# Patient Record
Sex: MALE | Race: WHITE | Employment: OTHER | URBAN - METROPOLITAN AREA
[De-identification: names, ages, dates, MRNs, and addresses within clinical notes are randomized per-mention and may not be internally consistent; named-entity substitution may affect disease eponyms.]

---

## 2017-01-10 ENCOUNTER — TELEPHONE (OUTPATIENT)
Dept: ONCOLOGY | Facility: CLINIC | Age: 77
End: 2017-01-10

## 2017-01-10 NOTE — TELEPHONE ENCOUNTER
Per Patient's request,  completed and faxed [employer] request for lodging dates 1/10/2017-1/11/2017. [employer] will contact Patient for confirmation of reservation.  will continue to provide support as needed.    Soo Yeon Han, LGSW  Pager:  [phone]

## 2017-01-10 NOTE — TELEPHONE ENCOUNTER
Per Patient's request,  completed and faxed [employer] request for lodging dates 1/10/2017-1/11/2017. [employer] will contact Patient for confirmation of reservation.  will continue to provide support as needed.    Soo Yeon Han, LGSW  Pager:  [phone]

## 2017-01-11 ENCOUNTER — APPOINTMENT (OUTPATIENT)
Dept: LAB | Facility: CLINIC | Age: 77
End: 2017-01-11
Attending: INTERNAL MEDICINE
Payer: MEDICARE

## 2017-01-11 ENCOUNTER — ONCOLOGY VISIT (OUTPATIENT)
Dept: ONCOLOGY | Facility: CLINIC | Age: 77
End: 2017-01-11
Attending: INTERNAL MEDICINE
Payer: MEDICARE

## 2017-01-11 VITALS
TEMPERATURE: 97.8 F | RESPIRATION RATE: 18 BRPM | OXYGEN SATURATION: 100 % | HEART RATE: 68 BPM | WEIGHT: 166.6 LBS | BODY MASS INDEX: 24.68 KG/M2 | SYSTOLIC BLOOD PRESSURE: 143 MMHG | HEIGHT: 69 IN | DIASTOLIC BLOOD PRESSURE: 74 MMHG

## 2017-01-11 DIAGNOSIS — Z79.899 ENCOUNTER FOR LONG-TERM (CURRENT) USE OF MEDICATIONS: ICD-10-CM

## 2017-01-11 DIAGNOSIS — C34.11 MALIGNANT NEOPLASM OF UPPER LOBE OF RIGHT LUNG (H): ICD-10-CM

## 2017-01-11 LAB
ALBUMIN SERPL-MCNC: 3.6 G/DL (ref 3.4–5)
ALP SERPL-CCNC: 103 U/L (ref 40–150)
ALT SERPL W P-5'-P-CCNC: 25 U/L (ref 0–70)
ANION GAP SERPL CALCULATED.3IONS-SCNC: 6 MMOL/L (ref 3–14)
AST SERPL W P-5'-P-CCNC: 24 U/L (ref 0–45)
BASOPHILS # BLD AUTO: 0.1 10E9/L (ref 0–0.2)
BASOPHILS NFR BLD AUTO: 0.8 %
BILIRUB SERPL-MCNC: 1.1 MG/DL (ref 0.2–1.3)
BUN SERPL-MCNC: 20 MG/DL (ref 7–30)
CALCIUM SERPL-MCNC: 8.8 MG/DL (ref 8.5–10.1)
CHLORIDE SERPL-SCNC: 111 MMOL/L (ref 94–109)
CO2 SERPL-SCNC: 27 MMOL/L (ref 20–32)
CREAT SERPL-MCNC: 1.59 MG/DL (ref 0.66–1.25)
DIFFERENTIAL METHOD BLD: NORMAL
EOSINOPHIL # BLD AUTO: 0.1 10E9/L (ref 0–0.7)
EOSINOPHIL NFR BLD AUTO: 2.1 %
ERYTHROCYTE [DISTWIDTH] IN BLOOD BY AUTOMATED COUNT: 12.8 % (ref 10–15)
GFR SERPL CREATININE-BSD FRML MDRD: 42 ML/MIN/1.7M2
GLUCOSE SERPL-MCNC: 70 MG/DL (ref 70–99)
HCT VFR BLD AUTO: 49.4 % (ref 40–53)
HGB BLD-MCNC: 15.6 G/DL (ref 13.3–17.7)
IMM GRANULOCYTES # BLD: 0 10E9/L (ref 0–0.4)
IMM GRANULOCYTES NFR BLD: 0.3 %
LYMPHOCYTES # BLD AUTO: 1.5 10E9/L (ref 0.8–5.3)
LYMPHOCYTES NFR BLD AUTO: 24.7 %
MAGNESIUM SERPL-MCNC: 2.3 MG/DL (ref 1.6–2.3)
MCH RBC QN AUTO: 31 PG (ref 26.5–33)
MCHC RBC AUTO-ENTMCNC: 31.6 G/DL (ref 31.5–36.5)
MCV RBC AUTO: 98 FL (ref 78–100)
MONOCYTES # BLD AUTO: 0.7 10E9/L (ref 0–1.3)
MONOCYTES NFR BLD AUTO: 11.1 %
NEUTROPHILS # BLD AUTO: 3.8 10E9/L (ref 1.6–8.3)
NEUTROPHILS NFR BLD AUTO: 61 %
NRBC # BLD AUTO: 0 10*3/UL
NRBC BLD AUTO-RTO: 0 /100
PHOSPHATE SERPL-MCNC: 2 MG/DL (ref 2.5–4.5)
PLATELET # BLD AUTO: 185 10E9/L (ref 150–450)
POTASSIUM SERPL-SCNC: 5.1 MMOL/L (ref 3.4–5.3)
PROT SERPL-MCNC: 6.5 G/DL (ref 6.8–8.8)
RBC # BLD AUTO: 5.04 10E12/L (ref 4.4–5.9)
SODIUM SERPL-SCNC: 144 MMOL/L (ref 133–144)
WBC # BLD AUTO: 6.2 10E9/L (ref 4–11)

## 2017-01-11 PROCEDURE — 99212 OFFICE O/P EST SF 10 MIN: CPT | Mod: ZF

## 2017-01-11 PROCEDURE — 85025 COMPLETE CBC W/AUTO DIFF WBC: CPT | Performed by: INTERNAL MEDICINE

## 2017-01-11 PROCEDURE — 99215 OFFICE O/P EST HI 40 MIN: CPT | Mod: ZP | Performed by: INTERNAL MEDICINE

## 2017-01-11 PROCEDURE — 83735 ASSAY OF MAGNESIUM: CPT | Performed by: INTERNAL MEDICINE

## 2017-01-11 PROCEDURE — 80053 COMPREHEN METABOLIC PANEL: CPT | Performed by: INTERNAL MEDICINE

## 2017-01-11 PROCEDURE — 84100 ASSAY OF PHOSPHORUS: CPT | Performed by: INTERNAL MEDICINE

## 2017-01-11 PROCEDURE — 36592 COLLECT BLOOD FROM PICC: CPT

## 2017-01-11 RX ORDER — NITROGLYCERIN 0.4 MG/1
TABLET SUBLINGUAL
COMMUNITY
Start: 2009-08-07 | End: 2017-07-12

## 2017-01-11 RX ORDER — MOXIFLOXACIN 5 MG/ML
SOLUTION/ DROPS OPHTHALMIC
COMMUNITY
Start: 2015-03-26 | End: 2017-07-12

## 2017-01-11 RX ORDER — DIPHENOXYLATE HYDROCHLORIDE AND ATROPINE SULFATE 2.5; .025 MG/1; MG/1
TABLET ORAL
COMMUNITY
End: 2017-03-08

## 2017-01-11 RX ORDER — KETOROLAC TROMETHAMINE 5 MG/ML
SOLUTION OPHTHALMIC
COMMUNITY
Start: 2015-03-26 | End: 2017-07-12

## 2017-01-11 RX ORDER — PREDNISOLONE ACETATE 10 MG/ML
SUSPENSION/ DROPS OPHTHALMIC
COMMUNITY
Start: 2015-03-26 | End: 2017-07-12

## 2017-01-11 RX ORDER — SIMVASTATIN 80 MG
TABLET ORAL
COMMUNITY
Start: 2015-11-03

## 2017-01-11 ASSESSMENT — PAIN SCALES - GENERAL: PAINLEVEL: NO PAIN (0)

## 2017-01-11 NOTE — MR AVS SNAPSHOT
After Visit Summary   1/11/2017    Gilson Curtis    MRN: [id number]           Patient Information     Date Of Birth          1940        Visit Information        Provider Department      1/11/2017 10:15 AM Tim Haywood MD [facility]        Today's Diagnoses     Malignant neoplasm of upper lobe of right lung (H)         Encounter for long-term (current) use of medications            Follow-ups after your visit        Your next 10 appointments already scheduled     Mar 06, 2017 10:20 AM   LAB with LAB FIRST FLOOR [facility] ([facility])    [address] [place] [id number] [phone]           Patient must bring picture ID.  Patient should be prepared to give a urine specimen  Please do not eat 10-12 hours before your appointment if you are coming in fasting for labs on lipids, cholesterol, or glucose (sugar).  Pregnant women should follow their Care Team instructions. Water with medications is okay. Do not drink coffee or other fluids.   If you have concerns about taking  your medications, please ask at office or if scheduling via [employer], send a message by clicking on Secure Messaging, Message Your Care Team.            Mar 06, 2017 11:00 AM   CT CHEST W CONTRAST with MGCT1   [facility]    [address] [place] [id number] [phone]           Please bring any scans or X-rays taken at other hospitals, if similar tests were done. Also bring a list of your medicines, including vitamins, minerals and over-the-counter drugs. It is safest to leave personal items at home.  Be sure to tell your doctor:   If you have any allergies.   If there s any chance you are pregnant.   If you are breastfeeding.   If you have any special needs.  You will have contrast for this exam. To prepare:   Do not eat or drink for 2 hours before your exam. If you need  to take medicine, you may take it with small sips of water. (We may ask you to take liquid medicine as well.)   The day before your exam, drink extra fluids at least six 8-ounce glasses (unless your doctor tells you to restrict your fluids).  Patients over 70 or patients with diabetes or kidney problems:   If you haven t had a blood test (creatinine test) within the last 30 days, go to your clinic or Diagnostic Imaging Department for this test.  If you have diabetes:   If your kidney function is normal, continue taking your metformin (Avandamet, Glucophage, Glucovance, Metaglip) on the day of your exam.   If your kidney function is abnormal, wait 48 hours before restarting this medicine.  Please wear loose clothing, such as a sweat suit or jogging clothes. Avoid snaps, zippers and other metal. We may ask you to undress and put on a hospital gown.  If you have any questions, please call the Imaging Department where you will have your exam.            Mar 08, 2017  8:45 AM   (Arrive by 8:30 AM)   Return Visit with Tim Haywood MD   [facility] ([facility])    [address] [address] [phone]              Future tests that were ordered for you today     Open Future Orders        Priority Expected Expires Ordered    CT Chest w contrast Routine  1/11/2018 1/11/2017            Who to contact     If you have questions or need follow up information about today's clinic visit or your schedule please contact [facility] directly at [phone].  Normal or non-critical lab and imaging results will be communicated to you by MyChart, letter or phone within 4 business days after the clinic has received the results. If you do not hear from us within 7 days, please contact the clinic through MyChart or phone. If you have a critical or abnormal lab result, we will notify you by phone as soon as possible.  Submit refill  "requests through [employer] or call your pharmacy and they will forward the refill request to us. Please allow 3 business days for your refill to be completed.          Additional Information About Your Visit        [employer] Information     [employer] gives you secure access to your electronic health record. If you see a primary care provider, you can also send messages to your care team and make appointments. If you have questions, please call your primary care clinic.  If you do not have a primary care provider, please call [phone] and they will assist you.        Care EveryWhere ID     This is your Care EveryWhere ID. This could be used by other organizations to access your [facility] medical records  [id number]        Your Vitals Were     Pulse Temperature Respirations Height BMI (Body Mass Index) Pulse Oximetry    68 97.8  F (36.6  C) (Oral) 18 1.753 m (5' 9.02\") 24.59 kg/m2 100%       Blood Pressure from Last 3 Encounters:   01/11/17 143/74   10/12/16 158/75   08/15/16 127/74    Weight from Last 3 Encounters:   01/11/17 75.569 kg (166 lb 9.6 oz)   10/12/16 77.338 kg (170 lb 8 oz)   08/15/16 75.978 kg (167 lb 8 oz)              We Performed the Following     CBC with platelets differential     Comprehensive metabolic panel     Magnesium     Phosphorus          Today's Medication Changes          These changes are accurate as of: 1/11/17 11:26 AM.  If you have any questions, ask your nurse or doctor.               These medicines have changed or have updated prescriptions.        Dose/Directions    simvastatin 80 MG tablet   Commonly known as:  ZOCOR   This may have changed:  Another medication with the same name was removed. Continue taking this medication, and follow the directions you see here.   Changed by:  Tim Haywood MD        Refills:  0                Primary Care Provider Office Phone # Fax #    Hemant Jiménez [phone]       [address] " [address]        Thank you!     Thank you for choosing [facility]  for your care. Our goal is always to provide you with excellent care. Hearing back from our patients is one way we can continue to improve our services. Please take a few minutes to complete the written survey that you may receive in the mail after your visit with us. Thank you!             Your Updated Medication List - Protect others around you: Learn how to safely use, store and throw away your medicines at www.disposemymeds.org.          This list is accurate as of: 1/11/17 11:26 AM.  Always use your most recent med list.                   Brand Name Dispense Instructions for use    aspirin 81 MG tablet      Take 1 tablet by mouth At Bedtime       calcium carbonate 500 MG chewable tablet    TUMS     Take 1 chew tab by mouth as needed for heartburn       clindamycin 1 % topical gel    CLINDAMAX    60 g    Apply topically 2 times daily       erlotinib 150 MG tablet CHEMO    TARCEVA    30 tablet    Take 1 tablet (150 mg) by mouth daily Take on an empty stomach one hour before or two hours after a meal. Avoid grapefruit and grapefruit juice.       GABAPENTIN PO      Take 150 mg by mouth At Bedtime       hydrocortisone 1 % ointment      Apply topically 2 times daily       ketorolac 0.5 % ophthalmic solution    ACULAR         lisinopril 2.5 MG tablet    PRINIVIL/Zestril     Take 2.5 mg by mouth daily.       * MATURE ADULT CENTURY PO      Take 1 tablet by mouth daily OTC       * EYE VITAMINS PO      Take 1 capsule by mouth daily OTC       MULTI-VITAMINS Tabs          nitroglycerin 0.4 MG sublingual tablet    NITROSTAT         prednisoLONE acetate 1 % ophthalmic susp    PRED FORTE         ranitidine 150 MG capsule    ZANTAC     Take 150 mg by mouth 2 times daily       simvastatin 80 MG tablet    ZOCOR         VIGAMOX 0.5 % ophthalmic solution   Generic drug:  moxifloxacin          * Notice:  This list has 2 medication(s) that are the same  as other medications prescribed for you. Read the directions carefully, and ask your doctor or other care provider to review them with you.

## 2017-01-11 NOTE — Clinical Note
1/11/2017       RE: Gilson Curtis  [address] [address]     Dear Colleague,    Thank you for referring your patient, Gilson Curtis, to the [facility]. Please see a copy of my visit note below.    CHIEF COMPLAINT:  Stage IV non-small cell lung cancer.      HISTORY OF PRESENT ILLNESS:  The patient returns to clinic today for further therapy and monitoring.  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  He has been on Tarceva for the better part of    9 months.  CT scan today shows no change.  The previously resected rib shows no evidence of cancer in that area.  The nodule in the right upper lobe is unchanged at 6.8 mm.  I have reviewed the CT myself and concur with the radiologist's findings.      The patient continues to have a facial rash.  He wants to put the cortisone cream just on his nose at this time since he feels it makes his rash worse.  I said that is fine.  We did talk about some of the newer EGFR inhibitors which have less rash and will likely be available for first-line therapy later this year.  The patient has no diarrhea at this time.       REVIEW OF SYSTEMS:  A 10-point review of systems is positive for drug-induced rash as described above.  The patient uses hydrocortisone cream as needed for this.       IMAGING:  I have reviewed the CT myself and concur with the radiologist's findings.  There is no significant change.  There is no evidence of tumor in the right 8th rib.       PHYSICAL EXAMINATION:  Vital signs are stable.  The patient is afebrile.  Oxygenation is 100% on room air.  No further exam was carried out.  Erythematous rash on cheeks and nose.  No exudate.     LABORATORY:  Laboratory tests are pending at this time.  Creatinine is 1.7 which is stable.      ASSESSMENT AND PLAN:   1.  Recurrent non-small cell lung cancer:  The patient is doing well on erlotinib.  We will continue this treatment.  There is some hazy infiltrate at the bases of  both lungs.  It is very minimal and I have reviewed it myself.  It possibly is early pneumonitis and so I would like to see the patient back with an earlier CT scan in 6-8 weeks.  I will evaluate him myself at that time.  Otherwise, the patient is doing extremely well on the Tarceva and will continue this treatment.  If the Tarceva fails, second-line treatment with Tagrisso or possibly afatinib which has better effects on exon 21-deleted tumors would be a possibility.    2.  Drug-induced rash:  The patient's rash is not significantly improved.  He would like to stop the hydrocortisone cream and will just put it on his nose.  We did discuss that some of the newer agents have less rash and they might be available later this year for use in the first line.       cc:  ARMIDA Johnson, P           Again, thank you for allowing me to participate in the care of your patient.      Sincerely,    Tim Haywood MD

## 2017-01-11 NOTE — PROGRESS NOTES
CHIEF COMPLAINT:  Stage IV non-small cell lung cancer.      HISTORY OF PRESENT ILLNESS:  The patient returns to clinic today for further therapy and monitoring.  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  He has been on Tarceva for the better part of    9 months.  CT scan today shows no change.  The previously resected rib shows no evidence of cancer in that area.  The nodule in the right upper lobe is unchanged at 6.8 mm.  I have reviewed the CT myself and concur with the radiologist's findings.      The patient continues to have a facial rash.  He wants to put the cortisone cream just on his nose at this time since he feels it makes his rash worse.  I said that is fine.  We did talk about some of the newer EGFR inhibitors which have less rash and will likely be available for first-line therapy later this year.  The patient has no diarrhea at this time.       REVIEW OF SYSTEMS:  A 10-point review of systems is positive for drug-induced rash as described above.  The patient uses hydrocortisone cream as needed for this.       IMAGING:  I have reviewed the CT myself and concur with the radiologist's findings.  There is no significant change.  There is no evidence of tumor in the right 8th rib.       PHYSICAL EXAMINATION:  Vital signs are stable.  The patient is afebrile.  Oxygenation is 100% on room air.  No further exam was carried out.  Erythematous rash on cheeks and nose.  No exudate.     LABORATORY:  Laboratory tests are pending at this time.  Creatinine is 1.7 which is stable.      ASSESSMENT AND PLAN:   1.  Recurrent non-small cell lung cancer:  The patient is doing well on erlotinib.  We will continue this treatment.  There is some hazy infiltrate at the bases of both lungs.  It is very minimal and I have reviewed it myself.  It possibly is early pneumonitis and so I would like to see the patient back with an earlier CT scan in 6-8 weeks.  I will evaluate him myself at that time.  Otherwise, the patient  is doing extremely well on the Tarceva and will continue this treatment.  If the Tarceva fails, second-line treatment with Tagrisso or possibly afatinib which has better effects on exon 21-deleted tumors would be a possibility.    2.  Drug-induced rash:  The patient's rash is not significantly improved.  He would like to stop the hydrocortisone cream and will just put it on his nose.  We did discuss that some of the newer agents have less rash and they might be available later this year for use in the first line.       cc:  ARMIDA Johnson, P

## 2017-01-11 NOTE — NURSING NOTE
"Gilson Curtis is a 76 year old male who presents for:  Chief Complaint   Patient presents with     Blood Draw     blood collected by RN in placed unused IV from CT, shanta extra red gel just in case, vitals taken and pt checked in for next appt.     Oncology Clinic Visit     return patient visit related to NSCL CA         Initial Vitals:  /74 mmHg  Pulse 68  Temp(Src) 97.8  F (36.6  C) (Oral)  Resp 18  Ht 1.753 m (5' 9.02\")  Wt 75.569 kg (166 lb 9.6 oz)  BMI 24.59 kg/m2  SpO2 100% Estimated body mass index is 24.59 kg/(m^2) as calculated from the following:    Height as of this encounter: 1.753 m (5' 9.02\").    Weight as of this encounter: 75.569 kg (166 lb 9.6 oz).. Body surface area is 1.92 meters squared. BP completed using cuff size: NA (Not Taken)  No Pain (0) No LMP for male patient. Allergies and medications reviewed.     Medications: Medication refills not needed today.  Pharmacy name entered into [employer]: [facility] [address] [place] [address]    Comments: patient denied pain/discomfort.    5 minutes for nursing intake (face to face time)   Jair Newsome CMA          "

## 2017-01-16 ENCOUNTER — TELEPHONE (OUTPATIENT)
Dept: ONCOLOGY | Facility: CLINIC | Age: 77
End: 2017-01-16

## 2017-01-16 DIAGNOSIS — C34.91 NON-SMALL CELL LUNG CANCER, RIGHT (H): Primary | ICD-10-CM

## 2017-01-16 RX ORDER — ERLOTINIB HYDROCHLORIDE 150 MG/1
150 TABLET, FILM COATED ORAL DAILY
Qty: 30 TABLET | Refills: 0 | Status: SHIPPED
Start: 2017-01-16 | End: 2017-02-15

## 2017-01-16 NOTE — TELEPHONE ENCOUNTER
Oral Chemotherapy Monitoring Program    Primary Oncologist: Dr. Haywood  Primary Oncology Clinic: [facility]  Cancer Diagnosis: Lung Cancer    Therapy History:Tarceva 150mg (1 x 150mg) QDaily, Continuously   Start Date: 5/11/16      Lab Monitoring Plan  Monitoring plan:     C1D1+     CMP   C2D1+   Call, CMP, Mg, Phos   C3D1+   Call, CMP, Mg, Phos   C4D1+   Call, CMP, Mg, Phos   C5D1+   Call, CMP, Mg, Phos   C6D1+   Call, CMP, Mg, Phos     C1D8+      C2D8+      C3D8+      C4D8+      C5D8+      C6D8+        C1D15+   Call   C2D15+      C3D15+      C4D15+      C5D15+      C6D15+        C1D22+      C2D22+      C3D22+      C4D22+      C5D22+      C6D22+            Drug Interaction Assessment: No new drug interactions.                   Subjective/Objective:  Gilson Curtis is a 76 year old male contacted by phone for a follow-up visit for oral chemotherapy. Gilson reports he is feeling well. Reports the small rash on face and around nose has not improved and will use Aveno cream and stop hydrocortisone cream. He denies any diarrhea, constipation, nausea, vomiting, fever, and fatigue. We discussed timing of medication for administration on an empty stomach. He reports no missed doses and his MPR is 1.06. He has about a 3 week supply today.     ORAL CHEMOTHERAPY 6/3/2016 6/9/2016 7/8/2016 7/26/2016 9/6/2016 11/9/2016 1/16/2017   Drug Name Tarceva (Erlotinib) Tarceva (Erlotinib) Tarceva (Erlotinib) Tarceva (Erlotinib) Tarceva (Erlotinib) Tarceva (Erlotinib) Tarceva (Erlotinib)   Current Dosage 150mg 150mg 150mg 150mg 1500mg 150mg 150mg   Current Schedule Daily Daily Daily Daily Daily Daily Daily   Cycle Details Continuous Continuous Continuous Continuous Continuous Continuous Continuous   Start Date of Last Cycle - 5/11/2016 6/11/2016 7/11/2016 8/12/2016 10/10/2016 -   Planned next cycle start date - - - - - 11/10/2016 -   Doses missed in last 2 weeks 0 0 0 0 0 0 0   Adherence Assessment Adherent Adherent  "Adherent Adherent Adherent Adherent Adherent   Adherence interventions recommended none none none none none none none   Adverse Effects Rash Rash Rash Rash Rash EGFR rash EGFR rash   EGFR rash - - - - - Grade 1 Grade 1   Pharmacist Intervention(EGFR) - - - - - No No   Home BPs not needed not needed not needed not needed all BPs<140/90 not needed Not applicable   Any new drug interactions? - - - - - No No   Pharmacist Intervention - - - - - No No   Is the patient currently in pain? - - - - - No No       Last PHQ-2 Score on record:   PHQ-2 ( 1999 [employer]) 1/11/2017 10/12/2016   Q1: Little interest or pleasure in doing things 0 0   Q2: Feeling down, depressed or hopeless 0 0   PHQ-2 Score 0 0       Patient does not report depression symptoms.      Vitals:  BP:   BP Readings from Last 1 Encounters:   01/11/17 143/74     Wt Readings from Last 1 Encounters:   01/11/17 75.569 kg (166 lb 9.6 oz)     Estimated body surface area is 1.92 meters squared as calculated from the following:    Height as of 1/11/17: 1.753 m (5' 9.02\").    Weight as of 1/11/17: 75.569 kg (166 lb 9.6 oz).    Labs:  NA      144   1/11/2017   POTASSIUM      5.1   1/11/2017  RICK      8.8   1/11/2017  ALBUMIN      3.6   1/11/2017  MAG      2.3   1/11/2017  PHOS      2.0   1/11/2017  BUN       20   1/11/2017  CR     1.59   1/11/2017    AST       24   1/11/2017  ALT       25   1/11/2017  BILITOTAL      1.1   1/11/2017    WBC      6.2   1/11/2017  HGB     15.6   1/11/2017  PLT      185   1/11/2017  PLT      228   12/15/2016  ANEU      3.8   1/11/2017        Assessment:  Rash is stable and Gilson agrees with current treatment plan.    Plan:  Continue Tarceva 150mg po once daily.     Follow-Up:  Next appointment with Dr. Haywood 3/8/2017  Next labs and radiology 3/6/2017  Oral chemotherapy management in 4 weeks    Refill Due:  Next Tarceva rx release 1/16/2017  Fills at site 19      Jj Hassan, PharmD  Therapy Management Pharmacist  [facility]    Phone: [phone]

## 2017-02-08 ENCOUNTER — TELEPHONE (OUTPATIENT)
Dept: ONCOLOGY | Facility: CLINIC | Age: 77
End: 2017-02-08

## 2017-02-08 NOTE — TELEPHONE ENCOUNTER
Oral Chemotherapy Monitoring Program    Placed call to patient to set up Monthly labs.     Gilson notified me that he is leaving town early on a bus tour tomorrow morning (2/9/17) and will not be coming back till the 24th of February. He will schedule local labs on 2/27/16. Gilson has a history of high potassium levels therefore I went over foods high in potassium that he may want to limit in his diet.    Manuel Kay  Pharmacy Intern  [facility]

## 2017-02-13 DIAGNOSIS — C34.91 NON-SMALL CELL LUNG CANCER, RIGHT (H): Primary | ICD-10-CM

## 2017-02-13 RX ORDER — ERLOTINIB HYDROCHLORIDE 150 MG/1
150 TABLET, FILM COATED ORAL DAILY
Qty: 30 TABLET | Refills: 0 | Status: SHIPPED
Start: 2017-02-13 | End: 2017-03-15

## 2017-03-01 ENCOUNTER — TELEPHONE (OUTPATIENT)
Dept: ONCOLOGY | Facility: CLINIC | Age: 77
End: 2017-03-01

## 2017-03-06 ENCOUNTER — RADIANT APPOINTMENT (OUTPATIENT)
Dept: CT IMAGING | Facility: CLINIC | Age: 77
End: 2017-03-06
Attending: INTERNAL MEDICINE
Payer: COMMERCIAL

## 2017-03-06 DIAGNOSIS — C34.11 MALIGNANT NEOPLASM OF UPPER LOBE OF RIGHT LUNG (H): ICD-10-CM

## 2017-03-06 DIAGNOSIS — Z79.899 ENCOUNTER FOR LONG-TERM (CURRENT) USE OF MEDICATIONS: ICD-10-CM

## 2017-03-06 LAB
ALBUMIN SERPL-MCNC: 3.7 G/DL (ref 3.4–5)
ALP SERPL-CCNC: 109 U/L (ref 40–150)
ALT SERPL W P-5'-P-CCNC: 27 U/L (ref 0–70)
ANION GAP SERPL CALCULATED.3IONS-SCNC: 5 MMOL/L (ref 3–14)
AST SERPL W P-5'-P-CCNC: 20 U/L (ref 0–45)
BASOPHILS # BLD AUTO: 0 10E9/L (ref 0–0.2)
BASOPHILS NFR BLD AUTO: 0.6 %
BILIRUB SERPL-MCNC: 0.9 MG/DL (ref 0.2–1.3)
BUN SERPL-MCNC: 22 MG/DL (ref 7–30)
CALCIUM SERPL-MCNC: 8.9 MG/DL (ref 8.5–10.1)
CHLORIDE SERPL-SCNC: 110 MMOL/L (ref 94–109)
CO2 SERPL-SCNC: 28 MMOL/L (ref 20–32)
CREAT SERPL-MCNC: 1.78 MG/DL (ref 0.66–1.25)
DIFFERENTIAL METHOD BLD: NORMAL
EOSINOPHIL # BLD AUTO: 0.1 10E9/L (ref 0–0.7)
EOSINOPHIL NFR BLD AUTO: 2.1 %
ERYTHROCYTE [DISTWIDTH] IN BLOOD BY AUTOMATED COUNT: 13.7 % (ref 10–15)
GFR SERPL CREATININE-BSD FRML MDRD: 37 ML/MIN/1.7M2
GLUCOSE SERPL-MCNC: 106 MG/DL (ref 70–99)
HCT VFR BLD AUTO: 50 % (ref 40–53)
HGB BLD-MCNC: 16.5 G/DL (ref 13.3–17.7)
LYMPHOCYTES # BLD AUTO: 1.8 10E9/L (ref 0.8–5.3)
LYMPHOCYTES NFR BLD AUTO: 26.9 %
MAGNESIUM SERPL-MCNC: 2.1 MG/DL (ref 1.6–2.3)
MCH RBC QN AUTO: 32 PG (ref 26.5–33)
MCHC RBC AUTO-ENTMCNC: 33 G/DL (ref 31.5–36.5)
MCV RBC AUTO: 97 FL (ref 78–100)
MONOCYTES # BLD AUTO: 0.7 10E9/L (ref 0–1.3)
MONOCYTES NFR BLD AUTO: 11.1 %
NEUTROPHILS # BLD AUTO: 3.9 10E9/L (ref 1.6–8.3)
NEUTROPHILS NFR BLD AUTO: 59.3 %
PHOSPHATE SERPL-MCNC: 2.7 MG/DL (ref 2.5–4.5)
PLATELET # BLD AUTO: 171 10E9/L (ref 150–450)
POTASSIUM SERPL-SCNC: 4.8 MMOL/L (ref 3.4–5.3)
PROT SERPL-MCNC: 6.4 G/DL (ref 6.8–8.8)
RBC # BLD AUTO: 5.16 10E12/L (ref 4.4–5.9)
SODIUM SERPL-SCNC: 143 MMOL/L (ref 133–144)
WBC # BLD AUTO: 6.6 10E9/L (ref 4–11)

## 2017-03-06 PROCEDURE — 36415 COLL VENOUS BLD VENIPUNCTURE: CPT | Performed by: INTERNAL MEDICINE

## 2017-03-06 PROCEDURE — 71250 CT THORAX DX C-: CPT | Performed by: RADIOLOGY

## 2017-03-06 PROCEDURE — 84100 ASSAY OF PHOSPHORUS: CPT | Performed by: INTERNAL MEDICINE

## 2017-03-06 PROCEDURE — 83735 ASSAY OF MAGNESIUM: CPT | Performed by: INTERNAL MEDICINE

## 2017-03-06 PROCEDURE — 85025 COMPLETE CBC W/AUTO DIFF WBC: CPT | Performed by: INTERNAL MEDICINE

## 2017-03-06 PROCEDURE — 80053 COMPREHEN METABOLIC PANEL: CPT | Performed by: INTERNAL MEDICINE

## 2017-03-08 ENCOUNTER — ONCOLOGY VISIT (OUTPATIENT)
Dept: ONCOLOGY | Facility: CLINIC | Age: 77
End: 2017-03-08
Attending: INTERNAL MEDICINE
Payer: COMMERCIAL

## 2017-03-08 VITALS
HEART RATE: 73 BPM | TEMPERATURE: 97.5 F | DIASTOLIC BLOOD PRESSURE: 79 MMHG | OXYGEN SATURATION: 96 % | HEIGHT: 69 IN | WEIGHT: 167.6 LBS | BODY MASS INDEX: 24.82 KG/M2 | SYSTOLIC BLOOD PRESSURE: 146 MMHG

## 2017-03-08 DIAGNOSIS — C34.91 MALIGNANT NEOPLASM OF RIGHT LUNG, UNSPECIFIED PART OF LUNG (H): Primary | ICD-10-CM

## 2017-03-08 PROCEDURE — 99213 OFFICE O/P EST LOW 20 MIN: CPT | Mod: ZP | Performed by: INTERNAL MEDICINE

## 2017-03-08 PROCEDURE — 40000114 ZZH STATISTIC NO CHARGE CLINIC VISIT

## 2017-03-08 NOTE — PROGRESS NOTES
"Medical Oncology Follow Up Note    CHIEF COMPLAINT:  Stage IV non-small cell lung cancer.      HISTORY OF PRESENT ILLNESS:  The patient returns to clinic today for further therapy and monitoring.  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  He has been on Tarceva for nearly a year and is tolerating it quite well. He returns earlier to follow up interstitial changes on his prior CT scan. These change are all but resolved and his primary lesion remains stable at approximately 7mm.      The patient continues to have a facial rash but it is better recently than normal. He and his wife recently returned from a trip to [place], he is asking about pistachio interactions with his medication. The patient has no diarrhea at this time.  Overall he otherwise feels very well.     REVIEW OF SYSTEMS:  A 10-point review of systems is positive for drug-induced rash as described above.       PHYSICAL EXAMINATION:  /79  Pulse 73  Temp 97.5  F (36.4  C) (Oral)  Ht 1.753 m (5' 9.02\")  Wt 76 kg (167 lb 9.6 oz)  SpO2 96%  BMI 24.74 kg/m2  Gen: well appearing male in no acute distress  HEENT: Erythematous rash on his nose and cheeks, forehead is normal  Lymph: no palpable cervical, axillary, or inguinal lymphadenopathy  Lungs: CTA B/L, no wheezing  Heart: RRR, no murmurs  Abd: soft, nontender, no hepatosplenomegaly  Ext: no edema     LABORATORY:    CBC RESULTS:   Recent Labs   Lab Test  03/06/17   1000   WBC  6.6   RBC  5.16   HGB  16.5   HCT  50.0   MCV  97   MCH  32.0   MCHC  33.0   RDW  13.7   PLT  171     Recent Labs   Lab Test  03/06/17   1000  01/11/17   1000   NA  143  144   POTASSIUM  4.8  5.1   CHLORIDE  110*  111*   CO2  28  27   ANIONGAP  5  6   GLC  106*  70   BUN  22  20   CR  1.78*  1.59*   RICK  8.9  8.8     IMAGING:  CT scan 03/06/2017 has been reviewed.    ASSESSMENT:   1.  Recurrent non-small cell lung cancer: He is doing very well on erlotinib. HE has a slight rash on his face with occasional worsening but " overall is doing well. His CT scan shows resolution of hazy infiltrates and stable disease. We will plan to see him back in 3-4 months with repeat imaging at that time.  If the Tarceva fails, second-line treatment with Tagrisso or possibly afatinib which has better effects on exon 21-deleted tumors would be a possibility.    2.  Drug-induced rash:  The patient's rash is slightly better, but overall remains there. Not terribly bothersome for him.    PLAN:  1) Continue erlotinib  2) Repeat CT scan in 3-4 months  3) RTC after CT scan to see Dr. Haywood.     Patient seen and discussed with Dr. Haywood.    Ayan Sanchez MD  Heme/Onc Fellow    I have independently seen and examined this patient and my assessment is in agreement with the above note.  I have reviewed all tests and past medical history and my evaluation agrees with the findings in the note.    I spoke to the patient for 20 minutes regarding plans for therapy and monitoring.  I have reviewed the CT myself and concur with the radiologist's findings.  Previously seen GGO has resolved.    Patient will stay on Tarceva and have follow up CT in 3 months.

## 2017-03-08 NOTE — MR AVS SNAPSHOT
After Visit Summary   3/8/2017    Gilson Curtis    MRN: [id number]           Patient Information     Date Of Birth          1940        Visit Information        Provider Department      3/8/2017 8:45 AM Tim Haywood MD [facility]        Today's Diagnoses     Malignant neoplasm of right lung, unspecified part of lung (H)    -  1       Follow-ups after your visit        Your next 10 appointments already scheduled     Jul 11, 2017 10:20 AM CDT   LAB with LAB FIRST FLOOR [facility])    [address] [address] [place] [id number] [phone]           Patient must bring picture ID.  Patient should be prepared to give a urine specimen  Please do not eat 10-12 hours before your appointment if you are coming in fasting for labs on lipids, cholesterol, or glucose (sugar).  Pregnant women should follow their Care Team instructions. Water with medications is okay. Do not drink coffee or other fluids.   If you have concerns about taking  your medications, please ask at office or if scheduling via [employer], send a message by clicking on Secure Messaging, Message Your Care Team.            Jul 11, 2017 11:00 AM CDT   CT CHEST W CONTRAST with MGCT1   [facility])    [address] [address] [place] [id number] [phone]           Please bring any scans or X-rays taken at other hospitals, if similar tests were done. Also bring a list of your medicines, including vitamins, minerals and over-the-counter drugs. It is safest to leave personal items at home.  Be sure to tell your doctor:   If you have any allergies.   If there s any chance you are pregnant.   If you are breastfeeding.   If you have any special needs.  You will have contrast for this exam. To prepare:   Do not eat or drink for 2 hours before your exam. If you need to take medicine, you may take it with  small sips of water. (We may ask you to take liquid medicine as well.)   The day before your exam, drink extra fluids at least six 8-ounce glasses (unless your doctor tells you to restrict your fluids).  Patients over 70 or patients with diabetes or kidney problems:   If you haven t had a blood test (creatinine test) within the last 30 days, go to your clinic or Diagnostic Imaging Department for this test.  If you have diabetes:   If your kidney function is normal, continue taking your metformin (Avandamet, Glucophage, Glucovance, Metaglip) on the day of your exam.   If your kidney function is abnormal, wait 48 hours before restarting this medicine.  Please wear loose clothing, such as a sweat suit or jogging clothes. Avoid snaps, zippers and other metal. We may ask you to undress and put on a hospital gown.  If you have any questions, please call the Imaging Department where you will have your exam.            Jul 12, 2017  8:45 AM CDT   (Arrive by 8:30 AM)   Return Visit with Tim Haywood MD   [facility] ([facility])    [address] [place] [id number] [phone]              Future tests that were ordered for you today     Open Future Orders        Priority Expected Expires Ordered    CT Chest w contrast Routine  3/8/2018 3/8/2017            Who to contact     If you have questions or need follow up information about today's clinic visit or your schedule please contact [facility] directly at [phone].  Normal or non-critical lab and imaging results will be communicated to you by MyChart, letter or phone within 4 business days after the clinic has received the results. If you do not hear from us within 7 days, please contact the clinic through MyChart or phone. If you have a critical or abnormal lab result, we will notify you by phone as soon as possible.  Submit refill requests through [email or web address] or call your  "pharmacy and they will forward the refill request to us. Please allow 3 business days for your refill to be completed.          Additional Information About Your Visit        [employer] Information     [employer] gives you secure access to your electronic health record. If you see a primary care provider, you can also send messages to your care team and make appointments. If you have questions, please call your primary care clinic.  If you do not have a primary care provider, please call [phone] and they will assist you.        Care EveryWhere ID     This is your Care EveryWhere ID. This could be used by other organizations to access your [facility] medical records  [id number]        Your Vitals Were     Pulse Temperature Height Pulse Oximetry BMI (Body Mass Index)       73 97.5  F (36.4  C) (Oral) 1.753 m (5' 9.02\") 96% 24.74 kg/m2        Blood Pressure from Last 3 Encounters:   03/08/17 146/79   01/11/17 143/74   10/12/16 158/75    Weight from Last 3 Encounters:   03/08/17 76 kg (167 lb 9.6 oz)   01/11/17 75.6 kg (166 lb 9.6 oz)   10/12/16 77.3 kg (170 lb 8 oz)               Primary Care Provider Office Phone # Fax #    Hemant Jiménez [phone]       [address] [address]        Thank you!     Thank you for choosing [facility]  for your care. Our goal is always to provide you with excellent care. Hearing back from our patients is one way we can continue to improve our services. Please take a few minutes to complete the written survey that you may receive in the mail after your visit with us. Thank you!             Your Updated Medication List - Protect others around you: Learn how to safely use, store and throw away your medicines at www.disposemymeds.org.          This list is accurate as of: 3/8/17  9:17 AM.  Always use your most recent med list.                   Brand Name Dispense Instructions for use    aspirin 81 MG tablet      Take 1 tablet " by mouth At Bedtime       calcium carbonate 500 MG chewable tablet    TUMS     Take 1 chew tab by mouth as needed for heartburn       clindamycin 1 % topical gel    CLINDAMAX    60 g    Apply topically 2 times daily       erlotinib 150 MG tablet CHEMO    TARCEVA    30 tablet    Take 1 tablet (150 mg) by mouth daily Take on an empty stomach one hour before or two hours after a meal. Avoid grapefruit and grapefruit juice.       GABAPENTIN PO      Take 150 mg by mouth At Bedtime       hydrocortisone 1 % ointment      Apply topically 2 times daily       ketorolac 0.5 % ophthalmic solution    ACULAR     Reported on 3/8/2017       lisinopril 2.5 MG tablet    PRINIVIL/Zestril     Take 2.5 mg by mouth daily.       * MATURE ADULT CENTURY PO      Take 1 tablet by mouth daily OTC       * EYE VITAMINS PO      Take 1 capsule by mouth daily OTC       nitroglycerin 0.4 MG sublingual tablet    NITROSTAT     Reported on 3/8/2017       prednisoLONE acetate 1 % ophthalmic susp    PRED FORTE     Reported on 3/8/2017       ranitidine 150 MG capsule    ZANTAC     Take 150 mg by mouth 2 times daily       simvastatin 80 MG tablet    ZOCOR         VIGAMOX 0.5 % ophthalmic solution   Generic drug:  moxifloxacin      Reported on 3/8/2017       * Notice:  This list has 2 medication(s) that are the same as other medications prescribed for you. Read the directions carefully, and ask your doctor or other care provider to review them with you.

## 2017-03-08 NOTE — LETTER
"3/8/2017       RE: Gilson Curtis  [address] [facility] [address]     Dear Colleague,    Thank you for referring your patient, Gilson Curtis, to the [facility]. Please see a copy of my visit note below.    Medical Oncology Follow Up Note    CHIEF COMPLAINT:  Stage IV non-small cell lung cancer.      HISTORY OF PRESENT ILLNESS:  The patient returns to clinic today for further therapy and monitoring.  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  He has been on Tarceva for nearly a year and is tolerating it quite well. He returns earlier to follow up interstitial changes on his prior CT scan. These change are all but resolved and his primary lesion remains stable at approximately 7mm.      The patient continues to have a facial rash but it is better recently than normal. He and his wife recently returned from a trip to [place], he is asking about pistachio interactions with his medication. The patient has no diarrhea at this time.  Overall he otherwise feels very well.     REVIEW OF SYSTEMS:  A 10-point review of systems is positive for drug-induced rash as described above.       PHYSICAL EXAMINATION:  /79  Pulse 73  Temp 97.5  F (36.4  C) (Oral)  Ht 1.753 m (5' 9.02\")  Wt 76 kg (167 lb 9.6 oz)  SpO2 96%  BMI 24.74 kg/m2  Gen: well appearing male in no acute distress  HEENT: Erythematous rash on his nose and cheeks, forehead is normal  Lymph: no palpable cervical, axillary, or inguinal lymphadenopathy  Lungs: CTA B/L, no wheezing  Heart: RRR, no murmurs  Abd: soft, nontender, no hepatosplenomegaly  Ext: no edema     LABORATORY:    CBC RESULTS:   Recent Labs   Lab Test  03/06/17   1000   WBC  6.6   RBC  5.16   HGB  16.5   HCT  50.0   MCV  97   MCH  32.0   MCHC  33.0   RDW  13.7   PLT  171     Recent Labs   Lab Test  03/06/17   1000  01/11/17   1000   NA  143  144   POTASSIUM  4.8  5.1   CHLORIDE  110*  111*   CO2  28  27   ANIONGAP  5  6   GLC  106*  70   BUN  22  20   CR  " 1.78*  1.59*   RICK  8.9  8.8     IMAGING:  CT scan 03/06/2017 has been reviewed.    ASSESSMENT:   1.  Recurrent non-small cell lung cancer: He is doing very well on erlotinib. HE has a slight rash on his face with occasional worsening but overall is doing well. His CT scan shows resolution of hazy infiltrates and stable disease. We will plan to see him back in 3-4 months with repeat imaging at that time.  If the Tarceva fails, second-line treatment with Tagrisso or possibly afatinib which has better effects on exon 21-deleted tumors would be a possibility.    2.  Drug-induced rash:  The patient's rash is slightly better, but overall remains there. Not terribly bothersome for him.    PLAN:  1) Continue erlotinib  2) Repeat CT scan in 3-4 months  3) RTC after CT scan to see Dr. Haywood.     Patient seen and discussed with Dr. Haywood.    Ayan Sanchez MD  Heme/Onc Fellow    I have independently seen and examined this patient and my assessment is in agreement with the above note.  I have reviewed all tests and past medical history and my evaluation agrees with the findings in the note.    I spoke to the patient for 20 minutes regarding plans for therapy and monitoring.  I have reviewed the CT myself and concur with the radiologist's findings.  Previously seen GGO has resolved.    Patient will stay on Tarceva and have follow up CT in 3 months.    Again, thank you for allowing me to participate in the care of your patient.      Sincerely,    Tim Haywood MD

## 2017-04-03 DIAGNOSIS — C34.91 NON-SMALL CELL LUNG CANCER, RIGHT (H): Primary | ICD-10-CM

## 2017-04-03 RX ORDER — ERLOTINIB HYDROCHLORIDE 150 MG/1
150 TABLET, FILM COATED ORAL DAILY
Qty: 30 TABLET | Refills: 0 | Status: SHIPPED | OUTPATIENT
Start: 2017-04-03 | End: 2017-04-03

## 2017-04-03 RX ORDER — ERLOTINIB HYDROCHLORIDE 150 MG/1
150 TABLET, FILM COATED ORAL DAILY
Qty: 30 TABLET | Refills: 0 | Status: SHIPPED | OUTPATIENT
Start: 2017-04-03 | End: 2017-05-01

## 2017-04-04 ENCOUNTER — TELEPHONE (OUTPATIENT)
Dept: ONCOLOGY | Facility: CLINIC | Age: 77
End: 2017-04-04

## 2017-04-04 NOTE — TELEPHONE ENCOUNTER
Oral Chemotherapy Monitoring Program    Primary Oncologist: Dr. Haywood  Primary Oncology Clinic: [facility]  Cancer Diagnosis: Lung Cancer      Therapy History: Confirmed patient taking Tarceva 150mg (1 x 150mg) once daily, Continuously   Start Date: 5/11/16    Drug Interaction Assessment: No new drug interactions    Lab Monitoring Plan  Monitoring plan:     C1D1+     CMP   C2D1+   Call, CMP, Mg, Phos   C3D1+   Call, CMP, Mg, Phos   C4D1+   Call, CMP, Mg, Phos   C5D1+   Call, CMP, Mg, Phos   C6D1+   Call, CMP, Mg, Phos     C1D8+      C2D8+      C3D8+      C4D8+      C5D8+      C6D8+        C1D15+   Call   C2D15+      C3D15+      C4D15+      C5D15+      C6D15+        C1D22+      C2D22+      C3D22+      C4D22+      C5D22+      C6D22+            Subjective/Objective:  Gilson Curtis is a 77 year old male contacted by phone for a follow-up visit for oral chemotherapy.  Gilson reports he has had some mild cold symptoms over the past 4 to 5 days, reports no fever. Asked about drug interactions between Tarceva and Mucinex. No drug interaction, but recommended he increase fluids to 8 to 12 cups a day. He drinks about 40 oz of water a day. Also talked about adding honey and lemon to warm water or drinking tea. He reports the rash has improved since starting Aveno cream, but remains. No other questions or concerns.     ORAL CHEMOTHERAPY 7/8/2016 7/26/2016 9/6/2016 11/9/2016 1/16/2017 3/1/2017 4/4/2017   Drug Name Tarceva (Erlotinib) Tarceva (Erlotinib) Tarceva (Erlotinib) Tarceva (Erlotinib) Tarceva (Erlotinib) Tarceva (Erlotinib) Tarceva (Erlotinib)   Current Dosage 150mg 150mg 1500mg 150mg 150mg 150mg 150mg   Current Schedule Daily Daily Daily Daily Daily Daily Daily   Cycle Details Continuous Continuous Continuous Continuous Continuous Continuous Continuous   Start Date of Last Cycle 6/11/2016 7/11/2016 8/12/2016 10/10/2016 - - -   Planned next cycle start date - - - 11/10/2016 - - -   Doses missed in last 2  "weeks 0 0 0 0 0 0 0   Adherence Assessment - - - - - Adherent Adherent   Adverse Effects Rash Rash Rash EGFR rash EGFR rash EGFR rash EGFR rash   EGFR rash - - - Grade 1 Grade 1 Grade 1 Grade 1   Pharmacist Intervention(EGFR) - - - No No Yes No   Intervention(s) - - - - - Patient education -   Home BPs not needed not needed all BPs<140/90 not needed Not applicable not needed not needed   Any new drug interactions? - - - No No No No   Is the dose as ordered appropriate for the patient? - - - - - Yes Yes   Is the patient currently in pain? - - - No No No No   Has the patient been assessed within the past 6 months for depression? - - - - - - Yes   Has the patient missed any days of school, work, or other routine activity? - - - - - No No       Last PHQ-2 Score on record:   PHQ-2 ( 1999 [employer]) 1/11/2017 10/12/2016   Q1: Little interest or pleasure in doing things 0 0   Q2: Feeling down, depressed or hopeless 0 0   PHQ-2 Score 0 0       Patient does not report depression symptoms.      Vitals:  BP:   BP Readings from Last 1 Encounters:   03/08/17 146/79     Wt Readings from Last 1 Encounters:   03/08/17 76 kg (167 lb 9.6 oz)     Estimated body surface area is 1.92 meters squared as calculated from the following:    Height as of 3/8/17: 1.753 m (5' 9.02\").    Weight as of 3/8/17: 76 kg (167 lb 9.6 oz).    Labs:  Lab Results   Component Value Date     03/06/2017      Lab Results   Component Value Date    POTASSIUM 4.8 03/06/2017     Lab Results   Component Value Date    RICK 8.9 03/06/2017     Lab Results   Component Value Date    ALBUMIN 3.7 03/06/2017     Lab Results   Component Value Date    MAG 2.1 03/06/2017     Lab Results   Component Value Date    PHOS 2.7 03/06/2017     Lab Results   Component Value Date    BUN 22 03/06/2017     Lab Results   Component Value Date    CR 1.78 03/06/2017       Lab Results   Component Value Date    AST 20 03/06/2017     Lab Results   Component Value Date    ALT 27 03/06/2017 "     Lab Results   Component Value Date    BILITOTAL 0.9 03/06/2017       Lab Results   Component Value Date    WBC 6.6 03/06/2017     Lab Results   Component Value Date    HGB 16.5 03/06/2017     Lab Results   Component Value Date     03/06/2017     Lab Results   Component Value Date    ANEU 3.9 03/06/2017           Assessment:  Rash is stable and Gilson agrees with current treatment plan.      Plan:  Continue Tarceva 150mg po once daily.       Follow-Up:  Next appointment with Dr. Haywood 7/12/2017  Next labs and radiology 7/11/2017  Oral chemotherapy management in 4 weeks      Refill Due:  Last filled 4/4/17  Next Tarceva rx release 5/1/2017  Fills at [facility]      Thank you for the opportunity to participate in this patient's care,    Jj Hassan, PharmD  Therapy Management Oncology Pharmacist  [facility]   Phone: [phone]

## 2017-05-01 DIAGNOSIS — C34.91 NON-SMALL CELL LUNG CANCER, RIGHT (H): Primary | ICD-10-CM

## 2017-05-01 RX ORDER — ERLOTINIB HYDROCHLORIDE 150 MG/1
150 TABLET, FILM COATED ORAL DAILY
Qty: 30 TABLET | Refills: 0 | Status: SHIPPED | OUTPATIENT
Start: 2017-05-01 | End: 2017-05-31

## 2017-05-05 ENCOUNTER — TELEPHONE (OUTPATIENT)
Dept: ONCOLOGY | Facility: CLINIC | Age: 77
End: 2017-05-05

## 2017-05-05 NOTE — TELEPHONE ENCOUNTER
Oral Chemotherapy Monitoring Program   Placed call to patient in follow up of Tarceva (erlotinib) therapy.   Left message requesting call back.   No drug names were mentioned.    Left message 5/2. Rx filled on 5/1. MPR=1, patient is adherent.     Jj Hassan, PharmD  Therapy Management Oncology Pharmacist  [facility]   Phone: [phone]

## 2017-05-31 DIAGNOSIS — C34.91 NON-SMALL CELL LUNG CANCER, RIGHT (H): Primary | ICD-10-CM

## 2017-05-31 RX ORDER — ERLOTINIB HYDROCHLORIDE 150 MG/1
150 TABLET, FILM COATED ORAL DAILY
Qty: 30 TABLET | Refills: 0 | Status: SHIPPED | OUTPATIENT
Start: 2017-05-31 | End: 2017-06-30

## 2017-06-27 DIAGNOSIS — C34.91 NON-SMALL CELL LUNG CANCER, RIGHT (H): Primary | ICD-10-CM

## 2017-06-27 RX ORDER — ERLOTINIB HYDROCHLORIDE 150 MG/1
150 TABLET, FILM COATED ORAL DAILY
Qty: 30 TABLET | Refills: 0 | Status: SHIPPED | OUTPATIENT
Start: 2017-06-27 | End: 2017-07-27

## 2017-06-28 ENCOUNTER — CARE COORDINATION (OUTPATIENT)
Dept: ONCOLOGY | Facility: CLINIC | Age: 77
End: 2017-06-28

## 2017-06-28 NOTE — PROGRESS NOTES
To:  Dr. Haywood and Miriam Trevizo RNCC    Critical access hospital     Gilson was recently seen at his local [place] clinic and a CT scan was performed. 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 They would like you to have the images to view and report so they are sending them to the clinic.     Thanks   Digna Armendariz

## 2017-07-11 ENCOUNTER — TELEPHONE (OUTPATIENT)
Dept: ONCOLOGY | Facility: CLINIC | Age: 77
End: 2017-07-11

## 2017-07-11 ENCOUNTER — RADIANT APPOINTMENT (OUTPATIENT)
Dept: CT IMAGING | Facility: CLINIC | Age: 77
End: 2017-07-11
Attending: INTERNAL MEDICINE
Payer: COMMERCIAL

## 2017-07-11 DIAGNOSIS — C34.91 MALIGNANT NEOPLASM OF RIGHT LUNG, UNSPECIFIED PART OF LUNG (H): ICD-10-CM

## 2017-07-11 DIAGNOSIS — Z79.899 ENCOUNTER FOR LONG-TERM (CURRENT) USE OF MEDICATIONS: ICD-10-CM

## 2017-07-11 LAB
MAGNESIUM SERPL-MCNC: 1.9 MG/DL (ref 1.6–2.3)
PHOSPHATE SERPL-MCNC: 2.9 MG/DL (ref 2.5–4.5)
RADIOLOGIST FLAGS: NORMAL

## 2017-07-11 PROCEDURE — 71250 CT THORAX DX C-: CPT | Performed by: RADIOLOGY

## 2017-07-11 PROCEDURE — 36415 COLL VENOUS BLD VENIPUNCTURE: CPT | Performed by: FAMILY MEDICINE

## 2017-07-11 PROCEDURE — 84100 ASSAY OF PHOSPHORUS: CPT | Performed by: FAMILY MEDICINE

## 2017-07-11 PROCEDURE — 83735 ASSAY OF MAGNESIUM: CPT | Performed by: FAMILY MEDICINE

## 2017-07-11 NOTE — TELEPHONE ENCOUNTER
Oral Chemotherapy Monitoring Program   Placed call to patient in follow up of Tarceva (erlotinib) therapy.   Left message requesting call back.   No drug names were mentioned.    Left message 6/30 for assessment.     On 7/11, Idania Ty PGY-2 spoke with patient and requested call back early next week for assessment.     Last filled at site [facility] on 6/27.      Jj Hassan, PharmD  Therapy Management Oncology Pharmacist  [facility]   Phone: [phone]

## 2017-07-12 ENCOUNTER — ONCOLOGY VISIT (OUTPATIENT)
Dept: ONCOLOGY | Facility: CLINIC | Age: 77
End: 2017-07-12
Attending: INTERNAL MEDICINE
Payer: COMMERCIAL

## 2017-07-12 VITALS
HEART RATE: 56 BPM | HEIGHT: 69 IN | OXYGEN SATURATION: 98 % | WEIGHT: 169.8 LBS | DIASTOLIC BLOOD PRESSURE: 73 MMHG | RESPIRATION RATE: 16 BRPM | SYSTOLIC BLOOD PRESSURE: 131 MMHG | BODY MASS INDEX: 25.15 KG/M2 | TEMPERATURE: 97.9 F

## 2017-07-12 DIAGNOSIS — C34.90 NON-SMALL CELL LUNG CANCER (H): Primary | ICD-10-CM

## 2017-07-12 DIAGNOSIS — C34.91 MALIGNANT NEOPLASM OF RIGHT LUNG, UNSPECIFIED PART OF LUNG (H): Primary | ICD-10-CM

## 2017-07-12 PROCEDURE — 99215 OFFICE O/P EST HI 40 MIN: CPT | Mod: ZP | Performed by: INTERNAL MEDICINE

## 2017-07-12 PROCEDURE — 99212 OFFICE O/P EST SF 10 MIN: CPT | Mod: ZF

## 2017-07-12 PROCEDURE — 99213 OFFICE O/P EST LOW 20 MIN: CPT

## 2017-07-12 ASSESSMENT — PAIN SCALES - GENERAL: PAINLEVEL: NO PAIN (0)

## 2017-07-12 NOTE — MR AVS SNAPSHOT
After Visit Summary   7/12/2017    Gilson Curtis    MRN: [id number]           Patient Information     Date Of Birth          1940        Visit Information        Provider Department      7/12/2017 11:15 AM Tim Haywood MD [facility]        Today's Diagnoses     Malignant neoplasm of right lung, unspecified part of lung (H)    -  1       Follow-ups after your visit        Your next 10 appointments already scheduled     Oct 10, 2017 10:40 AM CDT   LAB with LAB FIRST FLOOR [facility])    [address] [place] [id number] [phone]           Patient must bring picture ID.  Patient should be prepared to give a urine specimen  Please do not eat 10-12 hours before your appointment if you are coming in fasting for labs on lipids, cholesterol, or glucose (sugar).  Pregnant women should follow their Care Team instructions. Water with medications is okay. Do not drink coffee or other fluids.   If you have concerns about taking  your medications, please ask at office or if scheduling via [employer], send a message by clicking on Secure Messaging, Message Your Care Team.            Oct 10, 2017 11:15 AM CDT   CT CHEST W/O CONTRAST with MGCT1   [facility])    [address] [place] [address] [phone]           Please bring any scans or X-rays taken at other hospitals, if similar tests were done. Also bring a list of your medicines, including vitamins, minerals and over-the-counter drugs. It is safest to leave personal items at home.  Be sure to tell your doctor:   If you have any allergies.   If there s any chance you are pregnant.   If you are breastfeeding.   If you have any special needs.  You do not need to do anything special to prepare.  Please wear loose clothing, such as a sweat suit or jogging clothes. Avoid snaps, zippers and other  metal. We may ask you to undress and put on a hospital gown.            Oct 11, 2017 10:15 AM CDT   (Arrive by 10:00 AM)   Return Visit with Tim Haywood MD   [facility] ([facility])    [address] [facility]  [address] [facility] [id number] [phone]              Future tests that were ordered for you today     Open Future Orders        Priority Expected Expires Ordered    CT Chest w/o contrast Routine  7/12/2018 7/12/2017    Comprehensive metabolic panel Routine  3/25/2018 3/26/2017    Magnesium Routine  3/25/2018 3/26/2017    Phosphorus Routine  3/25/2018 3/26/2017            Who to contact     If you have questions or need follow up information about today's clinic visit or your schedule please contact [facility] directly at [phone].  Normal or non-critical lab and imaging results will be communicated to you by MyChart, letter or phone within 4 business days after the clinic has received the results. If you do not hear from us within 7 days, please contact the clinic through [employer] or phone. If you have a critical or abnormal lab result, we will notify you by phone as soon as possible.  Submit refill requests through [employer] or call your pharmacy and they will forward the refill request to us. Please allow 3 business days for your refill to be completed.          Additional Information About Your Visit        MyChart Information     [employer] gives you secure access to your electronic health record. If you see a primary care provider, you can also send messages to your care team and make appointments. If you have questions, please call your primary care clinic.  If you do not have a primary care provider, please call [phone] and they will assist you.        Care EveryWhere ID     This is your Care EveryWhere ID. This could be used by other organizations to access your [facility] medical records  [id number]        Your  "Vitals Were     Pulse Temperature Respirations Height Pulse Oximetry BMI (Body Mass Index)    56 97.9  F (36.6  C) 16 1.755 m (5' 9.09\") 98% 25.01 kg/m2       Blood Pressure from Last 3 Encounters:   07/12/17 131/73   03/08/17 146/79   01/11/17 143/74    Weight from Last 3 Encounters:   07/12/17 77 kg (169 lb 12.8 oz)   03/08/17 76 kg (167 lb 9.6 oz)   01/11/17 75.6 kg (166 lb 9.6 oz)               Primary Care Provider Office Phone # Fax #    Hemant Jiménez [phone]       [address] [address]        Equal Access to Services     CAMRYN POSEY : Hadii aad ku hadasho Soomaali, waaxda luqadaha, qaybta kaalmada adeegyada, tono haskins . So [facility] [phone].    ATENCIÓN: Si habla español, tiene a tran disposición servicios gratuitos de asistencia lingüística. Llame al [phone].    We comply with applicable federal civil rights laws and [place] laws. We do not discriminate on the basis of race, color, national origin, age, disability sex, sexual orientation or gender identity.            Thank you!     Thank you for choosing [facility]  for your care. Our goal is always to provide you with excellent care. Hearing back from our patients is one way we can continue to improve our services. Please take a few minutes to complete the written survey that you may receive in the mail after your visit with us. Thank you!             Your Updated Medication List - Protect others around you: Learn how to safely use, store and throw away your medicines at www.disposemymeds.org.          This list is accurate as of: 7/12/17 11:56 AM.  Always use your most recent med list.                   Brand Name Dispense Instructions for use Diagnosis    aspirin 81 MG tablet      Take 1 tablet by mouth At Bedtime        calcium carbonate 500 MG chewable tablet    TUMS     Take 1 chew tab by mouth as needed for heartburn        clindamycin 1 % " topical gel    CLINDAMAX    60 g    Apply topically 2 times daily    Drug rash, Malignant neoplasm of upper lobe of right lung (H)       erlotinib 150 MG tablet CHEMO    TARCEVA    30 tablet    Take 1 tablet (150 mg) by mouth daily Take on an empty stomach 1hr before or 2hr after a meal. Avoid grapefruit and grapefruit juice.    Non-small cell lung cancer, right (H)       GABAPENTIN PO      Take 150 mg by mouth At Bedtime        hydrocortisone 1 % ointment      Apply topically 2 times daily        lisinopril 2.5 MG tablet    PRINIVIL/Zestril     Take 2.5 mg by mouth daily.        * MATURE ADULT CENTURY PO      Take 1 tablet by mouth daily OTC        * EYE VITAMINS PO      Take 1 capsule by mouth daily OTC        ranitidine 150 MG capsule    ZANTAC     Take 150 mg by mouth 2 times daily        simvastatin 80 MG tablet    ZOCOR          * Notice:  This list has 2 medication(s) that are the same as other medications prescribed for you. Read the directions carefully, and ask your doctor or other care provider to review them with you.

## 2017-07-12 NOTE — NURSING NOTE
"Oncology Rooming Note    July 12, 2017 11:00 AM   Gilson Curtis is a 77 year old male who presents for:    Chief Complaint   Patient presents with     Oncology Clinic Visit     follow up visit related to NSCL CA     Initial Vitals: /73  Pulse 56  Temp 97.9  F (36.6  C)  Resp 16  Ht 1.755 m (5' 9.09\")  Wt 77 kg (169 lb 12.8 oz)  SpO2 98%  BMI 25.01 kg/m2 Estimated body mass index is 25.01 kg/(m^2) as calculated from the following:    Height as of this encounter: 1.755 m (5' 9.09\").    Weight as of this encounter: 77 kg (169 lb 12.8 oz). Body surface area is 1.94 meters squared.  No Pain (0) Comment: Data Unavailable   No LMP for male patient.  Allergies reviewed: Yes  Medications reviewed: Yes    Medications: Medication refills not needed today.  Pharmacy name entered into [employer]: [facility] [address] [place] [address]    Clinical concerns: No new concerns.    10 minutes for nursing intake (face to face time)     Sunita Jorgensen LPN              "

## 2017-07-12 NOTE — LETTER
"7/12/2017       RE: Gilson Curtis  [address] [facility] [address]     Dear Colleague,    Thank you for referring your patient, Gilson Curtis, to the [facility]. Please see a copy of my visit note below.    CHIEF COMPLAINT:  Stage IV non-small cell lung cancer.       HISTORY OF PRESENT ILLNESS:  The patient returns to clinic today for further therapy and monitoring.  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  He has been on Tarceva for over a year and is tolerating it quite well. He returns earlier to follow up interstitial changes on his prior CT scan. These change are all but resolved and his primary lesion remains stable at approximately 7mm. I have reviewed the scan myself and concur with the radiologist's findings      The patient continues to have a facial rash . The patient has no diarrhea at this time.  Overall he otherwise feels very well.  We discussed decreasing the dose to 100 mg PO qd and we will try this to lessen the symptoms.      REVIEW OF SYSTEMS:  A 10-point review of systems is positive for drug-induced rash as described above.  Patient also has some right sided chest pain at site of previous bone resection       PHYSICAL EXAMINATION:  /79  Pulse 73  Temp 97.5  F (36.4  C) (Oral)  Ht 1.753 m (5' 9.02\")  Wt 76 kg (167 lb 9.6 oz)  SpO2 96%  BMI 24.74 kg/m2  Gen: well appearing male in no acute distress  HEENT: Erythematous rash on his nose and cheeks, forehead is normal  Lymph: no palpable cervical, axillary, or inguinal lymphadenopathy  Lungs: CTA B/L, no wheezing  Heart: RRR, no murmurs  Abd: soft, nontender, no hepatosplenomegaly  Ext: no edema    Imaging:  CT reveals stable dz.  Minimal nodule in RUL.  New 3mm non-specific nodule noted in left lung.  Will follow    Labs:  WNL    A/P:      NSCLC:  Will change to lower dose of Tarceva to try and lessen rash.  I will see patient in 3 months.    Again, thank you for allowing me to participate in the " care of your patient.      Sincerely,    Tim Haywood MD

## 2017-07-13 DIAGNOSIS — C33 MALIGNANT NEOPLASM OF TRACHEA, BRONCHUS, AND LUNG (H): ICD-10-CM

## 2017-07-13 DIAGNOSIS — C34.91 NON-SMALL CELL LUNG CANCER, RIGHT (H): Primary | ICD-10-CM

## 2017-07-13 DIAGNOSIS — C34.80 MALIGNANT NEOPLASM OF TRACHEA, BRONCHUS, AND LUNG (H): ICD-10-CM

## 2017-07-13 NOTE — PROGRESS NOTES
Oral Chemotherapy Monitoring Program    I spoke with gibran at [facility] and gave a verbal order on labs. Their fax machines are not working. Refer to labs in standing orders. Patient will have labs drawn next week and will be faxed to [facility] [phone]    Heidi Loja, PharmD, BCPS, BCOP  Hematology/Oncology Clinical Pharmacist  [facility]  [email or web address]

## 2017-07-18 ENCOUNTER — TRANSFERRED RECORDS (OUTPATIENT)
Dept: HEALTH INFORMATION MANAGEMENT | Facility: CLINIC | Age: 77
End: 2017-07-18

## 2017-07-18 ENCOUNTER — TELEPHONE (OUTPATIENT)
Dept: ONCOLOGY | Facility: CLINIC | Age: 77
End: 2017-07-18

## 2017-07-18 LAB
% GRANULOCYTES: 65.2 %
ABSOLUTE GRANULOCYTES: 4.1
ALBUMIN SERPL-MCNC: 3.8 G/DL
ALP SERPL-CCNC: 88 U/L
ALT SERPL-CCNC: 27 U/L
ANION GAP SERPL CALCULATED.3IONS-SCNC: NORMAL MMOL/L
AST SERPL-CCNC: 32 U/L
BILIRUB SERPL-MCNC: 1.4 MG/DL
BUN SERPL-MCNC: 26 MG/DL
CALCIUM SERPL-MCNC: 9.3 MG/DL
CHLORIDE SERPLBLD-SCNC: 104 MMOL/L
CO2 SERPL-SCNC: 26 MMOL/L
CREAT SERPL-MCNC: 1.6 MG/DL
ERYTHROCYTE [DISTWIDTH] IN BLOOD BY AUTOMATED COUNT: 12.7 %
GFR SERPL CREATININE-BSD FRML MDRD: 43 ML/MIN/1.73M2
GLUCOSE SERPL-MCNC: 87 MG/DL (ref 70–99)
HCT VFR BLD AUTO: 47.2 %
HEMOGLOBIN: 14.7 G/DL (ref 13.3–17.7)
LYMPHOCYTES # BLD AUTO: 1.6 10*3/UL
LYMPHOCYTES NFR BLD AUTO: 25.5 %
MCH RBC QN AUTO: 30 PG
MCHC RBC AUTO-ENTMCNC: 31.2 G/DL
MCV RBC AUTO: 96.2 FL
MONOCYTES # BLD AUTO: 0.6 10*3/UL
MONOCYTES NFR BLD AUTO: 9.3 %
PLATELET # BLD AUTO: 196 10^9/L
PMV BLD: 7.6 FL
POTASSIUM SERPL-SCNC: 4.9 MMOL/L
PROT SERPL-MCNC: 6.1 G/DL
RBC # BLD AUTO: 4.91 10^12/L
SODIUM SERPL-SCNC: 142 MMOL/L
WBC # BLD AUTO: 6.3 10^9/L

## 2017-07-18 NOTE — TELEPHONE ENCOUNTER
Oral Chemotherapy Monitoring Program    Placed call to patient regarding lab results from 7/18/17. Lab results show no concerning abnormalities. Patient currently on Tarceva therapy. Per Dr. Haywood, patient can switch over to getting labs every 3 months.    Alicia Mallory, Pharmacy Intern  Oral Chemotherapy Monitoring Program  [facility]  [phone]

## 2017-07-21 DIAGNOSIS — C34.80 MALIGNANT NEOPLASM OF TRACHEA, BRONCHUS, AND LUNG (H): ICD-10-CM

## 2017-07-21 DIAGNOSIS — C34.91 NON-SMALL CELL LUNG CANCER, RIGHT (H): ICD-10-CM

## 2017-07-21 DIAGNOSIS — C33 MALIGNANT NEOPLASM OF TRACHEA, BRONCHUS, AND LUNG (H): ICD-10-CM

## 2017-07-24 ENCOUNTER — TELEPHONE (OUTPATIENT)
Dept: ONCOLOGY | Facility: CLINIC | Age: 77
End: 2017-07-24

## 2017-07-24 NOTE — TELEPHONE ENCOUNTER
Oral Chemotherapy Monitoring Program  Primary Oncologist: Dr. Haywood  Primary Oncology Clinic: [facility]  Cancer Diagnosis: Lung Cancer      Therapy History: Confirmed patient taking Tarceva 150mg (1 x 150mg) once daily, continuously. Due to rash, dose will be reduced to 100mg once daily.   Start Date: 5/11/16     Drug Interaction Assessment: No new drug interactions     Lab Monitoring Plan                Monitoring plan:     C1D1+     CMP   C2D1+   Call, CMP, Mg, Phos   C3D1+   Call, CMP, Mg, Phos   C4D1+   Call, CMP, Mg, Phos   C5D1+   Call, CMP, Mg, Phos   C6D1+   Call, CMP, Mg, Phos     C1D8+      C2D8+      C3D8+      C4D8+      C5D8+      C6D8+        C1D15+   Call   C2D15+      C3D15+      C4D15+      C5D15+      C6D15+        C1D22+      C2D22+      C3D22+      C4D22+      C5D22+      C6D22+            Subjective/Objective:  Gilson Curtis is a 77 year old male contacted by phone for a follow-up visit for oral chemotherapy.  Gilson reports he continued to use hydrocortisone cream for rash. He will finish current supply of 150mg tablets, then reduce dose to 100mg. No other medication changes or side effects. He verbalized understanding of dose and plan. No other side effects, medication changes, or missed doses. Questions answered to his satisfaction.     ORAL CHEMOTHERAPY 7/26/2016 9/6/2016 11/9/2016 1/16/2017 3/1/2017 4/4/2017 7/24/2017   Drug Name Tarceva (Erlotinib) Tarceva (Erlotinib) Tarceva (Erlotinib) Tarceva (Erlotinib) Tarceva (Erlotinib) Tarceva (Erlotinib) Tarceva (Erlotinib)   Current Dosage 150mg 1500mg 150mg 150mg 150mg 150mg 150mg   Current Schedule Daily Daily Daily Daily Daily Daily Daily   Cycle Details Continuous Continuous Continuous Continuous Continuous Continuous Continuous   Start Date of Last Cycle 7/11/2016 8/12/2016 10/10/2016 - - - -   Planned next cycle start date - - 11/10/2016 - - - -   Doses missed in last 2 weeks 0 0 0 0 0 0 0   Adherence Assessment - - - -  "Adherent Adherent Adherent   Adverse Effects Rash Rash EGFR rash EGFR rash EGFR rash EGFR rash EGFR rash   EGFR rash - - Grade 1 Grade 1 Grade 1 Grade 1 Grade 1   Pharmacist Intervention(EGFR) - - No No Yes No No   Intervention(s) - - - - Patient education - -   Home BPs not needed all BPs<140/90 not needed Not applicable not needed not needed not needed   Any new drug interactions? - - No No No No No   Is the dose as ordered appropriate for the patient? - - - - Yes Yes Yes   Is the patient currently in pain? - - No No No No No   Has the patient been assessed within the past 6 months for depression? - - - - - Yes Yes   Has the patient missed any days of school, work, or other routine activity? - - - - No No No       Last PHQ-2 Score on record:   PHQ-2 ( 1999 [employer]) 1/11/2017 10/12/2016   Q1: Little interest or pleasure in doing things 0 0   Q2: Feeling down, depressed or hopeless 0 0   PHQ-2 Score 0 0       Patient does not report depression symptoms.      Vitals:  BP:   BP Readings from Last 1 Encounters:   07/12/17 131/73     Wt Readings from Last 1 Encounters:   07/12/17 77 kg (169 lb 12.8 oz)     Estimated body surface area is 1.94 meters squared as calculated from the following:    Height as of 7/12/17: 1.755 m (5' 9.09\").    Weight as of 7/12/17: 77 kg (169 lb 12.8 oz).    Labs:  Lab Results   Component Value Date     07/18/2017      Lab Results   Component Value Date    POTASSIUM 4.9 07/18/2017     Lab Results   Component Value Date    RICK 9.3 07/18/2017     Lab Results   Component Value Date    ALBUMIN 3.8 07/18/2017     Lab Results   Component Value Date    MAG 1.9 07/11/2017     Lab Results   Component Value Date    PHOS 2.9 07/11/2017     Lab Results   Component Value Date    BUN 26 07/18/2017     Lab Results   Component Value Date    CR 1.6 07/18/2017       Lab Results   Component Value Date    AST 32 07/18/2017     Lab Results   Component Value Date    ALT 27 07/18/2017     Lab Results "   Component Value Date    BILITOTAL 1.4 07/18/2017       Lab Results   Component Value Date    WBC 6.3 07/18/2017     Lab Results   Component Value Date    HGB 14.7 07/18/2017     Lab Results   Component Value Date     07/18/2017     Lab Results   Component Value Date    ANEU 3.9 03/06/2017           Assessment:  Gilson continued to experience continued rash from Tarceva and will decrease dose from 150mg once daily to 100mg once daily. He has about 14 tablets today.     Plan:  Finish current supply of Tarceva 150mg, then reduce dose to 100mg.    Follow-Up:  Dr. Haywood appointment 10/11/2017.      Refill Due:  Rx released 7/27/2017, deliver by 8/3/2017.      Thank you for the opportunity to participate in this patient's care,    Jj Hassan, PharmD  Therapy Management Oncology Pharmacist  [facility]   Phone: [phone]

## 2017-07-27 DIAGNOSIS — C34.91 NON-SMALL CELL LUNG CANCER, RIGHT (H): Primary | ICD-10-CM

## 2017-07-27 RX ORDER — ERLOTINIB HYDROCHLORIDE 100 MG/1
100 TABLET, FILM COATED ORAL DAILY
Qty: 30 TABLET | Refills: 11 | Status: SHIPPED | OUTPATIENT
Start: 2017-07-27 | End: 2017-08-26

## 2017-08-25 ENCOUNTER — TELEPHONE (OUTPATIENT)
Dept: ONCOLOGY | Facility: CLINIC | Age: 77
End: 2017-08-25

## 2017-08-25 NOTE — TELEPHONE ENCOUNTER
"Oral Chemotherapy Monitoring Program    Primary Oncologist: Dr. Haywood  Primary Oncology Clinic: [facility]  Cancer Diagnosis: Lung Cancer      Therapy History: Confirmed patient taking Tarceva 100mg (1 x 100mg) once daily, continuously.   Start Date:  5/11/16 to 8/6/2017- Tarceva 150mg once daily, due to rash, dose reduced to 100mg          8/7/2017- present  -  Tarceva 100mg once daily      Drug Interaction Assessment: No new drug interactions      Lab Monitoring Plan                            Monitoring plan:     C1D1+     CMP   C2D1+   Call, CMP, Mg, Phos   C3D1+   Call, CMP, Mg, Phos   C4D1+   Call, CMP, Mg, Phos   C5D1+   Call, CMP, Mg, Phos   C6D1+   Call, CMP, Mg, Phos     C1D8+      C2D8+      C3D8+      C4D8+      C5D8+      C6D8+        C1D15+   Call   C2D15+      C3D15+      C4D15+      C5D15+      C6D15+        C1D22+      C2D22+      C3D22+      C4D22+      C5D22+      C6D22+            Subjective/Objective:  Gilson Curtis is a 77 year old male contacted by phone for a follow-up visit for oral chemotherapy. After he finished his 150mg tablets, he started 100mg about 2 week ago. He reports \"finger nails are not flaking anymore\" and \"people say I am not quite so red\". He reports rash has not cleared, but has improved. No other side effects, medication changes, and no missed doses. Questions answered to his satisfaction and he appreciated the follow up call. He has 11 tablets remaining. Medication Possession Ratio (MPR) = 1, 13 fills in 12 months, patient is adherent.       ORAL CHEMOTHERAPY 9/6/2016 11/9/2016 1/16/2017 3/1/2017 4/4/2017 7/24/2017 8/25/2017   Drug Name Tarceva (Erlotinib) Tarceva (Erlotinib) Tarceva (Erlotinib) Tarceva (Erlotinib) Tarceva (Erlotinib) Tarceva (Erlotinib) Tarceva (Erlotinib)   Current Dosage 1500mg 150mg 150mg 150mg 150mg 150mg 100mg   Current Schedule Daily Daily Daily Daily Daily Daily Daily   Cycle Details Continuous Continuous Continuous Continuous " "Continuous Continuous Continuous   Start Date of Last Cycle 8/12/2016 10/10/2016 - - - - -   Planned next cycle start date - 11/10/2016 - - - - -   Doses missed in last 2 weeks 0 0 0 0 0 0 0   Adherence Assessment - - - Adherent Adherent Adherent Adherent   Adverse Effects Rash EGFR rash EGFR rash EGFR rash EGFR rash EGFR rash EGFR rash   EGFR rash - Grade 1 Grade 1 Grade 1 Grade 1 Grade 1 Grade 1   Pharmacist Intervention(EGFR) - No No Yes No No No   Intervention(s) - - - Patient education - - -   Home BPs all BPs<140/90 not needed Not applicable not needed not needed not needed not needed   Any new drug interactions? - No No No No No No   Is the dose as ordered appropriate for the patient? - - - Yes Yes Yes Yes   Is the patient currently in pain? - No No No No No No   Has the patient been assessed within the past 6 months for depression? - - - - Yes Yes -   Has the patient missed any days of school, work, or other routine activity? - - - No No No No       Last PHQ-2 Score on record:   PHQ-2 ( 1999 Pfizer) 1/11/2017 10/12/2016   Q1: Little interest or pleasure in doing things 0 0   Q2: Feeling down, depressed or hopeless 0 0   PHQ-2 Score 0 0       Patient does not report depression symptoms.      Vitals:  BP:   BP Readings from Last 1 Encounters:   07/12/17 131/73     Wt Readings from Last 1 Encounters:   07/12/17 77 kg (169 lb 12.8 oz)     Estimated body surface area is 1.94 meters squared as calculated from the following:    Height as of 7/12/17: 1.755 m (5' 9.09\").    Weight as of 7/12/17: 77 kg (169 lb 12.8 oz).    Labs:  Lab Results   Component Value Date     07/18/2017      Lab Results   Component Value Date    POTASSIUM 4.9 07/18/2017     Lab Results   Component Value Date    RICK 9.3 07/18/2017     Lab Results   Component Value Date    ALBUMIN 3.8 07/18/2017     Lab Results   Component Value Date    MAG 1.9 07/11/2017     Lab Results   Component Value Date    PHOS 2.9 07/11/2017     Lab Results "   Component Value Date    BUN 26 07/18/2017     Lab Results   Component Value Date    CR 1.6 07/18/2017       Lab Results   Component Value Date    AST 32 07/18/2017     Lab Results   Component Value Date    ALT 27 07/18/2017     Lab Results   Component Value Date    BILITOTAL 1.4 07/18/2017       Lab Results   Component Value Date    WBC 6.3 07/18/2017     Lab Results   Component Value Date    HGB 14.7 07/18/2017     Lab Results   Component Value Date     07/18/2017     Lab Results   Component Value Date    ANEU 3.9 03/06/2017           Assessment:  Gilson continues to tolerate Tarceva well, less rash since reducing dose to 100mg once daily about two weeks ago. He has 11 tablets remaining today.    Plan:  Continue Tarceva 100mg once daily.     Follow-Up:  Labs and radiology appointment 10/10/2017.  Dr. Haywood appointment 10/11/2017.    Refill Due:  Rx last filled at [facility] ([facility] 7/28/2017.   Deliver refill from [facility] ([facility]) by 9/1/2017.  Next rx release 7/27/2018.     Thank you for the opportunity to participate in this patient's care,    Jj Hassan, PharmD  Therapy Management Oncology Pharmacist  [facility]   Phone: [phone]

## 2017-10-10 ENCOUNTER — RADIANT APPOINTMENT (OUTPATIENT)
Dept: CT IMAGING | Facility: CLINIC | Age: 77
End: 2017-10-10
Attending: INTERNAL MEDICINE
Payer: COMMERCIAL

## 2017-10-10 DIAGNOSIS — C33 MALIGNANT NEOPLASM OF TRACHEA, BRONCHUS, AND LUNG (H): ICD-10-CM

## 2017-10-10 DIAGNOSIS — C34.90 NON-SMALL CELL LUNG CANCER (H): ICD-10-CM

## 2017-10-10 DIAGNOSIS — C34.91 MALIGNANT NEOPLASM OF RIGHT LUNG, UNSPECIFIED PART OF LUNG (H): ICD-10-CM

## 2017-10-10 DIAGNOSIS — C34.80 MALIGNANT NEOPLASM OF TRACHEA, BRONCHUS, AND LUNG (H): ICD-10-CM

## 2017-10-10 DIAGNOSIS — C34.91 NON-SMALL CELL LUNG CANCER, RIGHT (H): ICD-10-CM

## 2017-10-10 LAB
ALBUMIN SERPL-MCNC: 3.7 G/DL (ref 3.4–5)
ALP SERPL-CCNC: 106 U/L (ref 40–150)
ALT SERPL W P-5'-P-CCNC: 29 U/L (ref 0–70)
ANION GAP SERPL CALCULATED.3IONS-SCNC: 5 MMOL/L (ref 3–14)
AST SERPL W P-5'-P-CCNC: 23 U/L (ref 0–45)
BASOPHILS # BLD AUTO: 0 10E9/L (ref 0–0.2)
BASOPHILS NFR BLD AUTO: 0.2 %
BILIRUB SERPL-MCNC: 0.9 MG/DL (ref 0.2–1.3)
BUN SERPL-MCNC: 20 MG/DL (ref 7–30)
CALCIUM SERPL-MCNC: 8.8 MG/DL (ref 8.5–10.1)
CHLORIDE SERPL-SCNC: 109 MMOL/L (ref 94–109)
CO2 SERPL-SCNC: 29 MMOL/L (ref 20–32)
CREAT SERPL-MCNC: 1.55 MG/DL (ref 0.66–1.25)
DIFFERENTIAL METHOD BLD: NORMAL
EOSINOPHIL # BLD AUTO: 0.2 10E9/L (ref 0–0.7)
EOSINOPHIL NFR BLD AUTO: 2 %
ERYTHROCYTE [DISTWIDTH] IN BLOOD BY AUTOMATED COUNT: 12.8 % (ref 10–15)
GFR SERPL CREATININE-BSD FRML MDRD: 43 ML/MIN/1.7M2
GLUCOSE SERPL-MCNC: 73 MG/DL (ref 70–99)
HCT VFR BLD AUTO: 49.8 % (ref 40–53)
HGB BLD-MCNC: 16.4 G/DL (ref 13.3–17.7)
LYMPHOCYTES # BLD AUTO: 2 10E9/L (ref 0.8–5.3)
LYMPHOCYTES NFR BLD AUTO: 23.9 %
MAGNESIUM SERPL-MCNC: 2 MG/DL (ref 1.6–2.3)
MCH RBC QN AUTO: 32.3 PG (ref 26.5–33)
MCHC RBC AUTO-ENTMCNC: 32.9 G/DL (ref 31.5–36.5)
MCV RBC AUTO: 98 FL (ref 78–100)
MONOCYTES # BLD AUTO: 1.1 10E9/L (ref 0–1.3)
MONOCYTES NFR BLD AUTO: 13.1 %
NEUTROPHILS # BLD AUTO: 5.1 10E9/L (ref 1.6–8.3)
NEUTROPHILS NFR BLD AUTO: 60.8 %
PHOSPHATE SERPL-MCNC: 2.3 MG/DL (ref 2.5–4.5)
PLATELET # BLD AUTO: 180 10E9/L (ref 150–450)
POTASSIUM SERPL-SCNC: 4.4 MMOL/L (ref 3.4–5.3)
PROT SERPL-MCNC: 6.9 G/DL (ref 6.8–8.8)
RBC # BLD AUTO: 5.07 10E12/L (ref 4.4–5.9)
SODIUM SERPL-SCNC: 143 MMOL/L (ref 133–144)
WBC # BLD AUTO: 8.5 10E9/L (ref 4–11)

## 2017-10-10 PROCEDURE — 85025 COMPLETE CBC W/AUTO DIFF WBC: CPT | Performed by: INTERNAL MEDICINE

## 2017-10-10 PROCEDURE — 71250 CT THORAX DX C-: CPT | Performed by: RADIOLOGY

## 2017-10-10 PROCEDURE — 83735 ASSAY OF MAGNESIUM: CPT | Performed by: INTERNAL MEDICINE

## 2017-10-10 PROCEDURE — 80053 COMPREHEN METABOLIC PANEL: CPT | Performed by: INTERNAL MEDICINE

## 2017-10-10 PROCEDURE — 36415 COLL VENOUS BLD VENIPUNCTURE: CPT | Performed by: INTERNAL MEDICINE

## 2017-10-10 PROCEDURE — 84100 ASSAY OF PHOSPHORUS: CPT | Performed by: INTERNAL MEDICINE

## 2017-10-11 ENCOUNTER — ONCOLOGY VISIT (OUTPATIENT)
Dept: ONCOLOGY | Facility: CLINIC | Age: 77
End: 2017-10-11
Attending: INTERNAL MEDICINE
Payer: COMMERCIAL

## 2017-10-11 VITALS
DIASTOLIC BLOOD PRESSURE: 74 MMHG | HEART RATE: 66 BPM | SYSTOLIC BLOOD PRESSURE: 122 MMHG | WEIGHT: 170.3 LBS | TEMPERATURE: 97.9 F | RESPIRATION RATE: 17 BRPM | OXYGEN SATURATION: 99 % | BODY MASS INDEX: 25.22 KG/M2 | HEIGHT: 69 IN

## 2017-10-11 DIAGNOSIS — C34.90 MALIGNANT NEOPLASM OF LUNG, UNSPECIFIED LATERALITY, UNSPECIFIED PART OF LUNG (H): Primary | ICD-10-CM

## 2017-10-11 PROCEDURE — 99214 OFFICE O/P EST MOD 30 MIN: CPT | Mod: GC | Performed by: INTERNAL MEDICINE

## 2017-10-11 PROCEDURE — 99212 OFFICE O/P EST SF 10 MIN: CPT | Mod: ZF

## 2017-10-11 ASSESSMENT — PAIN SCALES - GENERAL: PAINLEVEL: MILD PAIN (2)

## 2017-10-11 NOTE — LETTER
"10/11/2017       RE: Gilson Curtis  [address] [facility] [address]     Dear Colleague,    Thank you for referring your patient, Gilson Curtis, to the [facility]. Please see a copy of my visit note below.    CHIEF COMPLAINT:  Stage IV non-small cell lung cancer.       HISTORY OF PRESENT ILLNESS:   Gilson Curtis returns to clinic today for lung cancer. His tumor has an EGFR exon 21 deletion.  He has been on Tarceva since June 2016 and is tolerating it quite well. Dose was decreased from 150 mg daily to 100 mg daily in July 2017 due to rash.       Interim history: Dewayne is here with his wife for 3 month f/u. At last appt we decreased the Tarceva dose b/c of the facial rash. The rash is about the same - erythematous but not pustular. He has one lesion on the left temple that he scratches frequently and has not healed. He uses hydrocortisone cream 1% about once per day. Does not use the clindamycin gel.     His taste has improved on the lower Tarceva dose. No n/v or d/c. Does not miss any doses.   He has pain along the right chest wall since his surgery last year. It has improved but he was expecting it to be gone by now. Does not use any pain meds.   No cough or sob. Energy level is good.       REVIEW OF SYSTEMS:  A 10-point review of systems is negative unless reported above      PHYSICAL EXAMINATION:  /74  Pulse 66  Temp 97.9  F (36.6  C) (Oral)  Resp 17  Ht 1.755 m (5' 9.09\")  Wt 77.2 kg (170 lb 4.8 oz)  SpO2 99%  BMI 25.08 kg/m2  Gen: well appearing male in no acute distress  HEENT: Erythematous rash on his nose and cheeks. Several small pustular lesions on nose.   Lymph: no palpable cervical, axillary, or inguinal lymphadenopathy  Lungs: CTA B/L, no wheezing  Heart: RRR, no murmurs  Abd: soft, nontender, no hepatosplenomegaly  Ext: no edema    Imaging:    CT chest 10/10/17: reviewed, stable right lung soft tissue nodule along surgical site. no other evidence of " disease.    Labs:  Na 143, K 4.4, Cr 1.55, LFTs wnl  WBC 8.5, Hb 16.4, Plts 180    Assessment/Plan:    EGFR positive stage IV lung adenocarcinoma: He has now been on Tarceva for almost 1.5 years and has only one nodule in the right lung which is stable. Tolerating well aside from facial rash which is manageable. Will continue at 100 mg daily.     RTC in 3 months with CT chest. If he does have progression will test for T790M mutation which is sensitive to the 3rd generation EGFR TKI osimertinib (Tagresso) and present in about 50% of cases after progression on 1st generation EGFR TKI.     Tinea cruris: advised to keep clean and dry. Will get clotrimazole OTC.     Patient seen and d/w staff Dr. Yobany Jaramillo MD  Heme/Onc Fellow  Pager: [phone]    I have independently seen and examined this patient and my assessment is in agreement with the above note.  I have reviewed all tests and past medical history and my evaluation agrees with the findings in the note.    Patient doing well.  Rash somewhat better on lower dose.  Will continue 100mg PO qd.  Patient desires to be seen routinely at [facility] and I will arrange follow up with Dr. Ahumada.    Again, thank you for allowing me to participate in the care of your patient.      Sincerely,    Tim Haywood MD

## 2017-10-11 NOTE — Clinical Note
Patient desires follow up in 3 months at [facility] and subsequent f/u at [facility].  I recommended Dr. Ahumada.  CT of chest in 3 months, labs.  Patient in room

## 2017-10-11 NOTE — PROGRESS NOTES
"CHIEF COMPLAINT:  Stage IV non-small cell lung cancer.       HISTORY OF PRESENT ILLNESS:  Gilson Curtis returns to clinic today for lung cancer. His tumor has an EGFR exon 21 deletion.  He has been on Tarceva since June 2016 and is tolerating it quite well. Dose was decreased from 150 mg daily to 100 mg daily in July 2017 due to rash.       Interim history: Dewayne is here with his wife for 3 month f/u. At last appt we decreased the Tarceva dose b/c of the facial rash. The rash is about the same - erythematous but not pustular. He has one lesion on the left temple that he scratches frequently and has not healed. He uses hydrocortisone cream 1% about once per day. Does not use the clindamycin gel.     His taste has improved on the lower Tarceva dose. No n/v or d/c. Does not miss any doses.   He has pain along the right chest wall since his surgery last year. It has improved but he was expecting it to be gone by now. Does not use any pain meds.   No cough or sob. Energy level is good.       REVIEW OF SYSTEMS:  A 10-point review of systems is negative unless reported above      PHYSICAL EXAMINATION:  /74  Pulse 66  Temp 97.9  F (36.6  C) (Oral)  Resp 17  Ht 1.755 m (5' 9.09\")  Wt 77.2 kg (170 lb 4.8 oz)  SpO2 99%  BMI 25.08 kg/m2  Gen: well appearing male in no acute distress  HEENT: Erythematous rash on his nose and cheeks. Several small pustular lesions on nose.   Lymph: no palpable cervical, axillary, or inguinal lymphadenopathy  Lungs: CTA B/L, no wheezing  Heart: RRR, no murmurs  Abd: soft, nontender, no hepatosplenomegaly  Ext: no edema    Imaging:    CT chest 10/10/17: reviewed, stable right lung soft tissue nodule along surgical site. no other evidence of disease.    Labs:  Na 143, K 4.4, Cr 1.55, LFTs wnl  WBC 8.5, Hb 16.4, Plts 180    Assessment/Plan:    EGFR positive stage IV lung adenocarcinoma: He has now been on Tarceva for almost 1.5 years and has only one nodule in the right lung which is " stable. Tolerating well aside from facial rash which is manageable. Will continue at 100 mg daily.     RTC in 3 months with CT chest. If he does have progression will test for T790M mutation which is sensitive to the 3rd generation EGFR TKI osimertinib (Tagresso) and present in about 50% of cases after progression on 1st generation EGFR TKI.     Tinea cruris: advised to keep clean and dry. Will get clotrimazole OTC.     Patient seen and d/w staff Dr. Yobany Jaramillo MD  Heme/Onc Fellow  Pager: [phone]    I have independently seen and examined this patient and my assessment is in agreement with the above note.  I have reviewed all tests and past medical history and my evaluation agrees with the findings in the note.    Patient doing well.  Rash somewhat better on lower dose.  Will continue 100mg PO qd.  Patient desires to be seen routinely at [facility] and I will arrange follow up with Dr. Ahumada.

## 2017-10-11 NOTE — MR AVS SNAPSHOT
After Visit Summary   10/11/2017    Gilson Curtis    MRN: [id number]           Patient Information     Date Of Birth          1940        Visit Information        Provider Department      10/11/2017 10:15 AM Tim Haywood MD [facility]        Today's Diagnoses     Malignant neoplasm of lung, unspecified laterality, unspecified part of lung (H)    -  1       Follow-ups after your visit        Your next 10 appointments already scheduled     Nestor 15, 2018 11:00 AM CST   LAB with LAB FIRST FLOOR [facility] ([facility])    [address] [place] [id number] [phone]           Patient must bring picture ID. Patient should be prepared to give a urine specimen  Please do not eat 10-12 hours before your appointment if you are coming in fasting for labs on lipids, cholesterol, or glucose (sugar). Pregnant women should follow their Care Team instructions. Water with medications is okay. Do not drink coffee or other fluids. If you have concerns about taking  your medications, please ask at office or if scheduling via [employer], send a message by clicking on Secure Messaging, Message Your Care Team.            Nestor 15, 2018 11:15 AM CST   CT CHEST W CONTRAST with MGCT1   [facility])    [address] [place] [id number] [phone]           Please bring any scans or X-rays taken at other hospitals, if similar tests were done. Also bring a list of your medicines, including vitamins, minerals and over-the-counter drugs. It is safest to leave personal items at home.  Be sure to tell your doctor:   If you have any allergies.   If there s any chance you are pregnant.   If you are breastfeeding.   If you have any special needs.  You will have contrast for this exam. To prepare:   Do not eat or drink for 2 hours before your exam. If you need to take medicine, you  may take it with small sips of water. (We may ask you to take liquid medicine as well.)   The day before your exam, drink extra fluids at least six 8-ounce glasses (unless your doctor tells you to restrict your fluids).  Patients over 70 or patients with diabetes or kidney problems:   If you haven t had a blood test (creatinine test) within the last 30 days, go to your clinic or Diagnostic Imaging Department for this test.  If you have diabetes:   If your kidney function is normal, continue taking your metformin (Avandamet, Glucophage, Glucovance, Metaglip) on the day of your exam.   If your kidney function is abnormal, wait 48 hours before restarting this medicine.  Please wear loose clothing, such as a sweat suit or jogging clothes. Avoid snaps, zippers and other metal. We may ask you to undress and put on a hospital gown.  If you have any questions, please call the Imaging Department where you will have your exam.            Nestor 15, 2018  2:00 PM CST   [facility] Visit with G[facility]ada [facility]   [facility]    [id number] [place] [id number] [phone]              Future tests that were ordered for you today     Open Standing Orders        Priority Remaining Interval Expires Ordered    CBC with platelets differential Routine 12/12  10/11/2018 10/11/2017    Comprehensive metabolic panel Routine 12/12  10/11/2018 10/11/2017          Open Future Orders        Priority Expected Expires Ordered    CT Chest w contrast Routine  10/11/2018 10/11/2017            Who to contact     If you have questions or need follow up information about today's clinic visit or your schedule please contact [facility] directly at [phone].  Normal or non-critical lab and imaging results will be communicated to you by MyChart, letter or phone within 4 business days after the clinic has received the results. If you do not hear from us within 7 days,  "please contact the clinic through [employer] or phone. If you have a critical or abnormal lab result, we will notify you by phone as soon as possible.  Submit refill requests through [employer] or call your pharmacy and they will forward the refill request to us. Please allow 3 business days for your refill to be completed.          Additional Information About Your Visit        [employer] Information     [employer] gives you secure access to your electronic health record. If you see a primary care provider, you can also send messages to your care team and make appointments. If you have questions, please call your primary care clinic.  If you do not have a primary care provider, please call [phone] and they will assist you.        Care EveryWhere ID     This is your Care EveryWhere ID. This could be used by other organizations to access your [place] medical records  [id number]        Your Vitals Were     Pulse Temperature Respirations Height Pulse Oximetry BMI (Body Mass Index)    66 97.9  F (36.6  C) (Oral) 17 1.755 m (5' 9.09\") 99% 25.08 kg/m2       Blood Pressure from Last 3 Encounters:   10/11/17 122/74   07/12/17 131/73   03/08/17 146/79    Weight from Last 3 Encounters:   10/11/17 77.2 kg (170 lb 4.8 oz)   07/12/17 77 kg (169 lb 12.8 oz)   03/08/17 76 kg (167 lb 9.6 oz)               Primary Care Provider Office Phone # Fax #    Hemant Jiménez [phone]       [address] [address]        Equal Access to Services     [facility]: Hadii aad ku hadasho Soomaali, waaxda luqadaha, qaybta kaalmada ademarco a, tono haskins . So [facility] [phone].    ATENCIÓN: Si habla español, tiene a tran disposición servicios gratuitos de asistencia lingüística. Llame al [phone].    We comply with applicable federal civil rights laws and [place] laws. We do not discriminate on the basis of race, color, national origin, age, disability, sex, sexual " orientation, or gender identity.            Thank you!     Thank you for choosing [facility]  for your care. Our goal is always to provide you with excellent care. Hearing back from our patients is one way we can continue to improve our services. Please take a few minutes to complete the written survey that you may receive in the mail after your visit with us. Thank you!             Your Updated Medication List - Protect others around you: Learn how to safely use, store and throw away your medicines at www.disposemymeds.org.          This list is accurate as of: 10/11/17 11:57 AM.  Always use your most recent med list.                   Brand Name Dispense Instructions for use Diagnosis    aspirin 81 MG tablet      Take 1 tablet by mouth At Bedtime        calcium carbonate 500 MG chewable tablet    TUMS     Take 1 chew tab by mouth as needed for heartburn        clindamycin 1 % topical gel    CLINDAMAX    60 g    Apply topically 2 times daily    Drug rash, Malignant neoplasm of upper lobe of right lung (H)       GABAPENTIN PO      Take 150 mg by mouth At Bedtime        hydrocortisone 1 % ointment      Apply topically 2 times daily        lisinopril 2.5 MG tablet    PRINIVIL/Zestril     Take 2.5 mg by mouth daily.        * MATURE ADULT CENTURY PO      Take 1 tablet by mouth daily OTC        * EYE VITAMINS PO      Take 1 capsule by mouth daily OTC        ranitidine 150 MG capsule    ZANTAC     Take 150 mg by mouth 2 times daily        simvastatin 80 MG tablet    ZOCOR          * Notice:  This list has 2 medication(s) that are the same as other medications prescribed for you. Read the directions carefully, and ask your doctor or other care provider to review them with you.

## 2017-10-11 NOTE — NURSING NOTE
"Oncology Rooming Note    October 11, 2017 9:48 AM   Gilson Curtis is a 77 year old male who presents for:    Chief Complaint   Patient presents with     Oncology Clinic Visit     return patient visit for 3 month f/u related to NSCL CA     Initial Vitals: /74  Pulse 66  Temp 97.9  F (36.6  C) (Oral)  Resp 17  Ht 1.755 m (5' 9.09\")  Wt 77.2 kg (170 lb 4.8 oz)  SpO2 99%  BMI 25.08 kg/m2 Estimated body mass index is 25.08 kg/(m^2) as calculated from the following:    Height as of this encounter: 1.755 m (5' 9.09\").    Weight as of this encounter: 77.2 kg (170 lb 4.8 oz). Body surface area is 1.94 meters squared.  Mild Pain (2) Comment: Data Unavailable   No LMP for male patient.  Allergies reviewed: Yes  Medications reviewed: Yes    Medications: Medication refills not needed today.  Pharmacy name entered into [facility]: [facility] [address] [place] [address]    Clinical concerns: RIB PAIN (RIGHT SIDE) - 2/10 DR. SNIDER was notified.    6 minutes for nursing intake (face to face time)     Jair Newsome CMA              "

## 2017-12-22 ENCOUNTER — TELEPHONE (OUTPATIENT)
Dept: ONCOLOGY | Facility: CLINIC | Age: 77
End: 2017-12-22

## 2017-12-22 NOTE — TELEPHONE ENCOUNTER
Oral Chemotherapy Monitoring Program   Placed call to patient in follow up of Tarceva (erlotinib) therapy.   Left message requesting call back.   No drug names were mentioned.    Last filled 12/21/2017 [facility]. Medication Possession Ratio (MPR) = 0.97, 12 fills in 12 months.     Jj Hassan, PharmD  Therapy Management Oncology Pharmacist  [facility]   Phone: [phone]

## 2018-01-05 ENCOUNTER — TELEPHONE (OUTPATIENT)
Dept: ONCOLOGY | Facility: CLINIC | Age: 78
End: 2018-01-05

## 2018-01-05 NOTE — TELEPHONE ENCOUNTER
Patient was enrolled into a [employer] mauri for NSCLC in the amount of $6000.00 for dates 12/5/2017-12/4/2018.   Pharmacy Processing Information: Rx Processor is PDM ID: [id number] GRP: [id number] BIN: [id number] PCN: [id number]

## 2018-01-15 ENCOUNTER — RADIANT APPOINTMENT (OUTPATIENT)
Dept: CT IMAGING | Facility: CLINIC | Age: 78
End: 2018-01-15
Attending: INTERNAL MEDICINE
Payer: COMMERCIAL

## 2018-01-15 ENCOUNTER — ONCOLOGY VISIT (OUTPATIENT)
Dept: ONCOLOGY | Facility: CLINIC | Age: 78
End: 2018-01-15
Payer: COMMERCIAL

## 2018-01-15 VITALS
BODY MASS INDEX: 25.38 KG/M2 | DIASTOLIC BLOOD PRESSURE: 78 MMHG | SYSTOLIC BLOOD PRESSURE: 157 MMHG | OXYGEN SATURATION: 99 % | WEIGHT: 172.3 LBS | HEART RATE: 64 BPM

## 2018-01-15 DIAGNOSIS — C34.80 MALIGNANT NEOPLASM OF TRACHEA, BRONCHUS, AND LUNG (H): Primary | ICD-10-CM

## 2018-01-15 DIAGNOSIS — C34.91 NON-SMALL CELL LUNG CANCER, RIGHT (H): Primary | ICD-10-CM

## 2018-01-15 DIAGNOSIS — C33 MALIGNANT NEOPLASM OF TRACHEA, BRONCHUS, AND LUNG (H): Primary | ICD-10-CM

## 2018-01-15 DIAGNOSIS — C34.90 MALIGNANT NEOPLASM OF LUNG, UNSPECIFIED LATERALITY, UNSPECIFIED PART OF LUNG (H): ICD-10-CM

## 2018-01-15 DIAGNOSIS — C34.91 NON-SMALL CELL LUNG CANCER, RIGHT (H): ICD-10-CM

## 2018-01-15 LAB
ALBUMIN SERPL-MCNC: 3.8 G/DL (ref 3.4–5)
ALP SERPL-CCNC: 97 U/L (ref 40–150)
ALT SERPL W P-5'-P-CCNC: 27 U/L (ref 0–70)
ANION GAP SERPL CALCULATED.3IONS-SCNC: 5 MMOL/L (ref 3–14)
AST SERPL W P-5'-P-CCNC: 26 U/L (ref 0–45)
BASOPHILS # BLD AUTO: 0.1 10E9/L (ref 0–0.2)
BASOPHILS NFR BLD AUTO: 0.7 %
BILIRUB SERPL-MCNC: 0.9 MG/DL (ref 0.2–1.3)
BUN SERPL-MCNC: 27 MG/DL (ref 7–30)
CALCIUM SERPL-MCNC: 8.9 MG/DL (ref 8.5–10.1)
CHLORIDE SERPL-SCNC: 108 MMOL/L (ref 94–109)
CO2 SERPL-SCNC: 27 MMOL/L (ref 20–32)
CREAT SERPL-MCNC: 1.75 MG/DL (ref 0.66–1.25)
DIFFERENTIAL METHOD BLD: NORMAL
EOSINOPHIL # BLD AUTO: 0.1 10E9/L (ref 0–0.7)
EOSINOPHIL NFR BLD AUTO: 1.8 %
ERYTHROCYTE [DISTWIDTH] IN BLOOD BY AUTOMATED COUNT: 12.5 % (ref 10–15)
GFR SERPL CREATININE-BSD FRML MDRD: 38 ML/MIN/1.7M2
GLUCOSE SERPL-MCNC: 105 MG/DL (ref 70–99)
HCT VFR BLD AUTO: 51.3 % (ref 40–53)
HGB BLD-MCNC: 16.5 G/DL (ref 13.3–17.7)
IMM GRANULOCYTES # BLD: 0 10E9/L (ref 0–0.4)
IMM GRANULOCYTES NFR BLD: 0.3 %
LYMPHOCYTES # BLD AUTO: 1.6 10E9/L (ref 0.8–5.3)
LYMPHOCYTES NFR BLD AUTO: 23.3 %
MCH RBC QN AUTO: 31.8 PG (ref 26.5–33)
MCHC RBC AUTO-ENTMCNC: 32.2 G/DL (ref 31.5–36.5)
MCV RBC AUTO: 99 FL (ref 78–100)
MONOCYTES # BLD AUTO: 0.6 10E9/L (ref 0–1.3)
MONOCYTES NFR BLD AUTO: 9.2 %
NEUTROPHILS # BLD AUTO: 4.4 10E9/L (ref 1.6–8.3)
NEUTROPHILS NFR BLD AUTO: 64.7 %
PLATELET # BLD AUTO: 177 10E9/L (ref 150–450)
POTASSIUM SERPL-SCNC: 4.8 MMOL/L (ref 3.4–5.3)
PROT SERPL-MCNC: 6.6 G/DL (ref 6.8–8.8)
RADIOLOGIST FLAGS: NORMAL
RBC # BLD AUTO: 5.19 10E12/L (ref 4.4–5.9)
SODIUM SERPL-SCNC: 140 MMOL/L (ref 133–144)
WBC # BLD AUTO: 6.8 10E9/L (ref 4–11)

## 2018-01-15 PROCEDURE — 36415 COLL VENOUS BLD VENIPUNCTURE: CPT | Performed by: INTERNAL MEDICINE

## 2018-01-15 PROCEDURE — 80053 COMPREHEN METABOLIC PANEL: CPT | Performed by: INTERNAL MEDICINE

## 2018-01-15 PROCEDURE — 71260 CT THORAX DX C+: CPT | Performed by: RADIOLOGY

## 2018-01-15 PROCEDURE — 85025 COMPLETE CBC W/AUTO DIFF WBC: CPT | Performed by: INTERNAL MEDICINE

## 2018-01-15 PROCEDURE — 99214 OFFICE O/P EST MOD 30 MIN: CPT | Performed by: INTERNAL MEDICINE

## 2018-01-15 RX ORDER — IOPAMIDOL 755 MG/ML
83 INJECTION, SOLUTION INTRAVASCULAR ONCE
Status: COMPLETED | OUTPATIENT
Start: 2018-01-15 | End: 2018-01-15

## 2018-01-15 RX ADMIN — IOPAMIDOL 83 ML: 755 INJECTION, SOLUTION INTRAVASCULAR at 11:33

## 2018-01-15 ASSESSMENT — PAIN SCALES - GENERAL: PAINLEVEL: MILD PAIN (2)

## 2018-01-15 NOTE — MR AVS SNAPSHOT
After Visit Summary   1/15/2018    Gilson Curtis    MRN: [id number]           Patient Information     Date Of Birth          1940        Visit Information        Provider Department      1/15/2018 2:00 PM Gerry Ahumada MD [facility]        Today's Diagnoses     Non-small cell lung cancer, right (H)    -  1       Follow-ups after your visit        Who to contact     If you have questions or need follow up information about today's clinic visit or your schedule please contact [facility] directly at [phone].  Normal or non-critical lab and imaging results will be communicated to you by [employer], letter or phone within 4 business days after the clinic has received the results. If you do not hear from us within 7 days, please contact the clinic through [employer] or phone. If you have a critical or abnormal lab result, we will notify you by phone as soon as possible.  Submit refill requests through [employer] or call your pharmacy and they will forward the refill request to us. Please allow 3 business days for your refill to be completed.          Additional Information About Your Visit        MyChart Information     [employer] gives you secure access to your electronic health record. If you see a primary care provider, you can also send messages to your care team and make appointments. If you have questions, please call your primary care clinic.  If you do not have a primary care provider, please call [phone] and they will assist you.      [employer] is an electronic gateway that provides easy, online access to your medical records. With [employer], you can request a clinic appointment, read your test results, renew a prescription or communicate with your care team.     To access your existing account, please contact your [facility] or call [phone] for assistance.        Care EveryWhere ID     This is your Care EveryWhere ID.  This could be used by other organizations to access your [place] medical records  [id number]        Your Vitals Were     Pulse Pulse Oximetry BMI (Body Mass Index)             64 99% 25.38 kg/m2          Blood Pressure from Last 3 Encounters:   01/15/18 157/78   10/11/17 122/74   07/12/17 131/73    Weight from Last 3 Encounters:   01/15/18 78.2 kg (172 lb 4.8 oz)   10/11/17 77.2 kg (170 lb 4.8 oz)   07/12/17 77 kg (169 lb 12.8 oz)              Today, you had the following     No orders found for display       Primary Care Provider Office Phone # Fax #    Hemant Jiménez [phone]       [address] [address]        Equal Access to Services     [facility] : Hadii aad ku hadasho Soomaali, waaxda luqadaha, qaybta kaalmada adechikisyada, tono haskins . So [facility] [phone].    ATENCIÓN: Si habla español, tiene a tran disposición servicios gratuitos de asistencia lingüística. Blayne al [phone].    We comply with applicable federal civil rights laws and [place] laws. We do not discriminate on the basis of race, color, national origin, age, disability, sex, sexual orientation, or gender identity.            Thank you!     Thank you for choosing [facility]  for your care. Our goal is always to provide you with excellent care. Hearing back from our patients is one way we can continue to improve our services. Please take a few minutes to complete the written survey that you may receive in the mail after your visit with us. Thank you!             Your Updated Medication List - Protect others around you: Learn how to safely use, store and throw away your medicines at www.disposemymeds.org.          This list is accurate as of: 1/15/18 11:59 PM.  Always use your most recent med list.                   Brand Name Dispense Instructions for use Diagnosis    aspirin 81 MG tablet      Take 1 tablet by mouth At Bedtime        calcium  carbonate 500 MG chewable tablet    TUMS     Take 1 chew tab by mouth as needed for heartburn        clindamycin 1 % topical gel    CLINDAMAX    60 g    Apply topically 2 times daily    Drug rash, Malignant neoplasm of upper lobe of right lung (H)       GABAPENTIN PO      Take 150 mg by mouth At Bedtime        hydrocortisone 1 % ointment      Apply topically 2 times daily        lisinopril 2.5 MG tablet    PRINIVIL/Zestril     Take 2.5 mg by mouth daily.        * MATURE ADULT CENTURY PO      Take 1 tablet by mouth daily OTC        * EYE VITAMINS PO      Take 1 capsule by mouth daily OTC        ranitidine 150 MG capsule    ZANTAC     Take 150 mg by mouth 2 times daily        simvastatin 80 MG tablet    ZOCOR          * Notice:  This list has 2 medication(s) that are the same as other medications prescribed for you. Read the directions carefully, and ask your doctor or other care provider to review them with you.

## 2018-01-15 NOTE — NURSING NOTE
"Gilson Curtis's goals for this visit include:   Chief Complaint   Patient presents with     RECHECK     follow up - Transfer for care     He requests these members of at his care team be copied on today's visit    Initial Vitals: /78 (BP Location: Right arm, Patient Position: Chair, Cuff Size: Adult Regular)  Pulse 64  Wt 78.2 kg (172 lb 4.8 oz)  SpO2 99%  BMI 25.38 kg/m2 Estimated body mass index is 25.38 kg/(m^2) as calculated from the following:    Height as of 10/11/17: 1.755 m (5' 9.09\").    Weight as of this encounter: 78.2 kg (172 lb 4.8 oz).. BP completed using cuff size :regular  PCP: Hemant Jiménez    Referring Provider  No referring provider defined for this encounter.    Do you need refills at today's visit? BRENDA Mustafa CMA    "

## 2018-01-15 NOTE — PROGRESS NOTES
"[facility]  HEMATOLOGY AND ONCOLOGY    FOLLOW-UP VISIT NOTE    PATIENT NAME: Gilson Curtis MRN # [id number]  DATE OF VISIT: Nestor 15, 2018 YOB: 1940    REFERRING PROVIDER: No referring provider defined for this encounter.    CANCER TYPE: Non small cell lung cancer - adenocarcinoma  STAGE: IV    TREATMENT SUMMARY:  Gilson presented with with hemoptysis starting in November 2011. He underwent CT of chest on Jan 18, 2012 at his primary care clinic which showed a RUL mass 2 x 1.9 cm with no suspicious lymphadenopathy or adrenal masses. 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 He was staged with a PET-CT and 2 lesions were noted. He was staged as T3N0 disease. He had surgical resection of the nodules in RUL and RML. EGFR exon 21 deletion.  He has been on Tarceva since June 2016 and is tolerating it quite well. Dose was decreased from 150 mg daily to 100 mg daily in July 2017 due to rash.     CURRENT INTERVENTIONS:  Erlotinib 100 mg daily     SUBJECTIVE   Gilson Curtis is being followed for metastatic lung adenocarcinoma    He has been on this medication since June of 2016. He denies any new complains on this.       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     CAD (coronary artery disease)      CKD (chronic kidney disease) stage 3, GFR 30-59 ml/min      Fatigue     \"spells\"     Hypertension      Non-small cell lung cancer (H)      Prostate cancer (H)      Skin cancer, basal cell      Stented coronary artery 2009    x2     TIA (transient ischemic attack) 2002         CURRENT OUTPATIENT MEDICATIONS     Current Outpatient Prescriptions   Medication Sig     simvastatin (ZOCOR) 80 MG tablet      hydrocortisone 1 % ointment Apply topically 2 times daily     clindamycin (CLINDAMAX) 1 % gel Apply topically 2 times daily     " calcium carbonate (TUMS) 500 MG chewable tablet Take 1 chew tab by mouth as needed for heartburn     GABAPENTIN PO Take 150 mg by mouth At Bedtime      Multiple Vitamins-Minerals (MATURE ADULT CENTURY PO) Take 1 tablet by mouth daily OTC     Multiple Vitamins-Minerals (EYE VITAMINS PO) Take 1 capsule by mouth daily OTC     aspirin 81 MG tablet Take 1 tablet by mouth At Bedtime      ranitidine (ZANTAC) 150 MG capsule Take 150 mg by mouth 2 times daily      lisinopril (PRINIVIL,ZESTRIL) 2.5 MG tablet Take 2.5 mg by mouth daily.     No current facility-administered medications for this visit.      Facility-Administered Medications Ordered in Other Visits   Medication     ropivacaine 0.2% 550 mL in ON-Q C-Bloc select flow ( holds 400-550 mL) single cath disposable pump        ALLERGIES      Allergies   Allergen Reactions     No Known Drug Allergy         REVIEW OF SYSTEMS   As above in the HPI, o/w complete 12-point ROS was negative.     PHYSICAL EXAM   /78 (BP Location: Right arm, Patient Position: Chair, Cuff Size: Adult Regular)  Pulse 64  Wt 78.2 kg (172 lb 4.8 oz)  SpO2 99%  BMI 25.38 kg/m2  GEN: NAD  HEENT: PERRL, EOMI, no icterus, injection or pallor. Oropharynx is clear.  LYMPHATICs: no cervical or supraclavicular lymphadenopathy; no other abn lymphadenopathy  PULMONARY: clear with good air entry bilaterally  CARDIOVASCULAR: regular, no murmurs, rubs, or gallops  GASTROINTESTINAL: soft, non-tender, non-distended, normal bowel sounds, no hepatosplenomegaly by percussion or palpation  MUSCULOSKELTAL: warm, well perfused, no edema  NEURO: awake, alert and oriented to time place and person, cranial nerves intact - II - XII, no focal neurologic deficits  SKIN: no rashes     LABORATORY AND IMAGING STUDIES     Recent Labs   Lab Test  01/15/18   1052  10/10/17   1033  07/18/17   0826  03/06/17   1000  01/11/17   1000   10/12/16   0749   NA  140  143  142  143  144   < >  142   POTASSIUM  4.8  4.4  4.9   4.8  5.1   < >  4.4   CHLORIDE  108  109  104  110*  111*   < >  108   CO2  27  29  26  28  27   < >  26   ANIONGAP  5  5   --   5  6   --   8   BUN  27  20  26  22  20   < >  22   CR  1.75*  1.55*  1.6  1.78*  1.59*   < >  1.57*   GLC  105*  73  87  106*  70   < >  109*   RICK  8.9  8.8  9.3  8.9  8.8   < >  9.3    < > = values in this interval not displayed.     Recent Labs   Lab Test  10/10/17   1033  07/11/17   1037  03/06/17   1000  01/11/17   1000  12/15/16   1055   MAG  2.0  1.9  2.1  2.3  2.0   PHOS  2.3*  2.9  2.7  2.0*  2.7     Recent Labs   Lab Test  01/15/18   1052  10/10/17   1033  07/18/17   0826  03/06/17   1000  01/11/17   1000  12/15/16   1055   WBC  6.8  8.5  6.3  6.6  6.2  6.9   HGB  16.5  16.4  14.7  16.5  15.6  15.9   PLT  177  180  196  171  185  228   MCV  99  98  96.2  97  98  94.5   NEUTROPHIL  64.7  60.8   --   59.3  61.0  66.7     Recent Labs   Lab Test  01/15/18   1052  10/10/17   1033  07/18/17   0826   BILITOTAL  0.9  0.9  1.4   ALKPHOS  97  106  88   ALT  27  29  27   AST  26  23  32   ALBUMIN  3.8  3.7  3.8     No results found for: TSH  No results for input(s): CEA in the last [id number] hours.  Results for orders placed or performed in visit on 01/15/18   CT Chest w contrast     Value    Radiologist flags Lung nodule    Narrative    EXAMINATION: CT CHEST W CONTRAST, 1/15/2018 11:33 AM    TECHNIQUE:  Helical CT images from the lung apices through the upper  abdomen were obtained with IV contrast.  Contrast: 83ml isovue 370    COMPARISON: Chest CT 10/10/2017, 7/11/2017, 3/6/2017, 1/11/2017    HISTORY: nsclc; Malignant neoplasm of lung, unspecified laterality,  unspecified part of lung (H)    FINDINGS:    Chest: Unchanged 1.8 cm heterogeneously hypoattenuating nodule in the  left lobe of the thyroid gland. Heart size is normal. No pericardial  effusion. Normal caliber thoracic aorta and main pulmonary artery. No  central pulmonary embolism. No lymphadenopathy in the chest. No  pleural  effusion or pneumothorax. Central tracheobronchial tree is  clear.    Lungs: Postsurgical changes of right upper lobectomy and right middle  lobe wedge resection. 8 mm nodule along the right major fissure  (series 7 image 141) is unchanged over the course of numerous prior  exams and most likely related to scarring adjacent to the wedge  resection. No new or suspicious nodularity along the resection  margins. Additional pleural-based scarring in the anterior right lower  lobe. Minimal bibasilar atelectasis. Mild reticular opacity in the  lingula. Centrilobular emphysema.     New 10 mm groundglass opacity with a central focus of 2 mm solid  opacity in the left upper lobe (series 7 image 70). New 2 mm solid  nodule in the right upper lobe (series 7 image 42).    Upper abdomen: Cholecystectomy. Calcified granulomata in the spleen.  Numerous renal sinus cysts in the left kidney. Atrophic and scarred  right kidney. Small sliding-type hiatal hernia. Normal adrenals.    Bones and soft tissues: No acute or suspicious osseous abnormality.      Impression    IMPRESSION:   1. Postsurgical changes of right upper lobe lobectomy and right middle  lobe wedge resection without evidence of local recurrence.   2. New part solid part groundglass left upper lobe nodule. The  groundglass component measures 10 mm in the solid component measures 2  mm. Recommend short-term follow-up with CT chest in one month to  evaluate for resolution.  3. New 2 mm solid nodule in the right upper lobe, possibly infectious  or inflammatory.  4. Emphysema.  5. Unchanged 1.8 cm nodule in the left lobe of the thyroid gland.    [Consider Follow Up: Lung nodule]    This report will be copied to the [facility] to ensure a  provider acknowledges the finding.     I have personally reviewed the examination and initial interpretation  and I agree with the findings.    JOSELYN MCRAE MD         ASSESSMENT AND PLAN   Non small cell lung cancer -  adenocarcinoma with EGFR mutation (exxon 21 deletion)   On erlotinib since June 2016  ECOG PS 0  HTN, CKD, CAD    Zane is tolerating erlotinib well at the 100 mg daily dose. I have reviewed actual images from his restaging scans. He has a new ground glass opacity in left upper lobe. It is not possible to rule out metastatic disease in the lesion. Clinically this does still favor stable disease.     We will plan to continue with erlotinib as current. I did have him talk to our pharmacy team.     I will see him again in 3 months with labs and restaging scans.     Over 25 min of direct face to face time spent with patient with more than 50% time spent in counseling and coordinating care.

## 2018-01-15 NOTE — LETTER
"    1/15/2018         RE: Gilson Curtis  [address] [facility] [address]        Dear Colleague,    Thank you for referring your patient, Gilson Curtis, to the [facility]. Please see a copy of my visit note below.    [facility]  HEMATOLOGY AND ONCOLOGY    FOLLOW-UP VISIT NOTE    PATIENT NAME: Gilson Curtis MRN # [id number]  DATE OF VISIT: Nestor 15, 2018 YOB: 1940    REFERRING PROVIDER: No referring provider defined for this encounter.    CANCER TYPE: Non small cell lung cancer - adenocarcinoma  STAGE: IV    TREATMENT SUMMARY:  Gilson presented with with hemoptysis starting in November 2011. He underwent CT of chest on Jan 18, 2012 at his primary care clinic which showed a RUL mass 2 x 1.9 cm with no suspicious lymphadenopathy or adrenal masses. 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 He was staged with a PET-CT and 2 lesions were noted. He was staged as T3N0 disease. He had surgical resection of the nodules in RUL and RML. EGFR exon 21 deletion.  He has been on Tarceva since June 2016 and is tolerating it quite well. Dose was decreased from 150 mg daily to 100 mg daily in July 2017 due to rash.     CURRENT INTERVENTIONS:  Erlotinib 100 mg daily     SUBJECTIVE   Gilson Curtis is being followed for metastatic lung adenocarcinoma    He has been on this medication since June of 2016. He denies any new complains on this.       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     CAD (coronary artery disease)      CKD (chronic kidney disease) stage 3, GFR 30-59 ml/min      Fatigue     \"spells\"     Hypertension      Non-small cell lung cancer (H)      Prostate cancer (H)      Skin cancer, basal cell      Stented coronary artery 2009    x2     TIA (transient ischemic attack) 2002         CURRENT " OUTPATIENT MEDICATIONS     Current Outpatient Prescriptions   Medication Sig     simvastatin (ZOCOR) 80 MG tablet      hydrocortisone 1 % ointment Apply topically 2 times daily     clindamycin (CLINDAMAX) 1 % gel Apply topically 2 times daily     calcium carbonate (TUMS) 500 MG chewable tablet Take 1 chew tab by mouth as needed for heartburn     GABAPENTIN PO Take 150 mg by mouth At Bedtime      Multiple Vitamins-Minerals (MATURE ADULT CENTURY PO) Take 1 tablet by mouth daily OTC     Multiple Vitamins-Minerals (EYE VITAMINS PO) Take 1 capsule by mouth daily OTC     aspirin 81 MG tablet Take 1 tablet by mouth At Bedtime      ranitidine (ZANTAC) 150 MG capsule Take 150 mg by mouth 2 times daily      lisinopril (PRINIVIL,ZESTRIL) 2.5 MG tablet Take 2.5 mg by mouth daily.     No current facility-administered medications for this visit.      Facility-Administered Medications Ordered in Other Visits   Medication     ropivacaine 0.2% 550 mL in ON-Q C-Bloc select flow ( holds 400-550 mL) single cath disposable pump        ALLERGIES      Allergies   Allergen Reactions     No Known Drug Allergy         REVIEW OF SYSTEMS   As above in the HPI, o/w complete 12-point ROS was negative.     PHYSICAL EXAM   /78 (BP Location: Right arm, Patient Position: Chair, Cuff Size: Adult Regular)  Pulse 64  Wt 78.2 kg (172 lb 4.8 oz)  SpO2 99%  BMI 25.38 kg/m2  GEN: NAD  HEENT: PERRL, EOMI, no icterus, injection or pallor. Oropharynx is clear.  LYMPHATICs: no cervical or supraclavicular lymphadenopathy; no other abn lymphadenopathy  PULMONARY: clear with good air entry bilaterally  CARDIOVASCULAR: regular, no murmurs, rubs, or gallops  GASTROINTESTINAL: soft, non-tender, non-distended, normal bowel sounds, no hepatosplenomegaly by percussion or palpation  MUSCULOSKELTAL: warm, well perfused, no edema  NEURO: awake, alert and oriented to time place and person, cranial nerves intact - II - XII, no focal neurologic deficits  SKIN:  no rashes     LABORATORY AND IMAGING STUDIES     Recent Labs   Lab Test  01/15/18   1052  10/10/17   1033  07/18/17   0826  03/06/17   1000  01/11/17   1000   10/12/16   0749   NA  140  143  142  143  144   < >  142   POTASSIUM  4.8  4.4  4.9  4.8  5.1   < >  4.4   CHLORIDE  108  109  104  110*  111*   < >  108   CO2  27  29  26  28  27   < >  26   ANIONGAP  5  5   --   5  6   --   8   BUN  27  20  26  22  20   < >  22   CR  1.75*  1.55*  1.6  1.78*  1.59*   < >  1.57*   GLC  105*  73  87  106*  70   < >  109*   RICK  8.9  8.8  9.3  8.9  8.8   < >  9.3    < > = values in this interval not displayed.     Recent Labs   Lab Test  10/10/17   1033  07/11/17   1037  03/06/17   1000  01/11/17   1000  12/15/16   1055   MAG  2.0  1.9  2.1  2.3  2.0   PHOS  2.3*  2.9  2.7  2.0*  2.7     Recent Labs   Lab Test  01/15/18   1052  10/10/17   1033  07/18/17   0826  03/06/17   1000  01/11/17   1000  12/15/16   1055   WBC  6.8  8.5  6.3  6.6  6.2  6.9   HGB  16.5  16.4  14.7  16.5  15.6  15.9   PLT  177  180  196  171  185  228   MCV  99  98  96.2  97  98  94.5   NEUTROPHIL  64.7  60.8   --   59.3  61.0  66.7     Recent Labs   Lab Test  01/15/18   1052  10/10/17   1033  07/18/17   0826   BILITOTAL  0.9  0.9  1.4   ALKPHOS  97  106  88   ALT  27  29  27   AST  26  23  32   ALBUMIN  3.8  3.7  3.8     No results found for: TSH  No results for input(s): CEA in the last 51925 hours.  Results for orders placed or performed in visit on 01/15/18   CT Chest w contrast     Value    Radiologist flags Lung nodule    Narrative    EXAMINATION: CT CHEST W CONTRAST, 1/15/2018 11:33 AM    TECHNIQUE:  Helical CT images from the lung apices through the upper  abdomen were obtained with IV contrast.  Contrast: 83ml isovue 370    COMPARISON: Chest CT 10/10/2017, 7/11/2017, 3/6/2017, 1/11/2017    HISTORY: nsclc; Malignant neoplasm of lung, unspecified laterality,  unspecified part of lung (H)    FINDINGS:    Chest: Unchanged 1.8 cm heterogeneously  hypoattenuating nodule in the  left lobe of the thyroid gland. Heart size is normal. No pericardial  effusion. Normal caliber thoracic aorta and main pulmonary artery. No  central pulmonary embolism. No lymphadenopathy in the chest. No  pleural effusion or pneumothorax. Central tracheobronchial tree is  clear.    Lungs: Postsurgical changes of right upper lobectomy and right middle  lobe wedge resection. 8 mm nodule along the right major fissure  (series 7 image 141) is unchanged over the course of numerous prior  exams and most likely related to scarring adjacent to the wedge  resection. No new or suspicious nodularity along the resection  margins. Additional pleural-based scarring in the anterior right lower  lobe. Minimal bibasilar atelectasis. Mild reticular opacity in the  lingula. Centrilobular emphysema.     New 10 mm groundglass opacity with a central focus of 2 mm solid  opacity in the left upper lobe (series 7 image 70). New 2 mm solid  nodule in the right upper lobe (series 7 image 42).    Upper abdomen: Cholecystectomy. Calcified granulomata in the spleen.  Numerous renal sinus cysts in the left kidney. Atrophic and scarred  right kidney. Small sliding-type hiatal hernia. Normal adrenals.    Bones and soft tissues: No acute or suspicious osseous abnormality.      Impression    IMPRESSION:   1. Postsurgical changes of right upper lobe lobectomy and right middle  lobe wedge resection without evidence of local recurrence.   2. New part solid part groundglass left upper lobe nodule. The  groundglass component measures 10 mm in the solid component measures 2  mm. Recommend short-term follow-up with CT chest in one month to  evaluate for resolution.  3. New 2 mm solid nodule in the right upper lobe, possibly infectious  or inflammatory.  4. Emphysema.  5. Unchanged 1.8 cm nodule in the left lobe of the thyroid gland.    [Consider Follow Up: Lung nodule]    This report will be copied to the [facility] to ensure a  provider acknowledges the finding.     I have personally reviewed the examination and initial interpretation  and I agree with the findings.    JOSELYN MCRAE MD         ASSESSMENT AND PLAN   Non small cell lung cancer - adenocarcinoma with EGFR mutation (exxon 21 deletion)   On erlotinib since June 2016  ECOG PS 0  HTN, CKD, CAD    Zane is tolerating erlotinib well at the 100 mg daily dose. I have reviewed actual images from his restaging scans. He has a new ground glass opacity in left upper lobe. It is not possible to rule out metastatic disease in the lesion. Clinically this does still favor stable disease.     We will plan to continue with erlotinib as current. I did have him talk to our pharmacy team.     I will see him again in 3 months with labs and restaging scans.     Over 25 min of direct face to face time spent with patient with more than 50% time spent in counseling and coordinating care.      Again, thank you for allowing me to participate in the care of your patient.        Sincerely,        Gerry Ahumada MD

## 2018-01-22 ENCOUNTER — TELEPHONE (OUTPATIENT)
Dept: ONCOLOGY | Facility: CLINIC | Age: 78
End: 2018-01-22

## 2018-01-22 NOTE — TELEPHONE ENCOUNTER
Oral Chemotherapy Monitoring Program   Placed call to patient in follow up of Tarceva (erlotinib) therapy.   Left message requesting call back.   No drug names were mentioned.      Patient difficult to reach for therapy management assessment. His MPR is monitored every 3 months to ensure adherence to Tarceva therapy. MPR = 0.98 with 12 fills in 12 months. Last filled 30DS on 1/15/18. Will reach out next quarter for TM assessment.

## 2018-01-25 DIAGNOSIS — C34.91 NON-SMALL CELL CANCER OF RIGHT LUNG (H): Primary | ICD-10-CM

## 2018-02-21 ENCOUNTER — CARE COORDINATION (OUTPATIENT)
Dept: ONCOLOGY | Facility: CLINIC | Age: 78
End: 2018-02-21

## 2018-02-21 NOTE — PROGRESS NOTES
Left message with patient regarding his request for call back regarding CT scan results.  Call back number left on voice mail.

## 2018-04-23 ENCOUNTER — TELEPHONE (OUTPATIENT)
Dept: ONCOLOGY | Facility: CLINIC | Age: 78
End: 2018-04-23

## 2018-04-23 NOTE — TELEPHONE ENCOUNTER
Oral Chemotherapy Monitoring Program      Primary Oncologist: Dr. Haywood  Primary Oncology Clinic: [facility] Diagnosis: Lung Cancer      Therapy History: Confirmed patient taking Tarceva 150mg (1 x 150mg) once daily, continuously. Due to rash, dose will be reduced to 100mg once daily.   Start Date: 5/11/16      Drug Interaction Assessment: No new drug interactions      Lab Monitoring Plan                            Monitoring plan:     C1D1+     CMP   C2D1+   Call, CMP, Mg, Phos   C3D1+   Call, CMP, Mg, Phos   C4D1+   Call, CMP, Mg, Phos   C5D1+   Call, CMP, Mg, Phos   C6D1+   Call, CMP, Mg, Phos     C1D8+      C2D8+      C3D8+      C4D8+      C5D8+      C6D8+        C1D15+   Call   C2D15+      C3D15+      C4D15+      C5D15+      C6D15+        C1D22+      C2D22+      C3D22+      C4D22+      C5D22+      C6D22+             Subjective/Objective:  Gilson Curtis is a 78 year old male contacted by phone for a follow-up visit for oral chemotherapy. Continues on 100mg Tarceva daily.  No side effects, medication changes, and no missed doses. . He has 14 tablets remaining. Medication Possession Ratio (MPR) = 1.01, 13 fills in 12 months, patient is adherent.     ORAL CHEMOTHERAPY 11/9/2016 1/16/2017 3/1/2017 4/4/2017 7/24/2017 8/25/2017 4/23/2018   Drug Name Tarceva (Erlotinib) Tarceva (Erlotinib) Tarceva (Erlotinib) Tarceva (Erlotinib) Tarceva (Erlotinib) Tarceva (Erlotinib) Tarceva (Erlotinib)   Current Dosage 150mg 150mg 150mg 150mg 150mg 100mg 100mg   Current Schedule Daily Daily Daily Daily Daily Daily Daily   Cycle Details Continuous Continuous Continuous Continuous Continuous Continuous Continuous   Start Date of Last Cycle 10/10/2016 - - - - - -   Planned next cycle start date 11/10/2016 - - - - - -   Doses missed in last 2 weeks 0 0 0 0 0 0 0   Adherence Assessment - - Adherent Adherent Adherent Adherent Adherent   Adverse Effects EGFR rash EGFR rash EGFR rash EGFR rash EGFR rash EGFR rash No AE  "identified during assessment   EGFR rash Grade 1 Grade 1 Grade 1 Grade 1 Grade 1 Grade 1 -   Pharmacist Intervention(EGFR) No No Yes No No No -   Intervention(s) - - Patient education - - - -   Home BPs not needed Not applicable not needed not needed not needed not needed not needed   Any new drug interactions? No No No No No No No   Is the dose as ordered appropriate for the patient? - - Yes Yes Yes Yes -   Is the patient currently in pain? No No No No No No -   Has the patient been assessed within the past 6 months for depression? - - - Yes Yes - -   Has the patient missed any days of school, work, or other routine activity? - - No No No No -       Last PHQ-2 Score on record:   PHQ-2 ( 1999 [employer]) 1/11/2017 10/12/2016   Q1: Little interest or pleasure in doing things 0 0   Q2: Feeling down, depressed or hopeless 0 0   PHQ-2 Score 0 0       Patient does not report depression symptoms.      Vitals:  BP:   BP Readings from Last 1 Encounters:   01/15/18 157/78     Wt Readings from Last 1 Encounters:   01/15/18 78.2 kg (172 lb 4.8 oz)     Estimated body surface area is 1.95 meters squared as calculated from the following:    Height as of 10/11/17: 1.755 m (5' 9.09\").    Weight as of 1/15/18: 78.2 kg (172 lb 4.8 oz).    Labs:  No results found for NA within last 30 days.     No results found for K within last 30 days.     No results found for CA within last 30 days.     No results found for Mag within last 30 days.     No results found for Phos within last 30 days.     No results found for ALBUMIN within last 30 days.     No results found for BUN within last 30 days.     No results found for CR within last 30 days.       No results found for AST within last 30 days.     No results found for ALT within last 30 days.     No results found for BILITOTAL within last 30 days.       No results found for WBC within last 30 days.     No results found for HGB within last 30 days.     No results found for PLT within last 30 days. "     No results found for ANC within last 30 days.           Tabs on hand - 14    Assessment:  Continues to tolerate tarceva.     Plan:  Continue tarceva    Follow-Up:  RX deliver 5/2- email sent to site 19 to coordinate  Labs/scans 4/3  Dr. Ahumada- 5/7    Refill Due:  5/2 delivery     Kary Mandel RN  [facility]  [phone]

## 2018-05-01 ENCOUNTER — DOCUMENTATION ONLY (OUTPATIENT)
Dept: LAB | Facility: CLINIC | Age: 78
End: 2018-05-01

## 2018-05-01 DIAGNOSIS — C34.90 NON-SMALL CELL LUNG CANCER (H): Primary | ICD-10-CM

## 2018-05-01 NOTE — PROGRESS NOTES
Notes say labs per Dr. Ahumada but there is a cbc and cmp from Dr. Haywood in open orders. Please order labs for 5/3 appointment if others are needed.    Thank you,  Anastacia SARMIENTO

## 2018-05-03 ENCOUNTER — RADIANT APPOINTMENT (OUTPATIENT)
Dept: CT IMAGING | Facility: CLINIC | Age: 78
End: 2018-05-03
Attending: INTERNAL MEDICINE
Payer: COMMERCIAL

## 2018-05-03 DIAGNOSIS — C34.91 NON-SMALL CELL CANCER OF RIGHT LUNG (H): ICD-10-CM

## 2018-05-03 DIAGNOSIS — C34.90 NON-SMALL CELL LUNG CANCER (H): ICD-10-CM

## 2018-05-03 LAB
ALBUMIN SERPL-MCNC: 3.6 G/DL (ref 3.4–5)
ALP SERPL-CCNC: 99 U/L (ref 40–150)
ALT SERPL W P-5'-P-CCNC: 23 U/L (ref 0–70)
ANION GAP SERPL CALCULATED.3IONS-SCNC: 4 MMOL/L (ref 3–14)
AST SERPL W P-5'-P-CCNC: 26 U/L (ref 0–45)
BASOPHILS # BLD AUTO: 0 10E9/L (ref 0–0.2)
BASOPHILS NFR BLD AUTO: 0.6 %
BILIRUB SERPL-MCNC: 1.3 MG/DL (ref 0.2–1.3)
BUN SERPL-MCNC: 26 MG/DL (ref 7–30)
CALCIUM SERPL-MCNC: 8.8 MG/DL (ref 8.5–10.1)
CHLORIDE SERPL-SCNC: 109 MMOL/L (ref 94–109)
CO2 SERPL-SCNC: 29 MMOL/L (ref 20–32)
CREAT SERPL-MCNC: 1.61 MG/DL (ref 0.66–1.25)
DIFFERENTIAL METHOD BLD: NORMAL
EOSINOPHIL # BLD AUTO: 0.2 10E9/L (ref 0–0.7)
EOSINOPHIL NFR BLD AUTO: 2.4 %
ERYTHROCYTE [DISTWIDTH] IN BLOOD BY AUTOMATED COUNT: 12.5 % (ref 10–15)
GFR SERPL CREATININE-BSD FRML MDRD: 41 ML/MIN/1.7M2
GLUCOSE SERPL-MCNC: 101 MG/DL (ref 70–99)
HCT VFR BLD AUTO: 50.1 % (ref 40–53)
HGB BLD-MCNC: 15.9 G/DL (ref 13.3–17.7)
IMM GRANULOCYTES # BLD: 0 10E9/L (ref 0–0.4)
IMM GRANULOCYTES NFR BLD: 0.4 %
LYMPHOCYTES # BLD AUTO: 1.8 10E9/L (ref 0.8–5.3)
LYMPHOCYTES NFR BLD AUTO: 24.8 %
MCH RBC QN AUTO: 31.4 PG (ref 26.5–33)
MCHC RBC AUTO-ENTMCNC: 31.7 G/DL (ref 31.5–36.5)
MCV RBC AUTO: 99 FL (ref 78–100)
MONOCYTES # BLD AUTO: 0.9 10E9/L (ref 0–1.3)
MONOCYTES NFR BLD AUTO: 12.3 %
NEUTROPHILS # BLD AUTO: 4.2 10E9/L (ref 1.6–8.3)
NEUTROPHILS NFR BLD AUTO: 59.5 %
PLATELET # BLD AUTO: 187 10E9/L (ref 150–450)
POTASSIUM SERPL-SCNC: 4.4 MMOL/L (ref 3.4–5.3)
PROT SERPL-MCNC: 6.8 G/DL (ref 6.8–8.8)
RBC # BLD AUTO: 5.07 10E12/L (ref 4.4–5.9)
SODIUM SERPL-SCNC: 142 MMOL/L (ref 133–144)
WBC # BLD AUTO: 7.1 10E9/L (ref 4–11)

## 2018-05-03 PROCEDURE — 36415 COLL VENOUS BLD VENIPUNCTURE: CPT | Performed by: INTERNAL MEDICINE

## 2018-05-03 PROCEDURE — 85025 COMPLETE CBC W/AUTO DIFF WBC: CPT | Performed by: INTERNAL MEDICINE

## 2018-05-03 PROCEDURE — 71260 CT THORAX DX C+: CPT | Performed by: RADIOLOGY

## 2018-05-03 PROCEDURE — 74177 CT ABD & PELVIS W/CONTRAST: CPT | Performed by: RADIOLOGY

## 2018-05-03 PROCEDURE — 80053 COMPREHEN METABOLIC PANEL: CPT | Performed by: INTERNAL MEDICINE

## 2018-05-03 RX ORDER — IOPAMIDOL 755 MG/ML
105 INJECTION, SOLUTION INTRAVASCULAR ONCE
Status: COMPLETED | OUTPATIENT
Start: 2018-05-03 | End: 2018-05-03

## 2018-05-03 RX ADMIN — IOPAMIDOL 105 ML: 755 INJECTION, SOLUTION INTRAVASCULAR at 10:55

## 2018-05-07 ENCOUNTER — ONCOLOGY VISIT (OUTPATIENT)
Dept: ONCOLOGY | Facility: CLINIC | Age: 78
End: 2018-05-07
Payer: COMMERCIAL

## 2018-05-07 VITALS
WEIGHT: 170 LBS | SYSTOLIC BLOOD PRESSURE: 137 MMHG | BODY MASS INDEX: 25.18 KG/M2 | DIASTOLIC BLOOD PRESSURE: 80 MMHG | OXYGEN SATURATION: 96 % | TEMPERATURE: 98 F | HEIGHT: 69 IN | HEART RATE: 76 BPM

## 2018-05-07 DIAGNOSIS — C34.80 MALIGNANT NEOPLASM OF TRACHEA, BRONCHUS, AND LUNG (H): Primary | ICD-10-CM

## 2018-05-07 DIAGNOSIS — C33 MALIGNANT NEOPLASM OF TRACHEA, BRONCHUS, AND LUNG (H): Primary | ICD-10-CM

## 2018-05-07 PROCEDURE — 99215 OFFICE O/P EST HI 40 MIN: CPT | Performed by: INTERNAL MEDICINE

## 2018-05-07 ASSESSMENT — PAIN SCALES - GENERAL: PAINLEVEL: MILD PAIN (3)

## 2018-05-07 NOTE — NURSING NOTE
"Oncology Rooming Note    May 7, 2018 3:07 PM   Gilson Curtis is a 78 year old male who presents for:    Chief Complaint   Patient presents with     Oncology Clinic Visit     3 month follow up     Initial Vitals: /80  Pulse 76  Temp 98  F (36.7  C)  Ht 1.755 m (5' 9.09\")  Wt 77.1 kg (170 lb)  SpO2 96%  BMI 25.04 kg/m2 Estimated body mass index is 25.04 kg/(m^2) as calculated from the following:    Height as of this encounter: 1.755 m (5' 9.09\").    Weight as of this encounter: 77.1 kg (170 lb). Body surface area is 1.94 meters squared.  Mild Pain (3) Comment: Data Unavailable   No LMP for male patient.  Allergies reviewed: Yes  Medications reviewed: Yes    Medications: Medication refills not needed today.  Pharmacy name entered into [facility]: [facility] [address] [place] [address]        5 minutes for nursing intake (face to face time)     Gretel Khan LPN              "

## 2018-05-07 NOTE — LETTER
"    5/7/2018         RE: Gilson Curtis  [address] [facility] [address]        Dear Colleague,    Thank you for referring your patient, Gilson Curtis, to the [facility]. Please see a copy of my visit note below.    [facility]  HEMATOLOGY AND ONCOLOGY    FOLLOW-UP VISIT NOTE    PATIENT NAME: Gilson Curtis MRN # [id number]  DATE OF VISIT: May 7, 2018 YOB: 1940    REFERRING PROVIDER: No referring provider defined for this encounter.    CANCER TYPE: Non small cell lung cancer - adenocarcinoma  STAGE: IV    TREATMENT SUMMARY:  Gilson presented with with hemoptysis starting in November 2011. He underwent CT of chest on Jan 18, 2012 at his primary care clinic which showed a RUL mass 2 x 1.9 cm with no suspicious lymphadenopathy or adrenal masses. 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 He was staged with a PET-CT and 2 lesions were noted. He was staged as T3N0 disease. He had surgical resection of the nodules in RUL and RML. EGFR exon 21 deletion.  He has been on Tarceva since June 2016 and is tolerating it quite well. Dose was decreased from 150 mg daily to 100 mg daily in July 2017 due to rash.     CURRENT INTERVENTIONS:  Erlotinib 100 mg daily     SUBJECTIVE   Gilson Curtis is being followed for metastatic lung adenocarcinoma    He has been on this medication since June of 2016. He has pain in his chest on right side when he lays on the bed on that side.       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     CAD (coronary artery disease)      CKD (chronic kidney disease) stage 3, GFR 30-59 ml/min      Fatigue     \"spells\"     Hypertension      Non-small cell lung cancer (H)      Prostate cancer (H)      Skin cancer, basal cell      Stented coronary artery 2009    x2     TIA (transient " "ischemic attack) 2002         CURRENT OUTPATIENT MEDICATIONS     Current Outpatient Prescriptions   Medication Sig     aspirin 81 MG tablet Take 1 tablet by mouth At Bedtime      calcium carbonate (TUMS) 500 MG chewable tablet Take 1 chew tab by mouth as needed for heartburn     GABAPENTIN PO Take 150 mg by mouth At Bedtime      hydrocortisone 1 % ointment Apply topically 2 times daily     lisinopril (PRINIVIL,ZESTRIL) 2.5 MG tablet Take 2.5 mg by mouth daily.     Multiple Vitamins-Minerals (EYE VITAMINS PO) Take 1 capsule by mouth daily OTC     Multiple Vitamins-Minerals (MATURE ADULT CENTURY PO) Take 1 tablet by mouth daily OTC     ranitidine (ZANTAC) 150 MG capsule Take 150 mg by mouth 2 times daily      simvastatin (ZOCOR) 80 MG tablet      clindamycin (CLINDAMAX) 1 % gel Apply topically 2 times daily (Patient not taking: Reported on 5/7/2018)     No current facility-administered medications for this visit.      Facility-Administered Medications Ordered in Other Visits   Medication     ropivacaine 0.2% 550 mL in ON-Q C-Bloc select flow ( holds 400-550 mL) single cath disposable pump        ALLERGIES      Allergies   Allergen Reactions     No Known Drug Allergy         REVIEW OF SYSTEMS   As above in the HPI, o/w complete 12-point ROS was negative.     PHYSICAL EXAM   /80  Pulse 76  Temp 98  F (36.7  C)  Ht 1.755 m (5' 9.09\")  Wt 77.1 kg (170 lb)  SpO2 96%  BMI 25.04 kg/m2  GEN: NAD  HEENT: PERRL, EOMI, no icterus, injection or pallor. Oropharynx is clear.  LYMPHATICs: no cervical or supraclavicular lymphadenopathy; no other abn lymphadenopathy  PULMONARY: clear with good air entry bilaterally  CARDIOVASCULAR: regular, no murmurs, rubs, or gallops  GASTROINTESTINAL: soft, non-tender, non-distended, normal bowel sounds, no hepatosplenomegaly by percussion or palpation  MUSCULOSKELTAL: warm, well perfused, no edema  NEURO: awake, alert and oriented to time place and person, cranial nerves intact - " II - XII, no focal neurologic deficits  SKIN: no rashes     LABORATORY AND IMAGING STUDIES     Recent Labs   Lab Test  05/03/18   1019  01/15/18   1052  10/10/17   1033  07/18/17   0826  03/06/17   1000  01/11/17   1000   NA  142  140  143  142  143  144   POTASSIUM  4.4  4.8  4.4  4.9  4.8  5.1   CHLORIDE  109  108  109  104  110*  111*   CO2  29  27  29  26  28  27   ANIONGAP  4  5  5   --   5  6   BUN  26  27  20  26  22  20   CR  1.61*  1.75*  1.55*  1.6  1.78*  1.59*   GLC  101*  105*  73  87  106*  70   RICK  8.8  8.9  8.8  9.3  8.9  8.8     Recent Labs   Lab Test  10/10/17   1033  07/11/17   1037  03/06/17   1000  01/11/17   1000  12/15/16   1055   MAG  2.0  1.9  2.1  2.3  2.0   PHOS  2.3*  2.9  2.7  2.0*  2.7     Recent Labs   Lab Test  05/03/18   1019  01/15/18   1052  10/10/17   1033  07/18/17   0826  03/06/17   1000  01/11/17   1000   WBC  7.1  6.8  8.5  6.3  6.6  6.2   HGB  15.9  16.5  16.4  14.7  16.5  15.6   PLT  187  177  180  196  171  185   MCV  99  99  98  96.2  97  98   NEUTROPHIL  59.5  64.7  60.8   --   59.3  61.0     Recent Labs   Lab Test  05/03/18   1019  01/15/18   1052  10/10/17   1033   BILITOTAL  1.3  0.9  0.9   ALKPHOS  99  97  106   ALT  23  27  29   AST  26  26  23   ALBUMIN  3.6  3.8  3.7     No results found for: TSH  No results for input(s): CEA in the last 77888 hours.  Results for orders placed or performed in visit on 05/03/18   CT Chest/Abdomen/Pelvis w Contrast    Narrative    EXAMINATION: CT CHEST/ABDOMEN/PELVIS W CONTRAST, 5/3/2018 11:15 AM    TECHNIQUE:  Helical CT images from the thoracic inlet through the  symphysis pubis were obtained  with contrast. Contrast dose: 105 ml  isovue 370    COMPARISON: Chest CT on 1/15/2018, 10/10/2017 and 7/11/2017    HISTORY: Restaging non small cell lung cancer; Non-small cell cancer  of right lung (H)    FINDINGS:    Chest: There is 2.8 x 1.8 cm left and 1.5 x 1.2 cm right thyroid  nodules (series 3 image 14 and 10, respectively), not  significantly  changed as compared to 7/11/2017 exam. Heart size is within normal  limits. No pericardial effusion. The calibers of the main pulmonary  artery and ascending aorta are within normal limits. No significant  mediastinal, hilar or axillary lymphadenopathy.    Postsurgical changes of right upper and middle lobes wedge resection.  There is 8 x 9 mm nodule along the right major fissure (series 8 image  73), not significantly changed as compared to multiple prior exams,  likely represent scarring. Few new pulmonary nodules, for example 2 mm  right lower lobe pulmonary nodule (series 8 image 65) and 3 mm right  upper lobe nodule (series 8 image 27). Slight interval increase in the  5 mm posterior right lower lobe nodular subpleural pulmonary opacity  (series 8 image 139), as compared to 10/12/2016 exam. Interval  resolution of the previously noted 10 mm groundglass nodular pulmonary  opacity in the left upper lobe and 2 mm nodular opacity in the right  upper lobe, both were new on 1/15/2018 exam.    Abdomen and pelvis: Post surgical changes of cholecystectomy. Small  size hiatal hernia. The liver and adrenal glands are unremarkable. Few  calcified splenic granulomas, otherwise the spleen is unremarkable.  Fatty infiltration of the pancreas. Multiple parapelvic cystic areas  in the left kidney, likely parapelvic cysts. Multiple hypodense  cortical cystic foci in both kidneys, likely simple renal cyst.  Bilateral cortical scarring, right greater than left. Asymmetric sizes  of the kidney, with the right smaller than left.    Atherosclerotic calcification of the abdominal aorta and major  vessels. No significant retroperitoneal or mesenteric lymphadenopathy.  No abnormally dilated bowel loops. The appendix is normal.     Bones and soft tissues: Bilateral L5 pars defect. Mild multilevel  degenerative changes of thoracolumbar spine. Diffuse osteopenia. Small  fat-containing umbilical hernia.      Impression     IMPRESSION: In this patient with history of non-small cell lung cancer  status post resection, there is;  1. Few new pulmonary nodules, for example a 3 mm right upper lobe and  2 mm right lower lobe pulmonary nodules. Could be metastatic or  infectious, recommend short-term follow-up in 3 months to ensure  stability/resolution.  2. Interval resolution of the previously noted 10 mm left upper lobe  groundglass nodular pulmonary opacity and 2 mm right upper lobe  pulmonary nodule.  3. Stable bilateral thyroid nodules, can be further evaluated with  thyroid ultrasound if clinically indicated.  4. Small size hiatal hernia.       I have personally reviewed the examination and initial interpretation  and I agree with the findings.    MIRNA PANDA MD      ASSESSMENT AND PLAN   Non small cell lung cancer - adenocarcinoma with EGFR mutation (exxon 21 deletion)   On erlotinib since June 2016  ECOG PS 1  HTN, CKD, CAD    Zane is tolerating erlotinib well at the 100 mg daily dose. I have reviewed actual images from his restaging scans.     Pain with cough in right lower rib along the anterior axillary line. I reviewed actual images from his CT scan. I do see a lesion in chest which was present on previous imaging but has grown significantly this time around. The official read did miss it. While we could biopsy to confirm presence of metastatic disease, the radiographic findings are quite consistent with metastasis.     I would recommend palliative radiation to him. I reviewed the technique, logistics, palliative intent, side effects.     We will plan to continue with erlotinib as current. I did have him talk to our pharmacy team.     I will see him again in 3 months with labs and restaging scans.     Addendum: I have reviewed his scans with Dr. Haywood and he agrees with planned radiation. He has suggested continued erlotinib.     Over 45 min of direct face to face time spent with patient with more than 50% time spent in  counseling and coordinating care.      Again, thank you for allowing me to participate in the care of your patient.        Sincerely,        Gerry Ahumada MD

## 2018-05-07 NOTE — MR AVS SNAPSHOT
After Visit Summary   5/7/2018    Gilson Curtis    MRN: [id number]           Patient Information     Date Of Birth          1940        Visit Information        Provider Department      5/7/2018 3:00 PM Gerry Ahumada MD [facility] [place]        Today's Diagnoses     Malignant neoplasm of trachea, bronchus, and lung (H)    -  1       Follow-ups after your visit        Your next 10 appointments already scheduled     Aug 02, 2018  9:50 AM CDT   LAB with LAB FIRST FLOOR [facility] ([facility] [place])    [address] [place] [id number] [phone]           Please do not eat 10-12 hours before your appointment if you are coming in fasting for labs on lipids, cholesterol, or glucose (sugar). This does not apply to pregnant women. Water, hot tea and black coffee (with nothing added) are okay. Do not drink other fluids, diet soda or chew gum.            Aug 02, 2018 10:15 AM CDT   CT CHEST/ABDOMEN/PELVIS W CONTRAST with MGCT1   [facility] [place] ([facility] [place])    [address] [place] [id number] [phone]           Please bring any scans or X-rays taken at other hospitals, if similar tests were done. Also bring a list of your medicines, including vitamins, minerals and over-the-counter drugs. It is safest to leave personal items at home.  Be sure to tell your doctor:   If you have any allergies.   If there s any chance you are pregnant.   If you are breastfeeding.  How to prepare:   Do not eat or drink for 2 hours before your exam. If you need to take medicine, you may take it with small sips of water. (We may ask you to take liquid medicine as well.)   Please wear loose clothing, such as a sweat suit or jogging clothes. Avoid snaps, zippers and other metal. We may ask you to undress and put on a hospital gown.  Please arrive 30 minutes early for your CT. Once in the department you  might be asked to drink water 15-20 minutes prior to your exam.  If indicated you may be asked to drink an oral contrast in advance of your CT.  If this is the case, the imaging team will let you know or be in contact with you prior to your appointment  Patients over 70 or patients with diabetes or kidney problems:   If you haven t had a blood test (creatinine test) within the last 30 days, the Cardiologist/Radiologist may require you to get this test prior to your exam.  If you have diabetes:   Continue to take your metformin medication on the day of your exam  If you have any questions, please call the Imaging Department where you will have your exam.            Aug 06, 2018 10:00 AM CDT   Return Visit with Gerry Ahumada MD   [facility] ([facility])    [id number] [place] [id number] [phone]              Future tests that were ordered for you today     Open Future Orders        Priority Expected Expires Ordered    CBC with platelets differential Routine  5/7/2019 5/7/2018    Comprehensive metabolic panel Routine  5/7/2019 5/7/2018    CT Chest/Abdomen/Pelvis w Contrast Routine  5/7/2019 5/7/2018    Lactate Dehydrogenase Routine  5/7/2019 5/7/2018            Who to contact     If you have questions or need follow up information about today's clinic visit or your schedule please contact [facility] directly at [phone].  Normal or non-critical lab and imaging results will be communicated to you by MyChart, letter or phone within 4 business days after the clinic has received the results. If you do not hear from us within 7 days, please contact the clinic through MyChart or phone. If you have a critical or abnormal lab result, we will notify you by phone as soon as possible.  Submit refill requests through [employer] or call your pharmacy and they will forward the refill request to us. Please allow 3 business days for your refill to be  "completed.          Additional Information About Your Visit        [employer] Information     [employer] gives you secure access to your electronic health record. If you see a primary care provider, you can also send messages to your care team and make appointments. If you have questions, please call your primary care clinic.  If you do not have a primary care provider, please call [phone] and they will assist you.      [employer] is an electronic gateway that provides easy, online access to your medical records. With [employer], you can request a clinic appointment, read your test results, renew a prescription or communicate with your care team.     To access your existing account, please contact your [facility] or call [phone] for assistance.        Care EveryWhere ID     This is your Care EveryWhere ID. This could be used by other organizations to access your [facility] medical records  [id number]        Your Vitals Were     Pulse Temperature Height Pulse Oximetry BMI (Body Mass Index)       76 98  F (36.7  C) 1.755 m (5' 9.09\") 96% 25.04 kg/m2        Blood Pressure from Last 3 Encounters:   05/07/18 137/80   01/15/18 157/78   10/11/17 122/74    Weight from Last 3 Encounters:   05/07/18 77.1 kg (170 lb)   01/15/18 78.2 kg (172 lb 4.8 oz)   10/11/17 77.2 kg (170 lb 4.8 oz)               Primary Care Provider Office Phone # Fax #    Hemant Jiménez [phone]       [address] [address]        Equal Access to Services     CAMRYN POSEY : Hadii aad ku hadasho Soomaali, waaxda luqadaha, qaybta kaalmada adeegyada, tono giordano. So [facility] [phone].    ATENCIÓN: Si habla español, tiene a tran disposición servicios gratuitos de asistencia lingüística. Llame al [phone].    We comply with applicable federal civil rights laws and [place] laws. We do not discriminate on the basis of race, color, national origin, age, " disability, sex, sexual orientation, or gender identity.            Thank you!     Thank you for choosing [facility]  for your care. Our goal is always to provide you with excellent care. Hearing back from our patients is one way we can continue to improve our services. Please take a few minutes to complete the written survey that you may receive in the mail after your visit with us. Thank you!             Your Updated Medication List - Protect others around you: Learn how to safely use, store and throw away your medicines at www.disposemymeds.org.          This list is accurate as of 5/7/18  4:30 PM.  Always use your most recent med list.                   Brand Name Dispense Instructions for use Diagnosis    aspirin 81 MG tablet      Take 1 tablet by mouth At Bedtime        calcium carbonate 500 MG chewable tablet    TUMS     Take 1 chew tab by mouth as needed for heartburn        clindamycin 1 % topical gel    CLINDAMAX    60 g    Apply topically 2 times daily    Drug rash, Malignant neoplasm of upper lobe of right lung (H)       GABAPENTIN PO      Take 150 mg by mouth At Bedtime        hydrocortisone 1 % ointment      Apply topically 2 times daily        lisinopril 2.5 MG tablet    PRINIVIL/Zestril     Take 2.5 mg by mouth daily.        * MATURE ADULT CENTURY PO      Take 1 tablet by mouth daily OTC        * EYE VITAMINS PO      Take 1 capsule by mouth daily OTC        ranitidine 150 MG capsule    ZANTAC     Take 150 mg by mouth 2 times daily        simvastatin 80 MG tablet    ZOCOR          * Notice:  This list has 2 medication(s) that are the same as other medications prescribed for you. Read the directions carefully, and ask your doctor or other care provider to review them with you.

## 2018-05-07 NOTE — PROGRESS NOTES
"[facility]  HEMATOLOGY AND ONCOLOGY    FOLLOW-UP VISIT NOTE    PATIENT NAME: Gilson Curtis MRN # [id number]  DATE OF VISIT: May 7, 2018 YOB: 1940    REFERRING PROVIDER: No referring provider defined for this encounter.    CANCER TYPE: Non small cell lung cancer - adenocarcinoma  STAGE: IV    TREATMENT SUMMARY:  Gilson presented with with hemoptysis starting in November 2011. He underwent CT of chest on Jan 18, 2012 at his primary care clinic which showed a RUL mass 2 x 1.9 cm with no suspicious lymphadenopathy or adrenal masses. 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 He was staged with a PET-CT and 2 lesions were noted. He was staged as T3N0 disease. He had surgical resection of the nodules in RUL and RML. EGFR exon 21 deletion.  He has been on Tarceva since June 2016 and is tolerating it quite well. Dose was decreased from 150 mg daily to 100 mg daily in July 2017 due to rash.     CURRENT INTERVENTIONS:  Erlotinib 100 mg daily     SUBJECTIVE   Gilson Curtis is being followed for metastatic lung adenocarcinoma    He has been on this medication since June of 2016. He has pain in his chest on right side when he lays on the bed on that side.       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     CAD (coronary artery disease)      CKD (chronic kidney disease) stage 3, GFR 30-59 ml/min      Fatigue     \"spells\"     Hypertension      Non-small cell lung cancer (H)      Prostate cancer (H)      Skin cancer, basal cell      Stented coronary artery 2009    x2     TIA (transient ischemic attack) 2002         CURRENT OUTPATIENT MEDICATIONS     Current Outpatient Prescriptions   Medication Sig     aspirin 81 MG tablet Take 1 tablet by mouth At Bedtime      calcium carbonate (TUMS) 500 MG chewable tablet Take 1 chew tab by " "mouth as needed for heartburn     GABAPENTIN PO Take 150 mg by mouth At Bedtime      hydrocortisone 1 % ointment Apply topically 2 times daily     lisinopril (PRINIVIL,ZESTRIL) 2.5 MG tablet Take 2.5 mg by mouth daily.     Multiple Vitamins-Minerals (EYE VITAMINS PO) Take 1 capsule by mouth daily OTC     Multiple Vitamins-Minerals (MATURE ADULT CENTURY PO) Take 1 tablet by mouth daily OTC     ranitidine (ZANTAC) 150 MG capsule Take 150 mg by mouth 2 times daily      simvastatin (ZOCOR) 80 MG tablet      clindamycin (CLINDAMAX) 1 % gel Apply topically 2 times daily (Patient not taking: Reported on 5/7/2018)     No current facility-administered medications for this visit.      Facility-Administered Medications Ordered in Other Visits   Medication     ropivacaine 0.2% 550 mL in ON-Q C-Bloc select flow ( holds 400-550 mL) single cath disposable pump        ALLERGIES      Allergies   Allergen Reactions     No Known Drug Allergy         REVIEW OF SYSTEMS   As above in the HPI, o/w complete 12-point ROS was negative.     PHYSICAL EXAM   /80  Pulse 76  Temp 98  F (36.7  C)  Ht 1.755 m (5' 9.09\")  Wt 77.1 kg (170 lb)  SpO2 96%  BMI 25.04 kg/m2  GEN: NAD  HEENT: PERRL, EOMI, no icterus, injection or pallor. Oropharynx is clear.  LYMPHATICs: no cervical or supraclavicular lymphadenopathy; no other abn lymphadenopathy  PULMONARY: clear with good air entry bilaterally  CARDIOVASCULAR: regular, no murmurs, rubs, or gallops  GASTROINTESTINAL: soft, non-tender, non-distended, normal bowel sounds, no hepatosplenomegaly by percussion or palpation  MUSCULOSKELTAL: warm, well perfused, no edema  NEURO: awake, alert and oriented to time place and person, cranial nerves intact - II - XII, no focal neurologic deficits  SKIN: no rashes     LABORATORY AND IMAGING STUDIES     Recent Labs   Lab Test  05/03/18   1019  01/15/18   1052  10/10/17   1033  07/18/17   0826  03/06/17   1000  01/11/17   1000   NA  142  140  143  142  " 143  144   POTASSIUM  4.4  4.8  4.4  4.9  4.8  5.1   CHLORIDE  109  108  109  104  110*  111*   CO2  29  27  29  26  28  27   ANIONGAP  4  5  5   --   5  6   BUN  26  27  20  26  22  20   CR  1.61*  1.75*  1.55*  1.6  1.78*  1.59*   GLC  101*  105*  73  87  106*  70   RICK  8.8  8.9  8.8  9.3  8.9  8.8     Recent Labs   Lab Test  10/10/17   1033  07/11/17   1037  03/06/17   1000  01/11/17   1000  12/15/16   1055   MAG  2.0  1.9  2.1  2.3  2.0   PHOS  2.3*  2.9  2.7  2.0*  2.7     Recent Labs   Lab Test  05/03/18   1019  01/15/18   1052  10/10/17   1033  07/18/17   0826  03/06/17   1000  01/11/17   1000   WBC  7.1  6.8  8.5  6.3  6.6  6.2   HGB  15.9  16.5  16.4  14.7  16.5  15.6   PLT  187  177  180  196  171  185   MCV  99  99  98  96.2  97  98   NEUTROPHIL  59.5  64.7  60.8   --   59.3  61.0     Recent Labs   Lab Test  05/03/18   1019  01/15/18   1052  10/10/17   1033   BILITOTAL  1.3  0.9  0.9   ALKPHOS  99  97  106   ALT  23  27  29   AST  26  26  23   ALBUMIN  3.6  3.8  3.7     No results found for: TSH  No results for input(s): CEA in the last 69132 hours.  Results for orders placed or performed in visit on 05/03/18   CT Chest/Abdomen/Pelvis w Contrast    Narrative    EXAMINATION: CT CHEST/ABDOMEN/PELVIS W CONTRAST, 5/3/2018 11:15 AM    TECHNIQUE:  Helical CT images from the thoracic inlet through the  symphysis pubis were obtained  with contrast. Contrast dose: 105 ml  isovue 370    COMPARISON: Chest CT on 1/15/2018, 10/10/2017 and 7/11/2017    HISTORY: Restaging non small cell lung cancer; Non-small cell cancer  of right lung (H)    FINDINGS:    Chest: There is 2.8 x 1.8 cm left and 1.5 x 1.2 cm right thyroid  nodules (series 3 image 14 and 10, respectively), not significantly  changed as compared to 7/11/2017 exam. Heart size is within normal  limits. No pericardial effusion. The calibers of the main pulmonary  artery and ascending aorta are within normal limits. No significant  mediastinal, hilar or  axillary lymphadenopathy.    Postsurgical changes of right upper and middle lobes wedge resection.  There is 8 x 9 mm nodule along the right major fissure (series 8 image  73), not significantly changed as compared to multiple prior exams,  likely represent scarring. Few new pulmonary nodules, for example 2 mm  right lower lobe pulmonary nodule (series 8 image 65) and 3 mm right  upper lobe nodule (series 8 image 27). Slight interval increase in the  5 mm posterior right lower lobe nodular subpleural pulmonary opacity  (series 8 image 139), as compared to 10/12/2016 exam. Interval  resolution of the previously noted 10 mm groundglass nodular pulmonary  opacity in the left upper lobe and 2 mm nodular opacity in the right  upper lobe, both were new on 1/15/2018 exam.    Abdomen and pelvis: Post surgical changes of cholecystectomy. Small  size hiatal hernia. The liver and adrenal glands are unremarkable. Few  calcified splenic granulomas, otherwise the spleen is unremarkable.  Fatty infiltration of the pancreas. Multiple parapelvic cystic areas  in the left kidney, likely parapelvic cysts. Multiple hypodense  cortical cystic foci in both kidneys, likely simple renal cyst.  Bilateral cortical scarring, right greater than left. Asymmetric sizes  of the kidney, with the right smaller than left.    Atherosclerotic calcification of the abdominal aorta and major  vessels. No significant retroperitoneal or mesenteric lymphadenopathy.  No abnormally dilated bowel loops. The appendix is normal.     Bones and soft tissues: Bilateral L5 pars defect. Mild multilevel  degenerative changes of thoracolumbar spine. Diffuse osteopenia. Small  fat-containing umbilical hernia.      Impression    IMPRESSION: In this patient with history of non-small cell lung cancer  status post resection, there is;  1. Few new pulmonary nodules, for example a 3 mm right upper lobe and  2 mm right lower lobe pulmonary nodules. Could be metastatic  or  infectious, recommend short-term follow-up in 3 months to ensure  stability/resolution.  2. Interval resolution of the previously noted 10 mm left upper lobe  groundglass nodular pulmonary opacity and 2 mm right upper lobe  pulmonary nodule.  3. Stable bilateral thyroid nodules, can be further evaluated with  thyroid ultrasound if clinically indicated.  4. Small size hiatal hernia.       I have personally reviewed the examination and initial interpretation  and I agree with the findings.    MIRNA PANDA MD      ASSESSMENT AND PLAN   Non small cell lung cancer - adenocarcinoma with EGFR mutation (exxon 21 deletion)   On erlotinib since June 2016  ECOG PS 1  HTN, CKD, CAD    Zane is tolerating erlotinib well at the 100 mg daily dose. I have reviewed actual images from his restaging scans.     Pain with cough in right lower rib along the anterior axillary line. I reviewed actual images from his CT scan. I do see a lesion in chest which was present on previous imaging but has grown significantly this time around. The official read did miss it. While we could biopsy to confirm presence of metastatic disease, the radiographic findings are quite consistent with metastasis.     I would recommend palliative radiation to him. I reviewed the technique, logistics, palliative intent, side effects.     We will plan to continue with erlotinib as current. I did have him talk to our pharmacy team.     I will see him again in 3 months with labs and restaging scans.     Addendum: I have reviewed his scans with Dr. Haywood and he agrees with planned radiation. He has suggested continued erlotinib.     Over 45 min of direct face to face time spent with patient with more than 50% time spent in counseling and coordinating care.

## 2018-05-09 ENCOUNTER — CARE COORDINATION (OUTPATIENT)
Dept: ONCOLOGY | Facility: CLINIC | Age: 78
End: 2018-05-09

## 2018-05-09 NOTE — PROGRESS NOTES
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  Patient was under the impression that  was referring/recommending to go to the [facility] for radiation but that was a miscommunication. Writer encouraged that he would get very good care   Dr. Ahumada also discussed with Dr. Haywood regarding staying on Tarceva while he received radiation and he was agreeable with that plan.  Patient currently states his pain is mostly in the evening when lying in bed but eventually goes away.  He is not on any medication for it at this time.

## 2018-05-14 ENCOUNTER — TELEPHONE (OUTPATIENT)
Dept: RADIATION ONCOLOGY | Facility: CLINIC | Age: 78
End: 2018-05-14

## 2018-05-14 NOTE — TELEPHONE ENCOUNTER
Spoke with patient about consult date, time, length of visit, and location with Dr Michael Domínguez. Patient verbalized understanding     Grant SCHWARTZ

## 2018-05-15 ENCOUNTER — OFFICE VISIT (OUTPATIENT)
Dept: RADIATION ONCOLOGY | Facility: CLINIC | Age: 78
End: 2018-05-15
Payer: COMMERCIAL

## 2018-05-15 VITALS
BODY MASS INDEX: 24.99 KG/M2 | SYSTOLIC BLOOD PRESSURE: 119 MMHG | HEART RATE: 68 BPM | OXYGEN SATURATION: 97 % | RESPIRATION RATE: 18 BRPM | TEMPERATURE: 98 F | WEIGHT: 168.7 LBS | HEIGHT: 69 IN | DIASTOLIC BLOOD PRESSURE: 69 MMHG

## 2018-05-15 DIAGNOSIS — C34.80 MALIGNANT NEOPLASM OF TRACHEA, BRONCHUS, AND LUNG (H): Primary | ICD-10-CM

## 2018-05-15 DIAGNOSIS — C33 MALIGNANT NEOPLASM OF TRACHEA, BRONCHUS, AND LUNG (H): Primary | ICD-10-CM

## 2018-05-15 DIAGNOSIS — C34.91 NON-SMALL CELL CANCER OF RIGHT LUNG (H): Primary | ICD-10-CM

## 2018-05-15 DIAGNOSIS — M89.9 LYTIC LESION OF BONE ON X-RAY: ICD-10-CM

## 2018-05-15 PROCEDURE — 99205 OFFICE O/P NEW HI 60 MIN: CPT | Performed by: RADIOLOGY

## 2018-05-15 ASSESSMENT — PAIN SCALES - GENERAL: PAINLEVEL: NO PAIN (0)

## 2018-05-15 NOTE — PATIENT INSTRUCTIONS
What to expect at your Simulation visit:    You will meet with a Radiation Therapist and other team members who will be doing a planning session called a  simulation  with you. This process will determine your daily treatment.    ~ You will lie on a flat table and have a treatment planning CT scan.  It is important during the scan to hold very still and breathe normally.    ~ Your therapist may construct a body mold to help you hold still for your treatments.    ~ If you are having treatment to the head or neck area you will be fitted with a plastic mesh mask that fits very snugly over your face and neck.     ~ Your therapist will be taking some digital photos that will go in your treatment chart.      ~Your therapist will make marks on your skin and take measurements. Your therapist may ask you about making small tattoos (a permanent small dot) over these marks.  These marks are used to position you daily for your radiation therapy treatments. Please do not wash off any marks until all of your radiation therapy treatments are complete unless you are instructed to do so by your therapist.    ~ Once the simulation is completed it can take from 3 to 10 business days before you start radiation therapy treatments.    ~ You may meet with a nurse who will go over management of treatment side effects and self care during your treatments. The nurse will help to plan care with other departments and physicians if needed.      Please contact [facility] RN with questions or concerns following today's appointment: [phone].    Thank you!

## 2018-05-15 NOTE — PROGRESS NOTES
Pulled in by Dr Domínguez in radiation oncology- pt and wife requesting further investigation since having more pain to right side than the one area diagnosed on CT. Will get PET and Brain MRI and have pt return to Rad Onc for review after completed. SGermscheizeeshan,CNP

## 2018-05-15 NOTE — NURSING NOTE
Recommendations for MRI Brain and PET scan, orders placed by Lazara Leon NP.  Patient and wife escorted to cancer center scheduling desk, informed that this RN will continue to follow and contact with plan when imaging has been completed.  Patient and wife verbalized understanding of all information and had no questions at this time.    Imelda Huizar RN BSN OCN

## 2018-05-15 NOTE — MR AVS SNAPSHOT
After Visit Summary   5/15/2018    Glison Curtis    MRN: [id number]           Patient Information     Date Of Birth          1940        Visit Information        Provider Department      5/15/2018 9:00 AM Michael Domínguez MD [facility]        Care Instructions          What to expect at your Simulation visit:    You will meet with a Radiation Therapist and other team members who will be doing a planning session called a  simulation  with you. This process will determine your daily treatment.    ~ You will lie on a flat table and have a treatment planning CT scan.  It is important during the scan to hold very still and breathe normally.    ~ Your therapist may construct a body mold to help you hold still for your treatments.    ~ If you are having treatment to the head or neck area you will be fitted with a plastic mesh mask that fits very snugly over your face and neck.     ~ Your therapist will be taking some digital photos that will go in your treatment chart.      ~Your therapist will make marks on your skin and take measurements. Your therapist may ask you about making small tattoos (a permanent small dot) over these marks.  These marks are used to position you daily for your radiation therapy treatments. Please do not wash off any marks until all of your radiation therapy treatments are complete unless you are instructed to do so by your therapist.    ~ Once the simulation is completed it can take from 3 to 10 business days before you start radiation therapy treatments.    ~ You may meet with a nurse who will go over management of treatment side effects and self care during your treatments. The nurse will help to plan care with other departments and physicians if needed.      Please contact [facility] RN with questions or concerns following today's appointment: [phone].    Thank you!            Follow-ups after your visit        Your next 10  appointments already scheduled     May 25, 2018  8:45 AM CDT   MR BRAIN W/O & W CONTRAST with MGMR1   [facility] ([facility]    [address] [place] [id number] [phone]           Take your medicines as usual, unless your doctor tells you not to. Bring a list of your current medicines to your exam (including vitamins, minerals and over-the-counter drugs).  You may or may not receive intravenous (IV) contrast for this exam pending the discretion of the Radiologist.  You do not need to do anything special to prepare.  The MRI machine uses a strong magnet. Please wear clothes without metal (snaps, zippers). A sweatsuit works well, or we may give you a hospital gown.  Please remove any body piercings and hair extensions before you arrive. You will also remove watches, jewelry, hairpins, wallets, dentures, partial dental plates and hearing aids. You may wear contact lenses, and you may be able to wear your rings. We have a safe place to keep your personal items, but it is safer to leave them at home.  **IMPORTANT** THE INSTRUCTIONS BELOW ARE ONLY FOR THOSE PATIENTS WHO HAVE BEEN PRESCRIBED SEDATION OR GENERAL ANESTHESIA DURING THEIR MRI PROCEDURE:  IF YOUR DOCTOR PRESCRIBED ORAL SEDATION (take medicine to help you relax during your exam):   You must get the medicine from your doctor (oral medication) before you arrive. Bring the medicine to the exam. Do not take it at home. You ll be told when to take it upon arriving for your exam.   Arrive one hour early. Bring someone who can take you home after the test. Your medicine will make you sleepy. After the exam, you may not drive, take a bus or take a taxi by yourself.  IF YOUR DOCTOR PRESCRIBED IV SEDATION:   Arrive one hour early. Bring someone who can take you home after the test. Your medicine will make you sleepy. After the exam, you may not drive, take a bus or take a taxi by yourself.   No eating 6 hours before  your exam. You may have clear liquids up until 4 hours before your exam. (Clear liquids include water, clear tea, black coffee and fruit juice without pulp.)  IF YOUR DOCTOR PRESCRIBED ANESTHESIA (be asleep for your exam):   Arrive 1 1/2 hours early. Bring someone who can take you home after the test. You may not drive, take a bus or take a taxi by yourself.   No eating 8 hours before your exam. You may have clear liquids up until 4 hours before your exam. (Clear liquids include water, clear tea, black coffee and fruit juice without pulp.)   You will spend four to five hours in the recovery room.  Please call the Imaging Department at your exam site with any questions.            May 25, 2018 10:00 AM CDT   PE NPET ONCOLOGY (EYES TO THIGHS) with MGPET1   [facility] ([facility])    [address] [place] [id number] [phone]           Tell your doctor:   If there is any chance you may be pregnant or if you are breastfeeding.   If you have problems lying in small spaces (claustrophobia). If you do, your doctor may give you medicine to help you relax. If you have diabetes:   Have your exam early in the morning. Your blood glucose will go up as the day goes by.   Your glucose level must be 180 or less at the start of the exam. Please take any medicines you need to ensure this blood glucose level. 24 hours before your scan: Don t do any heavy exercise. (No jogging, aerobics or other workouts.) Exercise will make your pictures less accurate. 6 hours before your scan:   Stop all food and liquids (except water).   Do not chew gum or suck on mints.   If you need to take medicine with food, you may take it with a few crackers.  Please call your Imaging Department at your exam site with any questions.            Aug 02, 2018  9:50 AM CDT   LAB with LAB FIRST FLOOR [facility] ([facility])    [address] [place] [address]  [id number] [phone]           Please do not eat 10-12 hours before your appointment if you are coming in fasting for labs on lipids, cholesterol, or glucose (sugar). This does not apply to pregnant women. Water, hot tea and black coffee (with nothing added) are okay. Do not drink other fluids, diet soda or chew gum.            Aug 02, 2018 10:15 AM CDT   CT CHEST/ABDOMEN/PELVIS W CONTRAST with MGCT1   [facility] ([facility])    [address] [place] [id number] [phone]           Please bring any scans or X-rays taken at other hospitals, if similar tests were done. Also bring a list of your medicines, including vitamins, minerals and over-the-counter drugs. It is safest to leave personal items at home.  Be sure to tell your doctor:   If you have any allergies.   If there s any chance you are pregnant.   If you are breastfeeding.  How to prepare:   Do not eat or drink for 2 hours before your exam. If you need to take medicine, you may take it with small sips of water. (We may ask you to take liquid medicine as well.)   Please wear loose clothing, such as a sweat suit or jogging clothes. Avoid snaps, zippers and other metal. We may ask you to undress and put on a hospital gown.  Please arrive 30 minutes early for your CT. Once in the department you might be asked to drink water 15-20 minutes prior to your exam.  If indicated you may be asked to drink an oral contrast in advance of your CT.  If this is the case, the imaging team will let you know or be in contact with you prior to your appointment  Patients over 70 or patients with diabetes or kidney problems:   If you haven t had a blood test (creatinine test) within the last 30 days, the Cardiologist/Radiologist may require you to get this test prior to your exam.  If you have diabetes:   Continue to take your metformin medication on the day of your exam  If you have any questions, please call the Imaging  Department where you will have your exam.            Aug 06, 2018 10:00 AM CDT   Return Visit with Gerry Ahumada MD   [facility] ([facility]    [address] [address] [place] [id number] [phone]              Future tests that were ordered for you today     Open Future Orders        Priority Expected Expires Ordered    NPET Oncology (Eyes to Thighs) Routine  8/13/2018 5/15/2018    MR Brain w/o & w Contrast Routine  5/15/2019 5/15/2018            Who to contact     If you have questions or need follow up information about today's clinic visit or your schedule please contact [facility] directly at [phone].  Normal or non-critical lab and imaging results will be communicated to you by [employer], letter or phone within 4 business days after the clinic has received the results. If you do not hear from us within 7 days, please contact the clinic through [employer] or phone. If you have a critical or abnormal lab result, we will notify you by phone as soon as possible.  Submit refill requests through [employer] or call your pharmacy and they will forward the refill request to us. Please allow 3 business days for your refill to be completed.          Additional Information About Your Visit        [employer] Information     [employer] gives you secure access to your electronic health record. If you see a primary care provider, you can also send messages to your care team and make appointments. If you have questions, please call your primary care clinic.  If you do not have a primary care provider, please call [phone] and they will assist you.      [employer] is an electronic gateway that provides easy, online access to your medical records. With [employer], you can request a clinic appointment, read your test results, renew a prescription or communicate with your care team.     To access your existing account, please contact your [facility]  "Clinic or call [phone] for assistance.        Care EveryWhere ID     This is your Care EveryWhere ID. This could be used by other organizations to access your [facility] medical records  [id number]        Your Vitals Were     Pulse Temperature Respirations Height Pulse Oximetry BMI (Body Mass Index)    68 98  F (36.7  C) (Oral) 18 1.753 m (5' 9\") 97% 24.91 kg/m2       Blood Pressure from Last 3 Encounters:   05/15/18 119/69   05/07/18 137/80   01/15/18 157/78    Weight from Last 3 Encounters:   05/15/18 76.5 kg (168 lb 11.2 oz)   05/07/18 77.1 kg (170 lb)   01/15/18 78.2 kg (172 lb 4.8 oz)              Today, you had the following     No orders found for display       Primary Care Provider Office Phone # Fax #    Hemant Jiménez [phone]       [address] [address]        Equal Access to Services     ORNDA POSEY : Hadii anthony ku hadasho Soomaali, waaxda luqadaha, qaybta kaalmada adeegyada, tono stevenson haymakeda haskins . So [facility] [phone].    ATENCIÓN: Si habla español, tiene a tran disposición servicios gratuitos de asistencia lingüística. Llame al [phone].    We comply with applicable federal civil rights laws and [place] laws. We do not discriminate on the basis of race, color, national origin, age, disability, sex, sexual orientation, or gender identity.            Thank you!     Thank you for choosing [facility]  for your care. Our goal is always to provide you with excellent care. Hearing back from our patients is one way we can continue to improve our services. Please take a few minutes to complete the written survey that you may receive in the mail after your visit with us. Thank you!             Your Updated Medication List - Protect others around you: Learn how to safely use, store and throw away your medicines at www.disposemymeds.org.          This list is accurate as of 5/15/18 10:24 AM.  Always use your most recent " med list.                   Brand Name Dispense Instructions for use Diagnosis    aspirin 81 MG tablet      Take 1 tablet by mouth At Bedtime        calcium carbonate 500 MG chewable tablet    TUMS     Take 1 chew tab by mouth as needed for heartburn        clindamycin 1 % topical gel    CLINDAMAX    60 g    Apply topically 2 times daily    Drug rash, Malignant neoplasm of upper lobe of right lung (H)       GABAPENTIN PO      Take 150 mg by mouth At Bedtime        hydrocortisone 1 % ointment      Apply topically 2 times daily        lisinopril 2.5 MG tablet    PRINIVIL/Zestril     Take 2.5 mg by mouth daily.        * MATURE ADULT CENTURY PO      Take 1 tablet by mouth daily OTC        * EYE VITAMINS PO      Take 1 capsule by mouth daily OTC        ranitidine 150 MG capsule    ZANTAC     Take 150 mg by mouth 2 times daily        simvastatin 80 MG tablet    ZOCOR          * Notice:  This list has 2 medication(s) that are the same as other medications prescribed for you. Read the directions carefully, and ask your doctor or other care provider to review them with you.

## 2018-05-15 NOTE — NURSING NOTE
"INITIAL PATIENT ASSESSMENT      Diagnosis: Lung cancer    Patient with history of prostate cancer in 2009, s/p prostatectomy, patient denies history of hormones/radiation therapy    Prior radiation therapy: None    Prior chemotherapy:   Protocol: Tarceva  Dates: April, 2016 - current per patient and wife  Location: [facility] - Dr. Ahumada    Prior hormonal therapy:No    Pain Eval:  Denies at current visit.  Reports intermittent pain over the past one year of right lateral upper ribs/low axilla, reports intermittent sharp, shooting pain with \"pulsing\", worse at night, unable to lay on right side, relief with position change, \"lately, changing my position has not helped\".  Patient denies medication management.  Patient rates pain, \"6/10\" at night.  Patient reports pain is also exacerbated by coughing, \"I have to hold that side when I cough\".  Patient also reports intermittent right low anterior rib pain.    Psychosocial  Living arrangements: Lives in [place] with wife, Kaye, patient is retired.  Fall Risk: independent   referral needs: Not needed    Advanced Directive: Yes - Location: on file in [place] chart  Implantable Cardiac Device: No  Authorization To Share Protected Health Information: YES - Date: patient completed form during clinic visit today, 5/15/2018, sent to HIM for scanning      Reproductive note: Patient has three children, eight grandchildren.    Review of Systems     Constitutional: Negative.    HENT: Positive for hearing loss and tinnitus. Negative for congestion, ear discharge, ear pain, nosebleeds, sinus pain and sore throat.         Patient reports he has hearing aides, does not wear them often.   Eyes: Negative.    Respiratory: Positive for cough, sputum production and shortness of breath. Negative for hemoptysis, wheezing and stridor.         Patient reports intermittent coughing, light green sputum production.  Shortness of breath " with minimal activity, relief with rest.   Cardiovascular: Negative.    Gastrointestinal: Negative.    Genitourinary: Negative.    Musculoskeletal: Negative.    Skin: Negative.    Neurological: Positive for tingling. Negative for dizziness, tremors, sensory change, speech change, focal weakness, seizures, loss of consciousness and headaches.        Neuropathy of bilateral feet and fingers.   Endo/Heme/Allergies: Negative.    Psychiatric/Behavioral: Negative.      Nurse face-to-face time: Level 5:  over 15 min face to face time

## 2018-05-15 NOTE — PROGRESS NOTES
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 He is referred to radiation oncolgy for painful lesion of rig which is most likely due to recurrent from resected 8th rib. He also complains pain toward right axillary area. Pain is worse with cough.     He had CT of chest per Dr. Ahumada and showed Chest: There is 2.8 x 1.8 cm left and 1.5 x 1.2 cm right thyroid  nodules (series 3 image 14 and 10, respectively), not significantly  changed as compared to 7/11/2017 exam. Heart size is within normal  limits. No pericardial effusion. The calibers of the main pulmonary  artery and ascending aorta are within normal limits. No significant  mediastinal, hilar or axillary lymphadenopathy.     Postsurgical changes of right upper and middle lobes wedge resection.  There is 8 x 9 mm nodule along the right major fissure (series 8 image  73), not significantly changed as compared to multiple prior exams,  likely represent scarring. Few new pulmonary nodules, for example 2 mm  right lower lobe pulmonary nodule (series 8 image 65) and 3 mm right  upper lobe nodule (series 8 image 27). Slight interval increase in the  5 mm posterior right lower lobe nodular subpleural pulmonary opacity  (series 8 image 139), as compared to 10/12/2016 exam. Interval  resolution of the previously noted 10 mm groundglass nodular pulmonary  opacity in the left upper lobe and 2 mm nodular opacity in the right  upper lobe, both were new on 1/15/2018 exam.    There is also lesion on right 8th rib area where he had previous resection which is slowly larger.    He initially presented with with hemoptysis starting in November 2011. He underwent CT of chest on Jan 18, 2012  "at his primary care clinic which showed a RUL mass 2 x 1.9 cm with no suspicious lymphadenopathy or adrenal masses. 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 He was staged with a PET-CT and 2 lesions were noted. He was staged as T3N0 disease. He had surgical resection of the nodules in RUL and RML. EGFR exon 21 deletion.  He has been on Tarceva since June 2016 and is tolerating it quite well. Dose was decreased from 150 mg daily to 100 mg daily in July 2017 due to rash.   Currently, he is taking Erlotinib 100 mg daily too.     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      PHYSICAL EXAMINATION:  /78  Pulse 64  Wt 78.2 kg (172 lb 4.8 oz)  SpO2 99%     GEN: NAD  HEENT: PERRL, EOMI, no icterus, injection or pallor. Oropharynx is clear.  LYMPHATICs: no cervical or supraclavicular lymphadenopathy; no other abn lymphadenopathy  CHEST:Diffuse budging on anterior lateral chest wall around 8th rib area  PULMONARY: clear with good air entry bilaterally  CARDIOVASCULAR: regular, no murmurs, rubs, or gallops  GASTROINTESTINAL: soft, non-tender, non-distended, normal bowel sounds, no hepatosplenomegaly by percussion or palpation  MUSCULOSKELTAL: warm, well perfused, no edema  NEURO: awake, alert and oriented to time place and person, cranial nerves intact - II - XII, no focal neurologic deficits  SKIN: no rashe.     ASSESSMENT AND " RECOMMENDATION:  This is a 78 year old male with EGFR positive stage IV lung adenocarcinoma  With painful right 8th rib metastasis and has seen patient for palliative radiation.  Patient wife and patient want to find out if he has other lesions causing pain toward axillar and also asking he lesion showing on chest CT is cancerous lesion. It is probably better to have PET/CT scan before the radiation for more evaluation of patient status although the lesion is most likely recurrent metastatic lesion.  The issue was discussed with Dr. Zita Haile's nurse and scheduled.  Patient will return to radiation oncology after. I requested to do imaging study on flat table to help radiatin planning as needed.  Irene and his wife was given time for questions.    Thank you for letting us to participate on this patient care.    Michael Domínguez MD  Pager [phone]

## 2018-05-25 ENCOUNTER — CARE COORDINATION (OUTPATIENT)
Dept: RADIATION ONCOLOGY | Facility: CLINIC | Age: 78
End: 2018-05-25

## 2018-05-25 ENCOUNTER — RADIANT APPOINTMENT (OUTPATIENT)
Dept: PET IMAGING | Facility: CLINIC | Age: 78
End: 2018-05-25
Attending: NURSE PRACTITIONER
Payer: COMMERCIAL

## 2018-05-25 ENCOUNTER — RADIANT APPOINTMENT (OUTPATIENT)
Dept: MRI IMAGING | Facility: CLINIC | Age: 78
End: 2018-05-25
Attending: NURSE PRACTITIONER
Payer: COMMERCIAL

## 2018-05-25 DIAGNOSIS — C34.91 NON-SMALL CELL CANCER OF RIGHT LUNG (H): ICD-10-CM

## 2018-05-25 LAB
CREAT BLD-MCNC: 1.8 MG/DL (ref 0.66–1.25)
GFR SERPL CREATININE-BSD FRML MDRD: 36 ML/MIN/1.7M2

## 2018-05-25 PROCEDURE — 78815 PET IMAGE W/CT SKULL-THIGH: CPT | Mod: 59 | Performed by: RADIOLOGY

## 2018-05-25 PROCEDURE — 71250 CT THORAX DX C-: CPT

## 2018-05-25 PROCEDURE — 82565 ASSAY OF CREATININE: CPT | Performed by: RADIOLOGY

## 2018-05-25 PROCEDURE — 74177 CT ABD & PELVIS W/CONTRAST: CPT

## 2018-05-25 PROCEDURE — A9585 GADOBUTROL INJECTION: HCPCS | Performed by: NURSE PRACTITIONER

## 2018-05-25 PROCEDURE — 70553 MRI BRAIN STEM W/O & W/DYE: CPT | Performed by: RADIOLOGY

## 2018-05-25 PROCEDURE — A9552 F18 FDG: HCPCS | Performed by: NURSE PRACTITIONER

## 2018-05-25 RX ORDER — IOPAMIDOL 755 MG/ML
20-135 INJECTION, SOLUTION INTRAVASCULAR ONCE
Status: COMPLETED | OUTPATIENT
Start: 2018-05-25 | End: 2018-05-25

## 2018-05-25 RX ORDER — GADOBUTROL 604.72 MG/ML
7.5 INJECTION INTRAVENOUS ONCE
Status: COMPLETED | OUTPATIENT
Start: 2018-05-25 | End: 2018-05-25

## 2018-05-25 RX ADMIN — IOPAMIDOL 100 ML: 755 INJECTION, SOLUTION INTRAVASCULAR at 09:50

## 2018-05-25 RX ADMIN — GADOBUTROL 7.5 ML: 604.72 INJECTION INTRAVENOUS at 09:12

## 2018-05-25 NOTE — PROGRESS NOTES
Patient was seen for radiation oncology consultation on 5/15/2018 with Dr. Michael Domínguez, recommendation for PET and MRI brain prior to radiation treatment planning.  Patient had imaging scans done today, 5/25/2018.  Reviewed plan of care with Dr. Pérez here at [facility] as Dr. Michael Domínguez will not be at [facility] in the near future, willing to take over patient care.  Plan for patient to return to clinic week of 5/28/2018 to review imaging and develop plan for radiation treatment.  This RN contacted patient, appointment for return visit with Dr. Pérez scheduled Wednesday, 5/30/2018 at 1100 with CT simulation for radiation treatment planning scheduled at 1145.  Informed patient that Dr. Pérez would review imaging with patient at this visit.  Patient verbalized understanding, agreeable with plan and confirmed appointment date and times.  Patient had no questions at this time.    Imelda Huizar, RN BSN OCN

## 2018-05-30 ENCOUNTER — OFFICE VISIT (OUTPATIENT)
Dept: RADIATION ONCOLOGY | Facility: CLINIC | Age: 78
End: 2018-05-30
Payer: COMMERCIAL

## 2018-05-30 VITALS
RESPIRATION RATE: 14 BRPM | BODY MASS INDEX: 25.17 KG/M2 | SYSTOLIC BLOOD PRESSURE: 142 MMHG | HEART RATE: 63 BPM | DIASTOLIC BLOOD PRESSURE: 71 MMHG | HEIGHT: 69 IN | WEIGHT: 169.9 LBS | OXYGEN SATURATION: 97 %

## 2018-05-30 DIAGNOSIS — C79.51 SECONDARY MALIGNANT NEOPLASM OF BONE (H): Primary | ICD-10-CM

## 2018-05-30 PROCEDURE — 77263 THER RADIOLOGY TX PLNG CPLX: CPT | Performed by: RADIOLOGY

## 2018-05-30 PROCEDURE — 99214 OFFICE O/P EST MOD 30 MIN: CPT | Mod: 25 | Performed by: RADIOLOGY

## 2018-05-30 PROCEDURE — 77290 THER RAD SIMULAJ FIELD CPLX: CPT | Performed by: RADIOLOGY

## 2018-05-30 RX ORDER — ERLOTINIB HYDROCHLORIDE 100 MG/1
TABLET ORAL
COMMUNITY
Start: 2018-05-21 | End: 2018-08-06

## 2018-05-30 ASSESSMENT — PAIN SCALES - GENERAL: PAINLEVEL: MODERATE PAIN (5)

## 2018-05-30 NOTE — MR AVS SNAPSHOT
After Visit Summary   5/30/2018    Gilson Curtis    MRN: [id number]           Patient Information     Date Of Birth          1940        Visit Information        Provider Department      5/30/2018 11:00 AM Renate Pérez MD [facility]        Today's Diagnoses     Secondary malignant neoplasm of bone (H)    -  1      Care Instructions          What to expect at your Simulation visit:    You will meet with a Radiation Therapist and other team members who will be doing a planning session called a  simulation  with you. This process will determine your daily treatment.    ~ You will lie on a flat table and have a treatment planning CT scan.  It is important during the scan to hold very still and breathe normally.    ~ Your therapist may construct a body mold to help you hold still for your treatments.    ~ If you are having treatment to the head or neck area you will be fitted with a plastic mesh mask that fits very snugly over your face and neck.     ~ Your therapist will be taking some digital photos that will go in your treatment chart.      ~Your therapist will make marks on your skin and take measurements. Your therapist may ask you about making small tattoos (a permanent small dot) over these marks.  These marks are used to position you daily for your radiation therapy treatments. Please do not wash off any marks until all of your radiation therapy treatments are complete unless you are instructed to do so by your therapist.    ~ Once the simulation is completed it can take from 3 to 10 business days before you start radiation therapy treatments.    ~ You may meet with a nurse who will go over management of treatment side effects and self care during your treatments. The nurse will help to plan care with other departments and physicians if needed.    Please contact [facility] RN with questions or concerns following today's appointment:  [phone].    Thank you!            Follow-ups after your visit        Your next 10 appointments already scheduled     Aug 02, 2018  9:50 AM CDT   LAB with LAB FIRST FLOOR [facility] ([facility])    [address] [address] [place] [id number] [phone]           Please do not eat 10-12 hours before your appointment if you are coming in fasting for labs on lipids, cholesterol, or glucose (sugar). This does not apply to pregnant women. Water, hot tea and black coffee (with nothing added) are okay. Do not drink other fluids, diet soda or chew gum.            Aug 02, 2018 10:15 AM CDT   CT CHEST/ABDOMEN/PELVIS W CONTRAST with MGCT1   [facility] ([facility])    [address] [place] [address] [phone]           Please bring any scans or X-rays taken at other hospitals, if similar tests were done. Also bring a list of your medicines, including vitamins, minerals and over-the-counter drugs. It is safest to leave personal items at home.  Be sure to tell your doctor:   If you have any allergies.   If there s any chance you are pregnant.   If you are breastfeeding.  How to prepare:   Do not eat or drink for 2 hours before your exam. If you need to take medicine, you may take it with small sips of water. (We may ask you to take liquid medicine as well.)   Please wear loose clothing, such as a sweat suit or jogging clothes. Avoid snaps, zippers and other metal. We may ask you to undress and put on a hospital gown.  Please arrive 30 minutes early for your CT. Once in the department you might be asked to drink water 15-20 minutes prior to your exam.  If indicated you may be asked to drink an oral contrast in advance of your CT.  If this is the case, the imaging team will let you know or be in contact with you prior to your appointment  Patients over 70 or patients with diabetes or kidney problems:   If you haven t had a  blood test (creatinine test) within the last 30 days, the Cardiologist/Radiologist may require you to get this test prior to your exam.  If you have diabetes:   Continue to take your metformin medication on the day of your exam  If you have any questions, please call the Imaging Department where you will have your exam.            Aug 06, 2018 10:00 AM CDT   Return Visit with Gerry Ahumada MD   [facility] ([facility]    [id number] [place] [id number] [phone]              Who to contact     If you have questions or need follow up information about today's clinic visit or your schedule please contact [facility] directly at [phone].  Normal or non-critical lab and imaging results will be communicated to you by [email or web address], letter or phone within 4 business days after the clinic has received the results. If you do not hear from us within 7 days, please contact the clinic through [email or web address] or phone. If you have a critical or abnormal lab result, we will notify you by phone as soon as possible.  Submit refill requests through [employer] or call your pharmacy and they will forward the refill request to us. Please allow 3 business days for your refill to be completed.          Additional Information About Your Visit        [employer] Information     [employer] gives you secure access to your electronic health record. If you see a primary care provider, you can also send messages to your care team and make appointments. If you have questions, please call your primary care clinic.  If you do not have a primary care provider, please call [phone] and they will assist you.      [employer] is an electronic gateway that provides easy, online access to your medical records. With [employer], you can request a clinic appointment, read your test results, renew a prescription or communicate with your care team.     To access your existing account, please contact  "your [facility] or call [phone] for assistance.        Care EveryWhere ID     This is your Care EveryWhere ID. This could be used by other organizations to access your [facility] medical records  [id number]        Your Vitals Were     Pulse Respirations Height Pulse Oximetry BMI (Body Mass Index)       63 14 5' 9\" 97% 25.09 kg/m2        Blood Pressure from Last 3 Encounters:   05/30/18 142/71   05/15/18 119/69   05/07/18 137/80    Weight from Last 3 Encounters:   05/30/18 169 lb 14.4 oz   05/15/18 168 lb 11.2 oz   05/07/18 170 lb              Today, you had the following     No orders found for display       Primary Care Provider Office Phone # Fax #    Hemant Jiménez [phone]       [address]  [facility] [address]        Equal Access to Services     [facility] Center[facility]ANUEL : Hadii aad ku hadasho Soomaali, waaxda luqadaha, qaybta kaalmada adeegyada, waxay idiin hayaan nelly dominiquearakostas laanglen . So [facility] [phone].    ATENCIÓN: Si habla español, tiene a tran disposición servicios gratuitos de asistencia lingüística. Blayne al [phone].    We comply with applicable federal civil rights laws and [place] laws. We do not discriminate on the basis of race, color, national origin, age, disability, sex, sexual orientation, or gender identity.            Thank you!     Thank you for choosing [facility]  for your care. Our goal is always to provide you with excellent care. Hearing back from our patients is one way we can continue to improve our services. Please take a few minutes to complete the written survey that you may receive in the mail after your visit with us. Thank you!             Your Updated Medication List - Protect others around you: Learn how to safely use, store and throw away your medicines at www.disposemymeds.org.          This list is accurate as of 5/30/18  2:47 PM.  Always use your most recent med list.                   " Brand Name Dispense Instructions for use Diagnosis    aspirin 81 MG tablet      Take 1 tablet by mouth At Bedtime        calcium carbonate 500 MG chewable tablet    TUMS     Take 1 chew tab by mouth as needed for heartburn        clindamycin 1 % topical gel    CLINDAMAX    60 g    Apply topically 2 times daily    Drug rash, Malignant neoplasm of upper lobe of right lung (H)       GABAPENTIN PO      Take 150 mg by mouth At Bedtime        hydrocortisone 1 % ointment      Apply topically 2 times daily        lisinopril 2.5 MG tablet    PRINIVIL/Zestril     Take 2.5 mg by mouth daily.        * MATURE ADULT CENTURY PO      Take 1 tablet by mouth daily OTC        * EYE VITAMINS PO      Take 1 capsule by mouth daily OTC        ranitidine 150 MG capsule    ZANTAC     Take 150 mg by mouth 2 times daily        simvastatin 80 MG tablet    ZOCOR          TARCEVA 100 MG tablet CHEMO   Generic drug:  erlotinib           * Notice:  This list has 2 medication(s) that are the same as other medications prescribed for you. Read the directions carefully, and ask your doctor or other care provider to review them with you.

## 2018-05-30 NOTE — PATIENT INSTRUCTIONS
What to expect at your Simulation visit:    You will meet with a Radiation Therapist and other team members who will be doing a planning session called a  simulation  with you. This process will determine your daily treatment.    ~ You will lie on a flat table and have a treatment planning CT scan.  It is important during the scan to hold very still and breathe normally.    ~ Your therapist may construct a body mold to help you hold still for your treatments.    ~ If you are having treatment to the head or neck area you will be fitted with a plastic mesh mask that fits very snugly over your face and neck.     ~ Your therapist will be taking some digital photos that will go in your treatment chart.      ~Your therapist will make marks on your skin and take measurements. Your therapist may ask you about making small tattoos (a permanent small dot) over these marks.  These marks are used to position you daily for your radiation therapy treatments. Please do not wash off any marks until all of your radiation therapy treatments are complete unless you are instructed to do so by your therapist.    ~ Once the simulation is completed it can take from 3 to 10 business days before you start radiation therapy treatments.    ~ You may meet with a nurse who will go over management of treatment side effects and self care during your treatments. The nurse will help to plan care with other departments and physicians if needed.    Please contact [facility] RN with questions or concerns following today's appointment: [phone].    Thank you!

## 2018-05-30 NOTE — LETTER
2018        RE: Gilson Curtis  [address] [facility] [address]        IDENTIFICATION: This is a 78 year old male with stage IV lung adenocarcinoma with painful right rib mets.        INTERVAL HISTORY: Mr. Curtis is a 79 yo M w/ metastatic EGFR+ positive lung adenocarcinoma who has progressively painful right rib metastases. He was previously seen in consultation by my colleague, Dr. Michael Domínguez, on 5/15/18.    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 He was staged as T3N0 and had surgical resection of the RUL and RML nodules. They tested positive for an EGFR mutation in exon 21. He did not receive any adjuvant chemotherapy or radiation therapy.  He was on active surveillance and did well until 2016 when repeat imaging showed an enlarging nodule at the right upper lobectomy surgical margin and a new irregular lytic bone defect on the right eighth rib. The rib was biopsied/resected. However molecular testing of this repeat biopsy was not amenable to sequencing because of decalcification. Since 2016 he has been on Tarceva. It was dose reduced due to side effects. He recently was seen in our department in consultation for right sided pain presumably from his rib mass as he had had pretty stable findings prior to consultation. Dr. Domínguez recommended a PET/CT which showed the followin/25/18 PET/CT IMPRESSION: In this patient with history of lung cancer;   1. Findings consistent with progressive disease;  a. Hypermetabolic necrotic nodules along the right diaphragm, increased since 5/3/2018, likely metastatic deposits.  b. Multiple sub-4 mm pulmonary nodules, some are new 1/3/2018 and all are new since 1/15/2018. These are likely metastatic nodules.  c. Hypermetabolic metastases involving the right chest wall and seventh and 10th ribs increased since 5/3/2018.  2. Slightly decreased size of hypermetabolic  "right middle lobe pulmonary nodule since 3/23/2016. This is likely postsurgical.  3. Post surgical changes of right upper lobectomy in the right medial lobe wedge resection.    He also had a brain MRI on 5/25/18 which showed no evidence of metastatic disease.    As aforementioned he is currently on Tarceva.  He describes his right sided rib pain as sharp shooting and nonradiating for the past several months.  It is exacerbated by lying down, rated 5/10. He prefers not to take any pain medication for it. He has baseline sob with exertion but this is stable over the past 6 months.  He otherwise has no complaints.  He specifically denies any n/v/f/c/d/c/cp/ha/other bony or joint pain or new neuro symptoms.      REVIEW OF SYSTEMS: As per HPI, a 14-point review of system is otherwise negative.      PAST RADIATION THERAPY: None      PAST CONNECTIVE TISSUE DISEASE/PACEMAKER:  Denies.    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     CAD (coronary artery disease)      CKD (chronic kidney disease) stage 3, GFR 30-59 ml/min      Fatigue     \"spells\"     Hypertension      Lung cancer (H) 2016     Non-small cell lung cancer (H)      Prostate cancer (H) 2009     Skin cancer, basal cell      Stented coronary artery 2009    x2     TIA (transient ischemic attack) 2002       Past Surgical History:   Procedure Laterality Date     CATARACT IOL, RT/LT Left 05/2015     CATARACT IOL, RT/LT Right 06/2015     CATHETER, ABLATION  06/2006    [facility]     CHOLECYSTECTOMY  10/2012     ENDOBRONCHIAL ULTRASOUND FLEXIBLE  2/27/2012    Procedure:ENDOBRONCHIAL ULTRASOUND FLEXIBLE; Flexible Bronchoscopy, Endo Bronchial Ultrasound, Transbroncial biopies (Cytology specimens taken by Cytology); Surgeon:BRITTA MEDEL; Location:[facility] OR     EXCISION OF BASAL CELL CARCINOMA  10/2005    Left upper back     HERNIA REPAIR Bilateral 12/2010    Inguinal     PROSTATECTOMY RETROPUBIC RADICAL  " "04/2009     RESECT RIB Right 4/8/2016    Procedure: RESECT RIB;  Surgeon: Jamarcus Gannon MD;  Location: [facility] OR     STENT  08/2009    Angiogram/ 2 Taxus Stents - Mid LAD, Mid CMX     THORACOSCOPIC RESECTION LUNG  3/6/2012    Procedure:THORACOSCOPIC RESECTION LUNG; right video assisted thoracic surgery, wedge resection middle lobe x 2, right thoroctomy, right upper lobectomy, mediastinal lymphadenectomy, flexible bronchoscopy; Surgeon:BRITTA MEDEL; Location:[facility] OR       Family History   Problem Relation Age of Onset     Breast Cancer Sister 65     Breast Cancer Maternal Aunt        Social History   Substance Use Topics     Smoking status: Former Smoker     Packs/day: 1.00     Years: 20.00     Types: Cigarettes     Quit date: 1/1/1978     Smokeless tobacco: Never Used     Alcohol use No     PHYSICAL EXAMINATION:  /71 (BP Location: Left arm, Patient Position: Sitting, Cuff Size: Adult Large)  Pulse 63  Resp 14  Ht 5' 9\"  Wt 169 lb 14.4 oz  SpO2 97%  BMI 25.09 kg/m2  General: Alert, oriented, NAD  HEENT: NCAT, EOMI, PERRL  Heart: deferred  Lungs: deferred  Thorax: Tenderness to palpation right rib cage, well healed scar.  Diffuse budging on anterior lateral chest wall around 8th rib area  Ext: no CCE  Neuro: CNs grossly intact  Skin: warm and well perfused.         IMPRESSION/PLAN: Mr. Kline is a 77 yo M w/ metastatic EGFR+ positive lung adenocarcinoma who is being seen in consultation for a painful right rib mass. He is currently on Tarceva.  PE shows soft tissue prominence along the right anterior chest wall. Most recent imaging shows involvement of two ribs along the right rib cage as well as multiple small pulmonary nodules and nodules along the diaphragm.  Brain MRI is negative. Palliative XRT to the ribs could improve local control and decrease the patients pain. At this time patient is declining additional pain medication and understands that this treatment will not treat his entire " metastatic disease but only the area which is causing pain in the right thorax.  He will follow up with Dr. Ahumada after XRT to discuss additional systemic regimens.     Today the risks, benefits, treatment rationale and regimen of radiation therapy to the ribs/thoracic cavity were discussed with the patient and his wife. Risks include but are not limited to fatigue, pneumonitis, skin erythema, hyperpigmentation, spine/nerve or bone damage, GI disturbances such as nausea or bowel damage, liver damage and/or hematologic toxicity, and secondary malignancy. The alternative would be to defer XRT at this time and use PO pain medication. However the patient is hoping that this treatment will decrease the size of the tumor as well as improve his pain. I think given the location this can be done with minimal risk of side effects. Case was discussed with Dr. Ahumada and he agrees as well.  The patient consented to therapy and had a planning CT completed in our department today.  Treatment will start within the next 1-2 weeks.     There was ample time for questions and all were answered to the patient's satisfaction. Thank you for allowing me to participate in the care of this patient. If you have any questions, please do not hesitate to contact my office.         Sean Pérez MD   Adjunct   Dept of Radiation Oncology  [facility]

## 2018-05-30 NOTE — PROGRESS NOTES
IDENTIFICATION: This is a 78 year old male with stage IV lung adenocarcinoma with painful right rib mets.        INTERVAL HISTORY: Mr. Curtis is a 79 yo M w/ metastatic EGFR+ positive lung adenocarcinoma who has progressively painful right rib metastases. He was previously seen in consultation by my colleague, Dr. Michael Domínguez, on 5/15/18.    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 He was staged as T3N0 and had surgical resection of the RUL and RML nodules. They tested positive for an EGFR mutation in exon 21. He did not receive any adjuvant chemotherapy or radiation therapy.  He was on active surveillance and did well until 2016 when repeat imaging showed an enlarging nodule at the right upper lobectomy surgical margin and a new irregular lytic bone defect on the right eighth rib. The rib was biopsied/resected. However molecular testing of this repeat biopsy was not amenable to sequencing because of decalcification. Since 2016 he has been on Tarceva. It was dose reduced due to side effects. He recently was seen in our department in consultation for right sided pain presumably from his rib mass as he had had pretty stable findings prior to consultation. Dr. Domínguez recommended a PET/CT which showed the followin/25/18 PET/CT IMPRESSION: In this patient with history of lung cancer;   1. Findings consistent with progressive disease;  a. Hypermetabolic necrotic nodules along the right diaphragm, increased since 5/3/2018, likely metastatic deposits.  b. Multiple sub-4 mm pulmonary nodules, some are new 1/3/2018 and all are new since 1/15/2018. These are likely metastatic nodules.  c. Hypermetabolic metastases involving the right chest wall and seventh and 10th ribs increased since 5/3/2018.  2. Slightly decreased size of hypermetabolic right middle lobe pulmonary nodule since 3/23/2016. This is likely postsurgical.  3. Post  "surgical changes of right upper lobectomy in the right medial lobe wedge resection.    He also had a brain MRI on 5/25/18 which showed no evidence of metastatic disease.    As aforementioned he is currently on Tarceva.  He describes his right sided rib pain as sharp shooting and nonradiating for the past several months.  It is exacerbated by lying down, rated 5/10. He prefers not to take any pain medication for it. He has baseline sob with exertion but this is stable over the past 6 months.  He otherwise has no complaints.  He specifically denies any n/v/f/c/d/c/cp/ha/other bony or joint pain or new neuro symptoms.      REVIEW OF SYSTEMS: As per HPI, a 14-point review of system is otherwise negative.      PAST RADIATION THERAPY: None      PAST CONNECTIVE TISSUE DISEASE/PACEMAKER:  Denies.    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     CAD (coronary artery disease)      CKD (chronic kidney disease) stage 3, GFR 30-59 ml/min      Fatigue     \"spells\"     Hypertension      Lung cancer (H) 2016     Non-small cell lung cancer (H)      Prostate cancer (H) 2009     Skin cancer, basal cell      Stented coronary artery 2009    x2     TIA (transient ischemic attack) 2002       Past Surgical History:   Procedure Laterality Date     CATARACT IOL, RT/LT Left 05/2015     CATARACT IOL, RT/LT Right 06/2015     CATHETER, ABLATION  06/2006    [facility]     CHOLECYSTECTOMY  10/2012     ENDOBRONCHIAL ULTRASOUND FLEXIBLE  2/27/2012    Procedure:ENDOBRONCHIAL ULTRASOUND FLEXIBLE; Flexible Bronchoscopy, Endo Bronchial Ultrasound, Transbroncial biopies (Cytology specimens taken by Cytology); Surgeon:BRITTA MEDEL; Location:[facility]     EXCISION OF BASAL CELL CARCINOMA  10/2005    Left upper back     HERNIA REPAIR Bilateral 12/2010    Inguinal     PROSTATECTOMY RETROPUBIC RADICAL  04/2009     RESECT RIB Right 4/8/2016    Procedure: RESECT RIB;  Surgeon: Jamarcus Gannon " "MD Jony;  Location: [facility] OR     STENT  08/2009    Angiogram/ 2 Taxus Stents - Mid LAD, Mid CMX     THORACOSCOPIC RESECTION LUNG  3/6/2012    Procedure:THORACOSCOPIC RESECTION LUNG; right video assisted thoracic surgery, wedge resection middle lobe x 2, right thoroctomy, right upper lobectomy, mediastinal lymphadenectomy, flexible bronchoscopy; Surgeon:BRITTA MEDEL; Location:[facility] OR       Family History   Problem Relation Age of Onset     Breast Cancer Sister 65     Breast Cancer Maternal Aunt        Social History   Substance Use Topics     Smoking status: Former Smoker     Packs/day: 1.00     Years: 20.00     Types: Cigarettes     Quit date: 1/1/1978     Smokeless tobacco: Never Used     Alcohol use No     PHYSICAL EXAMINATION:  /71 (BP Location: Left arm, Patient Position: Sitting, Cuff Size: Adult Large)  Pulse 63  Resp 14  Ht 5' 9\"  Wt 169 lb 14.4 oz  SpO2 97%  BMI 25.09 kg/m2  General: Alert, oriented, NAD  HEENT: NCAT, EOMI, PERRL  Heart: deferred  Lungs: deferred  Thorax: Tenderness to palpation right rib cage, well healed scar.  Diffuse budging on anterior lateral chest wall around 8th rib area  Ext: no CCE  Neuro: CNs grossly intact  Skin: warm and well perfused.         IMPRESSION/PLAN: Mr. Kline is a 79 yo M w/ metastatic EGFR+ positive lung adenocarcinoma who is being seen in consultation for a painful right rib mass. He is currently on Tarceva.  PE shows soft tissue prominence along the right anterior chest wall. Most recent imaging shows involvement of two ribs along the right rib cage as well as multiple small pulmonary nodules and nodules along the diaphragm.  Brain MRI is negative. Palliative XRT to the ribs could improve local control and decrease the patients pain. At this time patient is declining additional pain medication and understands that this treatment will not treat his entire metastatic disease but only the area which is causing pain in the right thorax.  He will " follow up with Dr. Ahumada after XRT to discuss additional systemic regimens.     Today the risks, benefits, treatment rationale and regimen of radiation therapy to the ribs/thoracic cavity were discussed with the patient and his wife. Risks include but are not limited to fatigue, pneumonitis, skin erythema, hyperpigmentation, spine/nerve or bone damage, GI disturbances such as nausea or bowel damage, liver damage and/or hematologic toxicity, and secondary malignancy. The alternative would be to defer XRT at this time and use PO pain medication. However the patient is hoping that this treatment will decrease the size of the tumor as well as improve his pain. I think given the location this can be done with minimal risk of side effects. Case was discussed with Dr. Ahumada and he agrees as well.  The patient consented to therapy and had a planning CT completed in our department today.  Treatment will start within the next 1-2 weeks.     There was ample time for questions and all were answered to the patient's satisfaction. Thank you for allowing me to participate in the care of this patient. If you have any questions, please do not hesitate to contact my office.         Sean Pérez MD   Adjunct   Dept of Radiation Oncology  [facility]

## 2018-05-31 ENCOUNTER — TELEPHONE (OUTPATIENT)
Dept: RADIATION ONCOLOGY | Facility: CLINIC | Age: 78
End: 2018-05-31

## 2018-05-31 NOTE — TELEPHONE ENCOUNTER
Spoke with patient about radiation therapy start date of 6/6/18 7874 and patient verbalized understanding. Patient will have copy of schedule mailed to home for review and will contact this writer if times do not work    Grant SCHWARTZ

## 2018-06-01 ENCOUNTER — APPOINTMENT (OUTPATIENT)
Dept: RADIATION ONCOLOGY | Facility: CLINIC | Age: 78
End: 2018-06-01
Payer: COMMERCIAL

## 2018-06-01 PROCEDURE — 77307 TELETHX ISODOSE PLAN CPLX: CPT | Performed by: RADIOLOGY

## 2018-06-01 PROCEDURE — 77334 RADIATION TREATMENT AID(S): CPT | Performed by: RADIOLOGY

## 2018-06-06 ENCOUNTER — APPOINTMENT (OUTPATIENT)
Dept: RADIATION ONCOLOGY | Facility: CLINIC | Age: 78
End: 2018-06-06
Payer: COMMERCIAL

## 2018-06-06 ENCOUNTER — OFFICE VISIT (OUTPATIENT)
Dept: RADIATION ONCOLOGY | Facility: CLINIC | Age: 78
End: 2018-06-06
Payer: COMMERCIAL

## 2018-06-06 VITALS
RESPIRATION RATE: 18 BRPM | HEART RATE: 67 BPM | WEIGHT: 166.5 LBS | BODY MASS INDEX: 24.59 KG/M2 | OXYGEN SATURATION: 98 %

## 2018-06-06 DIAGNOSIS — C79.51 SECONDARY MALIGNANT NEOPLASM OF BONE (H): Primary | ICD-10-CM

## 2018-06-06 PROCEDURE — 77280 THER RAD SIMULAJ FIELD SMPL: CPT | Performed by: RADIOLOGY

## 2018-06-06 PROCEDURE — 77412 RADIATION TX DELIVERY LVL 3: CPT | Performed by: RADIOLOGY

## 2018-06-06 PROCEDURE — 99207 ZZC DROP WITH A PROCEDURE: CPT | Performed by: RADIOLOGY

## 2018-06-06 ASSESSMENT — PAIN SCALES - GENERAL: PAINLEVEL: NO PAIN (0)

## 2018-06-06 NOTE — PROGRESS NOTES
[facility] PHYSICIANS  SPECIALIZING IN BREAKTHROUGHS  Radiation Oncology    On Treatment Visit Note      Gilson Curtis      Date: 2018   MRN: [id number]   : 1940  Diagnosis: metastatic lung cancer      Reason for Visit:  On Radiation Treatment Visit     Treatment Summary to Date  Treatment Site: right ribs Current Dose: 300/3000 cGy Fractions: 1/10      Chemotherapy  Chemo concurrent with radx?: Yes  Oncologist: Dr. Ahumada  Drug Name/Frequency 1: Tarceva    Subjective:     Doing well with no complaints.    Nursing ROS:   Nutrition Alteration  Diet Type: Patient's Preference  Skin  Skin Reaction: 0 - No changes      Cardiovascular  Respiratory effort: 1 - Normal - without distress      Psychosocial  Mood - Anxiety: 0 - Normal  Mood - Depression: 0 - Normal  Pyschosocial Note: patient reports fatigue at baseline  Pain Assessment  0-10 Pain Scale: 0  Pain Treatment: denies need for pain medication      Objective:   Pulse 67  Resp 18  Wt 166 lb 8 oz  SpO2 98%  BMI 24.59 kg/m2  Gen: Appears well, in no acute distress  Skin: No erythema    Labs:  CBC RESULTS:   Recent Labs   Lab Test  18   1019   WBC  7.1   RBC  5.07   HGB  15.9   HCT  50.1   MCV  99   MCH  31.4   MCHC  31.7   RDW  12.5   PLT  187     ELECTROLYTES:  Recent Labs   Lab Test  18   1019   NA  142   POTASSIUM  4.4   CHLORIDE  109   RICK  8.8   CO2  29   BUN  26   CR  1.61*   GLC  101*       Assessment:    Tolerating radiation therapy well.  All questions and concerns addressed.    Toxicities:  Fatigue: Grade 0: No toxicity  Pain: Grade 1: Mild pain  Dyspnea: Grade 0: No toxicity  Dermatitis: Grade 0: No toxicity    Plan:   1. Continue current therapy.    2. Skin care with aquaphor BID      Mosaiq chart and setup information reviewed  MVCT/IGRT images checked    Medication Review  Med list reviewed with patient?: Yes  Med list printed and given: Offered and declined    Educational Topic Discussed  Education Instructions:  radiation therapy side effects: fatigue, skin changes and skin cares        Sean Pérez MD    Please do not send letter to referring physician.

## 2018-06-06 NOTE — PATIENT INSTRUCTIONS
Please contact [facility] RN with questions or concerns following today's appointment: [phone].    Thank you!

## 2018-06-06 NOTE — MR AVS SNAPSHOT
After Visit Summary   6/6/2018    Gilson Curtis    MRN: [id number]           Patient Information     Date Of Birth          1940        Visit Information        Provider Department      6/6/2018 1:45 PM Renate Pérez MD [facility]        Today's Diagnoses     Secondary malignant neoplasm of bone (H)    -  1      Care Instructions    Please contact [facility] RN with questions or concerns following today's appointment: [phone].    Thank you!            Follow-ups after your visit        Your next 10 appointments already scheduled     Jun 07, 2018  3:45 PM CDT   TREATMENT with RADIATION THERAPIST   [facility] ([facility]    [address] [facility] [address]   [phone]            Jun 08, 2018  1:15 PM CDT   TREATMENT with RADIATION THERAPIST   [facility] ([facility])    [address] [facility] [address]   [phone]            Jun 11, 2018  1:15 PM CDT   TREATMENT with RADIATION THERAPIST   [facility] ([facility])    [address] [facility] [address]   [phone]            Jun 12, 2018  1:45 PM CDT   TREATMENT with RADIATION THERAPIST   [facility] ([facility])    [address] [facility] [address]   [phone]            Jun 13, 2018 12:00 PM CDT   TREATMENT with RADIATION THERAPIST   [facility] ([facility])    [address] [facility] [address]   [phone]            Jun 13, 2018 12:15 PM CDT   on treatment visit with Renate Pérez MD   [facility] ([facility])    [address] [facility] [address]   [phone]            Jun 14, 2018 12:00 PM CDT   TREATMENT with RADIATION THERAPIST   [facility] ([facility])    [address] [place] [address]   [phone]            Nikhil 15, 2018 10:15 AM CDT   TREATMENT with RAD[facility]ATION THERAPIST   [facility]    [address] [facility] [address]   [phone]            Jun 18, 2018 11:45 AM CDT   TREATMENT with RAD[facility]ATION THERAPIST   [facility]    [address] [place] [address]   [phone]            Jun 19, 2018 11:45 AM CDT   TREATMENT with RAD[facility]ATION THERAPIST   [facility]    [address] [place] [address]   [phone]              Who to contact     If you have questions or need follow up information about today's clinic visit or your schedule please contact [facility] directly at [phone].  Normal or non-critical lab and imaging results will be communicated to you by [place] [employer], letter or phone within 4 business days after the clinic has received the results. If you do not hear from us within 7 days, please contact the clinic through [employer] or phone. If you have a critical or abnormal lab result, we will notify you by phone as soon as possible.  Submit refill requests through [employer] or call your pharmacy and they will forward the refill request to us. Please allow 3 business days for your refill to be completed.          Additional Information About Your Visit        [employer] Information     [employer] gives you secure access to your electronic health record. If you see a primary care provider, you can also send messages to your care team and make appointments. If you have questions, please call your primary care clinic.  If you do not have a primary care provider, please call [phone] and they will assist you.      [employer] is an electronic gateway that provides easy, online access to your medical records. With [employer], you can request a clinic  appointment, read your test results, renew a prescription or communicate with your care team.     To access your existing account, please contact your [facility] or call [phone] for assistance.        Care EveryWhere ID     This is your Care EveryWhere ID. This could be used by other organizations to access your [facility] medical records  [id number]        Your Vitals Were     Pulse Respirations Pulse Oximetry BMI (Body Mass Index)          67 18 98% 24.59 kg/m2         Blood Pressure from Last 3 Encounters:   05/30/18 142/71   05/15/18 119/69   05/07/18 137/80    Weight from Last 3 Encounters:   06/06/18 166 lb 8 oz   05/30/18 169 lb 14.4 oz   05/15/18 168 lb 11.2 oz              Today, you had the following     No orders found for display       Primary Care Provider Office Phone # Fax #    Hemant Jiménez [phone]       [address]  [facility] [address]        Equal Access to Services     CAMRYN POSEY : Hadii aad ku hadasho Soomaali, waaxda luqadaha, qaybta kaalmada adeegyada, waxay idiin hayaan adeeg kharakostas la'jaen . So [facility] [phone].    ATENCIÓN: Si habla español, tiene a tran disposición servicios gratuitos de asistencia lingüística. Llame al [phone].    We comply with applicable federal civil rights laws and [place] laws. We do not discriminate on the basis of race, color, national origin, age, disability, sex, sexual orientation, or gender identity.            Thank you!     Thank you for choosing [facility]  for your care. Our goal is always to provide you with excellent care. Hearing back from our patients is one way we can continue to improve our services. Please take a few minutes to complete the written survey that you may receive in the mail after your visit with us. Thank you!             Your Updated Medication List - Protect others around you: Learn how to safely use, store and throw away your medicines  at www.disposemymeds.org.          This list is accurate as of 6/6/18  4:52 PM.  Always use your most recent med list.                   Brand Name Dispense Instructions for use Diagnosis    aspirin 81 MG tablet      Take 1 tablet by mouth At Bedtime        calcium carbonate 500 MG chewable tablet    TUMS     Take 1 chew tab by mouth as needed for heartburn        clindamycin 1 % topical gel    CLINDAMAX    60 g    Apply topically 2 times daily    Drug rash, Malignant neoplasm of upper lobe of right lung (H)       GABAPENTIN PO      Take 150 mg by mouth At Bedtime        hydrocortisone 1 % ointment      Apply topically 2 times daily        lisinopril 2.5 MG tablet    PRINIVIL/Zestril     Take 2.5 mg by mouth daily.        * MATURE ADULT CENTURY PO      Take 1 tablet by mouth daily OTC        * EYE VITAMINS PO      Take 1 capsule by mouth daily OTC        ranitidine 150 MG capsule    ZANTAC     Take 150 mg by mouth 2 times daily        simvastatin 80 MG tablet    ZOCOR          TARCEVA 100 MG tablet CHEMO   Generic drug:  erlotinib           * Notice:  This list has 2 medication(s) that are the same as other medications prescribed for you. Read the directions carefully, and ask your doctor or other care provider to review them with you.

## 2018-06-07 ENCOUNTER — APPOINTMENT (OUTPATIENT)
Dept: RADIATION ONCOLOGY | Facility: CLINIC | Age: 78
End: 2018-06-07
Payer: COMMERCIAL

## 2018-06-07 PROCEDURE — 77412 RADIATION TX DELIVERY LVL 3: CPT | Performed by: RADIOLOGY

## 2018-06-08 ENCOUNTER — APPOINTMENT (OUTPATIENT)
Dept: RADIATION ONCOLOGY | Facility: CLINIC | Age: 78
End: 2018-06-08
Payer: COMMERCIAL

## 2018-06-08 PROCEDURE — 77412 RADIATION TX DELIVERY LVL 3: CPT | Performed by: RADIOLOGY

## 2018-06-11 ENCOUNTER — APPOINTMENT (OUTPATIENT)
Dept: RADIATION ONCOLOGY | Facility: CLINIC | Age: 78
End: 2018-06-11
Payer: COMMERCIAL

## 2018-06-11 PROCEDURE — 77412 RADIATION TX DELIVERY LVL 3: CPT | Performed by: RADIOLOGY

## 2018-06-12 ENCOUNTER — APPOINTMENT (OUTPATIENT)
Dept: RADIATION ONCOLOGY | Facility: CLINIC | Age: 78
End: 2018-06-12
Payer: COMMERCIAL

## 2018-06-12 PROCEDURE — 77336 RADIATION PHYSICS CONSULT: CPT | Performed by: RADIOLOGY

## 2018-06-12 PROCEDURE — 77412 RADIATION TX DELIVERY LVL 3: CPT | Performed by: RADIOLOGY

## 2018-06-12 PROCEDURE — 77427 RADIATION TX MANAGEMENT X5: CPT | Performed by: RADIOLOGY

## 2018-06-13 ENCOUNTER — APPOINTMENT (OUTPATIENT)
Dept: RADIATION ONCOLOGY | Facility: CLINIC | Age: 78
End: 2018-06-13
Payer: COMMERCIAL

## 2018-06-13 ENCOUNTER — OFFICE VISIT (OUTPATIENT)
Dept: RADIATION ONCOLOGY | Facility: CLINIC | Age: 78
End: 2018-06-13
Payer: COMMERCIAL

## 2018-06-13 VITALS — WEIGHT: 166.5 LBS | OXYGEN SATURATION: 96 % | BODY MASS INDEX: 24.59 KG/M2

## 2018-06-13 DIAGNOSIS — C79.51 SECONDARY MALIGNANT NEOPLASM OF BONE (H): Primary | ICD-10-CM

## 2018-06-13 PROCEDURE — 77417 THER RADIOLOGY PORT IMAGE(S): CPT | Performed by: RADIOLOGY

## 2018-06-13 PROCEDURE — 77412 RADIATION TX DELIVERY LVL 3: CPT | Performed by: RADIOLOGY

## 2018-06-13 PROCEDURE — 99207 ZZC DROP WITH A PROCEDURE: CPT | Performed by: RADIOLOGY

## 2018-06-13 ASSESSMENT — PAIN SCALES - GENERAL: PAINLEVEL: NO PAIN (0)

## 2018-06-13 NOTE — PATIENT INSTRUCTIONS
Please contact [facility] RN with questions or concerns following today's appointment: [phone].    Thank you!

## 2018-06-13 NOTE — PROGRESS NOTES
[facility] PHYSICIANS  SPECIALIZING IN BREAKTHROUGHS  Radiation Oncology    On Treatment Visit Note      Gilson Curtis      Date: 2018   MRN: [id number]   : 1940  Diagnosis: metastatic lung cancer    Reason for Visit:  On Radiation Treatment Visit     Treatment Summary to Date  Treatment Site: right ribs Current Dose: 1800/3000 cGy Fractions: 6/10      Chemotherapy  Chemo concurrent with radx?: Yes  Oncologist: Dr. Ahumada  Drug Name/Frequency 1: Tarceva    Subjective:  Having some fatigue. Pain has improved 0/10 at present, up to 4/10 with coughing and sneezing. Denies much cough or SOB, denies any fevers.  Feeling well overall. Questions about follow up plan.     Nursing ROS:   Nutrition Alteration  Diet Type: Patient's Preference     Cardiovascular  Respiratory effort: 1 - Normal - without distress     Psychosocial  Pyschosocial Note: patient reports fatigue at baseline  Pain Assessment  0-10 Pain Scale: 0  Pain Treatment: denies need for pain medication  Pain Note: patient reports 4/10 pain in ribs while coughing and sneezing    Objective:   Wt 166 lb 8 oz  SpO2 96%  BMI 24.59 kg/m2  Gen: Appears well, in no acute distress  Skin: No erythema    Labs:  CBC RESULTS:   Recent Labs   Lab Test  18   1019   WBC  7.1   RBC  5.07   HGB  15.9   HCT  50.1   MCV  99   MCH  31.4   MCHC  31.7   RDW  12.5   PLT  187     ELECTROLYTES:  Recent Labs   Lab Test  18   1019   NA  142   POTASSIUM  4.4   CHLORIDE  109   RICK  8.8   CO2  29   BUN  26   CR  1.61*   GLC  101*       Assessment:    Tolerating radiation therapy well.  All questions and concerns addressed. Pain improved with treatment.    Toxicities:  Fatigue: Grade 1: Fatigue relieved by rest  Pain: Grade 1: Mild pain  Dermatitis: Grade 0: No toxicity    Plan:   1. Continue current therapy.    2. Start Aquaphor for moisturizing skin care  3. Follow up with medical oncology - patient has appointment for 18  4. RTC as  needed    Mosaiq chart and setup information reviewed  Ports checked    Medication Review  Med list reviewed with patient?: Yes    Educational Topic Discussed  Additional Instructions: follow up with Dr Ahumada on 6/28/18    Pt seen and discussed with staff Dr. Pérez.    Mellisa Shipman MD  PGY-2 Radiation Oncology Resident  [facility]  Clinic: [phone]      I have seen and examined the patient and agree with the aforementioned assessment and plan.     Sean Pérez MD  Adjunct   Dept of Radiation Oncology  [facility] [place]

## 2018-06-13 NOTE — MR AVS SNAPSHOT
After Visit Summary   6/13/2018    Gilson Curtis    MRN: [id number]           Patient Information     Date Of Birth          1940        Visit Information        Provider Department      6/13/2018 12:15 PM Renate Pérez MD [facility]        Today's Diagnoses     Secondary malignant neoplasm of bone (H)    -  1      Care Instructions    [facility] [place] [facility] RN with questions or concerns following today's appointment: [phone].    Thank you!            Follow-ups after your visit        Your next 10 appointments already scheduled     Jun 14, 2018 12:15 PM CDT   [facility] with RAD[facility] THER[facility]   [facility]    [address] [facility] [address]   [phone]            Nikhil 15, 2018 10:15 AM CDT   TREATMENT with RADIATION THERAPIST   [facility]    [address] [facility] [address]   [phone]            Jun 18, 2018 11:45 AM CDT   TREATMENT with RAD[facility]ATION THERAPIST   [facility]    [address] [facility] [address]   [phone]            Jun 19, 2018 11:45 AM CDT   TREATMENT with RADIATION THERAPIST   [facility]    [address] [facility] [address]   [phone]            Jun 28, 2018  1:30 PM CDT   Return Visit with Gerry Ahumada MD   [facility] [place] 2[facility]    [address] [facility] [address]   [phone]            Aug 02, 2018  9:50 AM CDT   LAB with LAB FIRST FLOOR Formerly named [facility])    [address] [facility] [address]   [phone]           Please do not eat 10-12 hours before your appointment if you are coming in fasting for labs on lipids, cholesterol, or glucose  (sugar). This does not apply to pregnant women. Water, hot tea and black coffee (with nothing added) are okay. Do not drink other fluids, diet soda or chew gum.            Aug 02, 2018 10:15 AM CDT   CT CHEST/ABDOMEN/PELVIS W CONTRAST with MGCT1   [facility] ([facility])    [address] [place] [address] [phone]           Please bring any scans or X-rays taken at other hospitals, if similar tests were done. Also bring a list of your medicines, including vitamins, minerals and over-the-counter drugs. It is safest to leave personal items at home.  Be sure to tell your doctor:   If you have any allergies.   If there s any chance you are pregnant.   If you are breastfeeding.  How to prepare:   Do not eat or drink for 2 hours before your exam. If you need to take medicine, you may take it with small sips of water. (We may ask you to take liquid medicine as well.)   Please wear loose clothing, such as a sweat suit or jogging clothes. Avoid snaps, zippers and other metal. We may ask you to undress and put on a hospital gown.  Please arrive 30 minutes early for your CT. Once in the department you might be asked to drink water 15-20 minutes prior to your exam.  If indicated you may be asked to drink an oral contrast in advance of your CT.  If this is the case, the imaging team will let you know or be in contact with you prior to your appointment  Patients over 70 or patients with diabetes or kidney problems:   If you haven t had a blood test (creatinine test) within the last 30 days, the Cardiologist/Radiologist may require you to get this test prior to your exam.  If you have diabetes:   Continue to take your metformin medication on the day of your exam  If you have any questions, please call the Imaging Department where you will have your exam.            Aug 06, 2018 10:00 AM CDT   Return Visit with Gerry Ahumada MD   [facility] ([facility])    [address] [place] [address] [phone]              Who to contact     If you have questions or need follow up information about today's clinic visit or your schedule please contact [facility] directly at [phone].  Normal or non-critical lab and imaging results will be communicated to you by MyChart, letter or phone within 4 business days after the clinic has received the results. If you do not hear from us within 7 days, please contact the clinic through MyChart or phone. If you have a critical or abnormal lab result, we will notify you by phone as soon as possible.  Submit refill requests through [employer] or call your pharmacy and they will forward the refill request to us. Please allow 3 business days for your refill to be completed.          Additional Information About Your Visit        [employer] Information     Drik gives you secure access to your electronic health record. If you see a primary care provider, you can also send messages to your care team and make appointments. If you have questions, please call your primary care clinic.  If you do not have a primary care provider, please call [phone] and they will assist you.      Drik is an electronic gateway that provides easy, online access to your medical records. With Drik, you can request a clinic appointment, read your test results, renew a prescription or communicate with your care team.     To access your existing account, please contact your [facility] or call [phone] for assistance.        Care EveryWhere ID     This is your Care EveryWhere ID. This could be used by other organizations to access your [place] medical records  [id number]        Your Vitals Were     Pulse Oximetry BMI (Body Mass Index)                96% 24.59 kg/m2           Blood Pressure from Last 3 Encounters:   05/30/18 142/71   05/15/18 119/69   05/07/18 137/80     Weight from Last 3 Encounters:   06/13/18 166 lb 8 oz   06/06/18 166 lb 8 oz   05/30/18 169 lb 14.4 oz              Today, you had the following     No orders found for display       Primary Care Provider Office Phone # Fax #    Hemant Jiménez [phone]       [address]  [place] [address]        Equal Access to Services     [facility] : Hadii aad ku hadasho Soomaali, waaxda luqadaha, qaybta kaalmada adeegyada, waxay idiin hayaan adeeg kharash la'aan ah. So [facility] [phone].    ATENCIÓN: Si habla español, tiene a tran disposición servicios gratuitos de asistencia lingüística. Llame al [phone].    We comply with applicable federal civil rights laws and [place] laws. We do not discriminate on the basis of race, color, national origin, age, disability, sex, sexual orientation, or gender identity.            Thank you!     Thank you for choosing [facility]  for your care. Our goal is always to provide you with excellent care. Hearing back from our patients is one way we can continue to improve our services. Please take a few minutes to complete the written survey that you may receive in the mail after your visit with us. Thank you!             Your Updated Medication List - Protect others around you: Learn how to safely use, store and throw away your medicines at www.disposemymeds.org.          This list is accurate as of 6/13/18  3:18 PM.  Always use your most recent med list.                   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        * MATURE ADULT CENTURY PO      Take 1 tablet by mouth daily OTC        * EYE VITAMINS PO      Take 1 capsule by mouth daily OTC        ranitidine 150 MG capsule    ZANTAC     Take 150 mg by mouth 2 times daily        simvastatin 80 MG tablet    ZOCOR          TARCEVA 100 MG tablet CHEMO   Generic drug:  erlotinib           * Notice:  This list has 2 medication(s) that are the same as other medications prescribed for you. Read the directions carefully, and ask your doctor or other care provider to review them with you.

## 2018-06-14 ENCOUNTER — APPOINTMENT (OUTPATIENT)
Dept: RADIATION ONCOLOGY | Facility: CLINIC | Age: 78
End: 2018-06-14
Payer: COMMERCIAL

## 2018-06-14 PROCEDURE — 77412 RADIATION TX DELIVERY LVL 3: CPT | Performed by: RADIOLOGY

## 2018-06-15 ENCOUNTER — APPOINTMENT (OUTPATIENT)
Dept: RADIATION ONCOLOGY | Facility: CLINIC | Age: 78
End: 2018-06-15
Payer: COMMERCIAL

## 2018-06-15 PROCEDURE — 77412 RADIATION TX DELIVERY LVL 3: CPT | Performed by: RADIOLOGY

## 2018-06-18 ENCOUNTER — APPOINTMENT (OUTPATIENT)
Dept: RADIATION ONCOLOGY | Facility: CLINIC | Age: 78
End: 2018-06-18
Payer: COMMERCIAL

## 2018-06-18 PROCEDURE — 77412 RADIATION TX DELIVERY LVL 3: CPT | Performed by: RADIOLOGY

## 2018-06-19 ENCOUNTER — APPOINTMENT (OUTPATIENT)
Dept: RADIATION ONCOLOGY | Facility: CLINIC | Age: 78
End: 2018-06-19
Payer: COMMERCIAL

## 2018-06-19 PROCEDURE — 77427 RADIATION TX MANAGEMENT X5: CPT | Performed by: RADIOLOGY

## 2018-06-19 PROCEDURE — 77412 RADIATION TX DELIVERY LVL 3: CPT | Performed by: RADIOLOGY

## 2018-06-19 PROCEDURE — 77336 RADIATION PHYSICS CONSULT: CPT | Performed by: RADIOLOGY

## 2018-06-28 ENCOUNTER — ONCOLOGY VISIT (OUTPATIENT)
Dept: ONCOLOGY | Facility: CLINIC | Age: 78
End: 2018-06-28
Payer: COMMERCIAL

## 2018-06-28 VITALS
OXYGEN SATURATION: 98 % | RESPIRATION RATE: 16 BRPM | TEMPERATURE: 98.5 F | DIASTOLIC BLOOD PRESSURE: 70 MMHG | SYSTOLIC BLOOD PRESSURE: 130 MMHG | HEART RATE: 63 BPM | WEIGHT: 168.7 LBS | BODY MASS INDEX: 24.91 KG/M2

## 2018-06-28 DIAGNOSIS — C34.80 MALIGNANT NEOPLASM OF TRACHEA, BRONCHUS, AND LUNG (H): Primary | ICD-10-CM

## 2018-06-28 DIAGNOSIS — C33 MALIGNANT NEOPLASM OF TRACHEA, BRONCHUS, AND LUNG (H): Primary | ICD-10-CM

## 2018-06-28 DIAGNOSIS — C34.91 NON-SMALL CELL CANCER OF RIGHT LUNG (H): ICD-10-CM

## 2018-06-28 PROCEDURE — 99215 OFFICE O/P EST HI 40 MIN: CPT | Performed by: INTERNAL MEDICINE

## 2018-06-28 ASSESSMENT — PAIN SCALES - GENERAL: PAINLEVEL: NO PAIN (0)

## 2018-06-28 NOTE — NURSING NOTE
"Oncology Rooming Note    June 28, 2018 1:32 PM   Gilson Curtis is a 78 year old male who presents for:    Chief Complaint   Patient presents with     Oncology Clinic Visit     follow up post radiation     Initial Vitals: /70  Pulse 63  Temp 98.5  F (36.9  C) (Oral)  Resp 16  Wt 76.5 kg (168 lb 11.2 oz)  SpO2 98%  BMI 24.91 kg/m2 Estimated body mass index is 24.91 kg/(m^2) as calculated from the following:    Height as of 5/30/18: 1.753 m (5' 9\").    Weight as of this encounter: 76.5 kg (168 lb 11.2 oz). Body surface area is 1.93 meters squared.  No Pain (0) Comment: Data Unavailable   No LMP for male patient.  Allergies reviewed: Yes  Medications reviewed: Yes    Medications: Medication refills not needed today.  Pharmacy name entered into [employer]: [facility] [address] [place] [address] minutes for nursing intake (face to face time)     Jaycee Mix RN              "

## 2018-06-28 NOTE — MR AVS SNAPSHOT
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 Include vitamins, minerals and over-the-counter drugs.  Tell your doctor in advance:   If you are or may be pregnant.   If you are taking Coumadin (or any other blood thinners) 5 days prior to the exam for any special instructions.   If you are diabetic to determine if your insulin needs have to be adjusted for the exam.  IF YOUR DOCTOR ALSO PRESCRIBED SEDATION DURING THE EXAM (medicine to help you relax): You will receive separate instructions about driving, eating, and additional tests that may be necessary prior to your exam day.  Please call the Imaging Department at your exam site with any questions.            Aug 02, 2018  9:50 AM CDT   LAB with LAB FIRST FLOOR [facility] ([facility])    [address] [place] [address] [phone]           Please do not eat 10-12 hours before your appointment if you are coming in fasting for labs on lipids, cholesterol, or glucose (sugar). This does not apply to pregnant women. Water, hot tea and black coffee (with nothing added) are okay. Do not drink other fluids, diet soda or chew gum.            Aug 02, 2018  10:15 AM CDT   CT CHEST/ABDOMEN/PELVIS W CONTRAST with MGCT1   [facility] ([facility])    [address] [facility] [address] [phone]           Please bring any scans or X-rays taken at other hospitals, if similar tests were done. Also bring a list of your medicines, including vitamins, minerals and over-the-counter drugs. It is safest to leave personal items at home.  Be sure to tell your doctor:   If you have any allergies.   If there s any chance you are pregnant.   If you are breastfeeding.  How to prepare:   Do not eat or drink for 2 hours before your exam. If you need to take medicine, you may take it with small sips of water. (We may ask you to take liquid medicine as well.)   Please wear loose clothing, such as a sweat suit or jogging clothes. Avoid snaps, zippers and other metal. We may ask you to undress and put on a hospital gown.  Please arrive 30 minutes early for your CT. Once in the department you might be asked to drink water 15-20 minutes prior to your exam.  If indicated you may be asked to drink an oral contrast in advance of your CT.  If this is the case, the imaging team will let you know or be in contact with you prior to your appointment  Patients over 70 or patients with diabetes or kidney problems:   If you haven t had a blood test (creatinine test) within the last 30 days, the Cardiologist/Radiologist may require you to get this test prior to your exam.  If you have diabetes:   Continue to take your metformin medication on the day of your exam  If you have any questions, please call the Imaging Department where you will have your exam.            Aug 06, 2018 10:00 AM CDT   Return Visit with Gerry Ahumada MD   [facility] ([facility])    [address] [facility] [id number] [phone]              Who to contact     If you have questions or need follow up information about today's  clinic visit or your schedule please contact [facility] directly at [phone].  Normal or non-critical lab and imaging results will be communicated to you by [employer], letter or phone within 4 business days after the clinic has received the results. If you do not hear from us within 7 days, please contact the clinic through [employer] or phone. If you have a critical or abnormal lab result, we will notify you by phone as soon as possible.  Submit refill requests through [employer] or call your pharmacy and they will forward the refill request to us. Please allow 3 business days for your refill to be completed.          Additional Information About Your Visit        [employer] Information     [employer] gives you secure access to your electronic health record. If you see a primary care provider, you can also send messages to your care team and make appointments. If you have questions, please call your primary care clinic.  If you do not have a primary care provider, please call [phone] and they will assist you.      [employer] is an electronic gateway that provides easy, online access to your medical records. With [employer], you can request a clinic appointment, read your test results, renew a prescription or communicate with your care team.     To access your existing account, please contact your [facility] or call [phone] for assistance.        Care EveryWhere ID     This is your Care EveryWhere ID. This could be used by other organizations to access your [place] medical records  [id number]        Your Vitals Were     Pulse Temperature Respirations Pulse Oximetry BMI (Body Mass Index)       63 98.5  F (36.9  C) (Oral) 16 98% 24.91 kg/m2        Blood Pressure from Last 3 Encounters:   06/28/18 130/70   05/30/18 142/71   05/15/18 119/69    Weight from Last 3 Encounters:   06/28/18 76.5 kg (168 lb 11.2 oz)   06/13/18 75.5 kg (166 lb 8 oz)   06/06/18 75.5 kg (166 lb 8 oz)               Today, you had the following     No orders found for display         Today's Medication Changes          These changes are accurate as of 6/28/18 11:59 PM.  If you have any questions, ask your nurse or doctor.               These medicines have changed or have updated prescriptions.        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 So [facility] [phone].    ATENCIÓN: Si habla español, tiene a tran disposición servicios gratuitos de asistencia lingüística. Llame al [phone].    We comply with applicable federal civil rights laws and [place] laws. We do not discriminate on the basis of race, color, national origin, age, disability, sex, sexual orientation, or gender identity.            Thank you!     Thank you for choosing [facility]  for your care. Our goal is always to provide you with excellent care. Hearing back from our patients is one way we can continue to improve our services. Please take a few minutes to complete the written survey that you may receive in the mail after your visit with us. Thank you!             Your Updated Medication List - Protect others around you: Learn how to safely use, store and throw away your medicines at www.disposemymeds.org.          This list is accurate as of 6/28/18 11:59 PM.  Always use your most recent med list.                   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        * MATURE ADULT CENTURY PO      Take 1 tablet by mouth daily OTC        * EYE VITAMINS PO      Take 1 capsule by mouth daily OTC        ranitidine 150 MG capsule    ZANTAC     Take 150 mg by mouth 2 times daily        simvastatin 80 MG tablet    ZOCOR          TARCEVA 100 MG tablet CHEMO   Generic drug:  erlotinib           * Notice:  This list has 2 medication(s) that are the same as other medications prescribed for you. Read the directions carefully, and ask your doctor or other care provider to review them with you.

## 2018-06-28 NOTE — PROGRESS NOTES
"[facility]  HEMATOLOGY AND ONCOLOGY    FOLLOW-UP VISIT NOTE    PATIENT NAME: Gilson Curtis MRN # [id number]  DATE OF VISIT: Jun 28, 2018 YOB: 1940    REFERRING PROVIDER: No referring provider defined for this encounter.    CANCER TYPE: Non small cell lung cancer - adenocarcinoma  STAGE: IV    TREATMENT SUMMARY:  Gilson presented with with hemoptysis starting in November 2011. He underwent CT of chest on Jan 18, 2012 at his primary care clinic which showed a RUL mass 2 x 1.9 cm with no suspicious lymphadenopathy or adrenal masses. 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 He was staged with a PET-CT and 2 lesions were noted. He was staged as T3N0 disease. He had surgical resection of the nodules in RUL and RML. EGFR exon 21 deletion.  He has been on Tarceva since June 2016 and is tolerating it quite well. Dose was decreased from 150 mg daily to 100 mg daily in July 2017 due to rash.     CURRENT INTERVENTIONS:  Erlotinib 100 mg daily     SUBJECTIVE   Gilson Curtis is being followed for metastatic lung adenocarcinoma    He has been on this medication since June of 2016. He has pain in his chest on right side when he lays on the bed on that side.       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     CAD (coronary artery disease)      CKD (chronic kidney disease) stage 3, GFR 30-59 ml/min      Fatigue     \"spells\"     Hypertension      Lung cancer (H) 2016     Non-small cell lung cancer (H)      Prostate cancer (H) 2009     Skin cancer, basal cell      Stented coronary artery 2009    x2     TIA (transient ischemic attack) 2002         CURRENT OUTPATIENT MEDICATIONS     Current Outpatient Prescriptions   Medication Sig     aspirin 81 MG tablet Take 1 tablet by mouth At Bedtime      calcium carbonate (TUMS) 500 MG " chewable tablet Take 1 chew tab by mouth as needed for heartburn     clindamycin (CLINDAMAX) 1 % gel Apply topically 2 times daily (Patient taking differently: Apply topically daily as needed )     GABAPENTIN PO Take 150 mg by mouth At Bedtime      hydrocortisone 1 % ointment Apply topically daily      lisinopril (PRINIVIL,ZESTRIL) 2.5 MG tablet Take 2.5 mg by mouth daily.     Multiple Vitamins-Minerals (EYE VITAMINS PO) Take 1 capsule by mouth daily OTC     Multiple Vitamins-Minerals (MATURE ADULT CENTURY PO) Take 1 tablet by mouth daily OTC     ranitidine (ZANTAC) 150 MG capsule Take 150 mg by mouth 2 times daily      simvastatin (ZOCOR) 80 MG tablet      TARCEVA 100 MG tablet CHEMO      No current facility-administered medications for this visit.      Facility-Administered Medications Ordered in Other Visits   Medication     ropivacaine 0.2% 550 mL in ON-Q C-Bloc select flow ( holds 400-550 mL) single cath disposable pump        ALLERGIES      Allergies   Allergen Reactions     No Known Drug Allergy         REVIEW OF SYSTEMS   As above in the HPI, o/w complete 12-point ROS was negative.     PHYSICAL EXAM   /70  Pulse 63  Temp 98.5  F (36.9  C) (Oral)  Resp 16  Wt 76.5 kg (168 lb 11.2 oz)  SpO2 98%  BMI 24.91 kg/m2  GEN: NAD  HEENT: PERRL, EOMI, no icterus, injection or pallor. Oropharynx is clear.  LYMPHATICs: no cervical or supraclavicular lymphadenopathy; no other abn lymphadenopathy  PULMONARY: clear with good air entry bilaterally  CARDIOVASCULAR: regular, no murmurs, rubs, or gallops  GASTROINTESTINAL: soft, non-tender, non-distended, normal bowel sounds, no hepatosplenomegaly by percussion or palpation  MUSCULOSKELTAL: warm, well perfused, no edema  NEURO: awake, alert and oriented to time place and person, cranial nerves intact - II - XII, no focal neurologic deficits  SKIN: no rashes     LABORATORY AND IMAGING STUDIES     Recent Labs   Lab Test  05/03/18   1019  01/15/18   1052   10/10/17   1033  07/18/17   0826  03/06/17   1000  01/11/17   1000   NA  142  140  143  142  143  144   POTASSIUM  4.4  4.8  4.4  4.9  4.8  5.1   CHLORIDE  109  108  109  104  110*  111*   CO2  29  27  29  26  28  27   ANIONGAP  4  5  5   --   5  6   BUN  26  27  20  26  22  20   CR  1.61*  1.75*  1.55*  1.6  1.78*  1.59*   GLC  101*  105*  73  87  106*  70   RICK  8.8  8.9  8.8  9.3  8.9  8.8     Recent Labs   Lab Test  10/10/17   1033  07/11/17   1037  03/06/17   1000  01/11/17   1000  12/15/16   1055   MAG  2.0  1.9  2.1  2.3  2.0   PHOS  2.3*  2.9  2.7  2.0*  2.7     Recent Labs   Lab Test  05/03/18   1019  01/15/18   1052  10/10/17   1033  07/18/17   0826  03/06/17   1000  01/11/17   1000   WBC  7.1  6.8  8.5  6.3  6.6  6.2   HGB  15.9  16.5  16.4  14.7  16.5  15.6   PLT  187  177  180  196  171  185   MCV  99  99  98  96.2  97  98   NEUTROPHIL  59.5  64.7  60.8   --   59.3  61.0     Recent Labs   Lab Test  05/03/18   1019  01/15/18   1052  10/10/17   1033   BILITOTAL  1.3  0.9  0.9   ALKPHOS  99  97  106   ALT  23  27  29   AST  26  26  23   ALBUMIN  3.6  3.8  3.7     Results for orders placed or performed in visit on 05/25/18   [facility] (Eyes to Thighs)    Narrative    Combined Report of:    PET and CT on  5/25/2018 11:25 AM :    1. PET of the neck, chest, abdomen, and pelvis.  2. PET CT Fusion for Attenuation Correction and Anatomical  Localization:    3. Diagnostic CT scan of the chest, abdomen, and pelvis with  intravenous contrast for interpretation.  3. CT of the chest, abdomen and pelvis obtained for diagnostic  interpretation.  4. 3D MIP and PET-CT fused images were processed on an independent  workstation and archived to PACS and reviewed by a radiologist.    Technique:    1. PET: The patient received 14.45 mCi of F-18-FDG; the serum glucose  was 104 prior to administration, body weight was 76.5 kg. Images were  evaluated in the axial, sagittal, and coronal planes as well as the  rotational whole  body MIP. Images were acquired from the Eyes to the  thighs.    UPTAKE WAS MEASURED AT 60 MINUTES.     BACKGROUND:  Liver SUV max= 3.63,   Aorta Blood SUV Max: 2.61.     2. CT: Volumetric acquisition for clinical interpretation of the  chest, abdomen, and pelvis acquired at 3 mm sections . The chest,  abdomen, and pelvis were evaluated at 5 mm sections in bone, soft  tissue, and lung windows.      The patient received 100 cc. Of Isovue 370 intravenously for the  examination.      3. 3D MIP and PET-CT fused images were processed on an independent  workstation and archived to PACS and reviewed by a radiologist.    INDICATION: recurrent lung cancer from 2012,--please complete on flat  board for radiation oncologist.; Non-small cell cancer of right lung  (H)    ADDITIONAL INFORMATION OBTAINED FROM EMR: 78-year-old patient with  history of stage IV lung adenocarcinoma, first diagnosed in 2012,  undergone resection of the nodules in the right upper lobe and right  middle lobe. He has been on Tarceva (Erlotinib) since 6/2016    COMPARISON: CT CAP 5/3/2018, CT chest 1/15/2018, PET/CT 3/23/2016    FINDINGS:     HEAD/NECK:  There is no  suspicious FDG uptake in the neck.     Scattered paranasal sinus mucosal thickening. Mastoid air cells are  underpneumatized.    The mucosal pharyngeal space, the , prevertebral and carotid  spaces are within normal limits.     No masses, mass effect or pathologically enlarged lymph nodes are  evident. Bilateral thyroid nodules without FDG uptake.     CHEST:  There is a 2.8 x 2.4 cm FDG avid mass in the right anterior chest wall  adjacent to 7 rib (series 5, image 129) with invasion of the rib,  increased since 1/15/2018 when it was measuring 2.2 x 1.9 cm.     1.3 x 1.8 cm FDG avid hypodense pleural nodule with invasion of the  underlying 9th rib (series 5, image 116), also increased since  1/15/2018 when it was measuring 1.1 x 0.8 cm.     Several hypermetabolic necrotic nodules  along the right diaphragma;  increased since 5/3/2018  -2.2 x 0.8 cm nodule (series 5, image 133) with max SUV of 4.4,  previously was measuring 1.2 x 0.7 cm on 1/15/2018,  -2.0 x 1.3 cm nodule (series 5, image 147) with max SUV of 4.6 was  measuring 1.3 x 0.9 cm,  -1.1 x 0.7 cm nodule (series 5, image 155) with max SUV of 3,  previously was not discernible.    Slightly decreased size of the FDG avid right middle lobe nodule  measuring 9 x 8 mm with max SUV of 3.6, previously measuring 10 x 7 mm  with max SUV of 5 on 3/23/2016.    There are multiple sub-4 mm pulmonary nodules. Some of these are new  since 5/3/2018. These nodules are all new since 1/15/2018. For  example;  -3 mm nodule in the right upper lobe (series 7, image 56), unchanged   -3 mm nodule in the right upper lobe (series 7, image 32), unchanged  -2 mm nodule in the left lower lobe (series 7, image 103), new    Groundglass densities in bilateral lung bases.    Post surgical changes of right upper lobectomy and middle lobe wedge  resection.    Mild pleural effusion on the right.     Pulmonary arteries are dilated.    ABDOMEN AND PELVIS:  There is no suspicious FDG uptake in the abdomen or pelvis.    There are no suspicious hepatic lesions. Pancreatic atrophy.    There are no suspicious adrenal mass lesions.  Cholecystectomy. There  is symmetric nephrographic renal phase without hydronephrosis.  Parapelvic cysts on the left. Right renal atrophy.    There is no evidence for diverticulitis, bowel obstruction or free  fluid.      Fat-containing left inguinal hernia.    Hiatal hernia.    LOWER EXTREMITIES:   No abnormal masses or hypermetabolic lesions.    BONES:   Bilateral spondylolysis at L5-S1. Right seventh rib metastasis.      Impression    IMPRESSION: In this patient with history of lung cancer;  1. Findings consistent with progressive disease;  a. Hypermetabolic necrotic nodules along the right diaphragm,  increased since 5/3/2018, likely metastatic  deposits.  b. Multiple sub-4 mm pulmonary nodules, some are new 1/3/2018 and all  are new since 1/15/2018. These are likely metastatic nodules.  c. Hypermetabolic metastases involving the right chest wall and  seventh and 10th ribs increased since 5/3/2018.  2. Slightly decreased size of hypermetabolic right middle lobe  pulmonary nodule since 3/23/2016. This is likely postsurgical.  3. Post surgical changes of right upper lobectomy in the right medial  lobe wedge resection.    I have personally reviewed the examination and initial interpretation  and I agree with the findings.    PEDRITO ARIAS MD        ASSESSMENT AND PLAN   Non small cell lung cancer - adenocarcinoma with EGFR mutation (exxon 21 deletion)   On erlotinib since June 2016  ECOG PS 1  HTN, CKD, CAD    Zane is tolerating erlotinib well at the 100 mg daily dose. I have reviewed actual images from his restaging scans.     Pain with cough in right lower rib along the anterior axillary line. I reviewed actual images from his CT scan. I do see a lesion in chest which was present on previous imaging but has grown significantly this time around. The official read did miss it. While we could biopsy to confirm presence of metastatic disease, the radiographic findings are quite consistent with metastasis.     I would recommend palliative radiation to him. I reviewed the technique, logistics, palliative intent, side effects.     We will plan to continue with erlotinib as current. I did have him talk to our pharmacy team.     I will see him again in 3 months with labs and restaging scans.     Addendum: I have reviewed his scans with Dr. Haywood and he agrees with planned radiation. He has suggested continued erlotinib.     Over 45 min of direct face to face time spent with patient with more than 50% time spent in counseling and coordinating care.

## 2018-06-28 NOTE — LETTER
"    6/28/2018         RE: Gilson Curtis  [address] [facility] [address]        Dear Colleague,    Thank you for referring your patient, Gilson Curtis, to the [facility]. Please see a copy of my visit note below.    [facility]  HEMATOLOGY AND ONCOLOGY    FOLLOW-UP VISIT NOTE    PATIENT NAME: Gilson Curtis MRN # [id number]  DATE OF VISIT: Jun 28, 2018 YOB: 1940    REFERRING PROVIDER: No referring provider defined for this encounter.    CANCER TYPE: Non small cell lung cancer - adenocarcinoma  STAGE: IV    TREATMENT SUMMARY:  Gilson presented with with hemoptysis starting in November 2011. He underwent CT of chest on Jan 18, 2012 at his primary care clinic which showed a RUL mass 2 x 1.9 cm with no suspicious lymphadenopathy or adrenal masses. 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 He was staged with a PET-CT and 2 lesions were noted. He was staged as T3N0 disease. He had surgical resection of the nodules in RUL and RML. EGFR exon 21 deletion.  He has been on Tarceva since June 2016 and is tolerating it quite well. Dose was decreased from 150 mg daily to 100 mg daily in July 2017 due to rash.     CURRENT INTERVENTIONS:  Erlotinib 100 mg daily     SUBJECTIVE   Gilson Curtis is being followed for metastatic lung adenocarcinoma    He has been on this medication since June of 2016. He has pain in his chest on right side when he lays on the bed on that side.       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     CAD (coronary artery disease)      CKD (chronic kidney disease) stage 3, GFR 30-59 ml/min      Fatigue     \"spells\"     Hypertension      Lung cancer (H) 2016     Non-small cell lung cancer (H)      Prostate cancer (H) 2009     Skin cancer, basal cell      Stented coronary " artery 2009    x2     TIA (transient ischemic attack) 2002         CURRENT OUTPATIENT MEDICATIONS     Current Outpatient Prescriptions   Medication Sig     aspirin 81 MG tablet Take 1 tablet by mouth At Bedtime      calcium carbonate (TUMS) 500 MG chewable tablet Take 1 chew tab by mouth as needed for heartburn     clindamycin (CLINDAMAX) 1 % gel Apply topically 2 times daily (Patient taking differently: Apply topically daily as needed )     GABAPENTIN PO Take 150 mg by mouth At Bedtime      hydrocortisone 1 % ointment Apply topically daily      lisinopril (PRINIVIL,ZESTRIL) 2.5 MG tablet Take 2.5 mg by mouth daily.     Multiple Vitamins-Minerals (EYE VITAMINS PO) Take 1 capsule by mouth daily OTC     Multiple Vitamins-Minerals (MATURE ADULT CENTURY PO) Take 1 tablet by mouth daily OTC     ranitidine (ZANTAC) 150 MG capsule Take 150 mg by mouth 2 times daily      simvastatin (ZOCOR) 80 MG tablet      TARCEVA 100 MG tablet CHEMO      No current facility-administered medications for this visit.      Facility-Administered Medications Ordered in Other Visits   Medication     ropivacaine 0.2% 550 mL in ON-Q C-Bloc select flow ( holds 400-550 mL) single cath disposable pump        ALLERGIES      Allergies   Allergen Reactions     No Known Drug Allergy         REVIEW OF SYSTEMS   As above in the HPI, o/w complete 12-point ROS was negative.     PHYSICAL EXAM   /70  Pulse 63  Temp 98.5  F (36.9  C) (Oral)  Resp 16  Wt 76.5 kg (168 lb 11.2 oz)  SpO2 98%  BMI 24.91 kg/m2  GEN: NAD  HEENT: PERRL, EOMI, no icterus, injection or pallor. Oropharynx is clear.  LYMPHATICs: no cervical or supraclavicular lymphadenopathy; no other abn lymphadenopathy  PULMONARY: clear with good air entry bilaterally  CARDIOVASCULAR: regular, no murmurs, rubs, or gallops  GASTROINTESTINAL: soft, non-tender, non-distended, normal bowel sounds, no hepatosplenomegaly by percussion or palpation  MUSCULOSKELTAL: warm, well perfused, no  edema  NEURO: awake, alert and oriented to time place and person, cranial nerves intact - II - XII, no focal neurologic deficits  SKIN: no rashes     LABORATORY AND IMAGING STUDIES     Recent Labs   Lab Test  05/03/18   1019  01/15/18   1052  10/10/17   1033  07/18/17   0826  03/06/17   1000  01/11/17   1000   NA  142  140  143  142  143  144   POTASSIUM  4.4  4.8  4.4  4.9  4.8  5.1   CHLORIDE  109  108  109  104  110*  111*   CO2  29  27  29  26  28  27   ANIONGAP  4  5  5   --   5  6   BUN  26  27  20  26  22  20   CR  1.61*  1.75*  1.55*  1.6  1.78*  1.59*   GLC  101*  105*  73  87  106*  70   RICK  8.8  8.9  8.8  9.3  8.9  8.8     Recent Labs   Lab Test  10/10/17   1033  07/11/17   1037  03/06/17   1000  01/11/17   1000  12/15/16   1055   MAG  2.0  1.9  2.1  2.3  2.0   PHOS  2.3*  2.9  2.7  2.0*  2.7     Recent Labs   Lab Test  05/03/18   1019  01/15/18   1052  10/10/17   1033  07/18/17   0826  03/06/17   1000  01/11/17   1000   WBC  7.1  6.8  8.5  6.3  6.6  6.2   HGB  15.9  16.5  16.4  14.7  16.5  15.6   PLT  187  177  180  196  171  185   MCV  99  99  98  96.2  97  98   NEUTROPHIL  59.5  64.7  60.8   --   59.3  61.0     Recent Labs   Lab Test  05/03/18   1019  01/15/18   1052  10/10/17   1033   BILITOTAL  1.3  0.9  0.9   ALKPHOS  99  97  106   ALT  23  27  29   AST  26  26  23   ALBUMIN  3.6  3.8  3.7     Results for orders placed or performed in visit on 05/25/18   [facility] Oncology (Eyes to Thighs)    Narrative    Combined Report of:    PET and CT on  5/25/2018 11:25 AM :    1. PET of the neck, chest, abdomen, and pelvis.  2. PET CT Fusion for Attenuation Correction and Anatomical  Localization:    3. Diagnostic CT scan of the chest, abdomen, and pelvis with  intravenous contrast for interpretation.  3. CT of the chest, abdomen and pelvis obtained for diagnostic  interpretation.  4. 3D MIP and PET-CT fused images were processed on an independent  workstation and archived to PACS and reviewed by a  radiologist.    Technique:    1. PET: The patient received 14.45 mCi of F-18-FDG; the serum glucose  was 104 prior to administration, body weight was 76.5 kg. Images were  evaluated in the axial, sagittal, and coronal planes as well as the  rotational whole body MIP. Images were acquired from the Eyes to the  thighs.    UPTAKE WAS MEASURED AT 60 MINUTES.     BACKGROUND:  Liver SUV max= 3.63,   Aorta Blood SUV Max: 2.61.     2. CT: Volumetric acquisition for clinical interpretation of the  chest, abdomen, and pelvis acquired at 3 mm sections . The chest,  abdomen, and pelvis were evaluated at 5 mm sections in bone, soft  tissue, and lung windows.      The patient received 100 cc. Of Isovue 370 intravenously for the  examination.      3. 3D MIP and PET-CT fused images were processed on an independent  workstation and archived to PACS and reviewed by a radiologist.    INDICATION: recurrent lung cancer from 2012,--please complete on flat  board for radiation oncologist.; Non-small cell cancer of right lung  (H)    ADDITIONAL INFORMATION OBTAINED FROM EMR: 78-year-old patient with  history of stage IV lung adenocarcinoma, first diagnosed in 2012,  undergone resection of the nodules in the right upper lobe and right  middle lobe. He has been on Tarceva (Erlotinib) since 6/2016    COMPARISON: CT CAP 5/3/2018, CT chest 1/15/2018, PET/CT 3/23/2016    FINDINGS:     HEAD/NECK:  There is no  suspicious FDG uptake in the neck.     Scattered paranasal sinus mucosal thickening. Mastoid air cells are  underpneumatized.    The mucosal pharyngeal space, the , prevertebral and carotid  spaces are within normal limits.     No masses, mass effect or pathologically enlarged lymph nodes are  evident. Bilateral thyroid nodules without FDG uptake.     CHEST:  There is a 2.8 x 2.4 cm FDG avid mass in the right anterior chest wall  adjacent to 7 rib (series 5, image 129) with invasion of the rib,  increased since 1/15/2018 when it  was measuring 2.2 x 1.9 cm.     1.3 x 1.8 cm FDG avid hypodense pleural nodule with invasion of the  underlying 9th rib (series 5, image 116), also increased since  1/15/2018 when it was measuring 1.1 x 0.8 cm.     Several hypermetabolic necrotic nodules along the right diaphragma;  increased since 5/3/2018  -2.2 x 0.8 cm nodule (series 5, image 133) with max SUV of 4.4,  previously was measuring 1.2 x 0.7 cm on 1/15/2018,  -2.0 x 1.3 cm nodule (series 5, image 147) with max SUV of 4.6 was  measuring 1.3 x 0.9 cm,  -1.1 x 0.7 cm nodule (series 5, image 155) with max SUV of 3,  previously was not discernible.    Slightly decreased size of the FDG avid right middle lobe nodule  measuring 9 x 8 mm with max SUV of 3.6, previously measuring 10 x 7 mm  with max SUV of 5 on 3/23/2016.    There are multiple sub-4 mm pulmonary nodules. Some of these are new  since 5/3/2018. These nodules are all new since 1/15/2018. For  example;  -3 mm nodule in the right upper lobe (series 7, image 56), unchanged   -3 mm nodule in the right upper lobe (series 7, image 32), unchanged  -2 mm nodule in the left lower lobe (series 7, image 103), new    Groundglass densities in bilateral lung bases.    Post surgical changes of right upper lobectomy and middle lobe wedge  resection.    Mild pleural effusion on the right.     Pulmonary arteries are dilated.    ABDOMEN AND PELVIS:  There is no suspicious FDG uptake in the abdomen or pelvis.    There are no suspicious hepatic lesions. Pancreatic atrophy.    There are no suspicious adrenal mass lesions.  Cholecystectomy. There  is symmetric nephrographic renal phase without hydronephrosis.  Parapelvic cysts on the left. Right renal atrophy.    There is no evidence for diverticulitis, bowel obstruction or free  fluid.      Fat-containing left inguinal hernia.    Hiatal hernia.    LOWER EXTREMITIES:   No abnormal masses or hypermetabolic lesions.    BONES:   Bilateral spondylolysis at L5-S1. Right  seventh rib metastasis.      Impression    IMPRESSION: In this patient with history of lung cancer;  1. Findings consistent with progressive disease;  a. Hypermetabolic necrotic nodules along the right diaphragm,  increased since 5/3/2018, likely metastatic deposits.  b. Multiple sub-4 mm pulmonary nodules, some are new 1/3/2018 and all  are new since 1/15/2018. These are likely metastatic nodules.  c. Hypermetabolic metastases involving the right chest wall and  seventh and 10th ribs increased since 5/3/2018.  2. Slightly decreased size of hypermetabolic right middle lobe  pulmonary nodule since 3/23/2016. This is likely postsurgical.  3. Post surgical changes of right upper lobectomy in the right medial  lobe wedge resection.    I have personally reviewed the examination and initial interpretation  and I agree with the findings.    PEDRITO ARIAS MD        ASSESSMENT AND PLAN   Non small cell lung cancer - adenocarcinoma with EGFR mutation (exxon 21 deletion)   On erlotinib since June 2016  ECOG PS 1  HTN, CKD, CAD    Zane is tolerating erlotinib well at the 100 mg daily dose. I have reviewed actual images from his restaging scans.     Pain with cough in right lower rib along the anterior axillary line. I reviewed actual images from his CT scan. I do see a lesion in chest which was present on previous imaging but has grown significantly this time around. The official read did miss it. While we could biopsy to confirm presence of metastatic disease, the radiographic findings are quite consistent with metastasis.     I would recommend palliative radiation to him. I reviewed the technique, logistics, palliative intent, side effects.     We will plan to continue with erlotinib as current. I did have him talk to our pharmacy team.     I will see him again in 3 months with labs and restaging scans.     Addendum: I have reviewed his scans with Dr. Haywood and he agrees with planned radiation. He has suggested  "continued erlotinib.     Over 45 min of direct face to face time spent with patient with more than 50% time spent in counseling and coordinating care.      [facility]  HEMATOLOGY AND ONCOLOGY    FOLLOW-UP VISIT NOTE    PATIENT NAME: Gilson Curtis MRN # [id number]  DATE OF VISIT: Jun 28, 2018 YOB: 1940    REFERRING PROVIDER: No referring provider defined for this encounter.    CANCER TYPE: Non small cell lung cancer - adenocarcinoma  STAGE: IV    TREATMENT SUMMARY:  Gilson presented with with hemoptysis starting in November 2011. He underwent CT of chest on Jan 18, 2012 at his primary care clinic which showed a RUL mass 2 x 1.9 cm with no suspicious lymphadenopathy or adrenal masses. 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 He was staged with a PET-CT and 2 lesions were noted. He was staged as T3N0 disease. He had surgical resection of the nodules in RUL and RML. EGFR exon 21 deletion.  He has been on Tarceva since June 2016 and is tolerating it quite well. Dose was decreased from 150 mg daily to 100 mg daily in July 2017 due to rash.     CURRENT INTERVENTIONS:  Erlotinib 100 mg daily     SUBJECTIVE   Gilson Curtis is being followed for metastatic lung adenocarcinoma    He has been on this medication since June of 2016. He has pain in his chest on right side when he lays on the bed on that side.       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     CAD (coronary artery disease)      CKD (chronic kidney disease) stage 3, GFR 30-59 ml/min      Fatigue     \"spells\"     Hypertension      Lung cancer (H) 2016     Non-small cell lung cancer (H)      Prostate cancer (H) 2009     Skin cancer, basal cell      Stented coronary artery 2009    x2     TIA (transient ischemic attack) 2002         CURRENT OUTPATIENT MEDICATIONS "     Current Outpatient Prescriptions   Medication Sig     aspirin 81 MG tablet Take 1 tablet by mouth At Bedtime      calcium carbonate (TUMS) 500 MG chewable tablet Take 1 chew tab by mouth as needed for heartburn     clindamycin (CLINDAMAX) 1 % gel Apply topically 2 times daily (Patient taking differently: Apply topically daily as needed )     GABAPENTIN PO Take 150 mg by mouth At Bedtime      hydrocortisone 1 % ointment Apply topically daily      lisinopril (PRINIVIL,ZESTRIL) 2.5 MG tablet Take 2.5 mg by mouth daily.     Multiple Vitamins-Minerals (EYE VITAMINS PO) Take 1 capsule by mouth daily OTC     Multiple Vitamins-Minerals (MATURE ADULT CENTURY PO) Take 1 tablet by mouth daily OTC     ranitidine (ZANTAC) 150 MG capsule Take 150 mg by mouth 2 times daily      simvastatin (ZOCOR) 80 MG tablet      TARCEVA 100 MG tablet CHEMO      No current facility-administered medications for this visit.      Facility-Administered Medications Ordered in Other Visits   Medication     ropivacaine 0.2% 550 mL in ON-Q C-Bloc select flow ( holds 400-550 mL) single cath disposable pump        ALLERGIES      Allergies   Allergen Reactions     No Known Drug Allergy         REVIEW OF SYSTEMS   As above in the HPI, o/w complete 12-point ROS was negative.     PHYSICAL EXAM   /70  Pulse 63  Temp 98.5  F (36.9  C) (Oral)  Resp 16  Wt 76.5 kg (168 lb 11.2 oz)  SpO2 98%  BMI 24.91 kg/m2  GEN: NAD  HEENT: PERRL, EOMI, no icterus, injection or pallor. Oropharynx is clear.  LYMPHATICs: no cervical or supraclavicular lymphadenopathy; no other abn lymphadenopathy  PULMONARY: clear with good air entry bilaterally  CARDIOVASCULAR: regular, no murmurs, rubs, or gallops  GASTROINTESTINAL: soft, non-tender, non-distended, normal bowel sounds, no hepatosplenomegaly by percussion or palpation  MUSCULOSKELTAL: warm, well perfused, no edema  NEURO: awake, alert and oriented to time place and person, cranial nerves intact - II - XII, no  focal neurologic deficits  SKIN: no rashes     LABORATORY AND IMAGING STUDIES     Recent Labs   Lab Test  05/03/18   1019  01/15/18   1052  10/10/17   1033  07/18/17   0826  03/06/17   1000  01/11/17   1000   NA  142  140  143  142  143  144   POTASSIUM  4.4  4.8  4.4  4.9  4.8  5.1   CHLORIDE  109  108  109  104  110*  111*   CO2  29  27  29  26  28  27   ANIONGAP  4  5  5   --   5  6   BUN  26  27  20  26  22  20   CR  1.61*  1.75*  1.55*  1.6  1.78*  1.59*   GLC  101*  105*  73  87  106*  70   RICK  8.8  8.9  8.8  9.3  8.9  8.8     Recent Labs   Lab Test  10/10/17   1033  07/11/17   1037  03/06/17   1000  01/11/17   1000  12/15/16   1055   MAG  2.0  1.9  2.1  2.3  2.0   PHOS  2.3*  2.9  2.7  2.0*  2.7     Recent Labs   Lab Test  05/03/18   1019  01/15/18   1052  10/10/17   1033  07/18/17   0826  03/06/17   1000  01/11/17   1000   WBC  7.1  6.8  8.5  6.3  6.6  6.2   HGB  15.9  16.5  16.4  14.7  16.5  15.6   PLT  187  177  180  196  171  185   MCV  99  99  98  96.2  97  98   NEUTROPHIL  59.5  64.7  60.8   --   59.3  61.0     Recent Labs   Lab Test  05/03/18   1019  01/15/18   1052  10/10/17   1033   BILITOTAL  1.3  0.9  0.9   ALKPHOS  99  97  106   ALT  23  27  29   AST  26  26  23   ALBUMIN  3.6  3.8  3.7     Results for orders placed or performed in visit on 05/25/18   NP[facility] Oncology (Eyes to Thighs)    Narrative    Combined Report of:    PET and CT on  5/25/2018 11:25 AM :    1. PET of the neck, chest, abdomen, and pelvis.  2. PET CT Fusion for Attenuation Correction and Anatomical  Localization:    3. Diagnostic CT scan of the chest, abdomen, and pelvis with  intravenous contrast for interpretation.  3. CT of the chest, abdomen and pelvis obtained for diagnostic  interpretation.  4. 3D MIP and PET-CT fused images were processed on an independent  workstation and archived to PACS and reviewed by a radiologist.    Technique:    1. PET: The patient received 14.45 mCi of F-18-FDG; the serum glucose  was 104 prior to  administration, body weight was 76.5 kg. Images were  evaluated in the axial, sagittal, and coronal planes as well as the  rotational whole body MIP. Images were acquired from the Eyes to the  thighs.    UPTAKE WAS MEASURED AT 60 MINUTES.     BACKGROUND:  Liver SUV max= 3.63,   Aorta Blood SUV Max: 2.61.     2. CT: Volumetric acquisition for clinical interpretation of the  chest, abdomen, and pelvis acquired at 3 mm sections . The chest,  abdomen, and pelvis were evaluated at 5 mm sections in bone, soft  tissue, and lung windows.      The patient received 100 cc. Of Isovue 370 intravenously for the  examination.      3. 3D MIP and PET-CT fused images were processed on an independent  workstation and archived to PACS and reviewed by a radiologist.    INDICATION: recurrent lung cancer from 2012,--please complete on flat  board for radiation oncologist.; Non-small cell cancer of right lung  (H)    ADDITIONAL INFORMATION OBTAINED FROM EMR: 78-year-old patient with  history of stage IV lung adenocarcinoma, first diagnosed in 2012,  undergone resection of the nodules in the right upper lobe and right  middle lobe. He has been on Tarceva (Erlotinib) since 6/2016    COMPARISON: CT CAP 5/3/2018, CT chest 1/15/2018, PET/CT 3/23/2016    FINDINGS:     HEAD/NECK:  There is no  suspicious FDG uptake in the neck.     Scattered paranasal sinus mucosal thickening. Mastoid air cells are  underpneumatized.    The mucosal pharyngeal space, the , prevertebral and carotid  spaces are within normal limits.     No masses, mass effect or pathologically enlarged lymph nodes are  evident. Bilateral thyroid nodules without FDG uptake.     CHEST:  There is a 2.8 x 2.4 cm FDG avid mass in the right anterior chest wall  adjacent to 7 rib (series 5, image 129) with invasion of the rib,  increased since 1/15/2018 when it was measuring 2.2 x 1.9 cm.     1.3 x 1.8 cm FDG avid hypodense pleural nodule with invasion of the  underlying 9th rib  (series 5, image 116), also increased since  1/15/2018 when it was measuring 1.1 x 0.8 cm.     Several hypermetabolic necrotic nodules along the right diaphragma;  increased since 5/3/2018  -2.2 x 0.8 cm nodule (series 5, image 133) with max SUV of 4.4,  previously was measuring 1.2 x 0.7 cm on 1/15/2018,  -2.0 x 1.3 cm nodule (series 5, image 147) with max SUV of 4.6 was  measuring 1.3 x 0.9 cm,  -1.1 x 0.7 cm nodule (series 5, image 155) with max SUV of 3,  previously was not discernible.    Slightly decreased size of the FDG avid right middle lobe nodule  measuring 9 x 8 mm with max SUV of 3.6, previously measuring 10 x 7 mm  with max SUV of 5 on 3/23/2016.    There are multiple sub-4 mm pulmonary nodules. Some of these are new  since 5/3/2018. These nodules are all new since 1/15/2018. For  example;  -3 mm nodule in the right upper lobe (series 7, image 56), unchanged   -3 mm nodule in the right upper lobe (series 7, image 32), unchanged  -2 mm nodule in the left lower lobe (series 7, image 103), new    Groundglass densities in bilateral lung bases.    Post surgical changes of right upper lobectomy and middle lobe wedge  resection.    Mild pleural effusion on the right.     Pulmonary arteries are dilated.    ABDOMEN AND PELVIS:  There is no suspicious FDG uptake in the abdomen or pelvis.    There are no suspicious hepatic lesions. Pancreatic atrophy.    There are no suspicious adrenal mass lesions.  Cholecystectomy. There  is symmetric nephrographic renal phase without hydronephrosis.  Parapelvic cysts on the left. Right renal atrophy.    There is no evidence for diverticulitis, bowel obstruction or free  fluid.      Fat-containing left inguinal hernia.    Hiatal hernia.    LOWER EXTREMITIES:   No abnormal masses or hypermetabolic lesions.    BONES:   Bilateral spondylolysis at L5-S1. Right seventh rib metastasis.      Impression    IMPRESSION: In this patient with history of lung cancer;  1. Findings consistent  with progressive disease;  a. Hypermetabolic necrotic nodules along the right diaphragm,  increased since 5/3/2018, likely metastatic deposits.  b. Multiple sub-4 mm pulmonary nodules, some are new 1/3/2018 and all  are new since 1/15/2018. These are likely metastatic nodules.  c. Hypermetabolic metastases involving the right chest wall and  seventh and 10th ribs increased since 5/3/2018.  2. Slightly decreased size of hypermetabolic right middle lobe  pulmonary nodule since 3/23/2016. This is likely postsurgical.  3. Post surgical changes of right upper lobectomy in the right medial  lobe wedge resection.    I have personally reviewed the examination and initial interpretation  and I agree with the findings.    PEDRITO ARIAS MD        ASSESSMENT AND PLAN   Non small cell lung cancer - adenocarcinoma with EGFR mutation (exxon 21 deletion)   On erlotinib since June 2016  ECOG PS 1  HTN, CKD, CAD    Zane is tolerating erlotinib well at the 100 mg daily dose.     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 His pain is significantly improved. He still has some discomfort but nowhere close to what we had then.     He had several question on the PET-CT scan which was done just before the radiation. I have reviewed actual images from his restaging scans with him and his wife. He has 2 very obvious lesions in his ribs on right side. There are other sites of disease progression including necrotic nodules over diaphragm and RUL nodule.     I reviewed mechanisms of resistance to erlotinib including the T790M mutation. We would need to biopsy one of his tumor lesions to ascertain if he has the mutation as next generation EFGR-TKI still respond in presence of this mutation. These are still oral drugs. Alternately in absence of mutation we could consider chemotherapy or immunotherapy. I reviewed oral second-generation TKI - osimertinib ([id number], TAGRISSO) has been approved by FDA for metastatic EGFR T790M  mutation-positive NSCLC.     I briefly reviewed chemotherapy and immunotherapy with them.     I would recommend palliative radiation to him. I reviewed the technique, logistics, palliative intent, side effects.     We will plan to continue with erlotinib as current. I did have him talk to our pharmacy team.     I will see him again in 3 months with labs and restaging scans.     Addendum: I have reviewed his scans with Dr. Haywood and he agrees with planned radiation. He has suggested continued erlotinib.     Over 45 min of direct face to face time spent with patient with more than 50% time spent in counseling and coordinating care.      Again, thank you for allowing me to participate in the care of your patient.        Sincerely,        Gerry Ahumada MD

## 2018-07-01 NOTE — PROGRESS NOTES
"[facility]  HEMATOLOGY AND ONCOLOGY    FOLLOW-UP VISIT NOTE    PATIENT NAME: Gilson Curtis MRN # [id number]  DATE OF VISIT: Jun 28, 2018 YOB: 1940    REFERRING PROVIDER: No referring provider defined for this encounter.    CANCER TYPE: Non small cell lung cancer - adenocarcinoma  STAGE: IV    TREATMENT SUMMARY:  Gilson presented with with hemoptysis starting in November 2011. He underwent CT of chest on Jan 18, 2012 at his primary care clinic which showed a RUL mass 2 x 1.9 cm with no suspicious lymphadenopathy or adrenal masses. 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 He was staged with a PET-CT and 2 lesions were noted. He was staged as T3N0 disease. He had surgical resection of the nodules in RUL and RML. EGFR exon 21 deletion.  He has been on Tarceva since June 2016 and is tolerating it quite well. Dose was decreased from 150 mg daily to 100 mg daily in July 2017 due to rash.     CURRENT INTERVENTIONS:  Erlotinib 100 mg daily     SUBJECTIVE   Gilson Curtis is being followed for metastatic lung adenocarcinoma    He has been on this medication since June of 2016. He has pain in his chest on right side when he lays on the bed on that side.       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     CAD (coronary artery disease)      CKD (chronic kidney disease) stage 3, GFR 30-59 ml/min      Fatigue     \"spells\"     Hypertension      Lung cancer (H) 2016     Non-small cell lung cancer (H)      Prostate cancer (H) 2009     Skin cancer, basal cell      Stented coronary artery 2009    x2     TIA (transient ischemic attack) 2002         CURRENT OUTPATIENT MEDICATIONS     Current Outpatient Prescriptions   Medication Sig     aspirin 81 MG tablet Take 1 tablet by mouth At Bedtime      calcium carbonate (TUMS) 500 MG " chewable tablet Take 1 chew tab by mouth as needed for heartburn     clindamycin (CLINDAMAX) 1 % gel Apply topically 2 times daily (Patient taking differently: Apply topically daily as needed )     GABAPENTIN PO Take 150 mg by mouth At Bedtime      hydrocortisone 1 % ointment Apply topically daily      lisinopril (PRINIVIL,ZESTRIL) 2.5 MG tablet Take 2.5 mg by mouth daily.     Multiple Vitamins-Minerals (EYE VITAMINS PO) Take 1 capsule by mouth daily OTC     Multiple Vitamins-Minerals (MATURE ADULT CENTURY PO) Take 1 tablet by mouth daily OTC     ranitidine (ZANTAC) 150 MG capsule Take 150 mg by mouth 2 times daily      simvastatin (ZOCOR) 80 MG tablet      TARCEVA 100 MG tablet CHEMO      No current facility-administered medications for this visit.      Facility-Administered Medications Ordered in Other Visits   Medication     ropivacaine 0.2% 550 mL in ON-Q C-Bloc select flow ( holds 400-550 mL) single cath disposable pump        ALLERGIES      Allergies   Allergen Reactions     No Known Drug Allergy         REVIEW OF SYSTEMS   As above in the HPI, o/w complete 12-point ROS was negative.     PHYSICAL EXAM   /70  Pulse 63  Temp 98.5  F (36.9  C) (Oral)  Resp 16  Wt 76.5 kg (168 lb 11.2 oz)  SpO2 98%  BMI 24.91 kg/m2  GEN: NAD  HEENT: PERRL, EOMI, no icterus, injection or pallor. Oropharynx is clear.  LYMPHATICs: no cervical or supraclavicular lymphadenopathy; no other abn lymphadenopathy  PULMONARY: clear with good air entry bilaterally  CARDIOVASCULAR: regular, no murmurs, rubs, or gallops  GASTROINTESTINAL: soft, non-tender, non-distended, normal bowel sounds, no hepatosplenomegaly by percussion or palpation  MUSCULOSKELTAL: warm, well perfused, no edema  NEURO: awake, alert and oriented to time place and person, cranial nerves intact - II - XII, no focal neurologic deficits  SKIN: no rashes     LABORATORY AND IMAGING STUDIES     Recent Labs   Lab Test  05/03/18   1019  01/15/18   1052   10/10/17   1033  07/18/17   0826  03/06/17   1000  01/11/17   1000   NA  142  140  143  142  143  144   POTASSIUM  4.4  4.8  4.4  4.9  4.8  5.1   CHLORIDE  109  108  109  104  110*  111*   CO2  29  27  29  26  28  27   ANIONGAP  4  5  5   --   5  6   BUN  26  27  20  26  22  20   CR  1.61*  1.75*  1.55*  1.6  1.78*  1.59*   GLC  101*  105*  73  87  106*  70   RICK  8.8  8.9  8.8  9.3  8.9  8.8     Recent Labs   Lab Test  10/10/17   1033  07/11/17   1037  03/06/17   1000  01/11/17   1000  12/15/16   1055   MAG  2.0  1.9  2.1  2.3  2.0   PHOS  2.3*  2.9  2.7  2.0*  2.7     Recent Labs   Lab Test  05/03/18   1019  01/15/18   1052  10/10/17   1033  07/18/17   0826  03/06/17   1000  01/11/17   1000   WBC  7.1  6.8  8.5  6.3  6.6  6.2   HGB  15.9  16.5  16.4  14.7  16.5  15.6   PLT  187  177  180  196  171  185   MCV  99  99  98  96.2  97  98   NEUTROPHIL  59.5  64.7  60.8   --   59.3  61.0     Recent Labs   Lab Test  05/03/18   1019  01/15/18   1052  10/10/17   1033   BILITOTAL  1.3  0.9  0.9   ALKPHOS  99  97  106   ALT  23  27  29   AST  26  26  23   ALBUMIN  3.6  3.8  3.7     Results for orders placed or performed in visit on 05/25/18   [facility] Oncology (Eyes to Thighs)    Narrative    Combined Report of:    PET and CT on  5/25/2018 11:25 AM :    1. PET of the neck, chest, abdomen, and pelvis.  2. PET CT Fusion for Attenuation Correction and Anatomical  Localization:    3. Diagnostic CT scan of the chest, abdomen, and pelvis with  intravenous contrast for interpretation.  3. CT of the chest, abdomen and pelvis obtained for diagnostic  interpretation.  4. 3D MIP and PET-CT fused images were processed on an independent  workstation and archived to PACS and reviewed by a radiologist.    Technique:    1. PET: The patient received 14.45 mCi of F-18-FDG; the serum glucose  was 104 prior to administration, body weight was 76.5 kg. Images were  evaluated in the axial, sagittal, and coronal planes as well as the  rotational whole  body MIP. Images were acquired from the Eyes to the  thighs.    UPTAKE WAS MEASURED AT 60 MINUTES.     BACKGROUND:  Liver SUV max= 3.63,   Aorta Blood SUV Max: 2.61.     2. CT: Volumetric acquisition for clinical interpretation of the  chest, abdomen, and pelvis acquired at 3 mm sections . The chest,  abdomen, and pelvis were evaluated at 5 mm sections in bone, soft  tissue, and lung windows.      The patient received 100 cc. Of Isovue 370 intravenously for the  examination.      3. 3D MIP and PET-CT fused images were processed on an independent  workstation and archived to PACS and reviewed by a radiologist.    INDICATION: recurrent lung cancer from 2012,--please complete on flat  board for radiation oncologist.; Non-small cell cancer of right lung  (H)    ADDITIONAL INFORMATION OBTAINED FROM EMR: 78-year-old patient with  history of stage IV lung adenocarcinoma, first diagnosed in 2012,  undergone resection of the nodules in the right upper lobe and right  middle lobe. He has been on Tarceva (Erlotinib) since 6/2016    COMPARISON: CT CAP 5/3/2018, CT chest 1/15/2018, PET/CT 3/23/2016    FINDINGS:     HEAD/NECK:  There is no  suspicious FDG uptake in the neck.     Scattered paranasal sinus mucosal thickening. Mastoid air cells are  underpneumatized.    The mucosal pharyngeal space, the , prevertebral and carotid  spaces are within normal limits.     No masses, mass effect or pathologically enlarged lymph nodes are  evident. Bilateral thyroid nodules without FDG uptake.     CHEST:  There is a 2.8 x 2.4 cm FDG avid mass in the right anterior chest wall  adjacent to 7 rib (series 5, image 129) with invasion of the rib,  increased since 1/15/2018 when it was measuring 2.2 x 1.9 cm.     1.3 x 1.8 cm FDG avid hypodense pleural nodule with invasion of the  underlying 9th rib (series 5, image 116), also increased since  1/15/2018 when it was measuring 1.1 x 0.8 cm.     Several hypermetabolic necrotic nodules  along the right diaphragma;  increased since 5/3/2018  -2.2 x 0.8 cm nodule (series 5, image 133) with max SUV of 4.4,  previously was measuring 1.2 x 0.7 cm on 1/15/2018,  -2.0 x 1.3 cm nodule (series 5, image 147) with max SUV of 4.6 was  measuring 1.3 x 0.9 cm,  -1.1 x 0.7 cm nodule (series 5, image 155) with max SUV of 3,  previously was not discernible.    Slightly decreased size of the FDG avid right middle lobe nodule  measuring 9 x 8 mm with max SUV of 3.6, previously measuring 10 x 7 mm  with max SUV of 5 on 3/23/2016.    There are multiple sub-4 mm pulmonary nodules. Some of these are new  since 5/3/2018. These nodules are all new since 1/15/2018. For  example;  -3 mm nodule in the right upper lobe (series 7, image 56), unchanged   -3 mm nodule in the right upper lobe (series 7, image 32), unchanged  -2 mm nodule in the left lower lobe (series 7, image 103), new    Groundglass densities in bilateral lung bases.    Post surgical changes of right upper lobectomy and middle lobe wedge  resection.    Mild pleural effusion on the right.     Pulmonary arteries are dilated.    ABDOMEN AND PELVIS:  There is no suspicious FDG uptake in the abdomen or pelvis.    There are no suspicious hepatic lesions. Pancreatic atrophy.    There are no suspicious adrenal mass lesions.  Cholecystectomy. There  is symmetric nephrographic renal phase without hydronephrosis.  Parapelvic cysts on the left. Right renal atrophy.    There is no evidence for diverticulitis, bowel obstruction or free  fluid.      Fat-containing left inguinal hernia.    Hiatal hernia.    LOWER EXTREMITIES:   No abnormal masses or hypermetabolic lesions.    BONES:   Bilateral spondylolysis at L5-S1. Right seventh rib metastasis.      Impression    IMPRESSION: In this patient with history of lung cancer;  1. Findings consistent with progressive disease;  a. Hypermetabolic necrotic nodules along the right diaphragm,  increased since 5/3/2018, likely metastatic  deposits.  b. Multiple sub-4 mm pulmonary nodules, some are new 1/3/2018 and all  are new since 1/15/2018. These are likely metastatic nodules.  c. Hypermetabolic metastases involving the right chest wall and  seventh and 10th ribs increased since 5/3/2018.  2. Slightly decreased size of hypermetabolic right middle lobe  pulmonary nodule since 3/23/2016. This is likely postsurgical.  3. Post surgical changes of right upper lobectomy in the right medial  lobe wedge resection.    I have personally reviewed the examination and initial interpretation  and I agree with the findings.    PEDRITO ARIAS MD        ASSESSMENT AND PLAN   Non small cell lung cancer - adenocarcinoma with EGFR mutation (exxon 21 deletion)   On erlotinib since June 2016  ECOG PS 1  HTN, CKD, CAD    Zane is tolerating erlotinib well at the 100 mg daily dose.     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 His pain is significantly improved. He still has some discomfort but nowhere close to what we had then.     He had several question on the PET-CT scan which was done just before the radiation. I have reviewed actual images from his restaging scans with him and his wife. He has 2 very obvious lesions in his ribs on right side. There are other sites of disease progression including necrotic nodules over diaphragm and RUL nodule.     I reviewed mechanisms of resistance to erlotinib including the T790M mutation. We would need to biopsy one of his tumor lesions to ascertain if he has the mutation as next generation EFGR-TKI still respond in presence of this mutation. These are still oral drugs. Alternately in absence of mutation we could consider chemotherapy or immunotherapy. I reviewed oral second-generation TKI - osimertinib ([id number], TAGRISSO) has been approved by FDA for metastatic EGFR T790M mutation-positive NSCLC.     I briefly reviewed chemotherapy and immunotherapy with them.     I would recommend palliative radiation to  him. I reviewed the technique, logistics, palliative intent, side effects.     We will plan to continue with erlotinib as current. I did have him talk to our pharmacy team.     I will see him again in 3 months with labs and restaging scans.     Addendum: I have reviewed his scans with Dr. Haywood and he agrees with planned radiation. He has suggested continued erlotinib.     Over 45 min of direct face to face time spent with patient with more than 50% time spent in counseling and coordinating care.

## 2018-07-02 ENCOUNTER — DOCUMENTATION ONLY (OUTPATIENT)
Dept: INTERVENTIONAL RADIOLOGY/VASCULAR | Facility: CLINIC | Age: 78
End: 2018-07-02

## 2018-07-02 NOTE — PROGRESS NOTES
Patient to be placed  on Interventional radiology schedule  for  a CT  guided biopsy of the right anterior chest wall mass adjacent to 7 rib  .    Discussed with Dr. Ian Penny IR RPA  [phone] Call pager  [phone] pager

## 2018-07-06 ENCOUNTER — CARE COORDINATION (OUTPATIENT)
Dept: ONCOLOGY | Facility: CLINIC | Age: 78
End: 2018-07-06

## 2018-07-11 RX ORDER — LIDOCAINE 40 MG/G
CREAM TOPICAL
Status: CANCELLED | OUTPATIENT
Start: 2018-07-11

## 2018-07-12 ENCOUNTER — TELEPHONE (OUTPATIENT)
Dept: INTERVENTIONAL RADIOLOGY/VASCULAR | Facility: CLINIC | Age: 78
End: 2018-07-12

## 2018-07-12 NOTE — TELEPHONE ENCOUNTER
Interventional Radiology   Interventional Radiology at [facility] has been requested to perform a Right pleural mass biopsy from Dr Ahumada.  Gilson Curtis is an 78  year old male with a history significant for metastatic lung cancer which was diagnosed in 1/2012 s/p Right upper lobectomy and different types of chemotherapy.   He had a recent restaging PET done at the end of May which showed progression of disease and enlargement of a lateral lesion. A biopsy is requested for further evaluation.   Reviewed imaging and clinical information with IR staff Dr Torres who approved the biopsy with Ultrasound guidance.    Gilson takes a daily Aspirin and will hold it for 5 days prior to the biopsy starting on Monday 7/9/18.  Central scheduling has contacted the patient and scheduled the biopsy for Friday 7/13/18.     Thanks [facility] Interventional Radiology CNP ([phone])

## 2018-07-13 ENCOUNTER — HOSPITAL ENCOUNTER (OUTPATIENT)
Dept: ULTRASOUND IMAGING | Facility: CLINIC | Age: 78
End: 2018-07-13
Attending: INTERNAL MEDICINE | Admitting: COLON & RECTAL SURGERY
Payer: MEDICARE

## 2018-07-13 ENCOUNTER — HOSPITAL ENCOUNTER (OUTPATIENT)
Facility: CLINIC | Age: 78
Discharge: HOME | End: 2018-07-13
Admitting: INTERNAL MEDICINE
Payer: MEDICARE

## 2018-07-13 VITALS
TEMPERATURE: 98.4 F | OXYGEN SATURATION: 95 % | SYSTOLIC BLOOD PRESSURE: 147 MMHG | HEART RATE: 83 BPM | RESPIRATION RATE: 16 BRPM | DIASTOLIC BLOOD PRESSURE: 89 MMHG

## 2018-07-13 DIAGNOSIS — C34.91 NON-SMALL CELL CANCER OF RIGHT LUNG (H): Primary | ICD-10-CM

## 2018-07-13 DIAGNOSIS — C34.80 MALIGNANT NEOPLASM OF TRACHEA, BRONCHUS, AND LUNG (H): ICD-10-CM

## 2018-07-13 DIAGNOSIS — C34.91 NON-SMALL CELL CANCER OF RIGHT LUNG (H): ICD-10-CM

## 2018-07-13 DIAGNOSIS — C33 MALIGNANT NEOPLASM OF TRACHEA, BRONCHUS, AND LUNG (H): ICD-10-CM

## 2018-07-13 PROCEDURE — 88360 TUMOR IMMUNOHISTOCHEM/MANUAL: CPT | Mod: 26 | Performed by: COLON & RECTAL SURGERY

## 2018-07-13 PROCEDURE — 00000159 ZZHCL STATISTIC H-SEND OUTS PREP: Performed by: COLON & RECTAL SURGERY

## 2018-07-13 PROCEDURE — 88172 CYTP DX EVAL FNA 1ST EA SITE: CPT | Mod: 26 | Performed by: COLON & RECTAL SURGERY

## 2018-07-13 PROCEDURE — 88305 TISSUE EXAM BY PATHOLOGIST: CPT | Mod: 26 | Performed by: COLON & RECTAL SURGERY

## 2018-07-13 PROCEDURE — 88173 CYTOPATH EVAL FNA REPORT: CPT | Mod: 26 | Performed by: COLON & RECTAL SURGERY

## 2018-07-13 PROCEDURE — 81445 SO NEO GSAP 5-50DNA/DNA&RNA: CPT | Performed by: RADIOLOGY

## 2018-07-13 PROCEDURE — 88161 CYTOPATH SMEAR OTHER SOURCE: CPT | Mod: 26 | Performed by: COLON & RECTAL SURGERY

## 2018-07-13 PROCEDURE — 88161 CYTOPATH SMEAR OTHER SOURCE: CPT | Performed by: COLON & RECTAL SURGERY

## 2018-07-13 PROCEDURE — 25000125 ZZHC RX 250: Performed by: INTERNAL MEDICINE

## 2018-07-13 PROCEDURE — 88305 TISSUE EXAM BY PATHOLOGIST: CPT | Performed by: COLON & RECTAL SURGERY

## 2018-07-13 PROCEDURE — 88342 IMHCHEM/IMCYTCHM 1ST ANTB: CPT | Mod: 26,59 | Performed by: COLON & RECTAL SURGERY

## 2018-07-13 PROCEDURE — 88341 IMHCHEM/IMCYTCHM EA ADD ANTB: CPT | Mod: 26,59 | Performed by: COLON & RECTAL SURGERY

## 2018-07-13 PROCEDURE — 88172 CYTP DX EVAL FNA 1ST EA SITE: CPT | Performed by: COLON & RECTAL SURGERY

## 2018-07-13 PROCEDURE — 27211108 US BIOPSY CORE LYMPH NODE

## 2018-07-13 PROCEDURE — 40000863 ZZH STATISTIC RADIOLOGY XRAY, US, CT, MAR, NM

## 2018-07-13 PROCEDURE — 88173 CYTOPATH EVAL FNA REPORT: CPT | Performed by: COLON & RECTAL SURGERY

## 2018-07-13 PROCEDURE — 88360 TUMOR IMMUNOHISTOCHEM/MANUAL: CPT | Performed by: COLON & RECTAL SURGERY

## 2018-07-13 PROCEDURE — 88341 IMHCHEM/IMCYTCHM EA ADD ANTB: CPT | Performed by: COLON & RECTAL SURGERY

## 2018-07-13 PROCEDURE — 88342 IMHCHEM/IMCYTCHM 1ST ANTB: CPT | Performed by: COLON & RECTAL SURGERY

## 2018-07-13 RX ORDER — LIDOCAINE HYDROCHLORIDE 10 MG/ML
4 INJECTION, SOLUTION EPIDURAL; INFILTRATION; INTRACAUDAL; PERINEURAL ONCE
Status: COMPLETED | OUTPATIENT
Start: 2018-07-13 | End: 2018-07-13

## 2018-07-13 RX ORDER — LIDOCAINE 40 MG/G
CREAM TOPICAL
Status: DISCONTINUED | OUTPATIENT
Start: 2018-07-13 | End: 2018-07-13 | Stop reason: HOSPADM

## 2018-07-13 RX ADMIN — LIDOCAINE HYDROCHLORIDE 4 ML: 10 INJECTION, SOLUTION INFILTRATION; PERINEURAL at 10:15

## 2018-07-13 NOTE — PROGRESS NOTES
VSS post procedure, denies pain, denies any lightheadedness when up.  Band aid site CDI.  D/c instructions given to pt post procedure.

## 2018-07-13 NOTE — DISCHARGE INSTRUCTIONS
Lymph Node Biopsy Discharge Instructions     After you go home:      You may resume your normal diet    Care of Puncture Site:      You may have mild bruising, soreness & swelling at the puncture site. This will go away in a few days    For swelling & bruising, you may use an ice pack on the site.     Activity:      You may go back to your normal activity    Avoid strenuous activity for 24 hours    Medicines:      You may resume all medications    For minor pain, you may take Acetaminophen (Tylenol) or Ibuprofen (Advil)            Call the provider who ordered this test if:      Increased redness or swelling at the site    Fluid or blood oozing from the site    Severe pain at the site    Chills or a fever greater than 101 F (38 C).    Any questions or concerns    Call  911 or go to the Emergency Room if:      Trouble breathing    Your neck swells    Bleeding that you cannot control    Severe difficulty swallowing    If you have questions call:      [facility] Radiology Dept @ [phone]    The provider who performed your procedure was _________________.

## 2018-07-13 NOTE — PROGRESS NOTES
Pt here for lymph node bx.  Assessment complete.  last aspirin taken last Sunday, denies any other blood thinners.  US tech scanning pt now, will do dc instructions post procedure.  Labs cancelled- per Dr Torres no labs pre procedure.

## 2018-07-13 NOTE — IP AVS SNAPSHOT
[place] [address]    [place] [address]    Phone:  [phone]                                       After Visit Summary   7/13/2018    Gilson Curtis    MRN: [id number]           After Visit Summary Signature Page     I have received my discharge instructions, and my questions have been answered. I have discussed any challenges I see with this plan with the nurse or doctor.    ..........................................................................................................................................  Patient/Patient Representative Signature      ..........................................................................................................................................  Patient Representative Print Name and Relationship to Patient    ..................................................               ................................................  Date                                            Time    ..........................................................................................................................................  Reviewed by Signature/Title    ...................................................              ..............................................  Date                                                            Time

## 2018-07-13 NOTE — IP AVS SNAPSHOT
MRN:[id number]                      After Visit Summary   7/13/2018    Gilson Curtis    MRN: [id number]           Visit Information        Department      7/13/2018  8:58 AM [facility]          Review of your medicines      UNREVIEWED medicines. Ask your doctor about these medicines        Dose / Directions    aspirin 81 MG tablet        Dose:  1 tablet   Take 1 tablet by mouth At Bedtime   Refills:  0       calcium carbonate 500 MG chewable tablet   Commonly known as:  TUMS        Dose:  1 chew tab   Take 1 chew tab by mouth as needed for heartburn   Refills:  0       clindamycin 1 % topical gel   Commonly known as:  CLINDAMAX   Used for:  Drug rash, Malignant neoplasm of upper lobe of right lung (H)        Apply topically 2 times daily   Quantity:  60 g   Refills:  12       GABAPENTIN PO   Indication:  Neuropathic Pain        Dose:  150 mg   Take 150 mg by mouth At Bedtime   Refills:  0       hydrocortisone 1 % ointment        Apply topically daily   Refills:  0       lisinopril 2.5 MG tablet   Commonly known as:  PRINIVIL/Zestril        Dose:  2.5 mg   Take 2.5 mg by mouth daily.   Refills:  0       * MATURE ADULT CENTURY PO        Dose:  1 tablet   Take 1 tablet by mouth daily OTC   Refills:  0       * EYE VITAMINS PO        Dose:  1 capsule   Take 1 capsule by mouth daily OTC   Refills:  0       ranitidine 150 MG capsule   Commonly known as:  ZANTAC        Dose:  150 mg   Take 150 mg by mouth 2 times daily   Refills:  0       simvastatin 80 MG tablet   Commonly known as:  ZOCOR        Refills:  0       TARCEVA 100 MG tablet CHEMO   Generic drug:  erlotinib        Refills:  0       * Notice:  This list has 2 medication(s) that are the same as other medications prescribed for you. Read the directions carefully, and ask your doctor or other care provider to review them with you.             Protect others around you: Learn how to safely use, store and throw away your  medicines at www.disposemymeds.org.         Follow-ups after your visit        Your next 10 appointments already scheduled     Aug 02, 2018  9:50 AM CDT   LAB with LAB FIRST FLOOR [facility] ([facility])    [address] [address] [place] [address] [phone]           Please do not eat 10-12 hours before your appointment if you are coming in fasting for labs on lipids, cholesterol, or glucose (sugar). This does not apply to pregnant women. Water, hot tea and black coffee (with nothing added) are okay. Do not drink other fluids, diet soda or chew gum.            Aug 02, 2018 10:15 AM CDT   CT CHEST/ABDOMEN/PELVIS W CONTRAST with MGCT1   [facility] ([facility])    [address] [place] [address] [phone]           Please bring any scans or X-rays taken at other hospitals, if similar tests were done. Also bring a list of your medicines, including vitamins, minerals and over-the-counter drugs. It is safest to leave personal items at home.  Be sure to tell your doctor:   If you have any allergies.   If there s any chance you are pregnant.   If you are breastfeeding.  How to prepare:   Do not eat or drink for 2 hours before your exam. If you need to take medicine, you may take it with small sips of water. (We may ask you to take liquid medicine as well.)   Please wear loose clothing, such as a sweat suit or jogging clothes. Avoid snaps, zippers and other metal. We may ask you to undress and put on a hospital gown.  Please arrive 30 minutes early for your CT. Once in the department you might be asked to drink water 15-20 minutes prior to your exam.  If indicated you may be asked to drink an oral contrast in advance of your CT.  If this is the case, the imaging team will let you know or be in contact with you prior to your appointment  Patients over 70 or patients with diabetes or kidney problems:   If you haven t  had a blood test (creatinine test) within the last 30 days, the Cardiologist/Radiologist may require you to get this test prior to your exam.  If you have diabetes:   Continue to take your metformin medication on the day of your exam  If you have any questions, please call the Imaging Department where you will have your exam.            Aug 06, 2018 10:00 AM CDT   Return Visit with Gerry Ahumada MD   [facility] ([facility])    [address] [place] [id number] [phone]               Care Instructions        Further instructions from your care team       Lymph Node Biopsy Discharge Instructions     After you go home:      You may resume your normal diet    Care of Puncture Site:      You may have mild bruising, soreness & swelling at the puncture site. This will go away in a few days    For swelling & bruising, you may use an ice pack on the site.     Activity:      You may go back to your normal activity    Avoid strenuous activity for 24 hours    Medicines:      You may resume all medications    For minor pain, you may take Acetaminophen (Tylenol) or Ibuprofen (Advil)            Call the provider who ordered this test if:      Increased redness or swelling at the site    Fluid or blood oozing from the site    Severe pain at the site    Chills or a fever greater than 101 F (38 C).    Any questions or concerns    Call  911 or go to the Emergency Room if:      Trouble breathing    Your neck swells    Bleeding that you cannot control    Severe difficulty swallowing    If you have questions call:      [facility] Radiology Dept @ [phone]    The provider who performed your procedure was _________________.     Additional Information About Your Visit        [employer] Information     [employer] gives you secure access to your electronic health record. If you see a primary care provider, you can also send messages to your care team and make appointments. If  you have questions, please call your primary care clinic.  If you do not have a primary care provider, please call [phone] and they will assist you.        Care EveryWhere ID     This is your Care EveryWhere ID. This could be used by other organizations to access your [facility] medical records  [id number]        Your Vitals Were     Blood Pressure Pulse Temperature Respirations          138/73 70 98.4  F (36.9  C) (Oral) 16         Primary Care Provider Office Phone # Fax #    Hemant Jiménez [phone]      Equal Access to Services     [facility]: Hadii aad ku hadasho Soomaali, waaxda luqadaha, qaybta kaalmada adechikisyaparish, tono haskins . So [facility] [phone].    ATENCIÓN: Si habla español, tiene a tran disposición servicios gratuitos de asistencia lingüística. [facility] [phone].    We comply with applicable federal civil rights laws and [place] laws. We do not discriminate on the basis of race, color, national origin, age, disability, sex, sexual orientation, or gender identity.            Thank you!     Thank you for choosing [facility] for your care. Our goal is always to provide you with excellent care. Hearing back from our patients is one way we can continue to improve our services. Please take a few minutes to complete the written survey that you may receive in the mail after you visit with us. Thank you!             Medication List: This is a list of all your medications and when to take them. Check marks below indicate your daily home schedule. Keep this list as a reference.      Medications           Morning Afternoon Evening Bedtime As Needed    aspirin 81 MG tablet   Take 1 tablet by mouth At Bedtime                                calcium carbonate 500 MG chewable tablet   Commonly known as:  TUMS   Take 1 chew tab by mouth as needed for heartburn                                clindamycin 1 % topical gel   Commonly known as:  CLINDAMAX   Apply topically  2 times daily                                GABAPENTIN PO   Take 150 mg by mouth At Bedtime                                hydrocortisone 1 % ointment   Apply topically daily                                lisinopril 2.5 MG tablet   Commonly known as:  PRINIVIL/Zestril   Take 2.5 mg by mouth daily.                                * MATURE ADULT CENTURY PO   Take 1 tablet by mouth daily OTC                                * EYE VITAMINS PO   Take 1 capsule by mouth daily OTC                                ranitidine 150 MG capsule   Commonly known as:  ZANTAC   Take 150 mg by mouth 2 times daily                                simvastatin 80 MG tablet   Commonly known as:  ZOCOR                                TARCEVA 100 MG tablet CHEMO   Generic drug:  erlotinib                                * Notice:  This list has 2 medication(s) that are the same as other medications prescribed for you. Read the directions carefully, and ask your doctor or other care provider to review them with you.

## 2018-07-17 LAB — COPATH REPORT: NORMAL

## 2018-07-18 ENCOUNTER — CARE COORDINATION (OUTPATIENT)
Dept: ONCOLOGY | Facility: CLINIC | Age: 78
End: 2018-07-18

## 2018-07-18 NOTE — PROGRESS NOTES
Returned patient's call regarding request for recent chest wall biopsy results (7/13); writer discussed with Dr. Ahumada - informed patient that it did show (as most expected) metastatic non-small cell lung cancer; will await results from Next Generation Sequencing to further determine if another immunotherapy drug would be more beneficial.  Patient would be due for next Tarceva around August 1 - advised to not refill this (writer will inform pharmacy) at present time.  Patient agrees with this plan.

## 2018-07-19 NOTE — ADDENDUM NOTE
Encounter addended by: Jessica Landaverde on: 7/19/2018 11:17 AM<BR>     Actions taken: Order Reconciliation Section accessed, Visit diagnoses modified, Diagnosis association updated,  activity accessed

## 2018-07-30 LAB — COPATH REPORT: NORMAL

## 2018-08-02 ENCOUNTER — RADIANT APPOINTMENT (OUTPATIENT)
Dept: CT IMAGING | Facility: CLINIC | Age: 78
End: 2018-08-02
Attending: INTERNAL MEDICINE
Payer: COMMERCIAL

## 2018-08-02 DIAGNOSIS — C33 MALIGNANT NEOPLASM OF TRACHEA, BRONCHUS, AND LUNG (H): ICD-10-CM

## 2018-08-02 DIAGNOSIS — C34.80 MALIGNANT NEOPLASM OF TRACHEA, BRONCHUS, AND LUNG (H): ICD-10-CM

## 2018-08-02 LAB
ALBUMIN SERPL-MCNC: 3.5 G/DL (ref 3.4–5)
ALP SERPL-CCNC: 117 U/L (ref 40–150)
ALT SERPL W P-5'-P-CCNC: 41 U/L (ref 0–70)
ANION GAP SERPL CALCULATED.3IONS-SCNC: 6 MMOL/L (ref 3–14)
AST SERPL W P-5'-P-CCNC: 35 U/L (ref 0–45)
BASOPHILS # BLD AUTO: 0 10E9/L (ref 0–0.2)
BASOPHILS NFR BLD AUTO: 0.6 %
BILIRUB SERPL-MCNC: 1 MG/DL (ref 0.2–1.3)
BUN SERPL-MCNC: 19 MG/DL (ref 7–30)
CALCIUM SERPL-MCNC: 8.8 MG/DL (ref 8.5–10.1)
CHLORIDE SERPL-SCNC: 107 MMOL/L (ref 94–109)
CO2 SERPL-SCNC: 28 MMOL/L (ref 20–32)
CREAT SERPL-MCNC: 1.47 MG/DL (ref 0.66–1.25)
DIFFERENTIAL METHOD BLD: NORMAL
EOSINOPHIL # BLD AUTO: 0.1 10E9/L (ref 0–0.7)
EOSINOPHIL NFR BLD AUTO: 1.7 %
ERYTHROCYTE [DISTWIDTH] IN BLOOD BY AUTOMATED COUNT: 13.3 % (ref 10–15)
GFR SERPL CREATININE-BSD FRML MDRD: 46 ML/MIN/1.7M2
GLUCOSE SERPL-MCNC: 102 MG/DL (ref 70–99)
HCT VFR BLD AUTO: 51.3 % (ref 40–53)
HGB BLD-MCNC: 16.3 G/DL (ref 13.3–17.7)
IMM GRANULOCYTES # BLD: 0 10E9/L (ref 0–0.4)
IMM GRANULOCYTES NFR BLD: 0.3 %
LDH SERPL L TO P-CCNC: 152 U/L (ref 85–227)
LYMPHOCYTES # BLD AUTO: 1.1 10E9/L (ref 0.8–5.3)
LYMPHOCYTES NFR BLD AUTO: 17.9 %
MCH RBC QN AUTO: 31.5 PG (ref 26.5–33)
MCHC RBC AUTO-ENTMCNC: 31.8 G/DL (ref 31.5–36.5)
MCV RBC AUTO: 99 FL (ref 78–100)
MONOCYTES # BLD AUTO: 0.8 10E9/L (ref 0–1.3)
MONOCYTES NFR BLD AUTO: 11.8 %
NEUTROPHILS # BLD AUTO: 4.3 10E9/L (ref 1.6–8.3)
NEUTROPHILS NFR BLD AUTO: 67.7 %
PLATELET # BLD AUTO: 157 10E9/L (ref 150–450)
POTASSIUM SERPL-SCNC: 4.5 MMOL/L (ref 3.4–5.3)
PROT SERPL-MCNC: 6.7 G/DL (ref 6.8–8.8)
RBC # BLD AUTO: 5.17 10E12/L (ref 4.4–5.9)
SODIUM SERPL-SCNC: 141 MMOL/L (ref 133–144)
WBC # BLD AUTO: 6.4 10E9/L (ref 4–11)

## 2018-08-02 PROCEDURE — 71260 CT THORAX DX C+: CPT | Performed by: RADIOLOGY

## 2018-08-02 PROCEDURE — 36415 COLL VENOUS BLD VENIPUNCTURE: CPT | Performed by: INTERNAL MEDICINE

## 2018-08-02 PROCEDURE — 74177 CT ABD & PELVIS W/CONTRAST: CPT | Performed by: RADIOLOGY

## 2018-08-02 PROCEDURE — 85025 COMPLETE CBC W/AUTO DIFF WBC: CPT | Performed by: INTERNAL MEDICINE

## 2018-08-02 PROCEDURE — 83615 LACTATE (LD) (LDH) ENZYME: CPT | Performed by: INTERNAL MEDICINE

## 2018-08-02 PROCEDURE — 80053 COMPREHEN METABOLIC PANEL: CPT | Performed by: INTERNAL MEDICINE

## 2018-08-02 RX ORDER — IOPAMIDOL 755 MG/ML
103 INJECTION, SOLUTION INTRAVASCULAR ONCE
Status: COMPLETED | OUTPATIENT
Start: 2018-08-02 | End: 2018-08-02

## 2018-08-02 RX ADMIN — IOPAMIDOL 103 ML: 755 INJECTION, SOLUTION INTRAVASCULAR at 10:27

## 2018-08-06 ENCOUNTER — CARE COORDINATION (OUTPATIENT)
Dept: ONCOLOGY | Facility: CLINIC | Age: 78
End: 2018-08-06

## 2018-08-06 ENCOUNTER — ONCOLOGY VISIT (OUTPATIENT)
Dept: ONCOLOGY | Facility: CLINIC | Age: 78
End: 2018-08-06
Payer: COMMERCIAL

## 2018-08-06 ENCOUNTER — DOCUMENTATION ONLY (OUTPATIENT)
Dept: PHARMACY | Facility: CLINIC | Age: 78
End: 2018-08-06

## 2018-08-06 ENCOUNTER — RADIANT APPOINTMENT (OUTPATIENT)
Dept: NUCLEAR MEDICINE | Facility: CLINIC | Age: 78
End: 2018-08-06
Attending: INTERNAL MEDICINE
Payer: COMMERCIAL

## 2018-08-06 VITALS
HEIGHT: 69 IN | RESPIRATION RATE: 18 BRPM | BODY MASS INDEX: 24.44 KG/M2 | HEART RATE: 66 BPM | SYSTOLIC BLOOD PRESSURE: 124 MMHG | DIASTOLIC BLOOD PRESSURE: 74 MMHG | TEMPERATURE: 97.9 F | WEIGHT: 165 LBS | OXYGEN SATURATION: 97 %

## 2018-08-06 DIAGNOSIS — Z51.81 ENCOUNTER FOR MONITORING CARDIOTOXIC DRUG THERAPY: ICD-10-CM

## 2018-08-06 DIAGNOSIS — Z79.899 ENCOUNTER FOR MONITORING CARDIOTOXIC DRUG THERAPY: ICD-10-CM

## 2018-08-06 DIAGNOSIS — C34.91 NON-SMALL CELL CANCER OF RIGHT LUNG (H): Primary | ICD-10-CM

## 2018-08-06 DIAGNOSIS — C34.91 NON-SMALL CELL CANCER OF RIGHT LUNG (H): ICD-10-CM

## 2018-08-06 DIAGNOSIS — B37.0 ORAL THRUSH: ICD-10-CM

## 2018-08-06 PROCEDURE — A9560 TC99M LABELED RBC: HCPCS | Performed by: INTERNAL MEDICINE

## 2018-08-06 PROCEDURE — 78472 GATED HEART PLANAR SINGLE: CPT

## 2018-08-06 PROCEDURE — 99215 OFFICE O/P EST HI 40 MIN: CPT | Performed by: INTERNAL MEDICINE

## 2018-08-06 PROCEDURE — 93000 ELECTROCARDIOGRAM COMPLETE: CPT | Performed by: INTERNAL MEDICINE

## 2018-08-06 RX ORDER — NYSTATIN 100000/ML
500000 SUSPENSION, ORAL (FINAL DOSE FORM) ORAL 4 TIMES DAILY
Qty: 280 ML | Refills: 0 | Status: SHIPPED | OUTPATIENT
Start: 2018-08-06 | End: 2019-02-07

## 2018-08-06 RX ORDER — LORAZEPAM 0.5 MG/1
0.5 TABLET ORAL EVERY 4 HOURS PRN
Qty: 30 TABLET | Refills: 2 | Status: SHIPPED | OUTPATIENT
Start: 2018-08-06 | End: 2019-02-07

## 2018-08-06 RX ORDER — FLUCONAZOLE 150 MG/1
150 TABLET ORAL ONCE
Qty: 3 TABLET | Refills: 0 | Status: SHIPPED | OUTPATIENT
Start: 2018-08-06 | End: 2019-02-07

## 2018-08-06 RX ORDER — PROCHLORPERAZINE MALEATE 10 MG
5 TABLET ORAL EVERY 6 HOURS PRN
Qty: 30 TABLET | Refills: 2 | Status: SHIPPED | OUTPATIENT
Start: 2018-08-06 | End: 2020-01-01

## 2018-08-06 RX ORDER — NYSTATIN 100000/ML
500000 SUSPENSION, ORAL (FINAL DOSE FORM) ORAL 4 TIMES DAILY
Qty: 280 ML | Refills: 0 | Status: SHIPPED | OUTPATIENT
Start: 2018-08-06 | End: 2018-08-06

## 2018-08-06 ASSESSMENT — PAIN SCALES - GENERAL: PAINLEVEL: MILD PAIN (3)

## 2018-08-06 NOTE — MR AVS SNAPSHOT
After Visit Summary   8/6/2018    Gilson Curtis    MRN: [id number]           Patient Information     Date Of Birth          1940        Visit Information        Provider Department      8/6/2018 10:00 AM Gerry Ahumada MD [facility]        Today's Diagnoses     Non-small cell cancer of right lung (H)    -  1    Oral thrush        Encounter for monitoring cardiotoxic drug therapy           Follow-ups after your visit        Your next 10 appointments already scheduled     Sep 11, 2018 11:15 AM CDT   Return Visit with YOUSUF Zamora CNP   [facility] ([facility])    [address] [place] [phone]            Nov 01, 2018  9:15 AM CDT   CT CHEST/ABDOMEN/PELVIS W CONTRAST with MGCT1   [facility] ([facility])    [address] [place] [phone]           Please bring any scans or X-rays taken at other hospitals, if similar tests were done. Also bring a list of your medicines, including vitamins, minerals and over-the-counter drugs. It is safest to leave personal items at home.  Be sure to tell your doctor:   If you have any allergies.   If there s any chance you are pregnant.   If you are breastfeeding.  How to prepare:   Do not eat or drink for 2 hours before your exam. If you need to take medicine, you may take it with small sips of water. (We may ask you to take liquid medicine as well.)   Please wear loose clothing, such as a sweat suit or jogging clothes. Avoid snaps, zippers and other metal. We may ask you to undress and put on a hospital gown.  Please arrive 30 minutes early for your CT. Once in the department you might be asked to drink water 15-20 minutes prior to your exam.  If indicated you may be asked to drink an oral contrast in advance of your CT.  If this is the case, the imaging team will let you know or be in contact  with you prior to your appointment  Patients over 70 or patients with diabetes or kidney problems:   If you haven t had a blood test (creatinine test) within the last 30 days, the Cardiologist/Radiologist may require you to get this test prior to your exam.  If you have diabetes:   Continue to take your metformin medication on the day of your exam  If you have any questions, please call the Imaging Department where you will have your exam.            Nov 05, 2018  8:30 AM CST   Return Visit with Gerry Ahumada MD   [facility] ([facility])    [address] [place] [phone]              Who to contact     If you have questions or need follow up information about today's clinic visit or your schedule please contact [facility] directly at [phone].  Normal or non-critical lab and imaging results will be communicated to you by [employer]Streamhart, letter or phone within 4 business days after the clinic has received the results. If you do not hear from us within 7 days, please contact the clinic through gloStream[employer] or phone. If you have a critical or abnormal lab result, we will notify you by phone as soon as possible.  Submit refill requests through [employer] or call your pharmacy and they will forward the refill request to us. Please allow 3 business days for your refill to be completed.          Additional Information About Your Visit        [employer] Information     [employer] gives you secure access to your electronic health record. If you see a primary care provider, you can also send messages to your care team and make appointments. If you have questions, please call your primary care clinic.  If you do not have a primary care provider, please call [phone] and they will assist you.      [employer] is an electronic gateway that provides easy, online access to your medical records. With [employer], you can request a clinic appointment, read your  "test results, renew a prescription or communicate with your care team.     To access your existing account, please contact your [facility] or call [phone] for assistance.        Care EveryWhere ID     This is your Care EveryWhere ID. This could be used by other organizations to access your [facility] medical records  [id number]        Your Vitals Were     Pulse Temperature Respirations Height Pulse Oximetry BMI (Body Mass Index)    66 97.9  F (36.6  C) (Oral) 18 1.753 m (5' 9\") 97% 24.37 kg/m2       Blood Pressure from Last 3 Encounters:   08/06/18 124/74   07/13/18 147/89   06/28/18 130/70    Weight from Last 3 Encounters:   08/06/18 74.8 kg (165 lb)   06/28/18 76.5 kg (168 lb 11.2 oz)   06/13/18 75.5 kg (166 lb 8 oz)              We Performed the Following     EKG 12-lead complete w/read - Clinics          Today's Medication Changes          These changes are accurate as of 8/6/18 11:59 PM.  If you have any questions, ask your nurse or doctor.               Start taking these medicines.        Dose/Directions    fluconazole 150 MG tablet   Commonly known as:  DIFLUCAN   Used for:  Non-small cell cancer of right lung (H), Oral thrush   Started by:  Gerry Ahumada MD        Dose:  150 mg   Take 1 tablet (150 mg) by mouth once for 1 dose   Quantity:  3 tablet   Refills:  0       LORazepam 0.5 MG tablet   Commonly known as:  ATIVAN   Used for:  Non-small cell cancer of right lung (H)   Started by:  Gerry Ahumada MD        Dose:  0.5 mg   Take 1 tablet (0.5 mg) by mouth every 4 hours as needed (Anxiety, Nausea/Vomiting or Sleep)   Quantity:  30 tablet   Refills:  2       nystatin 659989 UNIT/ML suspension   Commonly known as:  MYCOSTATIN   Used for:  Non-small cell cancer of right lung (H), Oral thrush   Started by:  Gerry Ahumada MD        Dose:  352551 Units   Take 5 mLs (500,000 Units) by mouth 4 times daily   Quantity:  280 mL   Refills:  0       osimertinib " 80 MG tablet   Commonly known as:  TAGRISSO   Used for:  Encounter for monitoring cardiotoxic drug therapy, Non-small cell cancer of right lung (H)   Started by:  Fahrenbruch, Rebecca, RPH        Dose:  80 mg   Take 1 tablet (80 mg) by mouth daily   Quantity:  30 tablet   Refills:  0       prochlorperazine 10 MG tablet   Commonly known as:  COMPAZINE   Used for:  Non-small cell cancer of right lung (H)   Started by:  Gerry Ahumada MD        Dose:  5 mg   Take 0.5 tablets (5 mg) by mouth every 6 hours as needed (Nausea/Vomiting)   Quantity:  30 tablet   Refills:  2         Stop taking these medicines if you haven't already. Please contact your care team if you have questions.     TARCEVA 100 MG tablet CHEMO   Generic drug:  erlotinib   Stopped by:  Gerry Ahumada MD                Where to get your medicines      These medications were sent to [facility] - [facility] [address] [facility]  [address] [facility] [address]    Hours:  Mon-Fri 8:30am-5:00pm Toll Free [phone] Phone:  [phone]     osimertinib 80 MG tablet         These medications were sent to [facility] - [place], [place] - [address]  [address], [place] [address]     Phone:  [phone]     fluconazole 150 MG tablet    nystatin 974026 UNIT/ML suspension    prochlorperazine 10 MG tablet         Some of these will need a paper prescription and others can be bought over the counter.  Ask your nurse if you have questions.     Bring a paper prescription for each of these medications     LORazepam 0.5 MG tablet                Primary Care Provider Office Phone # Fax #    Hemant Jiménez [phone]       [address] [address]        Equal Access to Services     [facility]: Hadii aad ku hadasho Soomaali, waaxda luqadaha, qaybta kaalmada kelly, tono giordano.  So [facility] [phone].    ATENCIÓN: Si eladiola carlene, tiene a tran disposición servicios gratuitos de asistencia lingüística. Blayne tinsley [phone].    We comply with applicable federal civil rights laws and [place] laws. We do not discriminate on the basis of race, color, national origin, age, disability, sex, sexual orientation, or gender identity.            Thank you!     Thank you for choosing [facility]  for your care. Our goal is always to provide you with excellent care. Hearing back from our patients is one way we can continue to improve our services. Please take a few minutes to complete the written survey that you may receive in the mail after your visit with us. Thank you!             Your Updated Medication List - Protect others around you: Learn how to safely use, store and throw away your medicines at www.disposemymeds.org.          This list is accurate as of 8/6/18 11:59 PM.  Always use your most recent med list.                   Brand Name Dispense Instructions for use Diagnosis    aspirin 81 MG tablet      Take 1 tablet by mouth At Bedtime        calcium carbonate 500 MG chewable tablet    TUMS     Take 1 chew tab by mouth as needed for heartburn        clindamycin 1 % topical gel    CLINDAMAX    60 g    Apply topically 2 times daily    Drug rash, Malignant neoplasm of upper lobe of right lung (H)       fluconazole 150 MG tablet    DIFLUCAN    3 tablet    Take 1 tablet (150 mg) by mouth once for 1 dose    Non-small cell cancer of right lung (H), Oral thrush       GABAPENTIN PO      Take 150 mg by mouth At Bedtime        hydrocortisone 1 % ointment      Apply topically daily        lisinopril 2.5 MG tablet    PRINIVIL/Zestril     Take 2.5 mg by mouth daily.        LORazepam 0.5 MG tablet    ATIVAN    30 tablet    Take 1 tablet (0.5 mg) by mouth every 4 hours as needed (Anxiety, Nausea/Vomiting or Sleep)    Non-small cell cancer of right lung (H)       * MATURE ADULT CENTURY PO       Take 1 tablet by mouth daily OTC        * EYE VITAMINS PO      Take 1 capsule by mouth daily OTC        nystatin 031653 UNIT/ML suspension    MYCOSTATIN    280 mL    Take 5 mLs (500,000 Units) by mouth 4 times daily    Non-small cell cancer of right lung (H), Oral thrush       osimertinib 80 MG tablet    TAGRISSO    30 tablet    Take 1 tablet (80 mg) by mouth daily    Encounter for monitoring cardiotoxic drug therapy, Non-small cell cancer of right lung (H)       prochlorperazine 10 MG tablet    COMPAZINE    30 tablet    Take 0.5 tablets (5 mg) by mouth every 6 hours as needed (Nausea/Vomiting)    Non-small cell cancer of right lung (H)       ranitidine 150 MG capsule    ZANTAC     Take 150 mg by mouth 2 times daily        simvastatin 80 MG tablet    ZOCOR          * Notice:  This list has 2 medication(s) that are the same as other medications prescribed for you. Read the directions carefully, and ask your doctor or other care provider to review them with you.

## 2018-08-06 NOTE — LETTER
"    8/6/2018         RE: Gilson Curtis  [address] [facility] [address]        Dear Colleague,    Thank you for referring your patient, Gilson Curtis, to the [facility]. Please see a copy of my visit note below.    [facility]  HEMATOLOGY AND ONCOLOGY    FOLLOW-UP VISIT NOTE    PATIENT NAME: Gilson Curtis MRN # [id number]  DATE OF VISIT: Aug 6, 2018 YOB: 1940    REFERRING PROVIDER: No referring provider defined for this encounter.    CANCER TYPE: Non small cell lung cancer - adenocarcinoma  STAGE: IV    TREATMENT SUMMARY:  Gilson presented with with hemoptysis starting in November 2011. He underwent CT of chest on Jan 18, 2012 at his primary care clinic which showed a RUL mass 2 x 1.9 cm with no suspicious lymphadenopathy or adrenal masses. 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 He was staged with a PET-CT and 2 lesions were noted. He was staged as T3N0 disease. He had surgical resection of the nodules in RUL and RML. EGFR exon 21 deletion.  He has been on Tarceva since June 2016 and is tolerating it quite well. Dose was decreased from 150 mg daily to 100 mg daily in July 2017 due to rash.     CURRENT INTERVENTIONS:  Erlotinib 100 mg daily     SUBJECTIVE   Gilson Curtis is being followed for metastatic lung adenocarcinoma    He has been on this medication since June of 2016. He is not doing as well at this visit. He took erlotinib till 4 days ago and is out of his pills. He has noticed oral thrush. He has diminished energy. He has rash over his face from erlotinib.      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     CAD (coronary artery disease)      CKD (chronic kidney disease) stage 3, GFR 30-59 ml/min      Fatigue     \"spells\"     Hypertension      Lung cancer (H) 2016     " "Non-small cell lung cancer (H)      Prostate cancer (H) 2009     Skin cancer, basal cell      Stented coronary artery 2009    x2     TIA (transient ischemic attack) 2002         CURRENT OUTPATIENT MEDICATIONS     Current Outpatient Prescriptions   Medication Sig     aspirin 81 MG tablet Take 1 tablet by mouth At Bedtime      calcium carbonate (TUMS) 500 MG chewable tablet Take 1 chew tab by mouth as needed for heartburn     clindamycin (CLINDAMAX) 1 % gel Apply topically 2 times daily (Patient taking differently: Apply topically daily as needed )     GABAPENTIN PO Take 150 mg by mouth At Bedtime      hydrocortisone 1 % ointment Apply topically daily      lisinopril (PRINIVIL,ZESTRIL) 2.5 MG tablet Take 2.5 mg by mouth daily.     Multiple Vitamins-Minerals (EYE VITAMINS PO) Take 1 capsule by mouth daily OTC     Multiple Vitamins-Minerals (MATURE ADULT CENTURY PO) Take 1 tablet by mouth daily OTC     ranitidine (ZANTAC) 150 MG capsule Take 150 mg by mouth 2 times daily      simvastatin (ZOCOR) 80 MG tablet      TARCEVA 100 MG tablet CHEMO      No current facility-administered medications for this visit.      Facility-Administered Medications Ordered in Other Visits   Medication     ropivacaine 0.2% 550 mL in ON-Q C-Bloc select flow ( holds 400-550 mL) single cath disposable pump        ALLERGIES      Allergies   Allergen Reactions     No Known Drug Allergy         REVIEW OF SYSTEMS   As above in the HPI, o/w complete 12-point ROS was negative.     PHYSICAL EXAM   /74  Pulse 66  Temp 97.9  F (36.6  C) (Oral)  Resp 18  Ht 1.753 m (5' 9\")  Wt 74.8 kg (165 lb)  SpO2 97%  BMI 24.37 kg/m2  GEN: NAD  HEENT: PERRL, EOMI, no icterus, injection or pallor. Oropharynx is clear.  LYMPHATICs: no cervical or supraclavicular lymphadenopathy; no other abn lymphadenopathy  PULMONARY: clear with good air entry bilaterally  CARDIOVASCULAR: regular, no murmurs, rubs, or gallops  GASTROINTESTINAL: soft, non-tender, " non-distended, normal bowel sounds, no hepatosplenomegaly by percussion or palpation  MUSCULOSKELTAL: warm, well perfused, no edema  NEURO: awake, alert and oriented to time place and person, cranial nerves intact - II - XII, no focal neurologic deficits  SKIN: no rashes     LABORATORY AND IMAGING STUDIES     Recent Labs   Lab Test  08/02/18   0948  05/03/18   1019  01/15/18   1052  10/10/17   1033  07/18/17   0826  03/06/17   1000   NA  141  142  140  143  142  143   POTASSIUM  4.5  4.4  4.8  4.4  4.9  4.8   CHLORIDE  107  109  108  109  104  110*   CO2  28  29  27  29  26  28   ANIONGAP  6  4  5  5   --   5   BUN  19  26  27  20  26  22   CR  1.47*  1.61*  1.75*  1.55*  1.6  1.78*   GLC  102*  101*  105*  73  87  106*   RICK  8.8  8.8  8.9  8.8  9.3  8.9     Recent Labs   Lab Test  10/10/17   1033  07/11/17   1037  03/06/17   1000  01/11/17   1000  12/15/16   1055   MAG  2.0  1.9  2.1  2.3  2.0   PHOS  2.3*  2.9  2.7  2.0*  2.7     Recent Labs   Lab Test  08/02/18   0948  05/03/18   1019  01/15/18   1052  10/10/17   1033  07/18/17   0826  03/06/17   1000   WBC  6.4  7.1  6.8  8.5  6.3  6.6   HGB  16.3  15.9  16.5  16.4  14.7  16.5   PLT  157  187  177  180  196  171   MCV  99  99  99  98  96.2  97   NEUTROPHIL  67.7  59.5  64.7  60.8   --   59.3     Recent Labs   Lab Test  08/02/18   0948  05/03/18   1019  01/15/18   1052   BILITOTAL  1.0  1.3  0.9   ALKPHOS  117  99  97   ALT  41  23  27   AST  35  26  26   ALBUMIN  3.5  3.6  3.8   LDH  152   --    --      No results found for: TSH  No results for input(s): CEA in the last 95217 hours.  Results for orders placed or performed in visit on 08/02/18   CT Chest/Abdomen/Pelvis w Contrast    Narrative    EXAMINATION: CT CHEST/ABDOMEN/PELVIS W CONTRAST  8/2/2018 10:38 AM      CLINICAL HISTORY: Restaging non small cell lung cancer; Malignant  neoplasm of trachea, bronchus, and lung (H); Malignant neoplasm of  trachea, bronchus, and lung (H)    COMPARISON: PET/CT 5/25/2018,  CT 5/3/2018        PROCEDURE COMMENTS: CT of the chest, abdomen, and pelvis was performed  103 ml isovue 370 intravenous and oral contrast. Axial MIP  images of  the chest, and coronal and sagittal reformatted images of the chest,  abdomen, and pelvis obtained.    FINDINGS:    Support devices: None.    Chest:    Bilateral thyroid nodules, left greater than right, are not  significantly changed from prior exams. Atherosclerotic calcifications  of the aortic arch and descending thoracic aorta. Great vessels are  patent at their origin, and demonstrates conventional branching  pattern. The descending aorta and pulmonary artery are normal in  caliber. There is no central pulmonary embolus. Moderate coronary  artery calcifications. No significant mediastinal, hilar, or axillary  lymphadenopathy.    The central tracheobronchial tree is patent. Postsurgical changes of  right upper lobectomy and right middle wedge resection. There is no  pleural effusion or pneumothorax. There is no focal infiltrate. There  is a 10 x 10 mm pulmonary nodule along the right major fissure (series  6, image 27) with new areas of architectural distortion. There are  multiple enlarging pulmonary nodules, for example: A 5 mm nodule in  the right upper lobe previously measured 3 mm (series 6, image 9).  Previously tiny nodule now, 3 mm in the left lower lobe (series 6,  image 48).    The right anterior chest wall mass adjacent to the seventh rib has  decreased in size measuring 1.8 x 2.5 cm, previously measuring 2.4 x  2.8 cm. The pleural nodule invading the right ninth rib has also  decreased in size, measuring 1.5 x 1.1 cm, previously measuring 1.3 x  1.8 cm. Pleural nodule along the right darell of the diaphragm has  increased in size (series 3, image 134).    Abdomen/pelvis:    The liver is unremarkable. There is no focal hepatic mass.  Postsurgical changes of cholecystectomy. Moderately advanced fatty  replacement of the pancreatic parenchyma.  Scattered splenic  calcifications, likely representing sequelae of prior granulomatous  disease. The spleen is otherwise unremarkable. The adrenal glands are  unremarkable. Multiple parapelvic cystic areas in the left kidney are  unchanged from prior exam. Asymmetric cortical thinning, right greater  than left, is also unchanged. There are no dilated loops of small or  large bowel. Few sigmoid diverticula, without evidence of  diverticulitis. No abnormal mucosal enhancement or thickening. The  appendix is visualized, and is unremarkable. Postsurgical changes of  prostatectomy. There is no pelvic mass. The major abdominal  vasculature is patent. There are moderate scattered atherosclerotic  calcifications of the abdominal aorta and bilateral iliac vessels. No  significant retroperitoneal, mesenteric, pelvic or inguinal  lymphadenopathy. There is no intra-abdominal free fluid or free air.  There is a tiny fat-containing umbilical hernia.    Bones:     No acute osseous abnormality. Two lesions suspicious for metastasis  involving the right ribs as described above, no additional suspicious  osseous lesions are identified. Mild multilevel degenerative changes  of the thoracolumbar spine. Bilateral L5 pars defects. Diffuse  osteopenia.      Impression    IMPRESSION:  In this patient with history of non-small cell lung cancer status post  resection with known metastatic disease, mixed response:   1a. Enlarging pulmonary nodules.   1b. Mixed response in multifocal right pleural and chest wall disease.  Some lesions are marginally larger while others are marginally  smaller.   2. No findings to suggest metastatic disease in the abdomen or pelvis.    I have personally reviewed the examination and initial interpretation  and I agree with the findings.    PEDRITO ARIAS MD     Lab Results   Component Value Date    PATH  07/13/2018     Patient Name: FREEDOM WILLAMS  MR#: [id number]  Specimen #: [id number]  Collected:  7/13/2018  Received: 7/13/2018  Reported: 7/16/2018 10:49  Ordering Phy(s): LAURIE GLOVER  Additional Phy(s): ISIDRO AVILA    For improved result formatting, select 'View Enhanced Report Format' under   Linked Documents section.    *ORIGINAL REPORT FOLLOWS ADDENDUM*    ADDENDUM    TO ORIGINAL REPORT  Status: Signed Out  Date Ordered:7/17/2018  Date Complete:7/17/2018  Date Reported:7/17/2018 17:13  Signed Out By: Nilton Aburto M.D.    INTERPRETATION:  Purpose of addendum as to report the immunoperoxidase markers for the   tumor of origin.  It should also be  noted that the corrected clinical history is of a primary lung carcinoma 6   years ago and a recurrence 2 years  ago.    TTF-1: Positive  Napsin A: Rare positive  P40: Rare positive  NKX3.1:  Negative    RESULT FOR IMMUNOHISTOCHEMICAL [place] CLONE  PD-L1 ASSAY  TUMOR PROPORTION SCORE (TPS): 10%    INTERPRETATION: LOW PD-L1 EXPRESSION (TPS d1-49%)    Accession Number: [id number]  Specimen Source/diagnosis: Right chest mass, ultrasound core biopsy,   pulmonary adenocarcinoma  Clinical request:  COMMENT:  This [place]  PD-L1 immunohistochemistry antibody assay is   a laboratory developed test to be  used for patients with non-small cell lung carcinoma (NSCLC) who are being   considered for treatment with  Keytruda (Pembrolizumab), an anti-PD-1 immune checkpoint inhibitor.  The   [place]  PD-L1 assay has been  validated by the [facility]   [facility]ohistochemistry Laboratory against the FDA  approved clinical trial-validated PharmDx 22C3 PD-L1 assay.  Evidence   suggests that the level of PD-L1  expression in the tumor cell population by immunohistochemistry is a major   predictor of response to checkpoint  inhibitor therapy.   Previous studies demonstrate a high correlation   between PD-L1 immunohistochemistry  expression data obtained with PD-L1 clones Dako 22C3 and [place]  in   NSCLC.   (References:   Lancet  387:1540-50, 2016; [facility] 375:1823-33, 2016; J Clin Oncol 34:4102-9. 2016; J   Thorac Oncol 12:1654-63, 2017;  [facility] PD-L1 2018 assessment)    Scoring system:   The tumor proportion score(TPS) is determined by   enumeration of the percentage of PD-L1  tumor cells with any amount of membrane positivity expressed as a whole   number relative to all viable tumor  cells in the specimen.  The scoring system for PD-L1 expression is divided   into three groups:  a) High  expressor, for tumors with TPS d 50%; b) Low expressor, for tumors with   TPS of d1%-49%; and c) Negative , for  tumors with TPS <1%.    Assay conditions:    - Fixation and processing:  10% neutral buffered formalin, paraffin   embedded.    - Staining method:  [place] predilute monoclonal PD-L1 antibody clone   , standard heat induced epitope  retrieval in cell conditioning 1 (CC1 -  EDTA, alkaline pH) , primary   antibody incubation 16 minutes, [place]  Optiview detection kit, and [employer] Ultra automated instrument.  - Minimum tumor cell requirement:   d 100 viable tumor cells present in   the specimen .    - Positive and negative controls react appropriately.    This test was developed and its performance characteristics determined by   [facility].  It has not been cleared or   approved by the FDA.  The laboratory is  regulated under CLIA and qualified to perform high complexity testing.    This test is used for clinical  purposes.   It  should not be regarded as investigational or for research.    COMMENTS:  Addendum diagnosis    Right chest, mass, ultrasound guided core biopsy  - Metastatic pulmonary adenocarcinoma  - See separate report for the lung molecular EGFR assay to include the   T790M and other resistance mutations  (noting that the sample is of low cellularity)    __________________________________________    ORIGINAL REPORT:    SPECIMEN(S):  A: FNA, right  "chest mass  B: Biopsy, CT guided, right chest mass    FINAL DIAGNOSIS:  Right chest mass, ultrasound guided fine needle biopsy  - Non-small cell carcinoma  - An addendum report will follow regarding the histochemical markers    COMMENT:    This 78-year-old man has a history of lung carcinoma diagnosed 6 years ago   with a recurrence 6 ago which was  treated with a rib resection.  He also has a history of prostate   carcinoma.    Electronically signed out by:    Nilton Aburto M.D.    GROSS:  Two specimen containers with formalin are received labeled with the   patient's name, date of birth, and medical  record number.  Information on the requisition slip, containers, and   associated labels is confirmed.    A. The specimen was submitted and done as a fine needle aspiration for   immediate evaluation.    B. The specimen is designated \"right chest mass\" consisting of multiple   tan-pink needle core biopsies, ranging  in length from 0.7-1.3 cm, received in two separate containers.  The   container contents are filtered, divided  accordingly, wrapped and are entirely submitted in two cassettes.   (Dictated by: Annie Pickering 7/13/2018 01:28  PM)    MICROSCOPIC:  <<<<<  A1 Diff Quik stained smear and one H&E stained smear show a few abnormal   cells including rare cells with  greatly enlarged nuclei in marked radiation in nuclear size previous   abnormal cells have moderate amounts of  eosinophilic cytoplasm.  Some of the abnormal cells are seen a small solid   clusters of cells.    B. Cell block sections from B1 and B2 show similar features with rare   markedly abnormal cells with large  hyperchromatic nuclei showing coarse vesicular chromatin are present   within abundant dense fibrous stroma.  The stroma includes focal areas of moderate cellularity composed of   spindle cells with bland nuclei,  suggesting scar formation, areas and collagen tissue with degenerative   changes and areas of atrophic skeletal  muscle " with degenerative changes and fibrosis.    Block 1 contains approximately 100 tumor cells and block 2 contains   approximately 50 tumor cells, so block 1  is borderline adequate for a molecular panel and block 2 is inadequate.    IHC markers for tumor of origin will  be performed on block 2 thereby preserving block 1 for other testing,   including T790M resistance mutation  testing if appropriate.  >>>>>    CPT Codes:  A: 13589-NYXS, 27142-QNWR-AHB, 48138-MXIW-WAS, 54966-JKJGP  B: 25891-LV3, 27937-AJ, SOH, SOH, 91711-YVB, 93641-WUI, 51688-DDO    TESTING LAB LOCATION:  [address]  [address] [phone]    COLLECTION SITE:  Client: [facility]  Location: [facility] (S)      PATH  07/13/2018     Patient Name: FREEDOM WILLAMS  MR#: [id number]  Specimen #: [id number]  Collected: 7/13/2018 10:20  Received: 7/19/2018 11:06  Reported: 7/30/2018 13:01  Ordering Phy(s): ISIDRO AVILA  Additional Phy(s): LAURIE GLOVER    For improved result formatting, select 'View Enhanced Report Format' under   Linked Documents section.  _________________________________________    TEST(S) REQUESTED:  Next Generation Sequencing Oncology LUNG    SPECIMEN DESCRIPTION:  FIXED SLIDES  R60-2516 B1    SIGNIFICANT RESULTS    ---------------------------------------------------------------------  Detected Alterations of Known or Potential Pathogenicity: EGFR L858R, EGFR   T790M    Detected Alterations of Uncertain Significance: None    Genes with No Detected Alterations of the Amino Acid Sequence: BRAF,   ERBB2, KRAS, MET, NRAS, RET  ---------------------------------------------------------------------     INTERPRETATION OF RESULTS     ---------------------------------------------------------------------   The previously found (Y27-0400) EGFR p.L858R mutation is present together   with a new p.T790M mutation in the  EGFR gene.     The T790M mutation can be detected as a  "\"second-site mutation\" in more   than 50% of EGFR-mutated lung cancers  that have developed acquired resistance to erlotinib or gefitinib.   Interestingly, case reports of patients who  develop the T790M mutation as a mechanism of acquired resistance to EGFR   TKI therapy have shown that this  resistance mutation can be lost after an EGFR TKI free period. These   patients then responded to a re-challenge  with EGFR TKI (Chang et al. 2011   [http://www.ncbi.nlm.nih.gov/pubmed/41874831]). Other reports have also  shown that patients can re-respond to TKI treatment after a hiatus from   targeted therapy (Deedee et al. 2010  [http://www.ncbi.nlm.nih.gov/pubmed/59215233]). Whether patients should   continue or discontinue an EGFR TKI  after developing acquired resistance remains to be definitively   determined.     Third generation EGFR TKIs rociletinib and osimertinib have demonstrated   efficacy in patients with advanced  EGFR-mutated NSCLC who have progressed on prior TKI therapy, including   cohorts of patients with EGFR  [id number] NSCLC (Kristie et al. 2014   [http://meetinglibrary.asco.org/print/7434103]; J?tiffaniee et al. 2014  [http://meetinglibrary.asco.org/content/395645-134]; Faraz et al. 2013  [http://journals.lww.com/jto/jessy/2013/10261]; Henrique et al. 2013  [http://lmeragzvkvzt1458.[facility]-org.eu/Scientific-Programme/Abstract-search.  aspx?fqwerxejft=0347]; Chang et  al. 2014 [http://meetinglibrary.asco.org/content/612307-542]). For   rociletinib, the reported overall response  rate in the phase 1 study was 58% in 40 patients with centrally confirmed   EGFR T790M-containing tumors  (Richyist et al. 2014 [http://meetinglibrary.asco.org/content/846305-019]).    Likewise, initial results from the  phase I trial of osimertinib demonstrate a response rate of 64% in the 107   patients with EGFR T790M positive  tumors  (J?nne et al. 2014   [http://meetinglibrary.asco.org/content/010222-749]).     Please note that due " to the minimal amount of DNA acquired from this   specimen, only a limited NGS panel could  be performed.     Exon 14 or RET, exon 2 of KRAS, exon 12 of BRAF, exon 22 of ERBB2 did not   achieve minimum 500X coverage. In  additions, portions of  MET exon 2 and ERBB2 exon 18 did not achieve   minimum 500X coverage. Sequencing data  was reviewed manually and no evidence of mutation(s) was identified;   however false negative results in this  region cannot be excluded. Clinical correlation is advised.     ---------------------------------------------------------------------  DRUG RESPONSE    ---------------------------------------------------------------------  Drugs Associated with Sensitivity for Patient's Tumor Type, Based on   Genomic Analysis  ---------------------------------------------------------------------  Drug: Osimertinib  Response to Drug Associated with Detected Alterations: Primary sensitivity  Alteration(s) Detected: EGFR T790M  Condition: Non-Small Cell Lung Cancer  Other Relevant Information: Indicated for metastatic, EGFR T790M   mutation-positive non-small cell lung cancer  (NSCLC) who have progressed on or after EGFR tyrosine kinase inhibitor   (TKI) therapy.  Line of Therapy: Metastatic  Source: FDA,NCCN,ASCO,MCG    Drug: Osimertinib  Response to Drug Associated with Detected Alterations: Primary sensitivity  Alteration(s) Detected: EGFR L858R  Condition: Non-Small Cell Lung Cancer  Other Relevant Information:  Line of Therapy: Metastatic  Source: FDA,NCCN    ---------------------------------------------------------------------  Drugs Associated with Resistance, Based on Genomic Analysis  ---------------------------------------------------------------------  Drug: Afatinib  Response to Drug Associated with Detected Alterations: Primary resistance,   Acquired resistance  Alteration(s) Detected: EGFR L858R, EGFR T790M  Condition: Non-Small Cell Lung Cancer  Other Relevant Information: EGFR T790M  can occur in patients who have not   previously received TKI therapy  causing primary resistance; or as a secondary mutation associated with TKI   therapy, leading to acquired  resistance.  Line of Therapy: Metastatic  Source: [facility]    Drug: Erlotinib  Response to Drug Associated with Detected Alterations: Primary resistance,   Acquired resistance  Alteration(s) Detected: EGFR L858R, EGFR T790M  Condition: Non-Small Cell Lung Cancer  Other Relevant Information: EGFR T790M can occur in patients who have not   previously received TKI therapy  causing primary resistance; or as a secondary mutation associated with TKI   therapy, leading to acquired  resistance.  Line of Therapy: Metastatic  Source: [facility]    Drug: Gefitinib  Response to Drug Associated with Detected Alterations: Primary resistance,   Acquired resistance  Alteration(s) Detected: EGFR L858R, EGFR T790M  Condition: Non-Small Cell Lung Cancer  Other Relevant Information: EGFR T790M can occur in patients who have not   previously received TKI therapy  causing primary resistance; or as a secondary mutation associated with TKI   therapy, leading to acquired  resistance.  Line of Therapy: Metastatic  Source: [facility]    GENETIC ALTERATIONS    ---------------------------------------------------------------------  Detected Alterations of Known or Potential Pathogenicity  ---------------------------------------------------------------------  Gene: EGFR  Alteration: T790M  c.2369C>T  Type of Alteration: Substitution - Missense  Significance: Pathogenic  Therapeutic Implications*: Associated with drug response  Additional Information: COSMIC: [id number],  [http://cancer.anamaria.ac.uk/cosmic/mutation/overview?genome=37&hf=53131]  [id number]   [http://cancer.anamaria.ac.uk/cosmic/mutation/overview?genome=37&fu=9386]   Allele Frequency: 0.0% dbSNP: [id number]  [https://www.ncbi.nlm.nih.gov/projects/SNP/snp_ref.cgi?kb=oz704492233]    Gene: EGFR  Alteration:  L858R  c.2573T>G  Type of Alteration: Substitution - Missense  Significance: Pathogenic  Therapeutic Implications*: Associated with drug response  Additional Information: COSMIC: [id number],  [http://cancer.anamaria.ac.uk/cosmic/mutation/overview?genome=37&cs=88324]  [id number]   [http://cancer.anamaria.ac.uk/cosmic/mutation/overview?genome=37&zm=3882]   Allele Frequency: 0.0% dbSNP: [id number]  [https://www.ncbi.nlm.nih.gov/projects/SNP/snp_ref.cgi?wn=za929735254]    ---------------------------------------------------------------------  Detected Alterations of Uncertain Significance  ---------------------------------------------------------------------  None  SELECTED ALTERATION DETAILS  ---------------------------------------------------------------------    ---------------------------------------------------------------------  EGFR L858R  ---------------------------------------------------------------------  Nucleotide Change:  [id number]  Type of Alteration:  Substitu...         ASSESSMENT AND PLAN   Non small cell lung cancer - adenocarcinoma with EGFR mutation (exxon 21 deletion)   On erlotinib since June 2016  ECOG PS 1  HTN, CKD, CAD    Zane is tolerating erlotinib well at the 100 mg daily dose.     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  He continues to have fatigue. He is feeling poorly and nowhere close to his baseline. His rash over face is worse. He has oral thrush. He has some nausea. He was planning on going on a trip and is in no health to do so. We will be changing his therapy for his metastatic lung cancer. We do not know how he would feel with his new therapy. He has not been able to walk short distances without feeling SOB. He had a vacation planned and had to cancel it.     I have prescribed him nystatin mouthwash for his thrush and also fluconazole 150 mg daily for 3 days.     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 I reviewed mechanisms of  resistance to erlotinib with development of T790M mutation. He will respond to next generation EFGR-TKI in presence of this mutation. I reviewed oral second-generation TKI - osimertinib (CHO1349, TAGRISSO) has been approved by FDA for metastatic EGFR T790M mutation-positive NSCLC. I reviewed the drug including side effects, palliative intent, expected benefits and alternatives. I have reviewed with our pharmacy team and they would review this information again with patient. I will get an EKG done today. I will schedule for an MUGA.    I will have him return in a month with labs for symptom management to see Lazara. I will see him in 3 months with labs and restaging scans.     I have reviewed his EKG - it shows normal sinus rhythm and no ST-T changes.    Over 45 min of direct face to face time spent with patient with more than 50% time spent in counseling and coordinating care.      Again, thank you for allowing me to participate in the care of your patient.        Sincerely,        Gerry Ahumada MD

## 2018-08-06 NOTE — PROGRESS NOTES
Patient and wife requesting signed attending physician statement for travel insurance claim. Unable to go on trip in October due to inability to walk long distances. Provided signed form, with all previous appointments attached, to wife.   Jeannie Roberts  RN, BSN, OCN

## 2018-08-06 NOTE — PROGRESS NOTES
"[facility]  HEMATOLOGY AND ONCOLOGY    FOLLOW-UP VISIT NOTE    PATIENT NAME: Gilson Curtis MRN # [id number]  DATE OF VISIT: Aug 6, 2018 YOB: 1940    REFERRING PROVIDER: No referring provider defined for this encounter.    CANCER TYPE: Non small cell lung cancer - adenocarcinoma  STAGE: IV    TREATMENT SUMMARY:  Gilson presented with with hemoptysis starting in November 2011. He underwent CT of chest on Jan 18, 2012 at his primary care clinic which showed a RUL mass 2 x 1.9 cm with no suspicious lymphadenopathy or adrenal masses. 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 He was staged with a PET-CT and 2 lesions were noted. He was staged as T3N0 disease. He had surgical resection of the nodules in RUL and RML. EGFR exon 21 deletion.  He has been on Tarceva since June 2016 and is tolerating it quite well. Dose was decreased from 150 mg daily to 100 mg daily in July 2017 due to rash.     CURRENT INTERVENTIONS:  Erlotinib 100 mg daily     SUBJECTIVE   Gilson Curtis is being followed for metastatic lung adenocarcinoma    He has been on this medication since June of 2016. He is not doing as well at this visit. He took erlotinib till 4 days ago and is out of his pills. He has noticed oral thrush. He has diminished energy. He has rash over his face from erlotinib.      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     CAD (coronary artery disease)      CKD (chronic kidney disease) stage 3, GFR 30-59 ml/min      Fatigue     \"spells\"     Hypertension      Lung cancer (H) 2016     Non-small cell lung cancer (H)      Prostate cancer (H) 2009     Skin cancer, basal cell      Stented coronary artery 2009    x2     TIA (transient ischemic attack) 2002         CURRENT OUTPATIENT MEDICATIONS     Current Outpatient Prescriptions " "  Medication Sig     aspirin 81 MG tablet Take 1 tablet by mouth At Bedtime      calcium carbonate (TUMS) 500 MG chewable tablet Take 1 chew tab by mouth as needed for heartburn     clindamycin (CLINDAMAX) 1 % gel Apply topically 2 times daily (Patient taking differently: Apply topically daily as needed )     GABAPENTIN PO Take 150 mg by mouth At Bedtime      hydrocortisone 1 % ointment Apply topically daily      lisinopril (PRINIVIL,ZESTRIL) 2.5 MG tablet Take 2.5 mg by mouth daily.     Multiple Vitamins-Minerals (EYE VITAMINS PO) Take 1 capsule by mouth daily OTC     Multiple Vitamins-Minerals (MATURE ADULT CENTURY PO) Take 1 tablet by mouth daily OTC     ranitidine (ZANTAC) 150 MG capsule Take 150 mg by mouth 2 times daily      simvastatin (ZOCOR) 80 MG tablet      TARCEVA 100 MG tablet CHEMO      No current facility-administered medications for this visit.      Facility-Administered Medications Ordered in Other Visits   Medication     ropivacaine 0.2% 550 mL in ON-Q C-Bloc select flow ( holds 400-550 mL) single cath disposable pump        ALLERGIES      Allergies   Allergen Reactions     No Known Drug Allergy         REVIEW OF SYSTEMS   As above in the HPI, o/w complete 12-point ROS was negative.     PHYSICAL EXAM   /74  Pulse 66  Temp 97.9  F (36.6  C) (Oral)  Resp 18  Ht 1.753 m (5' 9\")  Wt 74.8 kg (165 lb)  SpO2 97%  BMI 24.37 kg/m2  GEN: NAD  HEENT: PERRL, EOMI, no icterus, injection or pallor. Oropharynx is clear.  LYMPHATICs: no cervical or supraclavicular lymphadenopathy; no other abn lymphadenopathy  PULMONARY: clear with good air entry bilaterally  CARDIOVASCULAR: regular, no murmurs, rubs, or gallops  GASTROINTESTINAL: soft, non-tender, non-distended, normal bowel sounds, no hepatosplenomegaly by percussion or palpation  MUSCULOSKELTAL: warm, well perfused, no edema  NEURO: awake, alert and oriented to time place and person, cranial nerves intact - II - XII, no focal neurologic " deficits  SKIN: no rashes     LABORATORY AND IMAGING STUDIES     Recent Labs   Lab Test  08/02/18   0948  05/03/18   1019  01/15/18   1052  10/10/17   1033  07/18/17   0826  03/06/17   1000   NA  141  142  140  143  142  143   POTASSIUM  4.5  4.4  4.8  4.4  4.9  4.8   CHLORIDE  107  109  108  109  104  110*   CO2  28  29  27  29  26  28   ANIONGAP  6  4  5  5   --   5   BUN  19  26  27  20  26  22   CR  1.47*  1.61*  1.75*  1.55*  1.6  1.78*   GLC  102*  101*  105*  73  87  106*   RICK  8.8  8.8  8.9  8.8  9.3  8.9     Recent Labs   Lab Test  10/10/17   1033  07/11/17   1037  03/06/17   1000  01/11/17   1000  12/15/16   1055   MAG  2.0  1.9  2.1  2.3  2.0   PHOS  2.3*  2.9  2.7  2.0*  2.7     Recent Labs   Lab Test  08/02/18   0948  05/03/18   1019  01/15/18   1052  10/10/17   1033  07/18/17   0826  03/06/17   1000   WBC  6.4  7.1  6.8  8.5  6.3  6.6   HGB  16.3  15.9  16.5  16.4  14.7  16.5   PLT  157  187  177  180  196  171   MCV  99  99  99  98  96.2  97   NEUTROPHIL  67.7  59.5  64.7  60.8   --   59.3     Recent Labs   Lab Test  08/02/18   0948  05/03/18   1019  01/15/18   1052   BILITOTAL  1.0  1.3  0.9   ALKPHOS  117  99  97   ALT  41  23  27   AST  35  26  26   ALBUMIN  3.5  3.6  3.8   LDH  152   --    --      No results found for: TSH  No results for input(s): CEA in the last 19258 hours.  Results for orders placed or performed in visit on 08/02/18   CT Chest/Abdomen/Pelvis w Contrast    Narrative    EXAMINATION: CT CHEST/ABDOMEN/PELVIS W CONTRAST  8/2/2018 10:38 AM      CLINICAL HISTORY: Restaging non small cell lung cancer; Malignant  neoplasm of trachea, bronchus, and lung (H); Malignant neoplasm of  trachea, bronchus, and lung (H)    COMPARISON: PET/CT 5/25/2018, CT 5/3/2018        PROCEDURE COMMENTS: CT of the chest, abdomen, and pelvis was performed  103 ml isovue 370 intravenous and oral contrast. Axial MIP  images of  the chest, and coronal and sagittal reformatted images of the chest,  abdomen, and  pelvis obtained.    FINDINGS:    Support devices: None.    Chest:    Bilateral thyroid nodules, left greater than right, are not  significantly changed from prior exams. Atherosclerotic calcifications  of the aortic arch and descending thoracic aorta. Great vessels are  patent at their origin, and demonstrates conventional branching  pattern. The descending aorta and pulmonary artery are normal in  caliber. There is no central pulmonary embolus. Moderate coronary  artery calcifications. No significant mediastinal, hilar, or axillary  lymphadenopathy.    The central tracheobronchial tree is patent. Postsurgical changes of  right upper lobectomy and right middle wedge resection. There is no  pleural effusion or pneumothorax. There is no focal infiltrate. There  is a 10 x 10 mm pulmonary nodule along the right major fissure (series  6, image 27) with new areas of architectural distortion. There are  multiple enlarging pulmonary nodules, for example: A 5 mm nodule in  the right upper lobe previously measured 3 mm (series 6, image 9).  Previously tiny nodule now, 3 mm in the left lower lobe (series 6,  image 48).    The right anterior chest wall mass adjacent to the seventh rib has  decreased in size measuring 1.8 x 2.5 cm, previously measuring 2.4 x  2.8 cm. The pleural nodule invading the right ninth rib has also  decreased in size, measuring 1.5 x 1.1 cm, previously measuring 1.3 x  1.8 cm. Pleural nodule along the right darell of the diaphragm has  increased in size (series 3, image 134).    Abdomen/pelvis:    The liver is unremarkable. There is no focal hepatic mass.  Postsurgical changes of cholecystectomy. Moderately advanced fatty  replacement of the pancreatic parenchyma. Scattered splenic  calcifications, likely representing sequelae of prior granulomatous  disease. The spleen is otherwise unremarkable. The adrenal glands are  unremarkable. Multiple parapelvic cystic areas in the left kidney are  unchanged from  prior exam. Asymmetric cortical thinning, right greater  than left, is also unchanged. There are no dilated loops of small or  large bowel. Few sigmoid diverticula, without evidence of  diverticulitis. No abnormal mucosal enhancement or thickening. The  appendix is visualized, and is unremarkable. Postsurgical changes of  prostatectomy. There is no pelvic mass. The major abdominal  vasculature is patent. There are moderate scattered atherosclerotic  calcifications of the abdominal aorta and bilateral iliac vessels. No  significant retroperitoneal, mesenteric, pelvic or inguinal  lymphadenopathy. There is no intra-abdominal free fluid or free air.  There is a tiny fat-containing umbilical hernia.    Bones:     No acute osseous abnormality. Two lesions suspicious for metastasis  involving the right ribs as described above, no additional suspicious  osseous lesions are identified. Mild multilevel degenerative changes  of the thoracolumbar spine. Bilateral L5 pars defects. Diffuse  osteopenia.      Impression    IMPRESSION:  In this patient with history of non-small cell lung cancer status post  resection with known metastatic disease, mixed response:   1a. Enlarging pulmonary nodules.   1b. Mixed response in multifocal right pleural and chest wall disease.  Some lesions are marginally larger while others are marginally  smaller.   2. No findings to suggest metastatic disease in the abdomen or pelvis.    I have personally reviewed the examination and initial interpretation  and I agree with the findings.    PEDRITO ARIAS MD     Lab Results   Component Value Date    PATH  07/13/2018     Patient Name: FREEDOM WILLAMS  MR#: [id number]  Specimen #: [id number]  Collected: 7/13/2018  Received: 7/13/2018  Reported: 7/16/2018 10:49  Ordering Phy(s): LAURIE GLOVER  Additional Phy(s): ISIDRO AVILA    For improved result formatting, select 'View Enhanced Report Format' under   Linked Documents  section.    *ORIGINAL REPORT FOLLOWS ADDENDUM*    ADDENDUM    TO ORIGINAL REPORT  Status: Signed Out  Date Ordered:7/17/2018  Date Complete:7/17/2018  Date Reported:7/17/2018 17:13  Signed Out By: Nilton Aburto M.D.    INTERPRETATION:  Purpose of addendum as to report the immunoperoxidase markers for the   tumor of origin.  It should also be  noted that the corrected clinical history is of a primary lung carcinoma 6   years ago and a recurrence 2 years  ago.    TTF-1: Positive  Napsin A: Rare positive  P40: Rare positive  NKX3.1:  Negative    RESULT FOR IMMUNOHISTOCHEMICAL VENTANA CLONE  PD-L1 ASSAY  TUMOR PROPORTION SCORE (TPS): 10%    INTERPRETATION: LOW PD-L1 EXPRESSION (TPS d1-49%)    Accession Number: [id number]  Specimen Source/diagnosis: Right chest mass, ultrasound core biopsy,   pulmonary adenocarcinoma  Clinical request:  COMMENT:  This [place]  PD-L1 immunohistochemistry antibody assay is   a laboratory developed test to be  used for patients with non-small cell lung carcinoma (NSCLC) who are being   considered for treatment with  Keytruda (Pembrolizumab), an anti-PD-1 immune checkpoint inhibitor.  The   [place]  PD-L1 assay has been  validated by the [facility]   Immunohistochemistry Laboratory against the FDA  approved clinical trial-validated PharmDx 22C3 PD-L1 assay.  Evidence   suggests that the level of PD-L1  expression in the tumor cell population by immunohistochemistry is a major   predictor of response to checkpoint  inhibitor therapy.   Previous studies demonstrate a high correlation   between PD-L1 immunohistochemistry  expression data obtained with PD-L1 clones Dako 22C3 and [place]  in   NSCLC.   (References:  Lancet  387:1540-50, 2016; NEJM 375:1823-33, 2016; J Clin Oncol 34:4102-9. 2016; J   Thorac Oncol 12:1654-63, 2017;  [facility] PD-L1 2018 assessment)    Scoring system:   The tumor proportion score(TPS) is determined by   enumeration of  the percentage of PD-L1  tumor cells with any amount of membrane positivity expressed as a whole   number relative to all viable tumor  cells in the specimen.  The scoring system for PD-L1 expression is divided   into three groups:  a) High  expressor, for tumors with TPS d 50%; b) Low expressor, for tumors with   TPS of d1%-49%; and c) Negative , for  tumors with TPS <1%.    Assay conditions:    - Fixation and processing:  10% neutral buffered formalin, paraffin   embedded.    - Staining method:  Eunice predilute monoclonal PD-L1 antibody clone   , standard heat induced epitope  retrieval in cell conditioning 1 (CC1 -  EDTA, alkaline pH) , primary   antibody incubation 16 minutes, Eunice  Optiview detection kit, and Eunice BenchMark Ultra automated instrument.  - Minimum tumor cell requirement:   d 100 viable tumor cells present in   the specimen .    - Positive and negative controls react appropriately.    This test was developed and its performance characteristics determined by   [facility].  It has not been cleared or   approved by the FDA.  The laboratory is  regulated under CLIA and qualified to perform high complexity testing.    This test is used for clinical  purposes.   It  should not be regarded as investigational or for research.    COMMENTS:  Addendum diagnosis    Right chest, mass, ultrasound guided core biopsy  - Metastatic pulmonary adenocarcinoma  - See separate report for the lung molecular EGFR assay to include the   T790M and other resistance mutations  (noting that the sample is of low cellularity)    __________________________________________    ORIGINAL REPORT:    SPECIMEN(S):  A: FNA, right chest mass  B: Biopsy, CT guided, right chest mass    FINAL DIAGNOSIS:  Right chest mass, ultrasound guided fine needle biopsy  - Non-small cell carcinoma  - An addendum report will follow regarding the histochemical  "markers    COMMENT:    This 78-year-old man has a history of lung carcinoma diagnosed 6 years ago   with a recurrence 6 ago which was  treated with a rib resection.  He also has a history of prostate   carcinoma.    Electronically signed out by:    Nilton Aburto M.D.    GROSS:  Two specimen containers with formalin are received labeled with the   patient's name, date of birth, and medical  record number.  Information on the requisition slip, containers, and   associated labels is confirmed.    A. The specimen was submitted and done as a fine needle aspiration for   immediate evaluation.    B. The specimen is designated \"right chest mass\" consisting of multiple   tan-pink needle core biopsies, ranging  in length from 0.7-1.3 cm, received in two separate containers.  The   container contents are filtered, divided  accordingly, wrapped and are entirely submitted in two cassettes.   (Dictated by: Annie Pickering 7/13/2018 01:28  PM)    MICROSCOPIC:  <<<<<  A1 Diff Quik stained smear and one H&E stained smear show a few abnormal   cells including rare cells with  greatly enlarged nuclei in marked radiation in nuclear size previous   abnormal cells have moderate amounts of  eosinophilic cytoplasm.  Some of the abnormal cells are seen a small solid   clusters of cells.    B. Cell block sections from B1 and B2 show similar features with rare   markedly abnormal cells with large  hyperchromatic nuclei showing coarse vesicular chromatin are present   within abundant dense fibrous stroma.  The stroma includes focal areas of moderate cellularity composed of   spindle cells with bland nuclei,  suggesting scar formation, areas and collagen tissue with degenerative   changes and areas of atrophic skeletal  muscle with degenerative changes and fibrosis.    Block 1 contains approximately 100 tumor cells and block 2 contains   approximately 50 tumor cells, so block 1  is borderline adequate for a molecular panel and block 2 is " "inadequate.    IHC markers for tumor of origin will  be performed on block 2 thereby preserving block 1 for other testing,   including T790M resistance mutation  testing if appropriate.  >>>>>    CPT Codes:  A: 10895-NXPT, 42598-BISF-KAP, 67247-ONAK-GTF, 97054-UUJCD  B: 35944-MQ8, 23205-MI, SOH, SOH, 61611-NMH, 81574-YRR, 01122-QRO    TESTING LAB LOCATION:  [address]  [address] [phone]    COLLECTION SITE:  Client: [facility]  Location: [facility] (S)      PATH  07/13/2018     Patient Name: FREEDOM WILLAMS  MR#: [id number]  Specimen #: [id number]  Collected: 7/13/2018 10:20  Received: 7/19/2018 11:06  Reported: 7/30/2018 13:01  Ordering Phy(s): ISIDRO AVILA  Additional Phy(s): LAURIE GLOVER    For improved result formatting, select 'View Enhanced Report Format' under   Linked Documents section.  _________________________________________    TEST(S) REQUESTED:  Next Generation Sequencing Oncology LUNG    SPECIMEN DESCRIPTION:  FIXED SLIDES  [id number] B1    SIGNIFICANT RESULTS    ---------------------------------------------------------------------  Detected Alterations of Known or Potential Pathogenicity: EGFR L858R, EGFR   T790M    Detected Alterations of Uncertain Significance: None    Genes with No Detected Alterations of the Amino Acid Sequence: BRAF,   ERBB2, KRAS, MET, NRAS, RET  ---------------------------------------------------------------------     INTERPRETATION OF RESULTS     ---------------------------------------------------------------------   The previously found ([id number]) EGFR p.L858R mutation is present together   with a new p.T790M mutation in the  EGFR gene.     The T790M mutation can be detected as a \"second-site mutation\" in more   than 50% of EGFR-mutated lung cancers  that have developed acquired resistance to erlotinib or gefitinib.   Interestingly, case reports of patients who  develop the T790M mutation as " a mechanism of acquired resistance to EGFR   TKI therapy have shown that this  resistance mutation can be lost after an EGFR TKI free period. These   patients then responded to a re-challenge  with EGFR TKI (Chang et al. 2011   [http://www.ncbi.nlm.nih.gov/pubmed/70776673]). Other reports have also  shown that patients can re-respond to TKI treatment after a hiatus from   targeted therapy (Deedee et al. 2010  [http://www.ncbi.nlm.nih.gov/pubmed/40621472]). Whether patients should   continue or discontinue an EGFR TKI  after developing acquired resistance remains to be definitively   determined.     Third generation EGFR TKIs rociletinib and osimertinib have demonstrated   efficacy in patients with advanced  EGFR-mutated NSCLC who have progressed on prior TKI therapy, including   cohorts of patients with EGFR  P088B-whgbcey NSCLC (Kristie et al. 2014   [http://meetinglibrary.asco.org/print/3853963]; J?tiffaniee et al. 2014  [http://meetinglibrary.asco.org/content/749543-990]; Faraz et al. 2013  [http://journals.lww.com/jto/jessy/2013/45665]; Henrique et al. 2013  [http://fcgcosqufhfv6894.ecco-org.eu/Scientific-Programme/Abstract-search.  aspx?jnieqnzvel=4712]; Sequist et  al. 2014 [http://meetinglibrary.asco.org/content/164338-438]). For   rociletinib, the reported overall response  rate in the phase 1 study was 58% in 40 patients with centrally confirmed   EGFR T790M-containing tumors  (Richyist et al. 2014 [http://meetinglibrary.asco.org/content/418643-448]).    Likewise, initial results from the  phase I trial of osimertinib demonstrate a response rate of 64% in the 107   patients with EGFR T790M positive  tumors  (J?nne et al. 2014   [http://meetinglibrary.asco.org/content/911908-866]).     Please note that due to the minimal amount of DNA acquired from this   specimen, only a limited NGS panel could  be performed.     Exon 14 or RET, exon 2 of KRAS, exon 12 of BRAF, exon 22 of ERBB2 did not   achieve minimum 500X coverage.  In  additions, portions of  MET exon 2 and ERBB2 exon 18 did not achieve   minimum 500X coverage. Sequencing data  was reviewed manually and no evidence of mutation(s) was identified;   however false negative results in this  region cannot be excluded. Clinical correlation is advised.     ---------------------------------------------------------------------  DRUG RESPONSE    ---------------------------------------------------------------------  Drugs Associated with Sensitivity for Patient's Tumor Type, Based on   Genomic Analysis  ---------------------------------------------------------------------  Drug: Osimertinib  Response to Drug Associated with Detected Alterations: Primary sensitivity  Alteration(s) Detected: EGFR T790M  Condition: Non-Small Cell Lung Cancer  Other Relevant Information: Indicated for metastatic, EGFR T790M   mutation-positive non-small cell lung cancer  (NSCLC) who have progressed on or after EGFR tyrosine kinase inhibitor   (TKI) therapy.  Line of Therapy: Metastatic  Source: FDA,NCCN,ASCO,MCG    Drug: Osimertinib  Response to Drug Associated with Detected Alterations: Primary sensitivity  Alteration(s) Detected: EGFR L858R  Condition: Non-Small Cell Lung Cancer  Other Relevant Information:  Line of Therapy: Metastatic  Source: FDA,NCCN    ---------------------------------------------------------------------  Drugs Associated with Resistance, Based on Genomic Analysis  ---------------------------------------------------------------------  Drug: Afatinib  Response to Drug Associated with Detected Alterations: Primary resistance,   Acquired resistance  Alteration(s) Detected: EGFR L858R, EGFR T790M  Condition: Non-Small Cell Lung Cancer  Other Relevant Information: EGFR T790M can occur in patients who have not   previously received TKI therapy  causing primary resistance; or as a secondary mutation associated with TKI   therapy, leading to acquired  resistance.  Line of Therapy:  Metastatic  Source: [facility]    Drug: Erlotinib  Response to Drug Associated with Detected Alterations: Primary resistance,   Acquired resistance  Alteration(s) Detected: EGFR L858R, EGFR T790M  Condition: Non-Small Cell Lung Cancer  Other Relevant Information: EGFR T790M can occur in patients who have not   previously received TKI therapy  causing primary resistance; or as a secondary mutation associated with TKI   therapy, leading to acquired  resistance.  Line of Therapy: Metastatic  Source: [facility]    Drug: Gefitinib  Response to Drug Associated with Detected Alterations: Primary resistance,   Acquired resistance  Alteration(s) Detected: EGFR L858R, EGFR T790M  Condition: Non-Small Cell Lung Cancer  Other Relevant Information: EGFR T790M can occur in patients who have not   previously received TKI therapy  causing primary resistance; or as a secondary mutation associated with TKI   therapy, leading to acquired  resistance.  Line of Therapy: Metastatic  Source: [facility]    GENETIC ALTERATIONS    ---------------------------------------------------------------------  Detected Alterations of Known or Potential Pathogenicity  ---------------------------------------------------------------------  Gene: EGFR  Alteration: T790M  c.2369C>T  Type of Alteration: Substitution - Missense  Significance: Pathogenic  Therapeutic Implications*: Associated with drug response  Additional Information: COSMIC: [id number],  [http://cancer.anamaria.ac.uk/cosmic/mutation/overview?genome=37&lz=23677]  [id number]   [http://cancer.anamaria.ac.uk/cosmic/mutation/overview?genome=37&qh=7607]   Allele Frequency: 0.0% dbSNP: [id number]  [https://www.ncbi.nlm.nih.gov/projects/SNP/snp_ref.cgi?wb=ix750716419]    Gene: EGFR  Alteration: L858R  c.2573T>G  Type of Alteration: Substitution - Missense  Significance: Pathogenic  Therapeutic Implications*: Associated with drug response  Additional Information: CorporaIC:  [id number],  [http://[email or web address].[email or web address]/cosmic/mutation/overview?genome=37&pg=08202]  [id number]   [http://[email or web address].[email or web address]/cosmic/mutation/overview?genome=37&kr=3377]   Allele Frequency: 0.0% dbSNP: [id number]  [https://www.ncbi.nlm.nih.gov/projects/SNP/snp_ref.cgi?wn=cm026039613]    ---------------------------------------------------------------------  Detected Alterations of Uncertain Significance  ---------------------------------------------------------------------  None  SELECTED ALTERATION DETAILS  ---------------------------------------------------------------------    ---------------------------------------------------------------------  EGFR L858R  ---------------------------------------------------------------------  Nucleotide Change:  [id number]  Type of Alteration:  Substitu...         ASSESSMENT AND PLAN   Non small cell lung cancer - adenocarcinoma with EGFR mutation (exxon 21 deletion)   On erlotinib since June 2016  ECOG PS 1  HTN, CKD, CAD    Zane is tolerating erlotinib well at the 100 mg daily dose.     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  He continues to have fatigue. He is feeling poorly and nowhere close to his baseline. His rash over face is worse. He has oral thrush. He has some nausea. He was planning on going on a trip and is in no health to do so. We will be changing his therapy for his metastatic lung cancer. We do not know how he would feel with his new therapy. He has not been able to walk short distances without feeling SOB. He had a vacation planned and had to cancel it.     I have prescribed him nystatin mouthwash for his thrush and also fluconazole 150 mg daily for 3 days.     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 I reviewed mechanisms of resistance to erlotinib with development of T790M mutation. He will respond to next generation EFGR-TKI in presence of this mutation. 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 I reviewed the drug including side effects, palliative intent, expected benefits and alternatives. I have reviewed with our pharmacy team and they would review this information again with patient. I will get an EKG done today. I will schedule for an MUGA.    I will have him return in a month with labs for symptom management to see Lazara. I will see him in 3 months with labs and restaging scans.     I have reviewed his EKG - it shows normal sinus rhythm and no ST-T changes.    Over 45 min of direct face to face time spent with patient with more than 50% time spent in counseling and coordinating care.

## 2018-08-06 NOTE — NURSING NOTE
"Oncology Rooming Note    August 6, 2018 10:05 AM   Gilson Curtis is a 78 year old male who presents for:    Chief Complaint   Patient presents with     Oncology Clinic Visit     3 month follow up and review ct results/also has thrush started yesterday      Initial Vitals: /74  Pulse 66  Temp 97.9  F (36.6  C) (Oral)  Resp 18  Ht 1.753 m (5' 9\")  Wt 74.8 kg (165 lb)  SpO2 97%  BMI 24.37 kg/m2 Estimated body mass index is 24.37 kg/(m^2) as calculated from the following:    Height as of this encounter: 1.753 m (5' 9\").    Weight as of this encounter: 74.8 kg (165 lb). Body surface area is 1.91 meters squared.  Mild Pain (3) Comment: Data Unavailable   No LMP for male patient.  Allergies reviewed: Yes  Medications reviewed: Yes    Medications: Medication refills not needed today.  Pharmacy name entered into [employer]: [facility] [address] [place] [address]         5 minutes for nursing intake (face to face time)     Pretty Collins LPN              "

## 2018-08-07 ENCOUNTER — TELEPHONE (OUTPATIENT)
Dept: ONCOLOGY | Facility: CLINIC | Age: 78
End: 2018-08-07

## 2018-08-07 DIAGNOSIS — C34.80 MALIGNANT NEOPLASM OF TRACHEA, BRONCHUS, AND LUNG (H): Primary | ICD-10-CM

## 2018-08-07 DIAGNOSIS — C33 MALIGNANT NEOPLASM OF TRACHEA, BRONCHUS, AND LUNG (H): Primary | ICD-10-CM

## 2018-08-07 NOTE — ORAL ONC MGMT
Prior Authorization Approval    Authorization Effective Date:  08/06/18  Authorization Expiration Date:  02/06/2019  Medication: Tagrisso  Approved Dose/Quantity: 80mg  Reference #:     Insurance Company:  [employer]  Expected CoPay:       CoPay Card Available:      Foundation Assistance Needed:  [employer] mauri  Which Pharmacy is filling the prescription (Not needed for infusion/clinic administered):  [facility]  Pharmacy Notified:    Patient Notified:

## 2018-08-14 ENCOUNTER — DOCUMENTATION ONLY (OUTPATIENT)
Dept: PHARMACY | Facility: CLINIC | Age: 78
End: 2018-08-14

## 2018-08-14 NOTE — PROGRESS NOTES
Oral Chemotherapy Monitoring Program    Primary Oncologist: Dr. Gerry Ahumada  Primary Oncology Clinic: [facility]  Cancer Diagnosis: NSCLC    Drug: Osimertinib (Tagrisso) 80 mg daily continuously  Start Date: 8/5/18    Dose is appropriate for patients:  Renal Function CrCl 42 and  Hepatic Function     Expected duration of therapy: Until disease progression or unacceptable toxicity    Drug Interaction Assessment:  Potential QTC prolongation with fluconazole and osimertinib.  Will monitor.     Lab Monitoring Plan  Monitoring plan:    C1D1+   LVEF  CBC  CMP  (EKG  if high risk) C2D1+ CBC  CMP  (EKG  if high risk) C3D1+ CBC  CMP  (EKG if high risk)   C4D1+ LVEF  CBC  CMP  (EKG if high risk)   C5D1+ CBC  CMP  (EKG if high risk)   C6D1+ CBC  CMP  (EKG if high risk)     C1D8+  C2D8+  C3D8+  C4D8+  C5D8+  C6D8+    C1D15+  C2D15+  C3D15+  C4D15+  C5D15+  C6D15+    C1D22+  C2D22+  C3D22+  C4D22+  C5D22+  C6D22+      Subjective/Objective:  Gilson Curtis is a 78 year old male contacted by phone for a follow-up visit for oral chemotherapy.  Gilson states he is doing well and does not notice any side effects. He is using hydrocortisone cream around his nose because it is dry to prevent rash.     ORAL CHEMOTHERAPY 1/16/2017 3/1/2017 4/4/2017 7/24/2017 8/25/2017 4/23/2018 8/14/2018   Drug Name Tarceva (Erlotinib) Tarceva (Erlotinib) Tarceva (Erlotinib) Tarceva (Erlotinib) Tarceva (Erlotinib) Tarceva (Erlotinib) Tagrisso (Osimertinib)   Current Dosage 150mg 150mg 150mg 150mg 100mg 100mg 80mg   Current Schedule Daily Daily Daily Daily Daily Daily Daily   Cycle Details Continuous Continuous Continuous Continuous Continuous Continuous Continuous   Start Date of Last Cycle - - - - - - 8/5/2018   Planned next cycle start date - - - - - - -   Doses missed in last 2 weeks 0 0 0 0 0 0 0   Adherence Assessment - Adherent Adherent Adherent Adherent Adherent Adherent   Adverse Effects EGFR rash EGFR rash EGFR  rash EGFR rash EGFR rash No AE identified during assessment No AE identified during assessment   EGFR rash Grade 1 Grade 1 Grade 1 Grade 1 Grade 1 - -   Pharmacist Intervention(EGFR) No Yes No No No - -   Intervention(s) - Patient education - - - - -   Home BPs Not applicable not needed not needed not needed not needed not needed -   Any new drug interactions? No No No No No No No   Is the dose as ordered appropriate for the patient? - Yes Yes Yes Yes - -   Is the patient currently in pain? No No No No No - Assessed in last 30 days.   Has the patient been assessed within the past 6 months for depression? - - Yes Yes - - Yes   Has the patient missed any days of school, work, or other routine activity? - No No No No - -       Assessment:  Gilson is tolerating therapy with minimal side effects.     Plan:  Continue current therapy    Follow-Up:  9/11 visit with Lazara at 11:15am    Refill Due:  8/28 release osimertinib to [facility]    Pippa Luna, Pharm. D., BCOP

## 2018-08-24 ENCOUNTER — TELEPHONE (OUTPATIENT)
Dept: ONCOLOGY | Facility: CLINIC | Age: 78
End: 2018-08-24

## 2018-08-24 NOTE — TELEPHONE ENCOUNTER
I left a message mailed letter appt date/time change. Provider DR Ahumada not in clinic rescheduled appointment from 011/05/2018 to 11/15/2018-cap 08/24/2018

## 2018-08-28 DIAGNOSIS — Z51.81 ENCOUNTER FOR MONITORING CARDIOTOXIC DRUG THERAPY: Primary | ICD-10-CM

## 2018-08-28 DIAGNOSIS — Z79.899 ENCOUNTER FOR MONITORING CARDIOTOXIC DRUG THERAPY: Primary | ICD-10-CM

## 2018-08-28 DIAGNOSIS — C34.91 NON-SMALL CELL CANCER OF RIGHT LUNG (H): ICD-10-CM

## 2018-09-11 ENCOUNTER — TELEPHONE (OUTPATIENT)
Dept: CARE COORDINATION | Facility: CLINIC | Age: 78
End: 2018-09-11

## 2018-09-11 ENCOUNTER — DOCUMENTATION ONLY (OUTPATIENT)
Dept: PHARMACY | Facility: CLINIC | Age: 78
End: 2018-09-11

## 2018-09-11 ENCOUNTER — ONCOLOGY VISIT (OUTPATIENT)
Dept: ONCOLOGY | Facility: CLINIC | Age: 78
End: 2018-09-11
Payer: COMMERCIAL

## 2018-09-11 VITALS
HEART RATE: 64 BPM | OXYGEN SATURATION: 97 % | WEIGHT: 162.56 LBS | SYSTOLIC BLOOD PRESSURE: 117 MMHG | RESPIRATION RATE: 18 BRPM | HEIGHT: 69 IN | BODY MASS INDEX: 24.08 KG/M2 | DIASTOLIC BLOOD PRESSURE: 68 MMHG | TEMPERATURE: 98 F

## 2018-09-11 DIAGNOSIS — R53.83 FATIGUE, UNSPECIFIED TYPE: ICD-10-CM

## 2018-09-11 DIAGNOSIS — R21 RASH: ICD-10-CM

## 2018-09-11 DIAGNOSIS — C34.80 MALIGNANT NEOPLASM OF TRACHEA, BRONCHUS, AND LUNG (H): ICD-10-CM

## 2018-09-11 DIAGNOSIS — N18.30 CHRONIC KIDNEY DISEASE, STAGE 3 (MODERATE): ICD-10-CM

## 2018-09-11 DIAGNOSIS — Z79.899 ENCOUNTER FOR MONITORING CARDIOTOXIC DRUG THERAPY: Primary | ICD-10-CM

## 2018-09-11 DIAGNOSIS — Z51.81 ENCOUNTER FOR MONITORING CARDIOTOXIC DRUG THERAPY: Primary | ICD-10-CM

## 2018-09-11 DIAGNOSIS — C34.91 NON-SMALL CELL CANCER OF RIGHT LUNG (H): Primary | ICD-10-CM

## 2018-09-11 DIAGNOSIS — C34.91 NON-SMALL CELL CANCER OF RIGHT LUNG (H): ICD-10-CM

## 2018-09-11 DIAGNOSIS — D69.6 THROMBOCYTOPENIA (H): ICD-10-CM

## 2018-09-11 DIAGNOSIS — C33 MALIGNANT NEOPLASM OF TRACHEA, BRONCHUS, AND LUNG (H): ICD-10-CM

## 2018-09-11 DIAGNOSIS — Z79.899 ENCOUNTER FOR MONITORING CARDIOTOXIC DRUG THERAPY: ICD-10-CM

## 2018-09-11 DIAGNOSIS — Z51.81 ENCOUNTER FOR MONITORING CARDIOTOXIC DRUG THERAPY: ICD-10-CM

## 2018-09-11 LAB
ALBUMIN SERPL-MCNC: 3.3 G/DL (ref 3.4–5)
ALP SERPL-CCNC: 107 U/L (ref 40–150)
ALT SERPL W P-5'-P-CCNC: 31 U/L (ref 0–70)
ANION GAP SERPL CALCULATED.3IONS-SCNC: 6 MMOL/L (ref 3–14)
AST SERPL W P-5'-P-CCNC: 28 U/L (ref 0–45)
BASOPHILS # BLD AUTO: 0 10E9/L (ref 0–0.2)
BASOPHILS NFR BLD AUTO: 0.7 %
BILIRUB SERPL-MCNC: 0.4 MG/DL (ref 0.2–1.3)
BUN SERPL-MCNC: 26 MG/DL (ref 7–30)
CALCIUM SERPL-MCNC: 8.3 MG/DL (ref 8.5–10.1)
CHLORIDE SERPL-SCNC: 109 MMOL/L (ref 94–109)
CO2 SERPL-SCNC: 28 MMOL/L (ref 20–32)
CREAT SERPL-MCNC: 1.74 MG/DL (ref 0.66–1.25)
DIFFERENTIAL METHOD BLD: ABNORMAL
EOSINOPHIL # BLD AUTO: 0.1 10E9/L (ref 0–0.7)
EOSINOPHIL NFR BLD AUTO: 1.1 %
ERYTHROCYTE [DISTWIDTH] IN BLOOD BY AUTOMATED COUNT: 12.6 % (ref 10–15)
GFR SERPL CREATININE-BSD FRML MDRD: 38 ML/MIN/1.7M2
GLUCOSE SERPL-MCNC: 93 MG/DL (ref 70–99)
HCT VFR BLD AUTO: 42.8 % (ref 40–53)
HGB BLD-MCNC: 13.3 G/DL (ref 13.3–17.7)
IMM GRANULOCYTES # BLD: 0 10E9/L (ref 0–0.4)
IMM GRANULOCYTES NFR BLD: 0.2 %
LYMPHOCYTES # BLD AUTO: 1 10E9/L (ref 0.8–5.3)
LYMPHOCYTES NFR BLD AUTO: 18.9 %
MCH RBC QN AUTO: 31.1 PG (ref 26.5–33)
MCHC RBC AUTO-ENTMCNC: 31.1 G/DL (ref 31.5–36.5)
MCV RBC AUTO: 100 FL (ref 78–100)
MONOCYTES # BLD AUTO: 0.7 10E9/L (ref 0–1.3)
MONOCYTES NFR BLD AUTO: 13.6 %
NEUTROPHILS # BLD AUTO: 3.6 10E9/L (ref 1.6–8.3)
NEUTROPHILS NFR BLD AUTO: 65.5 %
PLATELET # BLD AUTO: 115 10E9/L (ref 150–450)
POTASSIUM SERPL-SCNC: 4.4 MMOL/L (ref 3.4–5.3)
PROT SERPL-MCNC: 7.2 G/DL (ref 6.8–8.8)
RBC # BLD AUTO: 4.27 10E12/L (ref 4.4–5.9)
SODIUM SERPL-SCNC: 143 MMOL/L (ref 133–144)
WBC # BLD AUTO: 5.5 10E9/L (ref 4–11)

## 2018-09-11 PROCEDURE — 99214 OFFICE O/P EST MOD 30 MIN: CPT | Performed by: NURSE PRACTITIONER

## 2018-09-11 PROCEDURE — 36415 COLL VENOUS BLD VENIPUNCTURE: CPT | Performed by: INTERNAL MEDICINE

## 2018-09-11 PROCEDURE — 80053 COMPREHEN METABOLIC PANEL: CPT | Performed by: INTERNAL MEDICINE

## 2018-09-11 PROCEDURE — 85025 COMPLETE CBC W/AUTO DIFF WBC: CPT | Performed by: INTERNAL MEDICINE

## 2018-09-11 ASSESSMENT — PAIN SCALES - GENERAL: PAINLEVEL: NO PAIN (0)

## 2018-09-11 NOTE — PROGRESS NOTES
Oral Chemotherapy Monitoring Program    Primary Oncologist: Dr. Gerry Ahumada  Primary Oncology Clinic: [facility]  Cancer Diagnosis: NSCLC    Drug: Osimertinib (Tagrisso) 80 mg daily continuously  Start Date: 8/5/18    Dose is appropriate for patients:  Renal Function CrCl 42 and  Hepatic Function     Expected duration of therapy: Until disease progression or unacceptable toxicity    Drug Interaction Assessment:  Potential QTC prolongation with fluconazole and osimertinib.  Will monitor.     Lab Monitoring Plan  Monitoring plan:    C1D1+   LVEF  CBC  CMP  (EKG  if high risk) C2D1+ CBC  CMP  (EKG  if high risk) C3D1+ CBC  CMP  (EKG if high risk)   C4D1+ LVEF  CBC  CMP  (EKG if high risk)   C5D1+ CBC  CMP  (EKG if high risk)   C6D1+ CBC  CMP  (EKG if high risk)     C1D8+  C2D8+  C3D8+  C4D8+  C5D8+  C6D8+    C1D15+  C2D15+  C3D15+  C4D15+  C5D15+  C6D15+    C1D22+  C2D22+  C3D22+  C4D22+  C5D22+  C6D22+      Subjective/Objective:  Gilson Curtis is a 78 year old male seen in clinic for a follow-up visit for oral chemotherapy.  Gilson reports that he has experienced significant fatigue. He takes naps that do help. He tries to stay active around the house but only at 15-20 minutes at a time. He also notes dry skin that is relieved with moisturizers.     ORAL CHEMOTHERAPY 3/1/2017 4/4/2017 7/24/2017 8/25/2017 4/23/2018 8/14/2018 9/11/2018   Drug Name Tarceva (Erlotinib) Tarceva (Erlotinib) Tarceva (Erlotinib) Tarceva (Erlotinib) Tarceva (Erlotinib) Tagrisso (Osimertinib) Tagrisso (Osimertinib)   Current Dosage 150mg 150mg 150mg 100mg 100mg 80mg 80mg   Current Schedule Daily Daily Daily Daily Daily Daily Daily   Cycle Details Continuous Continuous Continuous Continuous Continuous Continuous Continuous   Start Date of Last Cycle - - - - - 8/5/2018 -   Planned next cycle start date - - - - - - -   Doses missed in last 2 weeks 0 0 0 0 0 0 0   Adherence Assessment Adherent Adherent Adherent  "Adherent Adherent Adherent Adherent   Adverse Effects EGFR rash EGFR rash EGFR rash EGFR rash No AE identified during assessment No AE identified during assessment Fatigue   EGFR rash Grade 1 Grade 1 Grade 1 Grade 1 - - -   Pharmacist Intervention(EGFR) Yes No No No - - -   Intervention(s) Patient education - - - - - -   Fatigue - - - - - - Grade 1   Pharmacist Intervention(fatigue) - - - - - - Yes   Intervention(s) - - - - - - Patient education   Home BPs not needed not needed not needed not needed not needed - -   Any new drug interactions? No No No No No No No   Is the dose as ordered appropriate for the patient? Yes Yes Yes Yes - - -   Is the patient currently in pain? No No No No - Assessed in last 30 days. Assessed in last 30 days.   Has the patient been assessed within the past 6 months for depression? - Yes Yes - - Yes Yes   Has the patient missed any days of school, work, or other routine activity? No No No No - - No       Vitals:  BP:   BP Readings from Last 1 Encounters:   09/11/18 117/68     Wt Readings from Last 1 Encounters:   09/11/18 73.7 kg (162 lb 9 oz)     Estimated body surface area is 1.89 meters squared as calculated from the following:    Height as of an earlier encounter on 9/11/18: 1.753 m (5' 9\").    Weight as of an earlier encounter on 9/11/18: 73.7 kg (162 lb 9 oz).    Labs:  _  Result Component Current Result Ref Range   Sodium 143 (9/11/2018) 133 - 144 mmol/L     _  Result Component Current Result Ref Range   Potassium 4.4 (9/11/2018) 3.4 - 5.3 mmol/L     _  Result Component Current Result Ref Range   Calcium 8.3 (L) (9/11/2018) 8.5 - 10.1 mg/dL     _  Result Component Current Result Ref Range   Albumin 3.3 (L) (9/11/2018) 3.4 - 5.0 g/dL     _  Result Component Current Result Ref Range   Urea Nitrogen 26 (9/11/2018) 7 - 30 mg/dL     _  Result Component Current Result Ref Range   Creatinine 1.74 (H) (9/11/2018) 0.66 - 1.25 mg/dL       _  Result Component Current Result Ref Range   AST 28 " (9/11/2018) 0 - 45 U/L     _  Result Component Current Result Ref Range   ALT 31 (9/11/2018) 0 - 70 U/L     _  Result Component Current Result Ref Range   Bilirubin Total 0.4 (9/11/2018) 0.2 - 1.3 mg/dL       _  Result Component Current Result Ref Range   WBC 5.5 (9/11/2018) 4.0 - 11.0 10e9/L     _  Result Component Current Result Ref Range   Hemoglobin 13.3 (9/11/2018) 13.3 - 17.7 g/dL     _  Result Component Current Result Ref Range   Platelet Count 115 (L) (9/11/2018) 150 - 450 10e9/L     _  Result Component Current Result Ref Range   Absolute Neutrophil 3.6 (9/11/2018) 1.6 - 8.3 10e9/L       QTc 438 9/11/18    Assessment:  Gilson is tolerating therapy with some side effects. Labs are ok.     Plan:  Continue current therapy.  Educated patient on importance of exercise and doing activities that get the heart rate up to help with fatigue.    Follow-Up:  10/15 Visith with Lazara at 12:15    Refill Due:  9/27 release osimertinib to [facility]    Pippa Luna, Pharm. D., BCOP

## 2018-09-11 NOTE — MR AVS SNAPSHOT
After Visit Summary   9/11/2018    Gilson Curtis    MRN: [id number]           Patient Information     Date Of Birth          1940        Visit Information        Provider Department      9/11/2018 11:15 AM Lazara Leon APRN CNP [facility]         Follow-ups after your visit        Your next 10 appointments already scheduled     Oct 15, 2018 11:30 AM CDT   Return Visit with [facility]SE [facility]   [facility] ([facility]    [address] [facility] [address] [phone]            Oct 15, 2018 11:45 AM CDT   LAB with LAB ONC [facility] ([facility])    [id number] [place] [address] [phone]           Please do not eat 10-12 hours before your appointment if you are coming in fasting for labs on lipids, cholesterol, or glucose (sugar). This does not apply to pregnant women. Water, hot tea and black coffee (with nothing added) are okay. Do not drink other fluids, diet soda or chew gum.            Oct 15, 2018 12:15 PM CDT   Return Visit with YOUSUF Zamora CNP   [facility] ([facility])    [address] [facility] [address] [phone]            Nov 01, 2018  9:15 AM CDT   CT CHEST/ABDOMEN/PELVIS W CONTRAST with MGCT1   [facility])    [address] [place] [id number] [phone]           How do I prepare for my exam? (Food and drink instructions) To prepare: Do not eat or drink for 2 hours before your exam. If you need to take medicine, you may take it with small sips of water. (We may ask you to take liquid medicine as well.)  How do I prepare for my exam? (Other instructions) Please arrive 30 minutes early for your CT.  Once in the department you might be asked to drink water 15-20 minutes prior to your exam.  If indicated you  may be asked to drink an oral contrast in advance of your CT.  If this is the case, the imaging team will let you know or be in contact with you prior to your appointment  Patients over 70 or patients with diabetes or kidney problems: If you haven t had a blood test (creatinine test) within the last 30 days, the Cardiologist/Radiologist may require you to get this test prior to your exam.  If you have diabetes:  Continue to take your metformin medication on the day of your exam  What should I wear: Please wear loose clothing, such as a sweat suit or jogging clothes. Avoid snaps, zippers and other metal. We may ask you to undress and put on a hospital gown.  How long does the exam take: Most scans take less than 20 minutes.  What should I bring: Please bring any scans or X-rays taken at other hospitals, if similar tests were done. Also bring a list of your medicines, including vitamins, minerals and over-the-counter drugs. It is safest to leave personal items at home.  Do I need a : No  is needed.  What do I need to tell my doctor? Be sure to tell your doctor: * If you have any allergies. * If there s any chance you are pregnant. * If you are breastfeeding.  What should I do after the exam: No restrictions, You may resume normal activities.  What is this test: A CT (computed tomography) scan is a series of pictures that allows us to look inside your body. The scanner creates images of the body in cross sections, much like slices of bread. This helps us see any problems more clearly. You may receive contrast (X-ray dye) before or during your scan. You will be asked to drink the contrast.  Who should I call with questions: If you have any questions, please call the Imaging Department where you will have your exam. Directions, parking instructions, and other information is available on our website, [email or web address].            Nov 15, 2018  8:45 AM CST   LAB with LAB ONC Count includes the [facility]  [facility] ([facility])    [address] [place] [address] [phone]           Please do not eat 10-12 hours before your appointment if you are coming in fasting for labs on lipids, cholesterol, or glucose (sugar). This does not apply to pregnant women. Water, hot tea and black coffee (with nothing added) are okay. Do not drink other fluids, diet soda or chew gum.            Nov 15, 2018  9:00 AM CST   Return Visit with NURSE [place] [facility]   [facility] ([facility])    [address] [place] [id number] [phone]            Nov 15, 2018  9:30 AM CST   Return Visit with Gerry Ahumada MD   [facility] ([facility])    [address] [place] [id number] [phone]              Who to contact     If you have questions or need follow up information about today's clinic visit or your schedule please contact [facility] directly at [phone].  Normal or non-critical lab and imaging results will be communicated to you by MyChart, letter or phone within 4 business days after the clinic has received the results. If you do not hear from us within 7 days, please contact the clinic through [employer] or phone. If you have a critical or abnormal lab result, we will notify you by phone as soon as possible.  Submit refill requests through [employer] or call your pharmacy and they will forward the refill request to us. Please allow 3 business days for your refill to be completed.          Additional Information About Your Visit        MyChart Information     [employer] gives you secure access to your electronic health record. If you see a primary care provider, you can also send messages to your care team and make appointments. If you have questions, please call your primary care clinic.  If you do not have a primary care provider, please call [phone] and they will assist  "you.      [employer] is an electronic gateway that provides easy, online access to your medical records. With [employer], you can request a clinic appointment, read your test results, renew a prescription or communicate with your care team.     To access your existing account, please contact your [facility] or call [phone] for assistance.        Care EveryWhere ID     This is your Care EveryWhere ID. This could be used by other organizations to access your [place] medical records  [id number]        Your Vitals Were     Pulse Temperature Respirations Height Pulse Oximetry BMI (Body Mass Index)    64 98  F (36.7  C) 18 1.753 m (5' 9\") 97% 24.01 kg/m2       Blood Pressure from Last 3 Encounters:   09/11/18 117/68   08/06/18 124/74   07/13/18 147/89    Weight from Last 3 Encounters:   09/11/18 73.7 kg (162 lb 9 oz)   08/06/18 74.8 kg (165 lb)   06/28/18 76.5 kg (168 lb 11.2 oz)              Today, you had the following     No orders found for display       Primary Care Provider Office Phone # Fax #    Hemant Jiménez [phone]       [address] [address]        Equal Access to Services     CAMRYN POSEY AH: Hadii aad ku hadasho Soomaali, waaxda luqadaha, qaybta kaalmada adeegyada, waxay idiin hayaan adechikis cuellar lajaxson giordano. So [facility] [phone].    ATENCIÓN: Si habla español, tiene a tran disposición servicios gratuitos de asistencia lingüística. Llame al [phone].    We comply with applicable federal civil rights laws and [place] laws. We do not discriminate on the basis of race, color, national origin, age, disability, sex, sexual orientation, or gender identity.            Thank you!     Thank you for choosing [facility]  for your care. Our goal is always to provide you with excellent care. Hearing back from our patients is one way we can continue to improve our services. Please take a few minutes to complete the written " survey that you may receive in the mail after your visit with us. Thank you!             Your Updated Medication List - Protect others around you: Learn how to safely use, store and throw away your medicines at www.disposemymeds.org.          This list is accurate as of 9/11/18 12:34 PM.  Always use your most recent med list.                   Brand Name Dispense Instructions for use Diagnosis    aspirin 81 MG tablet      Take 1 tablet by mouth At Bedtime        calcium carbonate 500 MG chewable tablet    TUMS     Take 1 chew tab by mouth as needed for heartburn        clindamycin 1 % topical gel    CLINDAMAX    60 g    Apply topically 2 times daily    Drug rash, Malignant neoplasm of upper lobe of right lung (H)       GABAPENTIN PO      Take 150 mg by mouth At Bedtime        hydrocortisone 1 % ointment      Apply topically daily        lisinopril 2.5 MG tablet    PRINIVIL/Zestril     Take 2.5 mg by mouth daily.        LORazepam 0.5 MG tablet    ATIVAN    30 tablet    Take 1 tablet (0.5 mg) by mouth every 4 hours as needed (Anxiety, Nausea/Vomiting or Sleep)    Non-small cell cancer of right lung (H)       * MATURE ADULT CENTURY PO      Take 1 tablet by mouth daily OTC        * EYE VITAMINS PO      Take 1 capsule by mouth daily OTC        nystatin 311251 UNIT/ML suspension    MYCOSTATIN    280 mL    Take 5 mLs (500,000 Units) by mouth 4 times daily    Non-small cell cancer of right lung (H), Oral thrush       osimertinib 80 MG tablet    TAGRISSO    30 tablet    Take 1 tablet (80 mg) by mouth daily    Encounter for monitoring cardiotoxic drug therapy, Non-small cell cancer of right lung (H)       prochlorperazine 10 MG tablet    COMPAZINE    30 tablet    Take 0.5 tablets (5 mg) by mouth every 6 hours as needed (Nausea/Vomiting)    Non-small cell cancer of right lung (H)       ranitidine 150 MG capsule    ZANTAC     Take 150 mg by mouth 2 times daily        simvastatin 80 MG tablet    ZOCOR          * Notice:  This  list has 2 medication(s) that are the same as other medications prescribed for you. Read the directions carefully, and ask your doctor or other care provider to review them with you.

## 2018-09-11 NOTE — PROGRESS NOTES
"Oncology Follow Up Visit: September 11, 2018    Oncologist: Dr Gerry Ahumada  PCP: Hemant Jiménez    Diagnosis: Stage IV Lung Cancer  Gilson Curtis is an 77 yo who complained of hemoptysis in 11/2011 and was found to have a 2 x 1.9 cm RUL mass. 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 Curtis comes to clinic for review after 1 cycle of Tagrisso. Pt shares He has noted weakness and has seen some neuropathy though not noting functional change. He also reports some skin flaking from extremities and only residual from previous rash to face in in nose creases and treated with steroid cream with good results. Bowels are normal and has had no pain, nausea, fevers or illness, depression or sleep issues. He also denies any chest pain or SOB. Continues with fluconozole.  Rest of comprehensive and complete ROS is reviewed and is negative.   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     CAD (coronary artery disease)      CKD (chronic kidney disease) stage 3, GFR 30-59 ml/min      Fatigue     \"spells\"     Hypertension      Lung cancer (H) 2016     Non-small cell lung cancer (H)      Prostate cancer (H) 2009     Skin cancer, basal cell      Stented coronary artery 2009    x2     TIA (transient ischemic attack) 2002     Current Outpatient Prescriptions   Medication     aspirin 81 MG tablet     calcium carbonate (TUMS) 500 MG chewable tablet     GABAPENTIN PO     lisinopril (PRINIVIL,ZESTRIL) 2.5 MG tablet     Multiple Vitamins-Minerals (EYE VITAMINS PO) " "    Multiple Vitamins-Minerals (MATURE ADULT CENTURY PO)     osimertinib (TAGRISSO) 80 MG tablet     simvastatin (ZOCOR) 80 MG tablet     clindamycin (CLINDAMAX) 1 % gel     hydrocortisone 1 % ointment     LORazepam (ATIVAN) 0.5 MG tablet     nystatin (MYCOSTATIN) 684091 UNIT/ML suspension     prochlorperazine (COMPAZINE) 10 MG tablet     ranitidine (ZANTAC) 150 MG capsule     No current facility-administered medications for this visit.      Facility-Administered Medications Ordered in Other Visits   Medication     ropivacaine 0.2% 550 mL in ON-Q C-Bloc select flow ( holds 400-550 mL) single cath disposable pump     Allergies   Allergen Reactions     No Known Drug Allergy        Physical Exam:/68  Pulse 64  Temp 98  F (36.7  C)  Resp 18  Ht 1.753 m (5' 9\")  Wt 73.7 kg (162 lb 9 oz)  SpO2 97%  BMI 24.01 kg/m2   EKG completed with LOk=849  ECOG PS- 0  Constitutional: Alert, cooperative, and in no distress.   ENT: Eyes bright, No mouth sores  Neck: Supple, No adenopathy.  Cardiac: Heart rate and rhythm is regular and strong without murmur  Respiratory: Breathing easy. Lung sounds clear to auscultation  GI: Abdomen is soft, non-tender, BS normal. No masses or organomegaly  MS: Muscle tone normal, extremities normal with no edema.   Skin: alejandro complexion to the face but no rash. Arms and legs with dry flakey skin but no rash.no bruising  Neuro: Sensory grossly WNL, gait normal.   Lymph: Normal ant/post cervical, axillary, supraclavicular nodes  Psych: Mentation appears normal and affect normal/bright with easy conversation.    Laboratory Results:   Results for orders placed or performed in visit on 09/11/18   CBC with platelets differential   Result Value Ref Range    WBC 5.5 4.0 - 11.0 10e9/L    RBC Count 4.27 (L) 4.4 - 5.9 10e12/L    Hemoglobin 13.3 13.3 - 17.7 g/dL    Hematocrit 42.8 40.0 - 53.0 %     78 - 100 fl    MCH 31.1 26.5 - 33.0 pg    MCHC 31.1 (L) 31.5 - 36.5 g/dL    RDW 12.6 10.0 - " 15.0 %    Platelet Count 115 (L) 150 - 450 10e9/L    % Neutrophils 65.5 %    % Lymphocytes 18.9 %    % Monocytes 13.6 %    % Eosinophils 1.1 %    % Basophils 0.7 %    % Immature Granulocytes 0.2 %    Absolute Neutrophil 3.6 1.6 - 8.3 10e9/L    Absolute Lymphocytes 1.0 0.8 - 5.3 10e9/L    Absolute Monocytes 0.7 0.0 - 1.3 10e9/L    Absolute Eosinophils 0.1 0.0 - 0.7 10e9/L    Absolute Basophils 0.0 0.0 - 0.2 10e9/L    Abs Immature Granulocytes 0.0 0 - 0.4 10e9/L    Diff Method Automated Method    Comprehensive metabolic panel   Result Value Ref Range    Sodium 143 133 - 144 mmol/L    Potassium 4.4 3.4 - 5.3 mmol/L    Chloride 109 94 - 109 mmol/L    Carbon Dioxide 28 20 - 32 mmol/L    Anion Gap 6 3 - 14 mmol/L    Glucose 93 70 - 99 mg/dL    Urea Nitrogen 26 7 - 30 mg/dL    Creatinine 1.74 (H) 0.66 - 1.25 mg/dL    GFR Estimate 38 (L) >60 mL/min/1.7m2    GFR Estimate If Black 46 (L) >60 mL/min/1.7m2    Calcium 8.3 (L) 8.5 - 10.1 mg/dL    Bilirubin Total 0.4 0.2 - 1.3 mg/dL    Albumin 3.3 (L) 3.4 - 5.0 g/dL    Protein Total 7.2 6.8 - 8.8 g/dL    Alkaline Phosphatase 107 40 - 150 U/L    ALT 31 0 - 70 U/L    AST 28 0 - 45 U/L     Assessment and Plan:   Stage IV Lung Cancer-Pt has now completed first month of Tagrisso 80 mg daily and is doing well with tolerance with only side effects of some weakness and dry skin. He feels he can continue with current plan with no need for change.   We will have him return in 4 weeks for review with treatment labs.   Next imaging after 3 cycles of the plan.   Thrombocytopenia- Platelets=115,000- will continue to monitor.  Ckd, stage 3-Creatinine is elevated but within his normal range- will continue to follow. NO adjustment to plan necessary.   Rash- residual facial rash being treated in nose creases with steroid cream with good results- fading rash. Skin is otherwise dry to arms and legs as seen with flakey skin- suggested daily moisturizing with heavy cream. Does not appear to be related  to reaction at this time.   Fatigue- encouraged daily exercise and increasing his healthy eating.  This was a 25 min visit with > 50% in counseling and coordinating care including education and management of concerns.    Lazara Leon,CNP

## 2018-09-11 NOTE — TELEPHONE ENCOUNTER
Oncology Distress Screening Follow-up  [facility]    Identified Concern and Score From Distress Screenin. How concerned are you about work and home life issues that may be affected by your cancer?   7           7. How concerned are you about knowing what resources are available to help you?  10               Date of Distress Screenin18    Data:  Pt has diagnosis of metastatic lung cancer and is taking oral drug for treatment    Intervention:  SW contacted pt via phone today to follow up regarding screening results.    Education Provided:  SW provided pt with information regarding cancer support/resources though pt denies any current cancer support/resource needs at this time.  Pt has supportive spouse and dtr.  Pt has SW contact information should his needs change.    Follow-up Required:  None    ARDEN Salmon     Social Work  formerly [facility]  Office:  [phone]  E-Mail:  [email or web address]  2018 10:14 AM

## 2018-09-11 NOTE — NURSING NOTE
"Oncology Rooming Note    September 11, 2018 11:25 AM   Gilson Curtis is a 78 year old male who presents for:    Chief Complaint   Patient presents with     Oncology Clinic Visit     follow up     Initial Vitals: /68  Pulse 64  Temp 98  F (36.7  C)  Resp 18  Ht 1.753 m (5' 9\")  Wt 73.7 kg (162 lb 9 oz)  SpO2 97%  BMI 24.01 kg/m2 Estimated body mass index is 24.01 kg/(m^2) as calculated from the following:    Height as of this encounter: 1.753 m (5' 9\").    Weight as of this encounter: 73.7 kg (162 lb 9 oz). Body surface area is 1.89 meters squared.  No Pain (0) Comment: Data Unavailable   No LMP for male patient.  Allergies reviewed: Yes  Medications reviewed: Yes    Medications: Medication refills not needed today.  Pharmacy name entered into [employer] Mobile Apps: [facility] [address] [place] [address]         5 minutes for nursing intake (face to face time)     Pretty Collins LPN              "

## 2018-09-11 NOTE — LETTER
"    9/11/2018         RE: Gilson Curtis  [address] [address]        Dear Colleague,    Thank you for referring your patient, Gilson Curtis, to the [facility]. Please see a copy of my visit note below.    Oncology Follow Up Visit: September 11, 2018    Oncologist: Dr Gerry Ahumada  PCP: Hemant Jiménez    Diagnosis: Stage IV Lung Cancer  Gilson Curtis is an 79 yo who complained of hemoptysis in 11/2011 and was found to have a 2 x 1.9 cm RUL mass. 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 Curtis comes to clinic for review after 1 cycle of Tagrisso. Pt shares He has noted weakness and has seen some neuropathy though not noting functional change. He also reports some skin flaking from extremities and only residual from previous rash to face in in nose creases and treated with steroid cream with good results. Bowels are normal and has had no pain, nausea, fevers or illness, depression or sleep issues. He also denies any chest pain or SOB. Continues with fluconozole.  Rest of comprehensive and complete ROS is reviewed and is negative.   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     CAD (coronary artery disease)      CKD (chronic kidney disease) stage 3, GFR 30-59 ml/min      Fatigue     \"spells\"     Hypertension      Lung cancer (H) 2016     Non-small cell lung cancer (H)      Prostate cancer (H) 2009     Skin cancer, basal cell      Stented coronary artery 2009    x2     TIA (transient ischemic " "attack) 2002     Current Outpatient Prescriptions   Medication     aspirin 81 MG tablet     calcium carbonate (TUMS) 500 MG chewable tablet     GABAPENTIN PO     lisinopril (PRINIVIL,ZESTRIL) 2.5 MG tablet     Multiple Vitamins-Minerals (EYE VITAMINS PO)     Multiple Vitamins-Minerals (MATURE ADULT CENTURY PO)     osimertinib (TAGRISSO) 80 MG tablet     simvastatin (ZOCOR) 80 MG tablet     clindamycin (CLINDAMAX) 1 % gel     hydrocortisone 1 % ointment     LORazepam (ATIVAN) 0.5 MG tablet     nystatin (MYCOSTATIN) 612785 UNIT/ML suspension     prochlorperazine (COMPAZINE) 10 MG tablet     ranitidine (ZANTAC) 150 MG capsule     No current facility-administered medications for this visit.      Facility-Administered Medications Ordered in Other Visits   Medication     ropivacaine 0.2% 550 mL in ON-Q C-Bloc select flow ( holds 400-550 mL) single cath disposable pump     Allergies   Allergen Reactions     No Known Drug Allergy        Physical Exam:/68  Pulse 64  Temp 98  F (36.7  C)  Resp 18  Ht 1.753 m (5' 9\")  Wt 73.7 kg (162 lb 9 oz)  SpO2 97%  BMI 24.01 kg/m2   EKG completed with ELo=790  ECOG PS- 0  Constitutional: Alert, cooperative, and in no distress.   ENT: Eyes bright, No mouth sores  Neck: Supple, No adenopathy.  Cardiac: Heart rate and rhythm is regular and strong without murmur  Respiratory: Breathing easy. Lung sounds clear to auscultation  GI: Abdomen is soft, non-tender, BS normal. No masses or organomegaly  MS: Muscle tone normal, extremities normal with no edema.   Skin: alejandro complexion to the face but no rash. Arms and legs with dry flakey skin but no rash.no bruising  Neuro: Sensory grossly WNL, gait normal.   Lymph: Normal ant/post cervical, axillary, supraclavicular nodes  Psych: Mentation appears normal and affect normal/bright with easy conversation.    Laboratory Results:   Results for orders placed or performed in visit on 09/11/18   CBC with platelets differential   Result " Value Ref Range    WBC 5.5 4.0 - 11.0 10e9/L    RBC Count 4.27 (L) 4.4 - 5.9 10e12/L    Hemoglobin 13.3 13.3 - 17.7 g/dL    Hematocrit 42.8 40.0 - 53.0 %     78 - 100 fl    MCH 31.1 26.5 - 33.0 pg    MCHC 31.1 (L) 31.5 - 36.5 g/dL    RDW 12.6 10.0 - 15.0 %    Platelet Count 115 (L) 150 - 450 10e9/L    % Neutrophils 65.5 %    % Lymphocytes 18.9 %    % Monocytes 13.6 %    % Eosinophils 1.1 %    % Basophils 0.7 %    % Immature Granulocytes 0.2 %    Absolute Neutrophil 3.6 1.6 - 8.3 10e9/L    Absolute Lymphocytes 1.0 0.8 - 5.3 10e9/L    Absolute Monocytes 0.7 0.0 - 1.3 10e9/L    Absolute Eosinophils 0.1 0.0 - 0.7 10e9/L    Absolute Basophils 0.0 0.0 - 0.2 10e9/L    Abs Immature Granulocytes 0.0 0 - 0.4 10e9/L    Diff Method Automated Method    Comprehensive metabolic panel   Result Value Ref Range    Sodium 143 133 - 144 mmol/L    Potassium 4.4 3.4 - 5.3 mmol/L    Chloride 109 94 - 109 mmol/L    Carbon Dioxide 28 20 - 32 mmol/L    Anion Gap 6 3 - 14 mmol/L    Glucose 93 70 - 99 mg/dL    Urea Nitrogen 26 7 - 30 mg/dL    Creatinine 1.74 (H) 0.66 - 1.25 mg/dL    GFR Estimate 38 (L) >60 mL/min/1.7m2    GFR Estimate If Black 46 (L) >60 mL/min/1.7m2    Calcium 8.3 (L) 8.5 - 10.1 mg/dL    Bilirubin Total 0.4 0.2 - 1.3 mg/dL    Albumin 3.3 (L) 3.4 - 5.0 g/dL    Protein Total 7.2 6.8 - 8.8 g/dL    Alkaline Phosphatase 107 40 - 150 U/L    ALT 31 0 - 70 U/L    AST 28 0 - 45 U/L     Assessment and Plan:   Stage IV Lung Cancer-Pt has now completed first month of Tagrisso 80 mg daily and is doing well with tolerance with only side effects of some weakness and dry skin. He feels he can continue with current plan with no need for change.   We will have him return in 4 weeks for review with treatment labs.   Next imaging after 3 cycles of the plan.   Thrombocytopenia- Platelets=115,000- will continue to monitor.  Ckd, stage 3-Creatinine is elevated but within his normal range- will continue to follow. NO adjustment to plan  necessary.   Rash- residual facial rash being treated in nose creases with steroid cream with good results- fading rash. Skin is otherwise dry to arms and legs as seen with flakey skin- suggested daily moisturizing with heavy cream. Does not appear to be related to reaction at this time.   This was a 25 min visit with > 50% in counseling and coordinating care including education and management of concerns.    Lazara Leon CNP      Again, thank you for allowing me to participate in the care of your patient.        Sincerely,        Lazara Leon, NP, APRN CNP

## 2018-09-18 PROCEDURE — 93005 ELECTROCARDIOGRAM TRACING: CPT | Performed by: INTERNAL MEDICINE

## 2018-09-28 DIAGNOSIS — Z51.81 ENCOUNTER FOR MONITORING CARDIOTOXIC DRUG THERAPY: Primary | ICD-10-CM

## 2018-09-28 DIAGNOSIS — Z79.899 ENCOUNTER FOR MONITORING CARDIOTOXIC DRUG THERAPY: Primary | ICD-10-CM

## 2018-09-28 DIAGNOSIS — C34.91 NON-SMALL CELL CANCER OF RIGHT LUNG (H): ICD-10-CM

## 2018-10-15 ENCOUNTER — ONCOLOGY VISIT (OUTPATIENT)
Dept: ONCOLOGY | Facility: CLINIC | Age: 78
End: 2018-10-15
Payer: COMMERCIAL

## 2018-10-15 ENCOUNTER — TELEPHONE (OUTPATIENT)
Dept: PHARMACY | Facility: CLINIC | Age: 78
End: 2018-10-15

## 2018-10-15 VITALS
SYSTOLIC BLOOD PRESSURE: 133 MMHG | RESPIRATION RATE: 16 BRPM | DIASTOLIC BLOOD PRESSURE: 71 MMHG | HEIGHT: 69 IN | BODY MASS INDEX: 24.73 KG/M2 | OXYGEN SATURATION: 98 % | HEART RATE: 63 BPM | WEIGHT: 167 LBS | TEMPERATURE: 97.8 F

## 2018-10-15 DIAGNOSIS — C34.91 NON-SMALL CELL CANCER OF RIGHT LUNG (H): Primary | ICD-10-CM

## 2018-10-15 DIAGNOSIS — Z79.899 ENCOUNTER FOR MONITORING CARDIOTOXIC DRUG THERAPY: Primary | ICD-10-CM

## 2018-10-15 DIAGNOSIS — D69.6 THROMBOCYTOPENIA (H): ICD-10-CM

## 2018-10-15 DIAGNOSIS — C34.91 NON-SMALL CELL CANCER OF RIGHT LUNG (H): ICD-10-CM

## 2018-10-15 DIAGNOSIS — Z51.81 ENCOUNTER FOR MONITORING CARDIOTOXIC DRUG THERAPY: Primary | ICD-10-CM

## 2018-10-15 DIAGNOSIS — R53.0 NEOPLASTIC MALIGNANT RELATED FATIGUE: ICD-10-CM

## 2018-10-15 DIAGNOSIS — N18.30 CKD (CHRONIC KIDNEY DISEASE) STAGE 3, GFR 30-59 ML/MIN (H): ICD-10-CM

## 2018-10-15 LAB
ALBUMIN SERPL-MCNC: 3.6 G/DL (ref 3.4–5)
ALP SERPL-CCNC: 95 U/L (ref 40–150)
ALT SERPL W P-5'-P-CCNC: 28 U/L (ref 0–70)
ANION GAP SERPL CALCULATED.3IONS-SCNC: 4 MMOL/L (ref 3–14)
AST SERPL W P-5'-P-CCNC: 28 U/L (ref 0–45)
BASOPHILS # BLD AUTO: 0 10E9/L (ref 0–0.2)
BASOPHILS NFR BLD AUTO: 0.3 %
BILIRUB SERPL-MCNC: 0.5 MG/DL (ref 0.2–1.3)
BUN SERPL-MCNC: 23 MG/DL (ref 7–30)
CALCIUM SERPL-MCNC: 8.8 MG/DL (ref 8.5–10.1)
CHLORIDE SERPL-SCNC: 110 MMOL/L (ref 94–109)
CO2 SERPL-SCNC: 29 MMOL/L (ref 20–32)
CREAT SERPL-MCNC: 1.62 MG/DL (ref 0.66–1.25)
DIFFERENTIAL METHOD BLD: ABNORMAL
EOSINOPHIL # BLD AUTO: 0.1 10E9/L (ref 0–0.7)
EOSINOPHIL NFR BLD AUTO: 1 %
ERYTHROCYTE [DISTWIDTH] IN BLOOD BY AUTOMATED COUNT: 12.7 % (ref 10–15)
GFR SERPL CREATININE-BSD FRML MDRD: 41 ML/MIN/1.7M2
GLUCOSE SERPL-MCNC: 77 MG/DL (ref 70–99)
HCT VFR BLD AUTO: 46.5 % (ref 40–53)
HGB BLD-MCNC: 14.5 G/DL (ref 13.3–17.7)
IMM GRANULOCYTES # BLD: 0 10E9/L (ref 0–0.4)
IMM GRANULOCYTES NFR BLD: 0.2 %
LYMPHOCYTES # BLD AUTO: 0.9 10E9/L (ref 0.8–5.3)
LYMPHOCYTES NFR BLD AUTO: 14.8 %
MCH RBC QN AUTO: 31.5 PG (ref 26.5–33)
MCHC RBC AUTO-ENTMCNC: 31.2 G/DL (ref 31.5–36.5)
MCV RBC AUTO: 101 FL (ref 78–100)
MONOCYTES # BLD AUTO: 0.7 10E9/L (ref 0–1.3)
MONOCYTES NFR BLD AUTO: 11.3 %
NEUTROPHILS # BLD AUTO: 4.2 10E9/L (ref 1.6–8.3)
NEUTROPHILS NFR BLD AUTO: 72.4 %
PLATELET # BLD AUTO: 99 10E9/L (ref 150–450)
POTASSIUM SERPL-SCNC: 4.7 MMOL/L (ref 3.4–5.3)
PROT SERPL-MCNC: 7.2 G/DL (ref 6.8–8.8)
RBC # BLD AUTO: 4.61 10E12/L (ref 4.4–5.9)
SODIUM SERPL-SCNC: 143 MMOL/L (ref 133–144)
WBC # BLD AUTO: 5.9 10E9/L (ref 4–11)

## 2018-10-15 PROCEDURE — 99214 OFFICE O/P EST MOD 30 MIN: CPT | Performed by: NURSE PRACTITIONER

## 2018-10-15 PROCEDURE — 80053 COMPREHEN METABOLIC PANEL: CPT | Performed by: INTERNAL MEDICINE

## 2018-10-15 PROCEDURE — 85025 COMPLETE CBC W/AUTO DIFF WBC: CPT | Performed by: INTERNAL MEDICINE

## 2018-10-15 PROCEDURE — 93000 ELECTROCARDIOGRAM COMPLETE: CPT | Performed by: NURSE PRACTITIONER

## 2018-10-15 PROCEDURE — 36415 COLL VENOUS BLD VENIPUNCTURE: CPT | Performed by: INTERNAL MEDICINE

## 2018-10-15 ASSESSMENT — PAIN SCALES - GENERAL: PAINLEVEL: NO PAIN (0)

## 2018-10-15 NOTE — MR AVS SNAPSHOT
After Visit Summary   10/15/2018    Gilson Curtis    MRN: [id number]           Patient Information     Date Of Birth          1940        Visit Information        Provider Department      10/15/2018 12:15 PM Lazara Leon APRN CNP [facility]        Today's Diagnoses     Non-small cell cancer of right lung (H)    -  1    Thrombocytopenia (H)        Neoplastic malignant related fatigue        CKD (chronic kidney disease) stage 3, GFR 30-59 ml/min (H)           Follow-ups after your visit        Your next 10 appointments already scheduled     Nov 01, 2018  9:15 AM CDT   CT CHEST/ABDOMEN/PELVIS W CONTRAST with MGCT1   [facility] ([facility])    [address] [place] [id number] [phone]           How do I prepare for my exam? (Food and drink instructions) To prepare: Do not eat or drink for 2 hours before your exam. If you need to take medicine, you may take it with small sips of water. (We may ask you to take liquid medicine as well.)  How do I prepare for my exam? (Other instructions) Please arrive 30 minutes early for your CT.  Once in the department you might be asked to drink water 15-20 minutes prior to your exam.  If indicated you may be asked to drink an oral contrast in advance of your CT.  If this is the case, the imaging team will let you know or be in contact with you prior to your appointment  Patients over 70 or patients with diabetes or kidney problems: If you haven t had a blood test (creatinine test) within the last 30 days, the Cardiologist/Radiologist may require you to get this test prior to your exam.  If you have diabetes:  Continue to take your metformin medication on the day of your exam  What should I wear: Please wear loose clothing, such as a sweat suit or jogging clothes. Avoid snaps, zippers and other metal. We may ask you to undress and put on a hospital gown.  How long does the exam take:  Most scans take less than 20 minutes.  What should I bring: Please bring any scans or X-rays taken at other hospitals, if similar tests were done. Also bring a list of your medicines, including vitamins, minerals and over-the-counter drugs. It is safest to leave personal items at home.  Do I need a : No  is needed.  What do I need to tell my doctor? Be sure to tell your doctor: * If you have any allergies. * If there s any chance you are pregnant. * If you are breastfeeding.  What should I do after the exam: No restrictions, You may resume normal activities.  What is this test: A CT (computed tomography) scan is a series of pictures that allows us to look inside your body. The scanner creates images of the body in cross sections, much like slices of bread. This helps us see any problems more clearly. You may receive contrast (X-ray dye) before or during your scan. You will be asked to drink the contrast.  Who should I call with questions: If you have any questions, please call the Imaging Department where you will have your exam. Directions, parking instructions, and other information is available on our website, [email or web address].            Nov 15, 2018  8:45 AM CST   LAB with LAB ONC [facility] ([facility])    [address] [place] [id number] [phone]           Please do not eat 10-12 hours before your appointment if you are coming in fasting for labs on lipids, cholesterol, or glucose (sugar). This does not apply to pregnant women. Water, hot tea and black coffee (with nothing added) are okay. Do not drink other fluids, diet soda or chew gum.            Nov 15, 2018  9:00 AM CST   Return Visit with NURSE ONLY CANCER CENTER   [facility] ([facility])    [address] [place] [id number] [phone]            Nov 15, 2018  9:30 AM CST   Return Visit with MD KWAME Oden  "[facility] ([facility])    [address] [place] [id number] [phone]              Who to contact     If you have questions or need follow up information about today's clinic visit or your schedule please contact [facility] directly at [phone].  Normal or non-critical lab and imaging results will be communicated to you by MyChart, letter or phone within 4 business days after the clinic has received the results. If you do not hear from us within 7 days, please contact the clinic through [employer] or phone. If you have a critical or abnormal lab result, we will notify you by phone as soon as possible.  Submit refill requests through [employer] or call your pharmacy and they will forward the refill request to us. Please allow 3 business days for your refill to be completed.          Additional Information About Your Visit        [employer] Information     [employer] gives you secure access to your electronic health record. If you see a primary care provider, you can also send messages to your care team and make appointments. If you have questions, please call your primary care clinic.  If you do not have a primary care provider, please call [phone] and they will assist you.      [employer] is an electronic gateway that provides easy, online access to your medical records. With [employer], you can request a clinic appointment, read your test results, renew a prescription or communicate with your care team.     To access your existing account, please contact your [facility] or call [phone] for assistance.        Care EveryWhere ID     This is your Care EveryWhere ID. This could be used by other organizations to access your [place] medical records  [id number]        Your Vitals Were     Pulse Temperature Respirations Height Pulse Oximetry BMI (Body Mass Index)    63 97.8  F (36.6  C) (Oral) 16 1.753 m (5' 9\") 98% 24.66 " kg/m2       Blood Pressure from Last 3 Encounters:   10/15/18 133/71   09/11/18 117/68   08/06/18 124/74    Weight from Last 3 Encounters:   10/15/18 75.8 kg (167 lb)   09/11/18 73.7 kg (162 lb 9 oz)   08/06/18 74.8 kg (165 lb)              Today, you had the following     No orders found for display       Primary Care Provider Office Phone # Fax #    Hemant Jiménez [phone]       [address] [facility] [address]        Equal Access to Services     [facility]: Hadii anthony ku hadasho Sogabriele, waaxda luqadaha, qaybta kaalmada adechikisyada, tono haskins . So [facility] [phone].    ATENCIÓN: Si habla español, tiene a tran disposición servicios gratuitos de asistencia lingüística. [facility] [phone].    We comply with applicable federal civil rights laws and [place] laws. We do not discriminate on the basis of race, color, national origin, age, disability, sex, sexual orientation, or gender identity.            Thank you!     Thank you for choosing [facility]  for your care. Our goal is always to provide you with excellent care. Hearing back from our patients is one way we can continue to improve our services. Please take a few minutes to complete the written survey that you may receive in the mail after your visit with us. Thank you!             Your Updated Medication List - Protect others around you: Learn how to safely use, store and throw away your medicines at www.disposemymeds.org.          This list is accurate as of 10/15/18  1:53 PM.  Always use your most recent med list.                   Brand Name Dispense Instructions for use Diagnosis    aspirin 81 MG tablet      Take 1 tablet by mouth At Bedtime        calcium carbonate 500 MG chewable tablet    TUMS     Take 1 chew tab by mouth as needed for heartburn        clindamycin 1 % topical gel    CLINDAMAX    60 g    Apply topically 2 times daily    Drug rash, Malignant neoplasm  of upper lobe of right lung (H)       GABAPENTIN PO      Take 150 mg by mouth At Bedtime        hydrocortisone 1 % ointment      Apply topically daily        lisinopril 2.5 MG tablet    PRINIVIL/Zestril     Take 2.5 mg by mouth daily.        LORazepam 0.5 MG tablet    ATIVAN    30 tablet    Take 1 tablet (0.5 mg) by mouth every 4 hours as needed (Anxiety, Nausea/Vomiting or Sleep)    Non-small cell cancer of right lung (H)       * MATURE ADULT CENTURY PO      Take 1 tablet by mouth daily OTC        * EYE VITAMINS PO      Take 1 capsule by mouth daily OTC        nystatin 727067 UNIT/ML suspension    MYCOSTATIN    280 mL    Take 5 mLs (500,000 Units) by mouth 4 times daily    Non-small cell cancer of right lung (H), Oral thrush       * osimertinib 80 MG tablet    TAGRISSO    30 tablet    Take 1 tablet (80 mg) by mouth daily    Encounter for monitoring cardiotoxic drug therapy, Non-small cell cancer of right lung (H)       * osimertinib 80 MG tablet    TAGRISSO    30 tablet    Take 1 tablet (80 mg) by mouth daily    Encounter for monitoring cardiotoxic drug therapy, Non-small cell cancer of right lung (H)       prochlorperazine 10 MG tablet    COMPAZINE    30 tablet    Take 0.5 tablets (5 mg) by mouth every 6 hours as needed (Nausea/Vomiting)    Non-small cell cancer of right lung (H)       ranitidine 150 MG capsule    ZANTAC     Take 150 mg by mouth 2 times daily        simvastatin 80 MG tablet    ZOCOR          * Notice:  This list has 4 medication(s) that are the same as other medications prescribed for you. Read the directions carefully, and ask your doctor or other care provider to review them with you.

## 2018-10-15 NOTE — TELEPHONE ENCOUNTER
Oral Chemotherapy Program    Primary Oncologist: Dr. Gerry Ahumada  Primary Oncology Clinic: [facility]  Cancer Diagnosis: NSCLC     Drug: Osimertinib (Tagrisso) 80 mg daily continuously  Start Date: 8/5/18     Dose is appropriate for patients:  Renal Function CrCl 42 and  Hepatic Function                   Expected duration of therapy: Until disease progression or unacceptable toxicity     Drug Interaction Assessment:  Potential QTC prolongation with fluconazole and osimertinib.  Will monitor.      Lab Monitoring Plan                Monitoring plan:    C1D1+   LVEF  CBC  CMP  (EKG  if high risk) C2D1+ CBC  CMP  (EKG  if high risk) C3D1+ CBC  CMP  (EKG if high risk) C4D1+ LVEF  CBC  CMP  (EKG if high risk) C5D1+ CBC  CMP  (EKG if high risk) C6D1+ CBC  CMP  (EKG if high risk)   C1D8+   C2D8+   C3D8+   C4D8+   C5D8+   C6D8+     C1D15+   C2D15+   C3D15+   C4D15+   C5D15+   C6D15+     C1D22+   C2D22+   C3D22+   C4D22+   C5D22+   C6D22+        Subjective/Objective:  Gilson Curits is a 78 year old male seen in clinic for a follow-up visit for oral chemotherapy.  Gilson reports that he is doing well.  His fatigue has improved. His skin is better.  No other side effects at this time.     ORAL CHEMOTHERAPY 3/1/2017 4/4/2017 7/24/2017 8/25/2017 4/23/2018 8/14/2018 9/11/2018   Drug Name Tarceva (Erlotinib) Tarceva (Erlotinib) Tarceva (Erlotinib) Tarceva (Erlotinib) Tarceva (Erlotinib) Tagrisso (Osimertinib) Tagrisso (Osimertinib)   Current Dosage 150mg 150mg 150mg 100mg 100mg 80mg 80mg   Current Schedule Daily Daily Daily Daily Daily Daily Daily   Cycle Details Continuous Continuous Continuous Continuous Continuous Continuous Continuous   Start Date of Last Cycle - - - - - 8/5/2018 -   Planned next cycle start date - - - - - - -   Doses missed in last 2 weeks 0 0 0 0 0 0 0   Adherence Assessment Adherent Adherent Adherent Adherent Adherent Adherent Adherent   Adverse Effects EGFR rash EGFR rash  "EGFR rash EGFR rash No AE identified during assessment No AE identified during assessment Fatigue   EGFR rash Grade 1 Grade 1 Grade 1 Grade 1 - - -   Pharmacist Intervention(EGFR) Yes No No No - - -   Intervention(s) Patient education - - - - - -   Fatigue - - - - - - Grade 1   Pharmacist Intervention(fatigue) - - - - - - Yes   Intervention(s) - - - - - - Patient education   Home BPs not needed not needed not needed not needed not needed - -   Any new drug interactions? No No No No No No No   Is the dose as ordered appropriate for the patient? Yes Yes Yes Yes - - -   Is the patient currently in pain? No No No No - Assessed in last 30 days. Assessed in last 30 days.   Has the patient been assessed within the past 6 months for depression? - Yes Yes - - Yes Yes   Has the patient missed any days of school, work, or other routine activity? No No No No - - No       Vitals:  BP:   BP Readings from Last 1 Encounters:   10/15/18 133/71     Wt Readings from Last 1 Encounters:   10/15/18 75.8 kg (167 lb)     Estimated body surface area is 1.92 meters squared as calculated from the following:    Height as of an earlier encounter on 10/15/18: 1.753 m (5' 9\").    Weight as of an earlier encounter on 10/15/18: 75.8 kg (167 lb).    Labs:  _  Result Component Current Result Ref Range   Sodium 143 (10/15/2018) 133 - 144 mmol/L     _  Result Component Current Result Ref Range   Potassium 4.7 (10/15/2018) 3.4 - 5.3 mmol/L     _  Result Component Current Result Ref Range   Calcium 8.8 (10/15/2018) 8.5 - 10.1 mg/dL     No results found for Mag within last 30 days.     No results found for Phos within last 30 days.     _  Result Component Current Result Ref Range   Albumin 3.6 (10/15/2018) 3.4 - 5.0 g/dL     _  Result Component Current Result Ref Range   Urea Nitrogen 23 (10/15/2018) 7 - 30 mg/dL     _  Result Component Current Result Ref Range   Creatinine 1.62 (H) (10/15/2018) 0.66 - 1.25 mg/dL       _  Result Component Current Result " Ref Range   AST 28 (10/15/2018) 0 - 45 U/L     _  Result Component Current Result Ref Range   ALT 28 (10/15/2018) 0 - 70 U/L     _  Result Component Current Result Ref Range   Bilirubin Total 0.5 (10/15/2018) 0.2 - 1.3 mg/dL       _  Result Component Current Result Ref Range   WBC 5.9 (10/15/2018) 4.0 - 11.0 10e9/L     _  Result Component Current Result Ref Range   Hemoglobin 14.5 (10/15/2018) 13.3 - 17.7 g/dL     _  Result Component Current Result Ref Range   Platelet Count 99 (L) (10/15/2018) 150 - 450 10e9/L     _  Result Component Current Result Ref Range   Absolute Neutrophil 4.2 (10/15/2018) 1.6 - 8.3 10e9/L     Assessment/Plan:  Continue same dose as ordered    Follow-Up:  11/15  0930 AM Appt with Dr Zita Hinkle Due:  [facility]    Logan Barrientos, PharmD, BCOP  October 15, 2018

## 2018-10-15 NOTE — PROGRESS NOTES
EKG completed without incident. Patient tolerated it well. Reviewed by Lazara ZIEGLER. Priyanka Hudson RN

## 2018-10-15 NOTE — LETTER
"    10/15/2018         RE: Gilson Curtis  [address] [place] [address]        Dear Colleague,    Thank you for referring your patient, Gilson Curtis, to the [facility]. Please see a copy of my visit note below.    Oncology Follow Up Visit: October 15, 2018     Oncologist: Dr Gerry Ahumada  PCP: Hemant Jiménez    Diagnosis: Stage IV Lung Cancer  Gilson Curtis is an 79 yo who complained of hemoptysis in 11/2011 and was found to have a 2 x 1.9 cm RUL mass. 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 Curtis comes to clinic for review after 2nd cycle of Tagrisso. Pt shares he has been feeling well with improved energy+ less fatigue- he was able to finish a brake job on his vehicle with intermittent rest periods. Dry skin issues much improved with using lotion after bathing. He denies any pain, nausea, SOB, fevers or illness. Bowel and bladder are normal. Continues with fluconozole.  Rest of comprehensive and complete ROS is reviewed and is negative.   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     CAD (coronary artery disease)      CKD (chronic kidney disease) stage 3, GFR 30-59 ml/min (H)      Fatigue     \"spells\"     Hypertension      Lung cancer (H) 2016     Non-small cell lung cancer (H)      Prostate cancer (H) 2009     Skin cancer, basal cell      Stented coronary artery 2009    x2     TIA (transient ischemic attack) 2002     Current Outpatient Prescriptions   Medication     aspirin 81 MG tablet     " "calcium carbonate (TUMS) 500 MG chewable tablet     GABAPENTIN PO     lisinopril (PRINIVIL,ZESTRIL) 2.5 MG tablet     Multiple Vitamins-Minerals (EYE VITAMINS PO)     Multiple Vitamins-Minerals (MATURE ADULT CENTURY PO)     osimertinib (TAGRISSO) 80 MG tablet     osimertinib (TAGRISSO) 80 MG tablet     simvastatin (ZOCOR) 80 MG tablet     clindamycin (CLINDAMAX) 1 % gel     hydrocortisone 1 % ointment     LORazepam (ATIVAN) 0.5 MG tablet     nystatin (MYCOSTATIN) 489347 UNIT/ML suspension     prochlorperazine (COMPAZINE) 10 MG tablet     ranitidine (ZANTAC) 150 MG capsule     No current facility-administered medications for this visit.      Facility-Administered Medications Ordered in Other Visits   Medication     ropivacaine 0.2% 550 mL in ON-Q C-Bloc select flow ( holds 400-550 mL) single cath disposable pump     Allergies   Allergen Reactions     No Known Drug Allergy        Physical Exam:/71 (BP Location: Right arm)  Pulse 63  Temp 97.8  F (36.6  C) (Oral)  Resp 16  Ht 1.753 m (5' 9\")  Wt 75.8 kg (167 lb)  SpO2 98%  BMI 24.66 kg/m2   EKG completed with BLp=652  ECOG PS- 0  Constitutional: Alert, cooperative, and in no distress.   ENT: Eyes bright, No mouth sores  Neck: Supple, No adenopathy.  Cardiac: Heart rate and rhythm is regular and strong without murmur  Respiratory: Breathing easy. Lung sounds clear to auscultation  GI: Abdomen is soft, non-tender, BS normal. No masses or organomegaly  MS: Muscle tone normal, extremities normal with no edema.   Skin: alejandro complexion to the face but no rash. Arms and legs which had the dry flakey skin is now resolved with regular lotioning.   Neuro: Sensory grossly WNL, gait normal.   Lymph: Normal ant/post cervical, axillary, supraclavicular nodes  Psych: Mentation appears normal and affect normal/bright with good conversation.     Laboratory Results:    Ref. Range 9/11/2018 11:04 10/15/2018 11:43   Sodium Latest Ref Range: 133 - 144 mmol/L 143 143 "   Potassium Latest Ref Range: 3.4 - 5.3 mmol/L 4.4 4.7   Chloride Latest Ref Range: 94 - 109 mmol/L 109 110 (H)   Carbon Dioxide Latest Ref Range: 20 - 32 mmol/L 28 29   Urea Nitrogen Latest Ref Range: 7 - 30 mg/dL 26 23   Creatinine Latest Ref Range: 0.66 - 1.25 mg/dL 1.74 (H) 1.62 (H)   GFR Estimate Latest Ref Range: >60 mL/min/1.7m2 38 (L) 41 (L)   GFR Estimate If Black Latest Ref Range: >60 mL/min/1.7m2 46 (L) 50 (L)   Calcium Latest Ref Range: 8.5 - 10.1 mg/dL 8.3 (L) 8.8   Anion Gap Latest Ref Range: 3 - 14 mmol/L 6 4   Albumin Latest Ref Range: 3.4 - 5.0 g/dL 3.3 (L) 3.6   Protein Total Latest Ref Range: 6.8 - 8.8 g/dL 7.2 7.2   Bilirubin Total Latest Ref Range: 0.2 - 1.3 mg/dL 0.4 0.5   Alkaline Phosphatase Latest Ref Range: 40 - 150 U/L 107 95   ALT Latest Ref Range: 0 - 70 U/L 31 28   AST Latest Ref Range: 0 - 45 U/L 28 28   Glucose Latest Ref Range: 70 - 99 mg/dL 93 77   WBC Latest Ref Range: 4.0 - 11.0 10e9/L 5.5 5.9   Hemoglobin Latest Ref Range: 13.3 - 17.7 g/dL 13.3 14.5   Hematocrit Latest Ref Range: 40.0 - 53.0 % 42.8 46.5   Platelet Count Latest Ref Range: 150 - 450 10e9/L 115 (L) 99 (L)   RBC Count Latest Ref Range: 4.4 - 5.9 10e12/L 4.27 (L) 4.61   MCV Latest Ref Range: 78 - 100 fl 100 101 (H)   MCH Latest Ref Range: 26.5 - 33.0 pg 31.1 31.5   MCHC Latest Ref Range: 31.5 - 36.5 g/dL 31.1 (L) 31.2 (L)   RDW Latest Ref Range: 10.0 - 15.0 % 12.6 12.7   Diff Method Unknown Automated Method Automated Method   % Neutrophils Latest Units: % 65.5 72.4   % Lymphocytes Latest Units: % 18.9 14.8   % Monocytes Latest Units: % 13.6 11.3   % Eosinophils Latest Units: % 1.1 1.0   % Basophils Latest Units: % 0.7 0.3   % Immature Granulocytes Latest Units: % 0.2 0.2   Absolute Neutrophil Latest Ref Range: 1.6 - 8.3 10e9/L 3.6 4.2   Absolute Lymphocytes Latest Ref Range: 0.8 - 5.3 10e9/L 1.0 0.9   Absolute Monocytes Latest Ref Range: 0.0 - 1.3 10e9/L 0.7 0.7   Absolute Eosinophils Latest Ref Range: 0.0 - 0.7  10e9/L 0.1 0.1   Absolute Basophils Latest Ref Range: 0.0 - 0.2 10e9/L 0.0 0.0   Abs Immature Granulocytes Latest Ref Range: 0 - 0.4 10e9/L 0.0 0.0     Assessment and Plan:   Stage IV Lung Cancer-Pt has been taking Tagrisso 80 mg po daily over 2 cycles now and has very few side effects noted with previous fatigue and dry skin now improved or resolved. He meets goals to continue with treatment.   Imaging is set for after 3 cycles of the plan on November 1 and is now set to see Dr Ahumada in 1 month.    Thrombocytopenia- Platelets=99,000- will continue to monitor.  Fatigue- improved- encouraged daily exercise and adequate fluid intake.   Ckd, stage 3-Creatinine is elevated but improved from previous visit and considered in his normal range. We will continue to follow with no adjustment to plan necessary.    This was a 25 min visit with > 50% in counseling and coordinating care including education and management of concerns.    Lazara Leon CNP      Again, thank you for allowing me to participate in the care of your patient.        Sincerely,        Lazara Leon, NP, APRN CNP

## 2018-10-15 NOTE — MR AVS SNAPSHOT
After Visit Summary   10/15/2018    Gilson Curtis    MRN: [id number]           Patient Information     Date Of Birth          1940        Visit Information        Provider Department      10/15/2018 11:30 AM NURSE ONLY CANCER CENTER [facility]        Today's Diagnoses     Encounter for monitoring cardiotoxic drug therapy    -  1    Non-small cell cancer of right lung (H)           Follow-ups after your visit        Your next 10 appointments already scheduled     Nov 01, 2018  9:15 AM CDT   CT CHEST/ABDOMEN/PELVIS W CONTRAST with MGCT1   [facility] ([facility])    [id number] [place] [id number] [phone]           How do I prepare for my exam? (Food and drink instructions) To prepare: Do not eat or drink for 2 hours before your exam. If you need to take medicine, you may take it with small sips of water. (We may ask you to take liquid medicine as well.)  How do I prepare for my exam? (Other instructions) Please arrive 30 minutes early for your CT.  Once in the department you might be asked to drink water 15-20 minutes prior to your exam.  If indicated you may be asked to drink an oral contrast in advance of your CT.  If this is the case, the imaging team will let you know or be in contact with you prior to your appointment  Patients over 70 or patients with diabetes or kidney problems: If you haven t had a blood test (creatinine test) within the last 30 days, the Cardiologist/Radiologist may require you to get this test prior to your exam.  If you have diabetes:  Continue to take your metformin medication on the day of your exam  What should I wear: Please wear loose clothing, such as a sweat suit or jogging clothes. Avoid snaps, zippers and other metal. We may ask you to undress and put on a hospital gown.  How long does the exam take: Most scans take less than 20 minutes.  What should I bring: Please bring any scans or  X-rays taken at other hospitals, if similar tests were done. Also bring a list of your medicines, including vitamins, minerals and over-the-counter drugs. It is safest to leave personal items at home.  Do I need a : No  is needed.  What do I need to tell my doctor? Be sure to tell your doctor: * If you have any allergies. * If there s any chance you are pregnant. * If you are breastfeeding.  What should I do after the exam: No restrictions, You may resume normal activities.  What is this test: A CT (computed tomography) scan is a series of pictures that allows us to look inside your body. The scanner creates images of the body in cross sections, much like slices of bread. This helps us see any problems more clearly. You may receive contrast (X-ray dye) before or during your scan. You will be asked to drink the contrast.  Who should I call with questions: If you have any questions, please call the Imaging Department where you will have your exam. Directions, parking instructions, and other information is available on our website, [email or web address].            Nov 15, 2018  8:45 AM CST   LAB with LAB ONC [facility] ([facility])    [id number] [place] [id number] [phone]           Please do not eat 10-12 hours before your appointment if you are coming in fasting for labs on lipids, cholesterol, or glucose (sugar). This does not apply to pregnant women. Water, hot tea and black coffee (with nothing added) are okay. Do not drink other fluids, diet soda or chew gum.            Nov 15, 2018  9:00 AM CST   Return Visit with [facility]URSE ONLY CANCER CENTER   [facility] ([facility])    [address] [place] [address]   [phone]            Nov 15, 2018  9:30 AM CST   Return Visit with Gerry Ahumada MD   [facility] ([facility])    [address] [place] [address]  [place] [id number] [phone]              Who to contact     If you have questions or need follow up information about today's clinic visit or your schedule please contact [facility] directly at [phone].  Normal or non-critical lab and imaging results will be communicated to you by Repligenhart, letter or phone within 4 business days after the clinic has received the results. If you do not hear from us within 7 days, please contact the clinic through Repligenhart or phone. If you have a critical or abnormal lab result, we will notify you by phone as soon as possible.  Submit refill requests through Nimia or call your pharmacy and they will forward the refill request to us. Please allow 3 business days for your refill to be completed.          Additional Information About Your Visit        Nimia Information     Nimia gives you secure access to your electronic health record. If you see a primary care provider, you can also send messages to your care team and make appointments. If you have questions, please call your primary care clinic.  If you do not have a primary care provider, please call [phone] and they will assist you.      Nimia is an electronic gateway that provides easy, online access to your medical records. With Nimia, you can request a clinic appointment, read your test results, renew a prescription or communicate with your care team.     To access your existing account, please contact your [facility] or call [phone] for assistance.        Care EveryWhere ID     This is your Care EveryWhere ID. This could be used by other organizations to access your [facility] medical records  [id number]         Blood Pressure from Last 3 Encounters:   10/15/18 133/71   09/11/18 117/68   08/06/18 124/74    Weight from Last 3 Encounters:   10/15/18 75.8 kg (167 lb)   09/11/18 73.7 kg (162 lb 9 oz)   08/06/18 74.8 kg (165 lb)              Today, you had  the following     No orders found for display       Primary Care Provider Office Phone # Fax #    Hemant Jiménez [phone]       [address] [facility] [address]        Equal Access to Services     CAMRYN POSEY : Hadii anthony ku esthero Sotramaineali, waaxda luqadaha, qaybta kaalmada adeegyada, waxdagoberto personn nelly cuellar laSeamakeda giordano. So [facility] [phone].    ATENCIÓN: Si habla español, tiene a tran disposición servicios gratuitos de asistencia lingüística. Llame al [phone].    We comply with applicable federal civil rights laws and [place] laws. We do not discriminate on the basis of race, color, national origin, age, disability, sex, sexual orientation, or gender identity.            Thank you!     Thank you for choosing [facility]  for your care. Our goal is always to provide you with excellent care. Hearing back from our patients is one way we can continue to improve our services. Please take a few minutes to complete the written survey that you may receive in the mail after your visit with us. Thank you!             Your Updated Medication List - Protect others around you: Learn how to safely use, store and throw away your medicines at www.disposemymeds.org.          This list is accurate as of 10/15/18  1:26 PM.  Always use your most recent med list.                   Brand Name Dispense Instructions for use Diagnosis    aspirin 81 MG tablet      Take 1 tablet by mouth At Bedtime        calcium carbonate 500 MG chewable tablet    TUMS     Take 1 chew tab by mouth as needed for heartburn        clindamycin 1 % topical gel    CLINDAMAX    60 g    Apply topically 2 times daily    Drug rash, Malignant neoplasm of upper lobe of right lung (H)       GABAPENTIN PO      Take 150 mg by mouth At Bedtime        hydrocortisone 1 % ointment      Apply topically daily        lisinopril 2.5 MG tablet    PRINIVIL/Zestril     Take 2.5 mg by mouth daily.        LORazepam 0.5  MG tablet    ATIVAN    30 tablet    Take 1 tablet (0.5 mg) by mouth every 4 hours as needed (Anxiety, Nausea/Vomiting or Sleep)    Non-small cell cancer of right lung (H)       * MATURE ADULT CENTURY PO      Take 1 tablet by mouth daily OTC        * EYE VITAMINS PO      Take 1 capsule by mouth daily OTC        nystatin 740699 UNIT/ML suspension    MYCOSTATIN    280 mL    Take 5 mLs (500,000 Units) by mouth 4 times daily    Non-small cell cancer of right lung (H), Oral thrush       * osimertinib 80 MG tablet    TAGRISSO    30 tablet    Take 1 tablet (80 mg) by mouth daily    Encounter for monitoring cardiotoxic drug therapy, Non-small cell cancer of right lung (H)       * osimertinib 80 MG tablet    TAGRISSO    30 tablet    Take 1 tablet (80 mg) by mouth daily    Encounter for monitoring cardiotoxic drug therapy, Non-small cell cancer of right lung (H)       prochlorperazine 10 MG tablet    COMPAZINE    30 tablet    Take 0.5 tablets (5 mg) by mouth every 6 hours as needed (Nausea/Vomiting)    Non-small cell cancer of right lung (H)       ranitidine 150 MG capsule    ZANTAC     Take 150 mg by mouth 2 times daily        simvastatin 80 MG tablet    ZOCOR          * Notice:  This list has 4 medication(s) that are the same as other medications prescribed for you. Read the directions carefully, and ask your doctor or other care provider to review them with you.

## 2018-10-15 NOTE — PROGRESS NOTES
"Oncology Follow Up Visit: October 15, 2018     Oncologist: Dr Gerry Ahumada  PCP: Hemant Jiménez    Diagnosis: Stage IV Lung Cancer  Gilson Curtis is an 79 yo who complained of hemoptysis in 11/2011 and was found to have a 2 x 1.9 cm RUL mass. 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 Curtis comes to clinic for review after 2nd cycle of Tagrisso. Pt shares he has been feeling well with improved energy+ less fatigue- he was able to finish a brake job on his vehicle with intermittent rest periods. Dry skin issues much improved with using lotion after bathing. He denies any pain, nausea, SOB, fevers or illness. Bowel and bladder are normal. Continues with fluconozole.  Rest of comprehensive and complete ROS is reviewed and is negative.   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     CAD (coronary artery disease)      CKD (chronic kidney disease) stage 3, GFR 30-59 ml/min (H)      Fatigue     \"spells\"     Hypertension      Lung cancer (H) 2016     Non-small cell lung cancer (H)      Prostate cancer (H) 2009     Skin cancer, basal cell      Stented coronary artery 2009    x2     TIA (transient ischemic attack) 2002     Current Outpatient Prescriptions   Medication     aspirin 81 MG tablet     calcium carbonate (TUMS) 500 MG chewable tablet     GABAPENTIN PO     lisinopril (PRINIVIL,ZESTRIL) 2.5 MG tablet     Multiple Vitamins-Minerals (EYE VITAMINS PO)     Multiple Vitamins-Minerals (MATURE ADULT CENTURY PO)     osimertinib (TAGRISSO) 80 MG " "tablet     osimertinib (TAGRISSO) 80 MG tablet     simvastatin (ZOCOR) 80 MG tablet     clindamycin (CLINDAMAX) 1 % gel     hydrocortisone 1 % ointment     LORazepam (ATIVAN) 0.5 MG tablet     nystatin (MYCOSTATIN) 539439 UNIT/ML suspension     prochlorperazine (COMPAZINE) 10 MG tablet     ranitidine (ZANTAC) 150 MG capsule     No current facility-administered medications for this visit.      Facility-Administered Medications Ordered in Other Visits   Medication     ropivacaine 0.2% 550 mL in ON-Q C-Bloc select flow ( holds 400-550 mL) single cath disposable pump     Allergies   Allergen Reactions     No Known Drug Allergy        Physical Exam:/71 (BP Location: Right arm)  Pulse 63  Temp 97.8  F (36.6  C) (Oral)  Resp 16  Ht 1.753 m (5' 9\")  Wt 75.8 kg (167 lb)  SpO2 98%  BMI 24.66 kg/m2   EKG completed with ERn=432  ECOG PS- 0  Constitutional: Alert, cooperative, and in no distress.   ENT: Eyes bright, No mouth sores  Neck: Supple, No adenopathy.  Cardiac: Heart rate and rhythm is regular and strong without murmur  Respiratory: Breathing easy. Lung sounds clear to auscultation  GI: Abdomen is soft, non-tender, BS normal. No masses or organomegaly  MS: Muscle tone normal, extremities normal with no edema.   Skin: alejandro complexion to the face but no rash. Arms and legs which had the dry flakey skin is now resolved with regular lotioning.   Neuro: Sensory grossly WNL, gait normal.   Lymph: Normal ant/post cervical, axillary, supraclavicular nodes  Psych: Mentation appears normal and affect normal/bright with good conversation.     Laboratory Results:    Ref. Range 9/11/2018 11:04 10/15/2018 11:43   Sodium Latest Ref Range: 133 - 144 mmol/L 143 143   Potassium Latest Ref Range: 3.4 - 5.3 mmol/L 4.4 4.7   Chloride Latest Ref Range: 94 - 109 mmol/L 109 110 (H)   Carbon Dioxide Latest Ref Range: 20 - 32 mmol/L 28 29   Urea Nitrogen Latest Ref Range: 7 - 30 mg/dL 26 23   Creatinine Latest Ref Range: 0.66 " - 1.25 mg/dL 1.74 (H) 1.62 (H)   GFR Estimate Latest Ref Range: >60 mL/min/1.7m2 38 (L) 41 (L)   GFR Estimate If Black Latest Ref Range: >60 mL/min/1.7m2 46 (L) 50 (L)   Calcium Latest Ref Range: 8.5 - 10.1 mg/dL 8.3 (L) 8.8   Anion Gap Latest Ref Range: 3 - 14 mmol/L 6 4   Albumin Latest Ref Range: 3.4 - 5.0 g/dL 3.3 (L) 3.6   Protein Total Latest Ref Range: 6.8 - 8.8 g/dL 7.2 7.2   Bilirubin Total Latest Ref Range: 0.2 - 1.3 mg/dL 0.4 0.5   Alkaline Phosphatase Latest Ref Range: 40 - 150 U/L 107 95   ALT Latest Ref Range: 0 - 70 U/L 31 28   AST Latest Ref Range: 0 - 45 U/L 28 28   Glucose Latest Ref Range: 70 - 99 mg/dL 93 77   WBC Latest Ref Range: 4.0 - 11.0 10e9/L 5.5 5.9   Hemoglobin Latest Ref Range: 13.3 - 17.7 g/dL 13.3 14.5   Hematocrit Latest Ref Range: 40.0 - 53.0 % 42.8 46.5   Platelet Count Latest Ref Range: 150 - 450 10e9/L 115 (L) 99 (L)   RBC Count Latest Ref Range: 4.4 - 5.9 10e12/L 4.27 (L) 4.61   MCV Latest Ref Range: 78 - 100 fl 100 101 (H)   MCH Latest Ref Range: 26.5 - 33.0 pg 31.1 31.5   MCHC Latest Ref Range: 31.5 - 36.5 g/dL 31.1 (L) 31.2 (L)   RDW Latest Ref Range: 10.0 - 15.0 % 12.6 12.7   Diff Method Unknown Automated Method Automated Method   % Neutrophils Latest Units: % 65.5 72.4   % Lymphocytes Latest Units: % 18.9 14.8   % Monocytes Latest Units: % 13.6 11.3   % Eosinophils Latest Units: % 1.1 1.0   % Basophils Latest Units: % 0.7 0.3   % Immature Granulocytes Latest Units: % 0.2 0.2   Absolute Neutrophil Latest Ref Range: 1.6 - 8.3 10e9/L 3.6 4.2   Absolute Lymphocytes Latest Ref Range: 0.8 - 5.3 10e9/L 1.0 0.9   Absolute Monocytes Latest Ref Range: 0.0 - 1.3 10e9/L 0.7 0.7   Absolute Eosinophils Latest Ref Range: 0.0 - 0.7 10e9/L 0.1 0.1   Absolute Basophils Latest Ref Range: 0.0 - 0.2 10e9/L 0.0 0.0   Abs Immature Granulocytes Latest Ref Range: 0 - 0.4 10e9/L 0.0 0.0     Assessment and Plan:   Stage IV Lung Cancer-Pt has been taking Tagrisso 80 mg po daily over 2 cycles now and  has very few side effects noted with previous fatigue and dry skin now improved or resolved. He meets goals to continue with treatment.   Imaging is set for after 3 cycles of the plan on November 1 and is now set to see Dr Ahumada in 1 month.    Thrombocytopenia- Platelets=99,000- will continue to monitor.  Fatigue- improved- encouraged daily exercise and adequate fluid intake.   Ckd, stage 3-Creatinine is elevated but improved from previous visit and considered in his normal range. We will continue to follow with no adjustment to plan necessary.    This was a 25 min visit with > 50% in counseling and coordinating care including education and management of concerns.    Lazara Leon,CNP

## 2018-10-15 NOTE — NURSING NOTE
"Oncology Rooming Note    October 15, 2018 12:11 PM   Gilson Curtis is a 78 year old male who presents for:    Chief Complaint   Patient presents with     Oncology Clinic Visit     1 month follow up     Initial Vitals: /71 (BP Location: Right arm)  Pulse 63  Temp 97.8  F (36.6  C) (Oral)  Resp 16  Ht 1.753 m (5' 9\")  Wt 75.8 kg (167 lb)  SpO2 98%  BMI 24.66 kg/m2 Estimated body mass index is 24.66 kg/(m^2) as calculated from the following:    Height as of this encounter: 1.753 m (5' 9\").    Weight as of this encounter: 75.8 kg (167 lb). Body surface area is 1.92 meters squared.  No Pain (0) Comment: Data Unavailable   No LMP for male patient.  Allergies reviewed: Yes  Medications reviewed: Yes    Medications: Medication refills not needed today.  Pharmacy name entered into Theracos: [facility] [address] [place] [address]     5 minutes for nursing intake (face to face time)     Pretty Collins LPN              "

## 2018-10-26 DIAGNOSIS — C34.91 NON-SMALL CELL CANCER OF RIGHT LUNG (H): ICD-10-CM

## 2018-10-26 DIAGNOSIS — Z79.899 ENCOUNTER FOR MONITORING CARDIOTOXIC DRUG THERAPY: Primary | ICD-10-CM

## 2018-10-26 DIAGNOSIS — Z51.81 ENCOUNTER FOR MONITORING CARDIOTOXIC DRUG THERAPY: Primary | ICD-10-CM

## 2018-10-29 DIAGNOSIS — Z79.899 ENCOUNTER FOR MONITORING CARDIOTOXIC DRUG THERAPY: Primary | ICD-10-CM

## 2018-10-29 DIAGNOSIS — C34.91 NON-SMALL CELL CANCER OF RIGHT LUNG (H): ICD-10-CM

## 2018-10-29 DIAGNOSIS — Z51.81 ENCOUNTER FOR MONITORING CARDIOTOXIC DRUG THERAPY: Primary | ICD-10-CM

## 2018-11-01 ENCOUNTER — RADIANT APPOINTMENT (OUTPATIENT)
Dept: NUCLEAR MEDICINE | Facility: CLINIC | Age: 78
End: 2018-11-01
Attending: INTERNAL MEDICINE
Payer: COMMERCIAL

## 2018-11-01 ENCOUNTER — RADIANT APPOINTMENT (OUTPATIENT)
Dept: CT IMAGING | Facility: CLINIC | Age: 78
End: 2018-11-01
Attending: INTERNAL MEDICINE
Payer: COMMERCIAL

## 2018-11-01 DIAGNOSIS — C34.80 MALIGNANT NEOPLASM OF TRACHEA, BRONCHUS, AND LUNG (H): ICD-10-CM

## 2018-11-01 DIAGNOSIS — C33 MALIGNANT NEOPLASM OF TRACHEA, BRONCHUS, AND LUNG (H): ICD-10-CM

## 2018-11-01 PROCEDURE — 74177 CT ABD & PELVIS W/CONTRAST: CPT | Performed by: RADIOLOGY

## 2018-11-01 PROCEDURE — A9560 TC99M LABELED RBC: HCPCS | Performed by: INTERNAL MEDICINE

## 2018-11-01 PROCEDURE — 78472 GATED HEART PLANAR SINGLE: CPT

## 2018-11-01 PROCEDURE — 71260 CT THORAX DX C+: CPT | Performed by: RADIOLOGY

## 2018-11-01 RX ORDER — IOPAMIDOL 755 MG/ML
103 INJECTION, SOLUTION INTRAVASCULAR ONCE
Status: COMPLETED | OUTPATIENT
Start: 2018-11-01 | End: 2018-11-01

## 2018-11-01 RX ADMIN — IOPAMIDOL 103 ML: 755 INJECTION, SOLUTION INTRAVASCULAR at 11:38

## 2018-11-15 ENCOUNTER — TELEPHONE (OUTPATIENT)
Dept: INFUSION THERAPY | Facility: CLINIC | Age: 78
End: 2018-11-15

## 2018-11-15 ENCOUNTER — APPOINTMENT (OUTPATIENT)
Dept: ONCOLOGY | Facility: CLINIC | Age: 78
End: 2018-11-15
Payer: COMMERCIAL

## 2018-11-15 ENCOUNTER — ONCOLOGY VISIT (OUTPATIENT)
Dept: ONCOLOGY | Facility: CLINIC | Age: 78
End: 2018-11-15
Payer: COMMERCIAL

## 2018-11-15 VITALS
BODY MASS INDEX: 24.73 KG/M2 | SYSTOLIC BLOOD PRESSURE: 135 MMHG | HEIGHT: 69 IN | OXYGEN SATURATION: 99 % | DIASTOLIC BLOOD PRESSURE: 78 MMHG | WEIGHT: 167 LBS | RESPIRATION RATE: 16 BRPM | TEMPERATURE: 97.9 F | HEART RATE: 61 BPM

## 2018-11-15 DIAGNOSIS — Z51.81 ENCOUNTER FOR MONITORING CARDIOTOXIC DRUG THERAPY: Primary | ICD-10-CM

## 2018-11-15 DIAGNOSIS — C34.91 NON-SMALL CELL CANCER OF RIGHT LUNG (H): ICD-10-CM

## 2018-11-15 DIAGNOSIS — Z79.899 ENCOUNTER FOR MONITORING CARDIOTOXIC DRUG THERAPY: Primary | ICD-10-CM

## 2018-11-15 LAB
ALBUMIN SERPL-MCNC: 3.7 G/DL (ref 3.4–5)
ALP SERPL-CCNC: 85 U/L (ref 40–150)
ALT SERPL W P-5'-P-CCNC: 25 U/L (ref 0–70)
ANION GAP SERPL CALCULATED.3IONS-SCNC: 5 MMOL/L (ref 3–14)
AST SERPL W P-5'-P-CCNC: 26 U/L (ref 0–45)
BASOPHILS # BLD AUTO: 0 10E9/L (ref 0–0.2)
BASOPHILS NFR BLD AUTO: 0.4 %
BILIRUB SERPL-MCNC: 0.8 MG/DL (ref 0.2–1.3)
BUN SERPL-MCNC: 28 MG/DL (ref 7–30)
CALCIUM SERPL-MCNC: 8.6 MG/DL (ref 8.5–10.1)
CHLORIDE SERPL-SCNC: 108 MMOL/L (ref 94–109)
CO2 SERPL-SCNC: 28 MMOL/L (ref 20–32)
CREAT SERPL-MCNC: 1.71 MG/DL (ref 0.66–1.25)
DIFFERENTIAL METHOD BLD: ABNORMAL
EOSINOPHIL # BLD AUTO: 0.1 10E9/L (ref 0–0.7)
EOSINOPHIL NFR BLD AUTO: 1.4 %
ERYTHROCYTE [DISTWIDTH] IN BLOOD BY AUTOMATED COUNT: 12.8 % (ref 10–15)
GFR SERPL CREATININE-BSD FRML MDRD: 39 ML/MIN/1.7M2
GLUCOSE SERPL-MCNC: 98 MG/DL (ref 70–99)
HCT VFR BLD AUTO: 48.3 % (ref 40–53)
HGB BLD-MCNC: 15 G/DL (ref 13.3–17.7)
IMM GRANULOCYTES # BLD: 0 10E9/L (ref 0–0.4)
IMM GRANULOCYTES NFR BLD: 0.4 %
LYMPHOCYTES # BLD AUTO: 1 10E9/L (ref 0.8–5.3)
LYMPHOCYTES NFR BLD AUTO: 20.2 %
MCH RBC QN AUTO: 30.9 PG (ref 26.5–33)
MCHC RBC AUTO-ENTMCNC: 31.1 G/DL (ref 31.5–36.5)
MCV RBC AUTO: 99 FL (ref 78–100)
MONOCYTES # BLD AUTO: 0.7 10E9/L (ref 0–1.3)
MONOCYTES NFR BLD AUTO: 13.6 %
NEUTROPHILS # BLD AUTO: 3.2 10E9/L (ref 1.6–8.3)
NEUTROPHILS NFR BLD AUTO: 64 %
PLATELET # BLD AUTO: 92 10E9/L (ref 150–450)
POTASSIUM SERPL-SCNC: 4.4 MMOL/L (ref 3.4–5.3)
PROT SERPL-MCNC: 7.1 G/DL (ref 6.8–8.8)
RBC # BLD AUTO: 4.86 10E12/L (ref 4.4–5.9)
SODIUM SERPL-SCNC: 141 MMOL/L (ref 133–144)
WBC # BLD AUTO: 5 10E9/L (ref 4–11)

## 2018-11-15 PROCEDURE — 80053 COMPREHEN METABOLIC PANEL: CPT | Performed by: INTERNAL MEDICINE

## 2018-11-15 PROCEDURE — 99214 OFFICE O/P EST MOD 30 MIN: CPT | Performed by: INTERNAL MEDICINE

## 2018-11-15 PROCEDURE — 36415 COLL VENOUS BLD VENIPUNCTURE: CPT | Performed by: INTERNAL MEDICINE

## 2018-11-15 PROCEDURE — 85025 COMPLETE CBC W/AUTO DIFF WBC: CPT | Performed by: INTERNAL MEDICINE

## 2018-11-15 ASSESSMENT — PAIN SCALES - GENERAL: PAINLEVEL: NO PAIN (0)

## 2018-11-15 NOTE — PROGRESS NOTES
"[facility]  HEMATOLOGY AND ONCOLOGY    FOLLOW-UP VISIT NOTE    PATIENT NAME: Gilson Curtis MRN # [id number]  DATE OF VISIT: Nov 15, 2018 YOB: 1940    REFERRING PROVIDER: No referring provider defined for this encounter.    CANCER TYPE: Non small cell lung cancer - adenocarcinoma  STAGE: IV    TREATMENT SUMMARY:  Gilson presented with with hemoptysis starting in November 2011. He underwent CT of chest on Jan 18, 2012 at his primary care clinic which showed a RUL mass 2 x 1.9 cm with no suspicious lymphadenopathy or adrenal masses. 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 He was staged with a PET-CT and 2 lesions were noted. He was staged as T3N0 disease. He had surgical resection of the nodules in RUL and RML. EGFR exon 21 deletion.  He has been on Tarceva since June 2016 and is tolerating it quite well. Dose was decreased from 150 mg daily to 100 mg daily in July 2017 due to rash.     CURRENT INTERVENTIONS:  osimertinib (Tagrisso)    SUBJECTIVE   Gilson Curtis is being followed for metastatic lung adenocarcinoma    Gilson has been on osimertinib for the last 3 months. He has diminished energy/fatigue. He also has a dry cough. Otherwise he is doing well.       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     CAD (coronary artery disease)      CKD (chronic kidney disease) stage 3, GFR 30-59 ml/min (H)      Fatigue     \"spells\"     Hypertension      Lung cancer (H) 2016     Non-small cell lung cancer (H)      Prostate cancer (H) 2009     Skin cancer, basal cell      Stented coronary artery 2009    x2     TIA (transient ischemic attack) 2002         CURRENT OUTPATIENT MEDICATIONS     Current Outpatient Prescriptions   Medication Sig     aspirin 81 MG tablet Take 1 tablet by mouth At Bedtime      calcium carbonate " (TUMS) 500 MG chewable tablet Take 1 chew tab by mouth as needed for heartburn     GABAPENTIN PO Take 150 mg by mouth At Bedtime      lisinopril (PRINIVIL,ZESTRIL) 2.5 MG tablet Take 2.5 mg by mouth daily.     Multiple Vitamins-Minerals (EYE VITAMINS PO) Take 1 capsule by mouth daily OTC     Multiple Vitamins-Minerals (MATURE ADULT CENTURY PO) Take 1 tablet by mouth daily OTC     osimertinib (TAGRISSO) 80 MG tablet Take 1 tablet (80 mg) by mouth daily     simvastatin (ZOCOR) 80 MG tablet      clindamycin (CLINDAMAX) 1 % gel Apply topically 2 times daily (Patient not taking: Reported on 8/6/2018)     hydrocortisone 1 % ointment Apply topically daily      LORazepam (ATIVAN) 0.5 MG tablet Take 1 tablet (0.5 mg) by mouth every 4 hours as needed (Anxiety, Nausea/Vomiting or Sleep) (Patient not taking: Reported on 9/11/2018)     nystatin (MYCOSTATIN) 299425 UNIT/ML suspension Take 5 mLs (500,000 Units) by mouth 4 times daily (Patient not taking: Reported on 9/11/2018)     osimertinib (TAGRISSO) 80 MG tablet Take 1 tablet (80 mg) by mouth daily (Patient not taking: Reported on 11/15/2018)     osimertinib (TAGRISSO) 80 MG tablet Take 1 tablet (80 mg) by mouth daily (Patient not taking: Reported on 11/15/2018)     prochlorperazine (COMPAZINE) 10 MG tablet Take 0.5 tablets (5 mg) by mouth every 6 hours as needed (Nausea/Vomiting) (Patient not taking: Reported on 9/11/2018)     ranitidine (ZANTAC) 150 MG capsule Take 150 mg by mouth 2 times daily      No current facility-administered medications for this visit.      Facility-Administered Medications Ordered in Other Visits   Medication     ropivacaine 0.2% 550 mL in ON-Q C-Bloc select flow ( holds 400-550 mL) single cath disposable pump        ALLERGIES      Allergies   Allergen Reactions     No Known Drug Allergy         REVIEW OF SYSTEMS   As above in the HPI, o/w complete 12-point ROS was negative.     PHYSICAL EXAM   /78  Pulse 61  Temp 97.9  F (36.6  C)   "Resp 16  Ht 1.753 m (5' 9\")  Wt 75.8 kg (167 lb)  SpO2 99%  BMI 24.66 kg/m2  GEN: NAD  HEENT: PERRL, EOMI, no icterus, injection or pallor. Oropharynx is clear.  LYMPHATICs: no cervical or supraclavicular lymphadenopathy; no other abn lymphadenopathy  PULMONARY: clear with good air entry bilaterally  CARDIOVASCULAR: regular, no murmurs, rubs, or gallops  GASTROINTESTINAL: soft, non-tender, non-distended, normal bowel sounds, no hepatosplenomegaly by percussion or palpation  MUSCULOSKELTAL: warm, well perfused, no edema  NEURO: awake, alert and oriented to time place and person, cranial nerves intact - II - XII, no focal neurologic deficits  SKIN: no rashes     LABORATORY AND IMAGING STUDIES     Recent Labs   Lab Test  11/15/18   0836  10/15/18   1143  09/11/18   1104  08/02/18   0948  05/03/18   1019   NA  141  143  143  141  142   POTASSIUM  4.4  4.7  4.4  4.5  4.4   CHLORIDE  108  110*  109  107  109   CO2  28  29  28  28  29   ANIONGAP  5  4  6  6  4   BUN  28  23  26  19  26   CR  1.71*  1.62*  1.74*  1.47*  1.61*   GLC  98  77  93  102*  101*   RICK  8.6  8.8  8.3*  8.8  8.8     Recent Labs   Lab Test  10/10/17   1033  07/11/17   1037  03/06/17   1000  01/11/17   1000  12/15/16   1055   MAG  2.0  1.9  2.1  2.3  2.0   PHOS  2.3*  2.9  2.7  2.0*  2.7     Recent Labs   Lab Test  11/15/18   0836  10/15/18   1143  09/11/18   1104  08/02/18   0948  05/03/18   1019   WBC  5.0  5.9  5.5  6.4  7.1   HGB  15.0  14.5  13.3  16.3  15.9   PLT  92*  99*  115*  157  187   MCV  99  101*  100  99  99   NEUTROPHIL  64.0  72.4  65.5  67.7  59.5     Recent Labs   Lab Test  11/15/18   0836  10/15/18   1143  09/11/18   1104  08/02/18   0948   BILITOTAL  0.8  0.5  0.4  1.0   ALKPHOS  85  95  107  117   ALT  25  28  31  41   AST  26  28  28  35   ALBUMIN  3.7  3.6  3.3*  3.5   LDH   --    --    --   152     No results found for: TSH  No results for input(s): CEA in the last 39492 hours.  Results for orders placed or performed in " visit on 11/01/18   CT Chest/Abdomen/Pelvis w Contrast    Narrative    Examination:  CT CHEST/ABDOMEN/PELVIS W CONTRAST .     Indication:  Restaging non small cell lung cancer now on Tagrisso;  Malignant neoplasm of trachea, bronchus, and lung (H); Malignant  neoplasm of trachea, bronchus, and lung (H)     Comparison: 8/2/2018.    Technique: CT of Chest, abdomen and pelvis was acquired from thoracic  inlet to pubic symphysis with IV contrast and without oral contrast.  Images were reconstructed in axial and coronal sections. Images were  reviewed in lung, soft tissue, liver, bone windows.     Contrast: 103 ml isovue 370    Findings:   Chest: There is a decrease or resolution of multiple metastatic lung  nodules. For example left lower lobe lung nodule series 6 image 43 mm,  prior 5 mm. Right upper lobe lung nodule series 6 image 11, 1 mm  nodule, prior 5 mm. There is no axillary axillary, mediastinal or  hilar lymphadenopathy by size criteria. 1.5 cm retroesophageal lymph  node series 2 image 53 is unchanged. Small hiatal hernia.    Abdomen pelvis: The liver and biliary tree are unremarkable.  Cholecystectomy clips. Partially fatty replaced pancreas. Splenic  granulomata unchanged. The adrenal glands are normal in appearance.  Right kidney is atrophic. Peripelvic cysts in the left kidney,  unchanged 7 mm nodule extending off the left kidney series 2 image 68  unchanged.     Portal vein, celiac access, SMA, are patent.    The bladder is unremarkable. The colon and small bowel are normal in  caliber. There is no abdominal or pelvic lymphadenopathy by size  criteria.    No suspicious bony abnormalities.      Impression    Impression: In this patient with non-small cell lung cancer on  Tagrisso, there is positive response to therapy.  Decrease or  resolution of multiple lung nodules.    PEDRITO ARIAS MD          ASSESSMENT AND PLAN   Non small cell lung cancer - adenocarcinoma with EGFR mutation (exxon 21  deletion)   On erlotinib since June 2016  ECOG PS 1  HTN, CKD, CAD    Zane is tolerating osimertinib well at the 80 mg daily dose. He has cough and fatigue. I am not sure about the cause of his fatigue. He had fatigue on erlotinib and this could be related to the drug. He has normal Hgb - 15 g/dl. His LVEF was 61% at the last check - 2 weeks ago.     He otherwise has no new complains on this medication. I have reviewed his EKG from last month - it shows normal sinus rhythm, 65 bpm,  msec and QTc 438 msec, no ST-T changes. We will skip the EKG this time.     I have reviewed actual images from his restaging scans and he has a good treatment response. I reviewed this with him and he is happy with the response.     Due to his fatigue, I will recommend that we hold it for 5-7 days till next week and he can restart after his Thanksgivings dinner.     His wife has been newly diagnosed with multiple myeloma and follows with Dr. Misti Mckeon at [facility].     I will see him again in 3 months with labs and scans prior to visit. He will be followed by pharmacy team for his monitoring on therapy    Over 25 min of direct face to face time spent with patient with more than 50% time spent in counseling and coordinating care.

## 2018-11-15 NOTE — MR AVS SNAPSHOT
After Visit Summary   11/15/2018    Gilson Curtis    MRN: [id number]           Patient Information     Date Of Birth          1940        Visit Information        Provider Department      11/15/2018 9:30 AM Gerry Ahumada MD [facility]        Today's Diagnoses     Encounter for monitoring cardiotoxic drug therapy    -  1    Non-small cell cancer of right lung (H)           Follow-ups after your visit        Your next 10 appointments already scheduled     Feb 07, 2019  9:15 AM CST   LAB with LAB ONC [facility] ([facility])    [address] [place] [id number]   [phone]           Please do not eat 10-12 hours before your appointment if you are coming in fasting for labs on lipids, cholesterol, or glucose (sugar). This does not apply to pregnant women. Water, hot tea and black coffee (with nothing added) are okay. Do not drink other fluids, diet soda or chew gum.            Feb 07, 2019 10:00 AM CST   Return Visit with Gerry Ahumada MD   [facility] ([facility])    [address] [place] [id number] [phone]              Future tests that were ordered for you today     Open Future Orders        Priority Expected Expires Ordered    CT Chest/Abdomen/Pelvis w Contrast Routine  11/15/2019 11/15/2018    CBC with platelets differential Routine  11/15/2019 11/15/2018    Comprehensive metabolic panel Routine  11/15/2019 11/15/2018    Lactate Dehydrogenase Routine  11/15/2019 11/15/2018            Who to contact     If you have questions or need follow up information about today's clinic visit or your schedule please contact [facility] directly at [phone].  Normal or non-critical lab and imaging results will be communicated to you by MyChart, letter or phone within 4 business days after the clinic has received the results. If you do not hear  "from us within 7 days, please contact the clinic through [email or web address] or phone. If you have a critical or abnormal lab result, we will notify you by phone as soon as possible.  Submit refill requests through [email or web address] or call your pharmacy and they will forward the refill request to us. Please allow 3 business days for your refill to be completed.          Additional Information About Your Visit        CS DiscoharShahiya Information     [email or web address] gives you secure access to your electronic health record. If you see a primary care provider, you can also send messages to your care team and make appointments. If you have questions, please call your primary care clinic.  If you do not have a primary care provider, please call [phone] and they will assist you.      [email or web address] is an electronic gateway that provides easy, online access to your medical records. With [email or web address], you can request a clinic appointment, read your test results, renew a prescription or communicate with your care team.     To access your existing account, please contact your [facility] or call [phone] for assistance.        Care EveryWhere ID     This is your Care EveryWhere ID. This could be used by other organizations to access your [place] medical records  [id number]        Your Vitals Were     Pulse Temperature Respirations Height Pulse Oximetry BMI (Body Mass Index)    61 97.9  F (36.6  C) 16 1.753 m (5' 9\") 99% 24.66 kg/m2       Blood Pressure from Last 3 Encounters:   11/15/18 135/78   10/15/18 133/71   09/11/18 117/68    Weight from Last 3 Encounters:   11/15/18 75.8 kg (167 lb)   10/15/18 75.8 kg (167 lb)   09/11/18 73.7 kg (162 lb 9 oz)               Primary Care Provider Office Phone # Fax #    Hemant Jiménez [phone]       [address]   [place] [address]        Equal Access to Services     CAMRYN POSEY AH: Hadii anthony Akins, willa padgett, qaybta katono coffman " elva rodríguezchikis catinamichael hensley'aan ah. [facility] [phone].    ATENCIÓN: Si eladiola carlene, tiene a tran disposición servicios gratuitos de asistencia lingüística. Blayne tinsley [phone].    We comply with applicable federal civil rights laws and [place] laws. We do not discriminate on the basis of race, color, national origin, age, disability, sex, sexual orientation, or gender identity.            Thank you!     Thank you for choosing [facility]  for your care. Our goal is always to provide you with excellent care. Hearing back from our patients is one way we can continue to improve our services. Please take a few minutes to complete the written survey that you may receive in the mail after your visit with us. Thank you!             Your Updated Medication List - Protect others around you: Learn how to safely use, store and throw away your medicines at www.disposemymeds.org.          This list is accurate as of 11/15/18 10:16 AM.  Always use your most recent med list.                   Brand Name Dispense Instructions for use Diagnosis    aspirin 81 MG tablet      Take 1 tablet by mouth At Bedtime        calcium carbonate 500 MG chewable tablet    TUMS     Take 1 chew tab by mouth as needed for heartburn        clindamycin 1 % topical gel    CLINDAMAX    60 g    Apply topically 2 times daily    Drug rash, Malignant neoplasm of upper lobe of right lung (H)       GABAPENTIN PO      Take 150 mg by mouth At Bedtime        hydrocortisone 1 % ointment      Apply topically daily        lisinopril 2.5 MG tablet    PRINIVIL/Zestril     Take 2.5 mg by mouth daily.        LORazepam 0.5 MG tablet    ATIVAN    30 tablet    Take 1 tablet (0.5 mg) by mouth every 4 hours as needed (Anxiety, Nausea/Vomiting or Sleep)    Non-small cell cancer of right lung (H)       * MATURE ADULT CENTURY PO      Take 1 tablet by mouth daily OTC        * EYE VITAMINS PO      Take 1 capsule by mouth daily OTC        nystatin 401134  UNIT/ML suspension    MYCOSTATIN    280 mL    Take 5 mLs (500,000 Units) by mouth 4 times daily    Non-small cell cancer of right lung (H), Oral thrush       * osimertinib 80 MG tablet    TAGRISSO    30 tablet    Take 1 tablet (80 mg) by mouth daily    Encounter for monitoring cardiotoxic drug therapy, Non-small cell cancer of right lung (H)       * osimertinib 80 MG tablet    TAGRISSO    30 tablet    Take 1 tablet (80 mg) by mouth daily    Encounter for monitoring cardiotoxic drug therapy, Non-small cell cancer of right lung (H)       * osimertinib 80 MG tablet    TAGRISSO    30 tablet    Take 1 tablet (80 mg) by mouth daily    Encounter for monitoring cardiotoxic drug therapy, Non-small cell cancer of right lung (H)       prochlorperazine 10 MG tablet    COMPAZINE    30 tablet    Take 0.5 tablets (5 mg) by mouth every 6 hours as needed (Nausea/Vomiting)    Non-small cell cancer of right lung (H)       ranitidine 150 MG capsule    ZANTAC     Take 150 mg by mouth 2 times daily        simvastatin 80 MG tablet    ZOCOR          * Notice:  This list has 5 medication(s) that are the same as other medications prescribed for you. Read the directions carefully, and ask your doctor or other care provider to review them with you.

## 2018-11-15 NOTE — LETTER
"    11/15/2018         RE: Gilson Curtis  [address] [facility] [address]        Dear Colleague,    Thank you for referring your patient, Gilson Curtis, to the [facility]. Please see a copy of my visit note below.    [facility]  HEMATOLOGY AND ONCOLOGY    FOLLOW-UP VISIT NOTE    PATIENT NAME: Gilson Curtis MRN # [id number]  DATE OF VISIT: Nov 15, 2018 YOB: 1940    REFERRING PROVIDER: No referring provider defined for this encounter.    CANCER TYPE: Non small cell lung cancer - adenocarcinoma  STAGE: IV    TREATMENT SUMMARY:  Gilson presented with with hemoptysis starting in November 2011. He underwent CT of chest on Jan 18, 2012 at his primary care clinic which showed a RUL mass 2 x 1.9 cm with no suspicious lymphadenopathy or adrenal masses. 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 He was staged with a PET-CT and 2 lesions were noted. He was staged as T3N0 disease. He had surgical resection of the nodules in RUL and RML. EGFR exon 21 deletion.  He has been on Tarceva since June 2016 and is tolerating it quite well. Dose was decreased from 150 mg daily to 100 mg daily in July 2017 due to rash.     CURRENT INTERVENTIONS:  osimertinib (Tagrisso)    SUBJECTIVE   Gilson Curtis is being followed for metastatic lung adenocarcinoma    Gilson has been on osimertinib for the last 3 months. He has diminished energy/fatigue. He also has a dry cough. Otherwise he is doing well.       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     CAD (coronary artery disease)      CKD (chronic kidney disease) stage 3, GFR 30-59 ml/min (H)      Fatigue     \"spells\"     Hypertension      Lung cancer (H) 2016     Non-small cell lung cancer (H)      Prostate cancer (H) 2009     Skin cancer, basal cell      " Stented coronary artery 2009    x2     TIA (transient ischemic attack) 2002         CURRENT OUTPATIENT MEDICATIONS     Current Outpatient Prescriptions   Medication Sig     aspirin 81 MG tablet Take 1 tablet by mouth At Bedtime      calcium carbonate (TUMS) 500 MG chewable tablet Take 1 chew tab by mouth as needed for heartburn     GABAPENTIN PO Take 150 mg by mouth At Bedtime      lisinopril (PRINIVIL,ZESTRIL) 2.5 MG tablet Take 2.5 mg by mouth daily.     Multiple Vitamins-Minerals (EYE VITAMINS PO) Take 1 capsule by mouth daily OTC     Multiple Vitamins-Minerals (MATURE ADULT CENTURY PO) Take 1 tablet by mouth daily OTC     osimertinib (TAGRISSO) 80 MG tablet Take 1 tablet (80 mg) by mouth daily     simvastatin (ZOCOR) 80 MG tablet      clindamycin (CLINDAMAX) 1 % gel Apply topically 2 times daily (Patient not taking: Reported on 8/6/2018)     hydrocortisone 1 % ointment Apply topically daily      LORazepam (ATIVAN) 0.5 MG tablet Take 1 tablet (0.5 mg) by mouth every 4 hours as needed (Anxiety, Nausea/Vomiting or Sleep) (Patient not taking: Reported on 9/11/2018)     nystatin (MYCOSTATIN) 601421 UNIT/ML suspension Take 5 mLs (500,000 Units) by mouth 4 times daily (Patient not taking: Reported on 9/11/2018)     osimertinib (TAGRISSO) 80 MG tablet Take 1 tablet (80 mg) by mouth daily (Patient not taking: Reported on 11/15/2018)     osimertinib (TAGRISSO) 80 MG tablet Take 1 tablet (80 mg) by mouth daily (Patient not taking: Reported on 11/15/2018)     prochlorperazine (COMPAZINE) 10 MG tablet Take 0.5 tablets (5 mg) by mouth every 6 hours as needed (Nausea/Vomiting) (Patient not taking: Reported on 9/11/2018)     ranitidine (ZANTAC) 150 MG capsule Take 150 mg by mouth 2 times daily      No current facility-administered medications for this visit.      Facility-Administered Medications Ordered in Other Visits   Medication     ropivacaine 0.2% 550 mL in ON-Q C-Bloc select flow ( holds 400-550 mL) single cath  "disposable pump        ALLERGIES      Allergies   Allergen Reactions     No Known Drug Allergy         REVIEW OF SYSTEMS   As above in the HPI, o/w complete 12-point ROS was negative.     PHYSICAL EXAM   /78  Pulse 61  Temp 97.9  F (36.6  C)  Resp 16  Ht 1.753 m (5' 9\")  Wt 75.8 kg (167 lb)  SpO2 99%  BMI 24.66 kg/m2  GEN: NAD  HEENT: PERRL, EOMI, no icterus, injection or pallor. Oropharynx is clear.  LYMPHATICs: no cervical or supraclavicular lymphadenopathy; no other abn lymphadenopathy  PULMONARY: clear with good air entry bilaterally  CARDIOVASCULAR: regular, no murmurs, rubs, or gallops  GASTROINTESTINAL: soft, non-tender, non-distended, normal bowel sounds, no hepatosplenomegaly by percussion or palpation  MUSCULOSKELTAL: warm, well perfused, no edema  NEURO: awake, alert and oriented to time place and person, cranial nerves intact - II - XII, no focal neurologic deficits  SKIN: no rashes     LABORATORY AND IMAGING STUDIES     Recent Labs   Lab Test  11/15/18   0836  10/15/18   1143  09/11/18   1104  08/02/18   0948  05/03/18   1019   NA  141  143  143  141  142   POTASSIUM  4.4  4.7  4.4  4.5  4.4   CHLORIDE  108  110*  109  107  109   CO2  28  29  28  28  29   ANIONGAP  5  4  6  6  4   BUN  28  23  26  19  26   CR  1.71*  1.62*  1.74*  1.47*  1.61*   GLC  98  77  93  102*  101*   RICK  8.6  8.8  8.3*  8.8  8.8     Recent Labs   Lab Test  10/10/17   1033  07/11/17   1037  03/06/17   1000  01/11/17   1000  12/15/16   1055   MAG  2.0  1.9  2.1  2.3  2.0   PHOS  2.3*  2.9  2.7  2.0*  2.7     Recent Labs   Lab Test  11/15/18   0836  10/15/18   1143  09/11/18   1104  08/02/18   0948  05/03/18   1019   WBC  5.0  5.9  5.5  6.4  7.1   HGB  15.0  14.5  13.3  16.3  15.9   PLT  92*  99*  115*  157  187   MCV  99  101*  100  99  99   NEUTROPHIL  64.0  72.4  65.5  67.7  59.5     Recent Labs   Lab Test  11/15/18   0836  10/15/18   1143  09/11/18   1104  08/02/18   0948   BILITOTAL  0.8  0.5  0.4  1.0   ALKPHOS  " 85  95  107  117   ALT  25  28  31  41   AST  26  28  28  35   ALBUMIN  3.7  3.6  3.3*  3.5   LDH   --    --    --   152     No results found for: TSH  No results for input(s): CEA in the last [id number] hours.  Results for orders placed or performed in visit on 11/01/18   CT Chest/Abdomen/Pelvis w Contrast    Narrative    Examination:  CT CHEST/ABDOMEN/PELVIS W CONTRAST .     Indication:  Restaging non small cell lung cancer now on Tagrisso;  Malignant neoplasm of trachea, bronchus, and lung (H); Malignant  neoplasm of trachea, bronchus, and lung (H)     Comparison: 8/2/2018.    Technique: CT of Chest, abdomen and pelvis was acquired from thoracic  inlet to pubic symphysis with IV contrast and without oral contrast.  Images were reconstructed in axial and coronal sections. Images were  reviewed in lung, soft tissue, liver, bone windows.     Contrast: 103 ml isovue 370    Findings:   Chest: There is a decrease or resolution of multiple metastatic lung  nodules. For example left lower lobe lung nodule series 6 image 43 mm,  prior 5 mm. Right upper lobe lung nodule series 6 image 11, 1 mm  nodule, prior 5 mm. There is no axillary axillary, mediastinal or  hilar lymphadenopathy by size criteria. 1.5 cm retroesophageal lymph  node series 2 image 53 is unchanged. Small hiatal hernia.    Abdomen pelvis: The liver and biliary tree are unremarkable.  Cholecystectomy clips. Partially fatty replaced pancreas. Splenic  granulomata unchanged. The adrenal glands are normal in appearance.  Right kidney is atrophic. Peripelvic cysts in the left kidney,  unchanged 7 mm nodule extending off the left kidney series 2 image 68  unchanged.     Portal vein, celiac access, SMA, are patent.    The bladder is unremarkable. The colon and small bowel are normal in  caliber. There is no abdominal or pelvic lymphadenopathy by size  criteria.    No suspicious bony abnormalities.      Impression    Impression: In this patient with non-small cell lung  cancer on  Tagrisso, there is positive response to therapy.  Decrease or  resolution of multiple lung nodules.    PEDRITO ARIAS MD          ASSESSMENT AND PLAN   Non small cell lung cancer - adenocarcinoma with EGFR mutation (exxon 21 deletion)   On erlotinib since June 2016  ECOG PS 1  HTN, CKD, CAD    Zane is tolerating osimertinib well at the 80 mg daily dose. He has cough and fatigue. I am not sure about the cause of his fatigue. He had fatigue on erlotinib and this could be related to the drug. He has normal Hgb - 15 g/dl. His LVEF was 61% at the last check - 2 weeks ago.     He otherwise has no new complains on this medication. I have reviewed his EKG from last month - it shows normal sinus rhythm, 65 bpm,  msec and QTc 438 msec, no ST-T changes. We will skip the EKG this time.     I have reviewed actual images from his restaging scans and he has a good treatment response. I reviewed this with him and he is happy with the response.     Due to his fatigue, I will recommend that we hold it for 5-7 days till next week and he can restart after his Thanksgivings dinner.     His wife has been newly diagnosed with multiple myeloma and follows with Dr. Misti Mckeon at [facility].     I will see him again in 3 months with labs and scans prior to visit. He will be followed by pharmacy team for his monitoring on therapy    Over 25 min of direct face to face time spent with patient with more than 50% time spent in counseling and coordinating care.      Again, thank you for allowing me to participate in the care of your patient.        Sincerely,        Gerry Ahumada MD

## 2018-11-15 NOTE — PROGRESS NOTES
Primary Oncologist: Dr. Gerry Ahumada  Primary Oncology Clinic: [facility]  Cancer Diagnosis: NSCLC      Drug: Osimertinib (Tagrisso) 80 mg daily continuously  Start Date: 8/5/18      Dose is appropriate for patients:  Renal Function CrCl 42 and  Hepatic Function                    Expected duration of therapy: Until disease progression or unacceptable toxicity      Drug Interaction Assessment:  Potential QTC prolongation with fluconazole and osimertinib.  Will monitor.       Lab Monitoring Plan                            Monitoring plan:    C1D1+   LVEF  CBC  CMP  (EKG  if high risk) C2D1+ CBC  CMP  (EKG  if high risk) C3D1+ CBC  CMP  (EKG if high risk) C4D1+ LVEF  CBC  CMP  (EKG if high risk) C5D1+ CBC  CMP  (EKG if high risk) C6D1+ CBC  CMP  (EKG if high risk)   C1D8+    C2D8+    C3D8+    C4D8+    C5D8+    C6D8+      C1D15+    C2D15+    C3D15+    C4D15+    C5D15+    C6D15+      C1D22+    C2D22+    C3D22+    C4D22+    C5D22+    C6D22+          Subjective/Objective:  Gilson Curtis is a 78 year old male seen in clinic for a follow-up visit for oral chemotherapy.  Gilson reports that he is doing well.  His fatigue is back but he did do a lot of [employer] work this week.  No other side effects at this time.     ORAL CHEMOTHERAPY 4/4/2017 7/24/2017 8/25/2017 4/23/2018 8/14/2018 9/11/2018 11/15/2018   Drug Name Tarceva (Erlotinib) Tarceva (Erlotinib) Tarceva (Erlotinib) Tarceva (Erlotinib) Tagrisso (Osimertinib) Tagrisso (Osimertinib) Tagrisso (Osimertinib)   Current Dosage 150mg 150mg 100mg 100mg 80mg 80mg 80mg   Current Schedule Daily Daily Daily Daily Daily Daily Daily   Cycle Details Continuous Continuous Continuous Continuous Continuous Continuous Continuous   Start Date of Last Cycle - - - - 8/5/2018 - -   Planned next cycle start date - - - - - - -   Doses missed in last 2 weeks 0 0 0 0 0 0 -   Adherence Assessment Adherent Adherent Adherent Adherent Adherent Adherent Adherent  "  Adverse Effects EGFR rash EGFR rash EGFR rash No AE identified during assessment No AE identified during assessment Fatigue Fatigue   EGFR rash Grade 1 Grade 1 Grade 1 - - - -   Pharmacist Intervention(EGFR) No No No - - - -   Intervention(s) - - - - - - -   Fatigue - - - - - Grade 1 Grade 2   Pharmacist Intervention(fatigue) - - - - - Yes No   Intervention(s) - - - - - Patient education -   Home BPs not needed not needed not needed not needed - - Not applicable   Any new drug interactions? No No No No No No No   Is the dose as ordered appropriate for the patient? Yes Yes Yes - - - Yes   Is the patient currently in pain? No No No - Assessed in last 30 days. Assessed in last 30 days. Assessed in last 30 days.   Has the patient been assessed within the past 6 months for depression? Yes Yes - - Yes Yes Yes   Has the patient missed any days of school, work, or other routine activity? No No No - - No -       Vitals:  BP:   BP Readings from Last 1 Encounters:   11/15/18 135/78     Wt Readings from Last 1 Encounters:   11/15/18 75.8 kg (167 lb)     Estimated body surface area is 1.92 meters squared as calculated from the following:    Height as of an earlier encounter on 11/15/18: 1.753 m (5' 9\").    Weight as of an earlier encounter on 11/15/18: 75.8 kg (167 lb).    Labs:  _  Result Component Current Result Ref Range   Sodium 141 (11/15/2018) 133 - 144 mmol/L     _  Result Component Current Result Ref Range   Potassium 4.4 (11/15/2018) 3.4 - 5.3 mmol/L     _  Result Component Current Result Ref Range   Calcium 8.6 (11/15/2018) 8.5 - 10.1 mg/dL     No results found for Mag within last 30 days.     No results found for Phos within last 30 days.     _  Result Component Current Result Ref Range   Albumin 3.7 (11/15/2018) 3.4 - 5.0 g/dL     _  Result Component Current Result Ref Range   Urea Nitrogen 28 (11/15/2018) 7 - 30 mg/dL     _  Result Component Current Result Ref Range   Creatinine 1.71 (H) (11/15/2018) 0.66 - 1.25 " mg/dL       _  Result Component Current Result Ref Range   AST 26 (11/15/2018) 0 - 45 U/L     _  Result Component Current Result Ref Range   ALT 25 (11/15/2018) 0 - 70 U/L     _  Result Component Current Result Ref Range   Bilirubin Total 0.8 (11/15/2018) 0.2 - 1.3 mg/dL       _  Result Component Current Result Ref Range   WBC 5.0 (11/15/2018) 4.0 - 11.0 10e9/L     _  Result Component Current Result Ref Range   Hemoglobin 15.0 (11/15/2018) 13.3 - 17.7 g/dL     _  Result Component Current Result Ref Range   Platelet Count 92 (L) (11/15/2018) 150 - 450 10e9/L     _  Result Component Current Result Ref Range   Absolute Neutrophil 3.2 (11/15/2018) 1.6 - 8.3 10e9/L     Assessment/Plan:  Continue same dose as ordered.  Per Dr Ahumada- hold and restart after thanksgiving to give him a break    Follow-Up:  Dr Ahumada 2/7/19  And monthly labs     Refill Due:  SP    Logan Barrientos, PharmD, BCOP  November 15, 2018

## 2018-11-15 NOTE — NURSING NOTE
"Oncology Rooming Note    November 15, 2018 9:28 AM   Gilson Curtis is a 78 year old male who presents for:    Chief Complaint   Patient presents with     Oncology Clinic Visit     3 month follow up     Initial Vitals: /78  Pulse 61  Temp 97.9  F (36.6  C)  Resp 16  Ht 1.753 m (5' 9\")  Wt 75.8 kg (167 lb)  SpO2 99%  BMI 24.66 kg/m2 Estimated body mass index is 24.66 kg/(m^2) as calculated from the following:    Height as of this encounter: 1.753 m (5' 9\").    Weight as of this encounter: 75.8 kg (167 lb). Body surface area is 1.92 meters squared.  No Pain (0) Comment: Data Unavailable   No LMP for male patient.  Allergies reviewed: Yes  Medications reviewed: Yes    Medications: Medication refills not needed today.  Pharmacy name entered into [facility]: [facility] [address] [place] [address]      5 minutes for nursing intake (face to face time)     Pretty Collins LPN              "

## 2018-11-26 ENCOUNTER — DOCUMENTATION ONLY (OUTPATIENT)
Dept: PHARMACY | Facility: CLINIC | Age: 78
End: 2018-11-26

## 2018-11-26 NOTE — PROGRESS NOTES
Oral Chemotherapy Program    Primary Oncologist: Dr. Gerry Ahumada  Primary Oncology Clinic: [facility]  Cancer Diagnosis: NSCLC     Drug: Osimertinib (Tagrisso) 80 mg daily continuously  Start Date: 8/5/18     Dose is appropriate for patients:  Renal Function CrCl 42 and  Hepatic Function                   Expected duration of therapy: Until disease progression or unacceptable toxicity     Drug Interaction Assessment:  Potential QTC prolongation with fluconazole and osimertinib.  Will monitor.      Lab Monitoring Plan  Monitoring plan:    C1D1+   LVEF  CBC  CMP  (EKG  if high risk) C2D1+ CBC  CMP  (EKG  if high risk) C3D1+ CBC  CMP  (EKG if high risk) C4D1+ LVEF  CBC  CMP  (EKG if high risk) C5D1+ CBC  CMP  (EKG if high risk) C6D1+ CBC  CMP  (EKG if high risk)   C1D8+   C2D8+   C3D8+   C4D8+   C5D8+   C6D8+     C1D15+   C2D15+   C3D15+   C4D15+   C5D15+   C6D15+     C1D22+   C2D22+   C3D22+   C4D22+   C5D22+   C6D22+       Subjective/Objective:  Gilson Curtis is a 78 year old male contacted by phone for a follow-up visit for oral chemotherapy.  Gilson states he held his osimertinib from 11/18-11/22. He did not notice any difference in his fatigue. He did restart the medication on 11/23. He counted his pills and has 13 left.     ORAL CHEMOTHERAPY 7/24/2017 8/25/2017 4/23/2018 8/14/2018 9/11/2018 11/15/2018 11/26/2018   Drug Name Tarceva (Erlotinib) Tarceva (Erlotinib) Tarceva (Erlotinib) Tagrisso (Osimertinib) Tagrisso (Osimertinib) Tagrisso (Osimertinib) Tagrisso (Osimertinib)   Current Dosage 150mg 100mg 100mg 80mg 80mg 80mg 80mg   Current Schedule Daily Daily Daily Daily Daily Daily Daily   Cycle Details Continuous Continuous Continuous Continuous Continuous Continuous Continuous   Start Date of Last Cycle - - - 8/5/2018 - - -   Planned next cycle start date - - - - - - -   Doses missed in last 2 weeks 0 0 0 0 0 - -   Adherence Assessment Adherent Adherent Adherent Adherent  "Adherent Adherent Adherent   Adverse Effects EGFR rash EGFR rash No AE identified during assessment No AE identified during assessment Fatigue Fatigue Fatigue   EGFR rash Grade 1 Grade 1 - - - - -   Pharmacist Intervention(EGFR) No No - - - - -   Intervention(s) - - - - - - -   Fatigue - - - - Grade 1 Grade 2 Grade 2   Pharmacist Intervention(fatigue) - - - - Yes No No   Intervention(s) - - - - Patient education - -   Home BPs not needed not needed not needed - - Not applicable -   Any new drug interactions? No No No No No No No   Is the dose as ordered appropriate for the patient? Yes Yes - - - Yes -   Is the patient currently in pain? No No - Assessed in last 30 days. Assessed in last 30 days. Assessed in last 30 days. Assessed in last 30 days.   Has the patient been assessed within the past 6 months for depression? Yes - - Yes Yes Yes Yes   Has the patient missed any days of school, work, or other routine activity? No No - - No - -       Vitals:  BP:   BP Readings from Last 1 Encounters:   11/15/18 135/78     Wt Readings from Last 1 Encounters:   11/15/18 75.8 kg (167 lb)     Estimated body surface area is 1.92 meters squared as calculated from the following:    Height as of 11/15/18: 1.753 m (5' 9\").    Weight as of 11/15/18: 75.8 kg (167 lb).    Assessment:  Gilson is tolerating osimertinib with some grade 2 fatigue. Holding the medication did not improve his symptoms.     Plan:  Continue osimertinib 80 mg daily.    Follow-Up:  In one month via phone call    Refill Due:  11/30 release osimertinib to [facility]    Pippa Luna, Pharm. D., BCOP      "

## 2018-11-30 DIAGNOSIS — Z51.81 ENCOUNTER FOR MONITORING CARDIOTOXIC DRUG THERAPY: Primary | ICD-10-CM

## 2018-11-30 DIAGNOSIS — Z79.899 ENCOUNTER FOR MONITORING CARDIOTOXIC DRUG THERAPY: Primary | ICD-10-CM

## 2018-11-30 DIAGNOSIS — C34.91 NON-SMALL CELL CANCER OF RIGHT LUNG (H): ICD-10-CM

## 2018-12-18 DIAGNOSIS — Z51.81 ENCOUNTER FOR MONITORING CARDIOTOXIC DRUG THERAPY: ICD-10-CM

## 2018-12-18 DIAGNOSIS — C34.91 NON-SMALL CELL CANCER OF RIGHT LUNG (H): ICD-10-CM

## 2018-12-18 DIAGNOSIS — Z79.899 ENCOUNTER FOR MONITORING CARDIOTOXIC DRUG THERAPY: ICD-10-CM

## 2018-12-18 LAB
ALBUMIN SERPL-MCNC: 3.5 G/DL (ref 3.4–5)
ALP SERPL-CCNC: 82 U/L (ref 40–150)
ALT SERPL W P-5'-P-CCNC: 25 U/L (ref 0–70)
ANION GAP SERPL CALCULATED.3IONS-SCNC: 6 MMOL/L (ref 3–14)
AST SERPL W P-5'-P-CCNC: 25 U/L (ref 0–45)
BASOPHILS # BLD AUTO: 0 10E9/L (ref 0–0.2)
BASOPHILS NFR BLD AUTO: 0.4 %
BILIRUB SERPL-MCNC: 0.6 MG/DL (ref 0.2–1.3)
BUN SERPL-MCNC: 28 MG/DL (ref 7–30)
CALCIUM SERPL-MCNC: 8.5 MG/DL (ref 8.5–10.1)
CHLORIDE SERPL-SCNC: 109 MMOL/L (ref 94–109)
CO2 SERPL-SCNC: 27 MMOL/L (ref 20–32)
CREAT SERPL-MCNC: 1.77 MG/DL (ref 0.66–1.25)
DIFFERENTIAL METHOD BLD: ABNORMAL
EOSINOPHIL # BLD AUTO: 0.1 10E9/L (ref 0–0.7)
EOSINOPHIL NFR BLD AUTO: 1.2 %
ERYTHROCYTE [DISTWIDTH] IN BLOOD BY AUTOMATED COUNT: 12.4 % (ref 10–15)
GFR SERPL CREATININE-BSD FRML MDRD: 37 ML/MIN/1.7M2
GLUCOSE SERPL-MCNC: 148 MG/DL (ref 70–99)
HCT VFR BLD AUTO: 48.1 % (ref 40–53)
HGB BLD-MCNC: 15.1 G/DL (ref 13.3–17.7)
IMM GRANULOCYTES # BLD: 0 10E9/L (ref 0–0.4)
IMM GRANULOCYTES NFR BLD: 0.2 %
LDH SERPL L TO P-CCNC: 154 U/L (ref 85–227)
LYMPHOCYTES # BLD AUTO: 0.9 10E9/L (ref 0.8–5.3)
LYMPHOCYTES NFR BLD AUTO: 17.1 %
MCH RBC QN AUTO: 31.3 PG (ref 26.5–33)
MCHC RBC AUTO-ENTMCNC: 31.4 G/DL (ref 31.5–36.5)
MCV RBC AUTO: 100 FL (ref 78–100)
MONOCYTES # BLD AUTO: 0.4 10E9/L (ref 0–1.3)
MONOCYTES NFR BLD AUTO: 7.5 %
NEUTROPHILS # BLD AUTO: 3.7 10E9/L (ref 1.6–8.3)
NEUTROPHILS NFR BLD AUTO: 73.6 %
PLATELET # BLD AUTO: 93 10E9/L (ref 150–450)
POTASSIUM SERPL-SCNC: 4.2 MMOL/L (ref 3.4–5.3)
PROT SERPL-MCNC: 6.6 G/DL (ref 6.8–8.8)
RBC # BLD AUTO: 4.83 10E12/L (ref 4.4–5.9)
SODIUM SERPL-SCNC: 142 MMOL/L (ref 133–144)
WBC # BLD AUTO: 5 10E9/L (ref 4–11)

## 2018-12-18 PROCEDURE — 85025 COMPLETE CBC W/AUTO DIFF WBC: CPT | Performed by: INTERNAL MEDICINE

## 2018-12-18 PROCEDURE — 36415 COLL VENOUS BLD VENIPUNCTURE: CPT | Performed by: INTERNAL MEDICINE

## 2018-12-18 PROCEDURE — 83615 LACTATE (LD) (LDH) ENZYME: CPT | Performed by: INTERNAL MEDICINE

## 2018-12-18 PROCEDURE — 80053 COMPREHEN METABOLIC PANEL: CPT | Performed by: INTERNAL MEDICINE

## 2018-12-19 ENCOUNTER — TELEPHONE (OUTPATIENT)
Dept: PHARMACY | Facility: CLINIC | Age: 78
End: 2018-12-19

## 2018-12-19 NOTE — ORAL ONC MGMT
Oral Chemotherapy Monitoring Program    Primary Oncologist: Dr. Gerry Ahumada  Primary Oncology Clinic: [facility]  Cancer Diagnosis: NSCLC    Drug: Osimertinib (Tagrisso) 80 mg daily continuously  Start Date: 8/15/18    Expected Duration of Therapy: Until disease progression or unacceptable toxicity    Drug Interaction Assessment: No significant drug interactions identified upon review.      Lab Monitoring Plan  CBC, CMP monthly, LVEF every 2-3 months  Subjective/Objective:  Gilson Curtis is a 78 year old male contacted by phone for a follow-up visit for oral chemotherapy.  Gilson states he is doing well. He continues to have fatigue. He reports it is up and down but is manageable. He denies any other side effects.     ORAL CHEMOTHERAPY 8/25/2017 4/23/2018 8/14/2018 9/11/2018 11/15/2018 11/26/2018 12/19/2018   Drug Name Tarceva (Erlotinib) Tarceva (Erlotinib) Tagrisso (Osimertinib) Tagrisso (Osimertinib) Tagrisso (Osimertinib) Tagrisso (Osimertinib) Tagrisso (Osimertinib)   Current Dosage 100mg 100mg 80mg 80mg 80mg 80mg 80mg   Current Schedule Daily Daily Daily Daily Daily Daily Daily   Cycle Details Continuous Continuous Continuous Continuous Continuous Continuous Continuous   Start Date of Last Cycle - - 8/5/2018 - - - -   Planned next cycle start date - - - - - - -   Doses missed in last 2 weeks 0 0 0 0 - - 0   Adherence Assessment Adherent Adherent Adherent Adherent Adherent Adherent Adherent   Adverse Effects EGFR rash No AE identified during assessment No AE identified during assessment Fatigue Fatigue Fatigue Fatigue   EGFR rash Grade 1 - - - - - -   Pharmacist Intervention(EGFR) No - - - - - -   Intervention(s) - - - - - - -   Fatigue - - - Grade 1 Grade 2 Grade 2 Grade 1   Pharmacist Intervention(fatigue) - - - Yes No No No   Intervention(s) - - - Patient education - - -   Home BPs not needed not needed - - Not applicable - -   Any new drug interactions? No No No No No No  No   Is the dose as ordered appropriate for the patient? Yes - - - Yes - -   Is the patient currently in pain? No - Assessed in last 30 days. Assessed in last 30 days. Assessed in last 30 days. Assessed in last 30 days. No   Has the patient been assessed within the past 6 months for depression? - - Yes Yes Yes Yes Yes   Has the patient missed any days of school, work, or other routine activity? No - - No - - No       Labs:  _  Result Component Current Result Ref Range   Sodium 142 (12/18/2018) 133 - 144 mmol/L     _  Result Component Current Result Ref Range   Potassium 4.2 (12/18/2018) 3.4 - 5.3 mmol/L     _  Result Component Current Result Ref Range   Calcium 8.5 (12/18/2018) 8.5 - 10.1 mg/dL     _  Result Component Current Result Ref Range   Albumin 3.5 (12/18/2018) 3.4 - 5.0 g/dL     _  Result Component Current Result Ref Range   Urea Nitrogen 28 (12/18/2018) 7 - 30 mg/dL     _  Result Component Current Result Ref Range   Creatinine 1.77 (H) (12/18/2018) 0.66 - 1.25 mg/dL       _  Result Component Current Result Ref Range   AST 25 (12/18/2018) 0 - 45 U/L     _  Result Component Current Result Ref Range   ALT 25 (12/18/2018) 0 - 70 U/L     _  Result Component Current Result Ref Range   Bilirubin Total 0.6 (12/18/2018) 0.2 - 1.3 mg/dL       _  Result Component Current Result Ref Range   WBC 5.0 (12/18/2018) 4.0 - 11.0 10e9/L     _  Result Component Current Result Ref Range   Hemoglobin 15.1 (12/18/2018) 13.3 - 17.7 g/dL     _  Result Component Current Result Ref Range   Platelet Count 93 (L) (12/18/2018) 150 - 450 10e9/L     _  Result Component Current Result Ref Range   Absolute Neutrophil 3.7 (12/18/2018) 1.6 - 8.3 10e9/L       Assessment:  Gilson is tolerating therapy with some fatigue. His labs are ok.    Plan:  Continue current therapy.     Follow-Up:  2/7/19 10 am with Zita and labs    Refill Due:  12/31/18 release osimertinib to [facility]    Isabella Rosario. D., BCOP

## 2018-12-31 DIAGNOSIS — Z51.81 ENCOUNTER FOR MONITORING CARDIOTOXIC DRUG THERAPY: Primary | ICD-10-CM

## 2018-12-31 DIAGNOSIS — C34.91 NON-SMALL CELL CANCER OF RIGHT LUNG (H): ICD-10-CM

## 2018-12-31 DIAGNOSIS — Z79.899 ENCOUNTER FOR MONITORING CARDIOTOXIC DRUG THERAPY: Primary | ICD-10-CM

## 2019-01-01 ENCOUNTER — OFFICE VISIT (OUTPATIENT)
Dept: RADIATION ONCOLOGY | Facility: CLINIC | Age: 79
End: 2019-01-01
Attending: RADIOLOGY
Payer: MEDICARE

## 2019-01-01 ENCOUNTER — ANCILLARY PROCEDURE (OUTPATIENT)
Dept: GENERAL RADIOLOGY | Facility: CLINIC | Age: 79
End: 2019-01-01
Attending: NURSE PRACTITIONER
Payer: MEDICARE

## 2019-01-01 ENCOUNTER — APPOINTMENT (OUTPATIENT)
Dept: RADIATION ONCOLOGY | Facility: CLINIC | Age: 79
End: 2019-01-01
Payer: MEDICARE

## 2019-01-01 ENCOUNTER — PATIENT OUTREACH (OUTPATIENT)
Dept: ONCOLOGY | Facility: CLINIC | Age: 79
End: 2019-01-01

## 2019-01-01 ENCOUNTER — ANCILLARY PROCEDURE (OUTPATIENT)
Dept: CT IMAGING | Facility: CLINIC | Age: 79
End: 2019-01-01
Attending: INTERNAL MEDICINE
Payer: MEDICARE

## 2019-01-01 ENCOUNTER — ANCILLARY PROCEDURE (OUTPATIENT)
Dept: GENERAL RADIOLOGY | Facility: CLINIC | Age: 79
End: 2019-01-01
Attending: RADIOLOGY
Payer: MEDICARE

## 2019-01-01 ENCOUNTER — ONCOLOGY VISIT (OUTPATIENT)
Dept: ONCOLOGY | Facility: CLINIC | Age: 79
End: 2019-01-01
Payer: MEDICARE

## 2019-01-01 ENCOUNTER — MEDICAL CORRESPONDENCE (OUTPATIENT)
Dept: HEALTH INFORMATION MANAGEMENT | Facility: CLINIC | Age: 79
End: 2019-01-01

## 2019-01-01 ENCOUNTER — PATIENT OUTREACH (OUTPATIENT)
Dept: RADIATION ONCOLOGY | Facility: CLINIC | Age: 79
End: 2019-01-01

## 2019-01-01 ENCOUNTER — ONCOLOGY VISIT (OUTPATIENT)
Dept: ONCOLOGY | Facility: CLINIC | Age: 79
End: 2019-01-01
Attending: INTERNAL MEDICINE
Payer: MEDICARE

## 2019-01-01 ENCOUNTER — TELEPHONE (OUTPATIENT)
Dept: ONCOLOGY | Facility: CLINIC | Age: 79
End: 2019-01-01

## 2019-01-01 ENCOUNTER — ANCILLARY PROCEDURE (OUTPATIENT)
Dept: MRI IMAGING | Facility: CLINIC | Age: 79
End: 2019-01-01
Attending: INTERNAL MEDICINE
Payer: MEDICARE

## 2019-01-01 ENCOUNTER — OFFICE VISIT (OUTPATIENT)
Dept: RADIATION ONCOLOGY | Facility: CLINIC | Age: 79
End: 2019-01-01
Payer: MEDICARE

## 2019-01-01 ENCOUNTER — DOCUMENTATION ONLY (OUTPATIENT)
Dept: PHARMACY | Facility: CLINIC | Age: 79
End: 2019-01-01

## 2019-01-01 ENCOUNTER — TELEPHONE (OUTPATIENT)
Dept: RADIATION ONCOLOGY | Facility: CLINIC | Age: 79
End: 2019-01-01

## 2019-01-01 ENCOUNTER — TRANSFERRED RECORDS (OUTPATIENT)
Dept: HEALTH INFORMATION MANAGEMENT | Facility: CLINIC | Age: 79
End: 2019-01-01

## 2019-01-01 VITALS
BODY MASS INDEX: 24.66 KG/M2 | HEIGHT: 69 IN | HEART RATE: 69 BPM | DIASTOLIC BLOOD PRESSURE: 67 MMHG | WEIGHT: 166.5 LBS | TEMPERATURE: 97.9 F | OXYGEN SATURATION: 97 % | RESPIRATION RATE: 18 BRPM | SYSTOLIC BLOOD PRESSURE: 118 MMHG

## 2019-01-01 VITALS
HEART RATE: 87 BPM | SYSTOLIC BLOOD PRESSURE: 107 MMHG | BODY MASS INDEX: 23.62 KG/M2 | OXYGEN SATURATION: 97 % | WEIGHT: 160 LBS | DIASTOLIC BLOOD PRESSURE: 59 MMHG

## 2019-01-01 VITALS
DIASTOLIC BLOOD PRESSURE: 65 MMHG | HEART RATE: 94 BPM | OXYGEN SATURATION: 96 % | TEMPERATURE: 99.8 F | SYSTOLIC BLOOD PRESSURE: 105 MMHG | RESPIRATION RATE: 18 BRPM

## 2019-01-01 VITALS
HEIGHT: 69 IN | DIASTOLIC BLOOD PRESSURE: 75 MMHG | TEMPERATURE: 98.4 F | RESPIRATION RATE: 16 BRPM | SYSTOLIC BLOOD PRESSURE: 116 MMHG | BODY MASS INDEX: 23.74 KG/M2 | WEIGHT: 160.3 LBS | HEART RATE: 98 BPM | OXYGEN SATURATION: 98 %

## 2019-01-01 VITALS
OXYGEN SATURATION: 97 % | TEMPERATURE: 98.9 F | RESPIRATION RATE: 18 BRPM | HEART RATE: 89 BPM | SYSTOLIC BLOOD PRESSURE: 96 MMHG | DIASTOLIC BLOOD PRESSURE: 62 MMHG

## 2019-01-01 VITALS
SYSTOLIC BLOOD PRESSURE: 120 MMHG | HEART RATE: 92 BPM | WEIGHT: 160 LBS | TEMPERATURE: 98.4 F | OXYGEN SATURATION: 97 % | DIASTOLIC BLOOD PRESSURE: 62 MMHG | RESPIRATION RATE: 18 BRPM | BODY MASS INDEX: 23.62 KG/M2

## 2019-01-01 VITALS
SYSTOLIC BLOOD PRESSURE: 108 MMHG | TEMPERATURE: 97.6 F | OXYGEN SATURATION: 96 % | DIASTOLIC BLOOD PRESSURE: 57 MMHG | HEART RATE: 95 BPM

## 2019-01-01 VITALS
WEIGHT: 155.4 LBS | SYSTOLIC BLOOD PRESSURE: 131 MMHG | BODY MASS INDEX: 22.94 KG/M2 | TEMPERATURE: 98.1 F | DIASTOLIC BLOOD PRESSURE: 80 MMHG | OXYGEN SATURATION: 98 % | HEART RATE: 94 BPM

## 2019-01-01 DIAGNOSIS — D69.6 THROMBOCYTOPENIA (H): ICD-10-CM

## 2019-01-01 DIAGNOSIS — C34.91 NON-SMALL CELL CANCER OF RIGHT LUNG (H): ICD-10-CM

## 2019-01-01 DIAGNOSIS — N18.30 CKD (CHRONIC KIDNEY DISEASE) STAGE 3, GFR 30-59 ML/MIN (H): ICD-10-CM

## 2019-01-01 DIAGNOSIS — C79.51 SECONDARY MALIGNANT NEOPLASM OF BONE (H): Primary | ICD-10-CM

## 2019-01-01 DIAGNOSIS — Z79.899 ENCOUNTER FOR MONITORING CARDIOTOXIC DRUG THERAPY: Primary | ICD-10-CM

## 2019-01-01 DIAGNOSIS — Z51.81 ENCOUNTER FOR MONITORING CARDIOTOXIC DRUG THERAPY: ICD-10-CM

## 2019-01-01 DIAGNOSIS — Z79.899 ENCOUNTER FOR MONITORING CARDIOTOXIC DRUG THERAPY: ICD-10-CM

## 2019-01-01 DIAGNOSIS — J90 PLEURAL EFFUSION: ICD-10-CM

## 2019-01-01 DIAGNOSIS — Z51.81 ENCOUNTER FOR MONITORING CARDIOTOXIC DRUG THERAPY: Primary | ICD-10-CM

## 2019-01-01 DIAGNOSIS — C78.7 LIVER METASTASIS: ICD-10-CM

## 2019-01-01 DIAGNOSIS — C79.51 BONE METASTASIS: ICD-10-CM

## 2019-01-01 DIAGNOSIS — R05.9 COUGH: ICD-10-CM

## 2019-01-01 DIAGNOSIS — C79.51 SECONDARY MALIGNANT NEOPLASM OF BONE (H): ICD-10-CM

## 2019-01-01 DIAGNOSIS — C34.91 NON-SMALL CELL CANCER OF RIGHT LUNG (H): Primary | ICD-10-CM

## 2019-01-01 DIAGNOSIS — M62.81 GENERALIZED MUSCLE WEAKNESS: ICD-10-CM

## 2019-01-01 DIAGNOSIS — R05.9 COUGH: Primary | ICD-10-CM

## 2019-01-01 LAB
ALBUMIN SERPL-MCNC: 3.1 G/DL (ref 3.4–5)
ALBUMIN SERPL-MCNC: 3.3 G/DL (ref 3.4–5)
ALBUMIN SERPL-MCNC: 3.6 G/DL (ref 3.4–5)
ALP SERPL-CCNC: 100 U/L (ref 40–150)
ALP SERPL-CCNC: 123 U/L (ref 40–150)
ALP SERPL-CCNC: 89 U/L (ref 40–150)
ALT SERPL W P-5'-P-CCNC: 19 U/L (ref 0–70)
ALT SERPL W P-5'-P-CCNC: 28 U/L (ref 0–70)
ALT SERPL W P-5'-P-CCNC: 38 U/L (ref 0–70)
ANION GAP SERPL CALCULATED.3IONS-SCNC: 4 MMOL/L (ref 3–14)
ANION GAP SERPL CALCULATED.3IONS-SCNC: 5 MMOL/L (ref 3–14)
ANION GAP SERPL CALCULATED.3IONS-SCNC: 6 MMOL/L (ref 3–14)
AST SERPL W P-5'-P-CCNC: 21 U/L (ref 0–45)
AST SERPL W P-5'-P-CCNC: 28 U/L (ref 0–45)
AST SERPL W P-5'-P-CCNC: 44 U/L (ref 0–45)
BASOPHILS # BLD AUTO: 0 10E9/L (ref 0–0.2)
BASOPHILS NFR BLD AUTO: 0.2 %
BASOPHILS NFR BLD AUTO: 0.4 %
BASOPHILS NFR BLD AUTO: 0.4 %
BILIRUB SERPL-MCNC: 0.5 MG/DL (ref 0.2–1.3)
BILIRUB SERPL-MCNC: 0.6 MG/DL (ref 0.2–1.3)
BILIRUB SERPL-MCNC: 0.6 MG/DL (ref 0.2–1.3)
BUN SERPL-MCNC: 24 MG/DL (ref 7–30)
BUN SERPL-MCNC: 27 MG/DL (ref 7–30)
BUN SERPL-MCNC: 34 MG/DL (ref 7–30)
CALCIUM SERPL-MCNC: 8.5 MG/DL (ref 8.5–10.1)
CALCIUM SERPL-MCNC: 9.3 MG/DL (ref 8.5–10.1)
CALCIUM SERPL-MCNC: 9.4 MG/DL (ref 8.5–10.1)
CHLORIDE SERPL-SCNC: 106 MMOL/L (ref 94–109)
CHLORIDE SERPL-SCNC: 107 MMOL/L (ref 94–109)
CHLORIDE SERPL-SCNC: 108 MMOL/L (ref 94–109)
CO2 SERPL-SCNC: 27 MMOL/L (ref 20–32)
CO2 SERPL-SCNC: 28 MMOL/L (ref 20–32)
CO2 SERPL-SCNC: 28 MMOL/L (ref 20–32)
CREAT SERPL-MCNC: 1.58 MG/DL (ref 0.66–1.25)
CREAT SERPL-MCNC: 1.66 MG/DL (ref 0.66–1.25)
CREAT SERPL-MCNC: 1.69 MG/DL (ref 0.66–1.25)
DIFFERENTIAL METHOD BLD: ABNORMAL
EOSINOPHIL # BLD AUTO: 0 10E9/L (ref 0–0.7)
EOSINOPHIL # BLD AUTO: 0.1 10E9/L (ref 0–0.7)
EOSINOPHIL # BLD AUTO: 0.1 10E9/L (ref 0–0.7)
EOSINOPHIL NFR BLD AUTO: 0.2 %
EOSINOPHIL NFR BLD AUTO: 1.3 %
EOSINOPHIL NFR BLD AUTO: 1.7 %
ERYTHROCYTE [DISTWIDTH] IN BLOOD BY AUTOMATED COUNT: 12.8 % (ref 10–15)
ERYTHROCYTE [DISTWIDTH] IN BLOOD BY AUTOMATED COUNT: 13.8 % (ref 10–15)
ERYTHROCYTE [DISTWIDTH] IN BLOOD BY AUTOMATED COUNT: 14 % (ref 10–15)
GFR SERPL CREATININE-BSD FRML MDRD: 37 ML/MIN/{1.73_M2}
GFR SERPL CREATININE-BSD FRML MDRD: 38 ML/MIN/{1.73_M2}
GFR SERPL CREATININE-BSD FRML MDRD: 40 ML/MIN/{1.73_M2}
GLUCOSE SERPL-MCNC: 106 MG/DL (ref 70–99)
GLUCOSE SERPL-MCNC: 115 MG/DL (ref 70–99)
GLUCOSE SERPL-MCNC: 127 MG/DL (ref 70–99)
HCT VFR BLD AUTO: 39.4 % (ref 40–53)
HCT VFR BLD AUTO: 43.8 % (ref 40–53)
HCT VFR BLD AUTO: 44.2 % (ref 40–53)
HGB BLD-MCNC: 12.1 G/DL (ref 13.3–17.7)
HGB BLD-MCNC: 13.8 G/DL (ref 13.3–17.7)
HGB BLD-MCNC: 13.9 G/DL (ref 13.3–17.7)
IMM GRANULOCYTES # BLD: 0 10E9/L (ref 0–0.4)
IMM GRANULOCYTES # BLD: 0 10E9/L (ref 0–0.4)
IMM GRANULOCYTES # BLD: 0.1 10E9/L (ref 0–0.4)
IMM GRANULOCYTES NFR BLD: 0.4 %
LAB SCANNED RESULT: NORMAL
LYMPHOCYTES # BLD AUTO: 0.7 10E9/L (ref 0.8–5.3)
LYMPHOCYTES # BLD AUTO: 0.9 10E9/L (ref 0.8–5.3)
LYMPHOCYTES # BLD AUTO: 1 10E9/L (ref 0.8–5.3)
LYMPHOCYTES NFR BLD AUTO: 13.7 %
LYMPHOCYTES NFR BLD AUTO: 16 %
LYMPHOCYTES NFR BLD AUTO: 8.6 %
MCH RBC QN AUTO: 30 PG (ref 26.5–33)
MCH RBC QN AUTO: 30.9 PG (ref 26.5–33)
MCH RBC QN AUTO: 31.1 PG (ref 26.5–33)
MCHC RBC AUTO-ENTMCNC: 30.7 G/DL (ref 31.5–36.5)
MCHC RBC AUTO-ENTMCNC: 31.4 G/DL (ref 31.5–36.5)
MCHC RBC AUTO-ENTMCNC: 31.5 G/DL (ref 31.5–36.5)
MCV RBC AUTO: 98 FL (ref 78–100)
MCV RBC AUTO: 98 FL (ref 78–100)
MCV RBC AUTO: 99 FL (ref 78–100)
MONOCYTES # BLD AUTO: 0.4 10E9/L (ref 0–1.3)
MONOCYTES # BLD AUTO: 0.7 10E9/L (ref 0–1.3)
MONOCYTES # BLD AUTO: 1.2 10E9/L (ref 0–1.3)
MONOCYTES NFR BLD AUTO: 10.5 %
MONOCYTES NFR BLD AUTO: 13.1 %
MONOCYTES NFR BLD AUTO: 8.4 %
NEUTROPHILS # BLD AUTO: 3.8 10E9/L (ref 1.6–8.3)
NEUTROPHILS # BLD AUTO: 4 10E9/L (ref 1.6–8.3)
NEUTROPHILS # BLD AUTO: 9 10E9/L (ref 1.6–8.3)
NEUTROPHILS NFR BLD AUTO: 68.8 %
NEUTROPHILS NFR BLD AUTO: 75.6 %
NEUTROPHILS NFR BLD AUTO: 79.9 %
PLATELET # BLD AUTO: 136 10E9/L (ref 150–450)
PLATELET # BLD AUTO: 168 10E9/L (ref 150–450)
PLATELET # BLD AUTO: 95 10E9/L (ref 150–450)
POTASSIUM SERPL-SCNC: 4.2 MMOL/L (ref 3.4–5.3)
POTASSIUM SERPL-SCNC: 4.5 MMOL/L (ref 3.4–5.3)
POTASSIUM SERPL-SCNC: 4.7 MMOL/L (ref 3.4–5.3)
PROT SERPL-MCNC: 6.5 G/DL (ref 6.8–8.8)
PROT SERPL-MCNC: 6.6 G/DL (ref 6.8–8.8)
PROT SERPL-MCNC: 6.7 G/DL (ref 6.8–8.8)
RBC # BLD AUTO: 4.03 10E12/L (ref 4.4–5.9)
RBC # BLD AUTO: 4.44 10E12/L (ref 4.4–5.9)
RBC # BLD AUTO: 4.5 10E12/L (ref 4.4–5.9)
SODIUM SERPL-SCNC: 139 MMOL/L (ref 133–144)
SODIUM SERPL-SCNC: 139 MMOL/L (ref 133–144)
SODIUM SERPL-SCNC: 141 MMOL/L (ref 133–144)
WBC # BLD AUTO: 11.3 10E9/L (ref 4–11)
WBC # BLD AUTO: 5.2 10E9/L (ref 4–11)
WBC # BLD AUTO: 5.5 10E9/L (ref 4–11)

## 2019-01-01 PROCEDURE — 74176 CT ABD & PELVIS W/O CONTRAST: CPT | Performed by: STUDENT IN AN ORGANIZED HEALTH CARE EDUCATION/TRAINING PROGRAM

## 2019-01-01 PROCEDURE — 77263 THER RADIOLOGY TX PLNG CPLX: CPT | Performed by: RADIOLOGY

## 2019-01-01 PROCEDURE — 77427 RADIATION TX MANAGEMENT X5: CPT | Performed by: RADIOLOGY

## 2019-01-01 PROCEDURE — 74183 MRI ABD W/O CNTR FLWD CNTR: CPT | Performed by: RADIOLOGY

## 2019-01-01 PROCEDURE — 99207 ZZC DROP WITH A PROCEDURE: CPT | Performed by: RADIOLOGY

## 2019-01-01 PROCEDURE — 36415 COLL VENOUS BLD VENIPUNCTURE: CPT | Performed by: INTERNAL MEDICINE

## 2019-01-01 PROCEDURE — 77412 RADIATION TX DELIVERY LVL 3: CPT | Performed by: RADIOLOGY

## 2019-01-01 PROCEDURE — 77280 THER RAD SIMULAJ FIELD SMPL: CPT | Performed by: RADIOLOGY

## 2019-01-01 PROCEDURE — 99214 OFFICE O/P EST MOD 30 MIN: CPT | Performed by: INTERNAL MEDICINE

## 2019-01-01 PROCEDURE — 77334 RADIATION TREATMENT AID(S): CPT | Performed by: RADIOLOGY

## 2019-01-01 PROCEDURE — 99215 OFFICE O/P EST HI 40 MIN: CPT | Mod: 25 | Performed by: RADIOLOGY

## 2019-01-01 PROCEDURE — 99215 OFFICE O/P EST HI 40 MIN: CPT | Performed by: INTERNAL MEDICINE

## 2019-01-01 PROCEDURE — 73522 X-RAY EXAM HIPS BI 3-4 VIEWS: CPT | Performed by: RADIOLOGY

## 2019-01-01 PROCEDURE — 71046 X-RAY EXAM CHEST 2 VIEWS: CPT | Performed by: RADIOLOGY

## 2019-01-01 PROCEDURE — 71250 CT THORAX DX C-: CPT | Performed by: RADIOLOGY

## 2019-01-01 PROCEDURE — 85025 COMPLETE CBC W/AUTO DIFF WBC: CPT | Performed by: INTERNAL MEDICINE

## 2019-01-01 PROCEDURE — 40001056 ZZHCL STATISTIC GUARDANT360 TUMOR SEQ: Mod: 90 | Performed by: INTERNAL MEDICINE

## 2019-01-01 PROCEDURE — G0463 HOSPITAL OUTPT CLINIC VISIT: HCPCS | Performed by: RADIOLOGY

## 2019-01-01 PROCEDURE — 99000 SPECIMEN HANDLING OFFICE-LAB: CPT | Performed by: INTERNAL MEDICINE

## 2019-01-01 PROCEDURE — 80053 COMPREHEN METABOLIC PANEL: CPT | Performed by: INTERNAL MEDICINE

## 2019-01-01 PROCEDURE — 71250 CT THORAX DX C-: CPT | Performed by: STUDENT IN AN ORGANIZED HEALTH CARE EDUCATION/TRAINING PROGRAM

## 2019-01-01 PROCEDURE — 77290 THER RAD SIMULAJ FIELD CPLX: CPT | Performed by: RADIOLOGY

## 2019-01-01 PROCEDURE — 77336 RADIATION PHYSICS CONSULT: CPT | Performed by: RADIOLOGY

## 2019-01-01 PROCEDURE — 74176 CT ABD & PELVIS W/O CONTRAST: CPT | Performed by: RADIOLOGY

## 2019-01-01 PROCEDURE — 93005 ELECTROCARDIOGRAM TRACING: CPT | Performed by: INTERNAL MEDICINE

## 2019-01-01 PROCEDURE — 77417 THER RADIOLOGY PORT IMAGE(S): CPT | Performed by: RADIOLOGY

## 2019-01-01 PROCEDURE — 77307 TELETHX ISODOSE PLAN CPLX: CPT | Performed by: RADIOLOGY

## 2019-01-01 PROCEDURE — A9581 GADOXETATE DISODIUM INJ: HCPCS | Mod: JW | Performed by: INTERNAL MEDICINE

## 2019-01-01 PROCEDURE — 99214 OFFICE O/P EST MOD 30 MIN: CPT | Performed by: NURSE PRACTITIONER

## 2019-01-01 RX ORDER — CODEINE PHOSPHATE AND GUAIFENESIN 10; 100 MG/5ML; MG/5ML
1-2 SOLUTION ORAL EVERY 4 HOURS PRN
Qty: 118 ML | Refills: 0 | Status: SHIPPED | OUTPATIENT
Start: 2019-01-01

## 2019-01-01 ASSESSMENT — PAIN SCALES - GENERAL
PAINLEVEL: MODERATE PAIN (5)
PAINLEVEL: NO PAIN (1)
PAINLEVEL: MILD PAIN (3)
PAINLEVEL: NO PAIN (0)
PAINLEVEL: SEVERE PAIN (6)
PAINLEVEL: MILD PAIN (3)
PAINLEVEL: MILD PAIN (3)
PAINLEVEL: NO PAIN (0)

## 2019-01-01 ASSESSMENT — MIFFLIN-ST. JEOR
SCORE: 1422.75
SCORE: 1450.87

## 2019-01-30 DIAGNOSIS — Z51.81 ENCOUNTER FOR MONITORING CARDIOTOXIC DRUG THERAPY: Primary | ICD-10-CM

## 2019-01-30 DIAGNOSIS — Z79.899 ENCOUNTER FOR MONITORING CARDIOTOXIC DRUG THERAPY: Primary | ICD-10-CM

## 2019-01-30 DIAGNOSIS — C34.91 NON-SMALL CELL CANCER OF RIGHT LUNG (H): ICD-10-CM

## 2019-02-07 ENCOUNTER — DOCUMENTATION ONLY (OUTPATIENT)
Dept: PHARMACY | Facility: CLINIC | Age: 79
End: 2019-02-07

## 2019-02-07 ENCOUNTER — ONCOLOGY VISIT (OUTPATIENT)
Dept: ONCOLOGY | Facility: CLINIC | Age: 79
End: 2019-02-07
Payer: MEDICARE

## 2019-02-07 ENCOUNTER — ANCILLARY PROCEDURE (OUTPATIENT)
Dept: CT IMAGING | Facility: CLINIC | Age: 79
End: 2019-02-07
Attending: INTERNAL MEDICINE
Payer: MEDICARE

## 2019-02-07 VITALS
TEMPERATURE: 97.5 F | HEIGHT: 69 IN | WEIGHT: 172 LBS | BODY MASS INDEX: 25.48 KG/M2 | OXYGEN SATURATION: 96 % | SYSTOLIC BLOOD PRESSURE: 110 MMHG | HEART RATE: 79 BPM | DIASTOLIC BLOOD PRESSURE: 64 MMHG | RESPIRATION RATE: 20 BRPM

## 2019-02-07 DIAGNOSIS — Z79.899 ENCOUNTER FOR MONITORING CARDIOTOXIC DRUG THERAPY: ICD-10-CM

## 2019-02-07 DIAGNOSIS — C34.90 NON-SMALL CELL LUNG CANCER (H): ICD-10-CM

## 2019-02-07 DIAGNOSIS — Z51.81 ENCOUNTER FOR MONITORING CARDIOTOXIC DRUG THERAPY: ICD-10-CM

## 2019-02-07 DIAGNOSIS — C34.91 NON-SMALL CELL CANCER OF RIGHT LUNG (H): Primary | ICD-10-CM

## 2019-02-07 DIAGNOSIS — C34.91 NON-SMALL CELL CANCER OF RIGHT LUNG (H): ICD-10-CM

## 2019-02-07 LAB
ALBUMIN SERPL-MCNC: 3.6 G/DL (ref 3.4–5)
ALP SERPL-CCNC: 92 U/L (ref 40–150)
ALT SERPL W P-5'-P-CCNC: 24 U/L (ref 0–70)
ANION GAP SERPL CALCULATED.3IONS-SCNC: 7 MMOL/L (ref 3–14)
AST SERPL W P-5'-P-CCNC: 25 U/L (ref 0–45)
BASOPHILS # BLD AUTO: 0 10E9/L (ref 0–0.2)
BASOPHILS NFR BLD AUTO: 0.5 %
BILIRUB SERPL-MCNC: 0.6 MG/DL (ref 0.2–1.3)
BUN SERPL-MCNC: 31 MG/DL (ref 7–30)
CALCIUM SERPL-MCNC: 8.6 MG/DL (ref 8.5–10.1)
CHLORIDE SERPL-SCNC: 109 MMOL/L (ref 94–109)
CO2 SERPL-SCNC: 27 MMOL/L (ref 20–32)
CREAT SERPL-MCNC: 1.9 MG/DL (ref 0.66–1.25)
DIFFERENTIAL METHOD BLD: ABNORMAL
EOSINOPHIL # BLD AUTO: 0.1 10E9/L (ref 0–0.7)
EOSINOPHIL NFR BLD AUTO: 1.7 %
ERYTHROCYTE [DISTWIDTH] IN BLOOD BY AUTOMATED COUNT: 12.6 % (ref 10–15)
GFR SERPL CREATININE-BSD FRML MDRD: 32 ML/MIN/{1.73_M2}
GLUCOSE SERPL-MCNC: 112 MG/DL (ref 70–99)
HCT VFR BLD AUTO: 47.3 % (ref 40–53)
HGB BLD-MCNC: 14.8 G/DL (ref 13.3–17.7)
IMM GRANULOCYTES # BLD: 0 10E9/L (ref 0–0.4)
IMM GRANULOCYTES NFR BLD: 0.2 %
LYMPHOCYTES # BLD AUTO: 1 10E9/L (ref 0.8–5.3)
LYMPHOCYTES NFR BLD AUTO: 17.5 %
MCH RBC QN AUTO: 31.1 PG (ref 26.5–33)
MCHC RBC AUTO-ENTMCNC: 31.3 G/DL (ref 31.5–36.5)
MCV RBC AUTO: 99 FL (ref 78–100)
MONOCYTES # BLD AUTO: 0.8 10E9/L (ref 0–1.3)
MONOCYTES NFR BLD AUTO: 13.2 %
NEUTROPHILS # BLD AUTO: 4 10E9/L (ref 1.6–8.3)
NEUTROPHILS NFR BLD AUTO: 66.9 %
PLATELET # BLD AUTO: 100 10E9/L (ref 150–450)
POTASSIUM SERPL-SCNC: 4.1 MMOL/L (ref 3.4–5.3)
PROT SERPL-MCNC: 7 G/DL (ref 6.8–8.8)
RBC # BLD AUTO: 4.76 10E12/L (ref 4.4–5.9)
SODIUM SERPL-SCNC: 143 MMOL/L (ref 133–144)
WBC # BLD AUTO: 5.9 10E9/L (ref 4–11)

## 2019-02-07 PROCEDURE — 85025 COMPLETE CBC W/AUTO DIFF WBC: CPT | Performed by: INTERNAL MEDICINE

## 2019-02-07 PROCEDURE — 99215 OFFICE O/P EST HI 40 MIN: CPT | Performed by: INTERNAL MEDICINE

## 2019-02-07 PROCEDURE — 71250 CT THORAX DX C-: CPT | Mod: 51 | Performed by: RADIOLOGY

## 2019-02-07 PROCEDURE — 80053 COMPREHEN METABOLIC PANEL: CPT | Performed by: INTERNAL MEDICINE

## 2019-02-07 PROCEDURE — 74176 CT ABD & PELVIS W/O CONTRAST: CPT | Performed by: RADIOLOGY

## 2019-02-07 PROCEDURE — 36415 COLL VENOUS BLD VENIPUNCTURE: CPT | Performed by: INTERNAL MEDICINE

## 2019-02-07 RX ORDER — IOPAMIDOL 755 MG/ML
103 INJECTION, SOLUTION INTRAVASCULAR ONCE
Status: ACTIVE | OUTPATIENT
Start: 2019-02-07

## 2019-02-07 ASSESSMENT — PAIN SCALES - GENERAL: PAINLEVEL: NO PAIN (0)

## 2019-02-07 ASSESSMENT — MIFFLIN-ST. JEOR: SCORE: 1475.57

## 2019-02-07 NOTE — LETTER
2/7/2019         RE: Gilson Curtis  [address] [facility] [address]        Dear Colleague,    Thank you for referring your patient, Gilson Curtis, to the [facility]. Please see a copy of my visit note below.    [facility]  HEMATOLOGY AND ONCOLOGY    FOLLOW-UP VISIT NOTE    PATIENT NAME: Gilson Curtis MRN # [id number]  DATE OF VISIT: Feb 7, 2019 YOB: 1940    REFERRING PROVIDER: No referring provider defined for this encounter.    CANCER TYPE: Non small cell lung cancer - adenocarcinoma  STAGE: IV    TREATMENT SUMMARY:  Gilson presented with with hemoptysis starting in November 2011. He underwent CT of chest on Jan 18, 2012 at his primary care clinic which showed a RUL mass 2 x 1.9 cm with no suspicious lymphadenopathy or adrenal masses. 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 He was staged with a PET-CT and 2 lesions were noted. He was staged as T3N0 disease. He had surgical resection of the nodules in RUL and RML. EGFR exon 21 deletion.  He has been on Tarceva since June 2016 and is tolerating it quite well. Dose was decreased from 150 mg daily to 100 mg daily in July 2017 due to rash.     CURRENT INTERVENTIONS:  osimertinib (Tagrisso) since 8/15/18    SUBJECTIVE   Gilson Curtis is being followed for metastatic lung adenocarcinoma    Gilson has been on osimertinib for the last 6 months. He has diminished energy/fatigue. His fatigue is getting better. In the past he could sleep anytime. He also has a dry cough. He had some sinus drainage earlier last month and was treated with antibiotics. Otherwise he is doing well.       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     CAD (coronary artery disease)      CKD (chronic kidney disease) stage 3, GFR 30-59 ml/min (H)      Fatigue   "   \"spells\"     Hypertension      Lung cancer (H) 2016     Non-small cell lung cancer (H)      Prostate cancer (H) 2009     Skin cancer, basal cell      Stented coronary artery 2009    x2     TIA (transient ischemic attack) 2002         CURRENT OUTPATIENT MEDICATIONS     Current Outpatient Medications   Medication Sig     aspirin 81 MG tablet Take 1 tablet by mouth At Bedtime      calcium carbonate (TUMS) 500 MG chewable tablet Take 1 chew tab by mouth as needed for heartburn     hydrocortisone 1 % ointment Apply topically daily      lisinopril (PRINIVIL,ZESTRIL) 2.5 MG tablet Take 2.5 mg by mouth daily.     Multiple Vitamins-Minerals (EYE VITAMINS PO) Take 1 capsule by mouth daily OTC     Multiple Vitamins-Minerals (MATURE ADULT CENTURY PO) Take 1 tablet by mouth daily OTC     prochlorperazine (COMPAZINE) 10 MG tablet Take 0.5 tablets (5 mg) by mouth every 6 hours as needed (Nausea/Vomiting)     simvastatin (ZOCOR) 80 MG tablet      GABAPENTIN PO Take 150 mg by mouth At Bedtime      osimertinib (TAGRISSO) 80 MG tablet Take 1 tablet (80 mg) by mouth daily (Patient not taking: Reported on 2/7/2019)     ranitidine (ZANTAC) 150 MG capsule Take 150 mg by mouth 2 times daily      No current facility-administered medications for this visit.      Facility-Administered Medications Ordered in Other Visits   Medication     iopamidol (ISOVUE-370) solution 103 mL     ropivacaine 0.2% 550 mL in ON-Q C-Bloc select flow ( holds 400-550 mL) single cath disposable pump        ALLERGIES      Allergies   Allergen Reactions     No Known Drug Allergy         REVIEW OF SYSTEMS   As above in the HPI, o/w complete 12-point ROS was negative.     PHYSICAL EXAM   /64   Pulse 79   Temp 97.5  F (36.4  C)   Resp 20   Ht 1.753 m (5' 9\")   Wt 78 kg (172 lb)   SpO2 96%   BMI 25.40 kg/m     GEN: NAD  HEENT: PERRL, EOMI, no icterus, injection or pallor. Oropharynx is clear.  LYMPHATICs: no cervical or supraclavicular " lymphadenopathy; no other abn lymphadenopathy  PULMONARY: clear with good air entry bilaterally  CARDIOVASCULAR: regular, no murmurs, rubs, or gallops  GASTROINTESTINAL: soft, non-tender, non-distended, normal bowel sounds, no hepatosplenomegaly by percussion or palpation  MUSCULOSKELTAL: warm, well perfused, no edema  NEURO: awake, alert and oriented to time place and person, cranial nerves intact - II - XII, no focal neurologic deficits  SKIN: no rashes     LABORATORY AND IMAGING STUDIES     Labs are stable; continues to have CKD stage III and thrombocytopenia    Recent Labs   Lab Test 02/07/19  0854 12/18/18  0928 11/15/18  0836 10/15/18  1143 09/11/18  1104    142 141 143 143   POTASSIUM 4.1 4.2 4.4 4.7 4.4   CHLORIDE 109 109 108 110* 109   CO2 27 27 28 29 28   ANIONGAP 7 6 5 4 6   BUN 31* 28 28 23 26   CR 1.90* 1.77* 1.71* 1.62* 1.74*   * 148* 98 77 93   RICK 8.6 8.5 8.6 8.8 8.3*     Recent Labs   Lab Test 10/10/17  1033 07/11/17  1037 03/06/17  1000 01/11/17  1000 12/15/16  1055   MAG 2.0 1.9 2.1 2.3 2.0   PHOS 2.3* 2.9 2.7 2.0* 2.7     Recent Labs   Lab Test 02/07/19  0854 12/18/18  0928 11/15/18  0836 10/15/18  1143 09/11/18  1104   WBC 5.9 5.0 5.0 5.9 5.5   HGB 14.8 15.1 15.0 14.5 13.3   * 93* 92* 99* 115*   MCV 99 100 99 101* 100   NEUTROPHIL 66.9 73.6 64.0 72.4 65.5     Recent Labs   Lab Test 02/07/19  0854 12/18/18  0928 11/15/18  0836  08/02/18  0948   BILITOTAL 0.6 0.6 0.8   < > 1.0   ALKPHOS 92 82 85   < > 117   ALT 24 25 25   < > 41   AST 25 25 26   < > 35   ALBUMIN 3.6 3.5 3.7   < > 3.5   LDH  --  154  --   --  152    < > = values in this interval not displayed.     No results found for: TSH  No results for input(s): CEA in the last 61760 hours.  Results for orders placed or performed in visit on 11/01/18   CT Chest/Abdomen/Pelvis w Contrast    Narrative    Examination:  CT CHEST/ABDOMEN/PELVIS W CONTRAST .     Indication:  Restaging non small cell lung cancer now on  Tagrisso;  Malignant neoplasm of trachea, bronchus, and lung (H); Malignant  neoplasm of trachea, bronchus, and lung (H)     Comparison: 8/2/2018.    Technique: CT of Chest, abdomen and pelvis was acquired from thoracic  inlet to pubic symphysis with IV contrast and without oral contrast.  Images were reconstructed in axial and coronal sections. Images were  reviewed in lung, soft tissue, liver, bone windows.     Contrast: 103 ml isovue 370    Findings:   Chest: There is a decrease or resolution of multiple metastatic lung  nodules. For example left lower lobe lung nodule series 6 image 43 mm,  prior 5 mm. Right upper lobe lung nodule series 6 image 11, 1 mm  nodule, prior 5 mm. There is no axillary axillary, mediastinal or  hilar lymphadenopathy by size criteria. 1.5 cm retroesophageal lymph  node series 2 image 53 is unchanged. Small hiatal hernia.    Abdomen pelvis: The liver and biliary tree are unremarkable.  Cholecystectomy clips. Partially fatty replaced pancreas. Splenic  granulomata unchanged. The adrenal glands are normal in appearance.  Right kidney is atrophic. Peripelvic cysts in the left kidney,  unchanged 7 mm nodule extending off the left kidney series 2 image 68  unchanged.     Portal vein, celiac access, SMA, are patent.    The bladder is unremarkable. The colon and small bowel are normal in  caliber. There is no abdominal or pelvic lymphadenopathy by size  criteria.    No suspicious bony abnormalities.      Impression    Impression: In this patient with non-small cell lung cancer on Tagrisso, there is positive response to therapy.  Decrease or resolution of multiple lung nodules.    PEDRITO ARIAS MD          ASSESSMENT AND PLAN   Non small cell lung cancer - adenocarcinoma with EGFR mutation (exxon 21 deletion)   On erlotinib since June 2016  ECOG PS 1  HTN, CKD, CAD    Zane is tolerating osimertinib well at the 80 mg daily dose. He has cough and fatigue. I am not sure about the cause of  his fatigue. He had fatigue on erlotinib and this could be related to the drug. He has normal Hgb ~ 15 g/dl. His LVEF was 61% at the last check - 2 weeks ago.     He otherwise has no new complains on this medication.    I have reviewed actual images from his restaging scans and he has a continued treatment response. I reviewed this with him and he is happy with the response. His wife had several questions for me and wanted to know if all his lung cancer would disappear.     I did review decreasing the dose for fatigue but he would like to continue on the medication at current dose as he is doing better than in the past.     His wife has been newly diagnosed with multiple myeloma and follows with Dr. Misti Mckeon at [facility].     I will see him again in 3 months with labs and scans prior to visit. He will be followed by pharmacy team for his monitoring on therapy    Over 25 min of direct face to face time spent with patient with more than 50% time spent in counseling and coordinating care.      Again, thank you for allowing me to participate in the care of your patient.        Sincerely,        Gerry Ahumada MD

## 2019-02-07 NOTE — PROGRESS NOTES
"[facility]  HEMATOLOGY AND ONCOLOGY    FOLLOW-UP VISIT NOTE    PATIENT NAME: Gilson Curtis MRN # [id number]  DATE OF VISIT: Feb 7, 2019 YOB: 1940    REFERRING PROVIDER: No referring provider defined for this encounter.    CANCER TYPE: Non small cell lung cancer - adenocarcinoma  STAGE: IV    TREATMENT SUMMARY:  Gilson presented with with hemoptysis starting in November 2011. He underwent CT of chest on Jan 18, 2012 at his primary care clinic which showed a RUL mass 2 x 1.9 cm with no suspicious lymphadenopathy or adrenal masses. 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 He was staged with a PET-CT and 2 lesions were noted. He was staged as T3N0 disease. He had surgical resection of the nodules in RUL and RML. EGFR exon 21 deletion.  He has been on Tarceva since June 2016 and is tolerating it quite well. Dose was decreased from 150 mg daily to 100 mg daily in July 2017 due to rash.     CURRENT INTERVENTIONS:  osimertinib (Tagrisso) since 8/15/18    SUBJECTIVE   Gilson Curtis is being followed for metastatic lung adenocarcinoma    Gilson has been on osimertinib for the last 6 months. He has diminished energy/fatigue. His fatigue is getting better. In the past he could sleep anytime. He also has a dry cough. He had some sinus drainage earlier last month and was treated with antibiotics. Otherwise he is doing well.       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     CAD (coronary artery disease)      CKD (chronic kidney disease) stage 3, GFR 30-59 ml/min (H)      Fatigue     \"spells\"     Hypertension      Lung cancer (H) 2016     Non-small cell lung cancer (H)      Prostate cancer (H) 2009     Skin cancer, basal cell      Stented coronary artery 2009    x2     TIA (transient ischemic attack) 2002         CURRENT " "OUTPATIENT MEDICATIONS     Current Outpatient Medications   Medication Sig     aspirin 81 MG tablet Take 1 tablet by mouth At Bedtime      calcium carbonate (TUMS) 500 MG chewable tablet Take 1 chew tab by mouth as needed for heartburn     hydrocortisone 1 % ointment Apply topically daily      lisinopril (PRINIVIL,ZESTRIL) 2.5 MG tablet Take 2.5 mg by mouth daily.     Multiple Vitamins-Minerals (EYE VITAMINS PO) Take 1 capsule by mouth daily OTC     Multiple Vitamins-Minerals (MATURE ADULT CENTURY PO) Take 1 tablet by mouth daily OTC     prochlorperazine (COMPAZINE) 10 MG tablet Take 0.5 tablets (5 mg) by mouth every 6 hours as needed (Nausea/Vomiting)     simvastatin (ZOCOR) 80 MG tablet      GABAPENTIN PO Take 150 mg by mouth At Bedtime      osimertinib (TAGRISSO) 80 MG tablet Take 1 tablet (80 mg) by mouth daily (Patient not taking: Reported on 2/7/2019)     ranitidine (ZANTAC) 150 MG capsule Take 150 mg by mouth 2 times daily      No current facility-administered medications for this visit.      Facility-Administered Medications Ordered in Other Visits   Medication     iopamidol (ISOVUE-370) solution 103 mL     ropivacaine 0.2% 550 mL in ON-Q C-Bloc select flow ( holds 400-550 mL) single cath disposable pump        ALLERGIES      Allergies   Allergen Reactions     No Known Drug Allergy         REVIEW OF SYSTEMS   As above in the HPI, o/w complete 12-point ROS was negative.     PHYSICAL EXAM   /64   Pulse 79   Temp 97.5  F (36.4  C)   Resp 20   Ht 1.753 m (5' 9\")   Wt 78 kg (172 lb)   SpO2 96%   BMI 25.40 kg/m    GEN: NAD  HEENT: PERRL, EOMI, no icterus, injection or pallor. Oropharynx is clear.  LYMPHATICs: no cervical or supraclavicular lymphadenopathy; no other abn lymphadenopathy  PULMONARY: clear with good air entry bilaterally  CARDIOVASCULAR: regular, no murmurs, rubs, or gallops  GASTROINTESTINAL: soft, non-tender, non-distended, normal bowel sounds, no hepatosplenomegaly by percussion " or palpation  MUSCULOSKELTAL: warm, well perfused, no edema  NEURO: awake, alert and oriented to time place and person, cranial nerves intact - II - XII, no focal neurologic deficits  SKIN: no rashes     LABORATORY AND IMAGING STUDIES     Labs are stable; continues to have CKD stage III and thrombocytopenia    Recent Labs   Lab Test 02/07/19  0854 12/18/18  0928 11/15/18  0836 10/15/18  1143 09/11/18  1104    142 141 143 143   POTASSIUM 4.1 4.2 4.4 4.7 4.4   CHLORIDE 109 109 108 110* 109   CO2 27 27 28 29 28   ANIONGAP 7 6 5 4 6   BUN 31* 28 28 23 26   CR 1.90* 1.77* 1.71* 1.62* 1.74*   * 148* 98 77 93   RICK 8.6 8.5 8.6 8.8 8.3*     Recent Labs   Lab Test 10/10/17  1033 07/11/17  1037 03/06/17  1000 01/11/17  1000 12/15/16  1055   MAG 2.0 1.9 2.1 2.3 2.0   PHOS 2.3* 2.9 2.7 2.0* 2.7     Recent Labs   Lab Test 02/07/19  0854 12/18/18  0928 11/15/18  0836 10/15/18  1143 09/11/18  1104   WBC 5.9 5.0 5.0 5.9 5.5   HGB 14.8 15.1 15.0 14.5 13.3   * 93* 92* 99* 115*   MCV 99 100 99 101* 100   NEUTROPHIL 66.9 73.6 64.0 72.4 65.5     Recent Labs   Lab Test 02/07/19  0854 12/18/18  0928 11/15/18  0836  08/02/18  0948   BILITOTAL 0.6 0.6 0.8   < > 1.0   ALKPHOS 92 82 85   < > 117   ALT 24 25 25   < > 41   AST 25 25 26   < > 35   ALBUMIN 3.6 3.5 3.7   < > 3.5   LDH  --  154  --   --  152    < > = values in this interval not displayed.     No results found for: TSH  No results for input(s): CEA in the last 93678 hours.  Results for orders placed or performed in visit on 11/01/18   CT Chest/Abdomen/Pelvis w Contrast    Narrative    Examination:  CT CHEST/ABDOMEN/PELVIS W CONTRAST .     Indication:  Restaging non small cell lung cancer now on Tagrisso;  Malignant neoplasm of trachea, bronchus, and lung (H); Malignant  neoplasm of trachea, bronchus, and lung (H)     Comparison: 8/2/2018.    Technique: CT of Chest, abdomen and pelvis was acquired from thoracic  inlet to pubic symphysis with IV contrast and  without oral contrast.  Images were reconstructed in axial and coronal sections. Images were  reviewed in lung, soft tissue, liver, bone windows.     Contrast: 103 ml isovue 370    Findings:   Chest: There is a decrease or resolution of multiple metastatic lung  nodules. For example left lower lobe lung nodule series 6 image 43 mm,  prior 5 mm. Right upper lobe lung nodule series 6 image 11, 1 mm  nodule, prior 5 mm. There is no axillary axillary, mediastinal or  hilar lymphadenopathy by size criteria. 1.5 cm retroesophageal lymph  node series 2 image 53 is unchanged. Small hiatal hernia.    Abdomen pelvis: The liver and biliary tree are unremarkable.  Cholecystectomy clips. Partially fatty replaced pancreas. Splenic  granulomata unchanged. The adrenal glands are normal in appearance.  Right kidney is atrophic. Peripelvic cysts in the left kidney,  unchanged 7 mm nodule extending off the left kidney series 2 image 68  unchanged.     Portal vein, celiac access, SMA, are patent.    The bladder is unremarkable. The colon and small bowel are normal in  caliber. There is no abdominal or pelvic lymphadenopathy by size  criteria.    No suspicious bony abnormalities.      Impression    Impression: In this patient with non-small cell lung cancer on Tagrisso, there is positive response to therapy.  Decrease or resolution of multiple lung nodules.    PEDRITO ARIAS MD          ASSESSMENT AND PLAN   Non small cell lung cancer - adenocarcinoma with EGFR mutation (exxon 21 deletion)   On erlotinib since June 2016  ECOG PS 1  HTN, CKD, CAD    Zane is tolerating osimertinib well at the 80 mg daily dose. He has cough and fatigue. I am not sure about the cause of his fatigue. He had fatigue on erlotinib and this could be related to the drug. He has normal Hgb ~ 15 g/dl. His LVEF was 61% at the last check - 2 weeks ago.     He otherwise has no new complains on this medication.    I have reviewed actual images from his  restaging scans and he has a continued treatment response. I reviewed this with him and he is happy with the response. His wife had several questions for me and wanted to know if all his lung cancer would disappear.     I did review decreasing the dose for fatigue but he would like to continue on the medication at current dose as he is doing better than in the past.     His wife has been newly diagnosed with multiple myeloma and follows with Dr. Misti Mckeon at [facility].     I will see him again in 3 months with labs and scans prior to visit. He will be followed by pharmacy team for his monitoring on therapy    Over 25 min of direct face to face time spent with patient with more than 50% time spent in counseling and coordinating care.

## 2019-02-07 NOTE — PROGRESS NOTES
Oral Chemotherapy Monitoring Program     Primary Oncologist: Dr. Gerry Ahumada  Primary Oncology Clinic: [facility]  Cancer Diagnosis: NSCLC     Drug: Osimertinib (Tagrisso) 80 mg daily continuously  Start Date: 8/15/18     Expected Duration of Therapy: Until disease progression or unacceptable toxicity     Drug Interaction Assessment: No significant drug interactions identified upon review.     Lab Monitoring Plan  CBC, CMP monthly, LVEF every 2-3 months    Subjective/Objective:  Gilson Curtis is a 79 year old male contacted by phone for a follow-up visit for oral chemotherapy.  Gilson states he is doing well. He continues to have fatigue, but it seems to be slightly improved.  He said he can sleep at any time.  It is manageable. He denies any other side effects. CT done today with improvement. Pt Creatine is up.  States he has not drank a lot of water lately.  He switched insurance companies at beginning of the year and has to get script filled at [facility].  He does not have his next cycle and only has 2 tabs left.   ORAL CHEMOTHERAPY 4/23/2018 8/14/2018 9/11/2018 11/15/2018 11/26/2018 12/19/2018 2/7/2019   Drug Name Tarceva (Erlotinib) Tagrisso (Osimertinib) Tagrisso (Osimertinib) Tagrisso (Osimertinib) Tagrisso (Osimertinib) Tagrisso (Osimertinib) Tagrisso (Osimertinib)   Current Dosage 100mg 80mg 80mg 80mg 80mg 80mg 80mg   Current Schedule Daily Daily Daily Daily Daily Daily Daily   Cycle Details Continuous Continuous Continuous Continuous Continuous Continuous Continuous   Start Date of Last Cycle - 8/5/2018 - - - - -   Planned next cycle start date - - - - - - -   Doses missed in last 2 weeks 0 0 0 - - 0 0   Adherence Assessment Adherent Adherent Adherent Adherent Adherent Adherent Adherent   Adverse Effects No AE identified during assessment No AE identified during assessment Fatigue Fatigue Fatigue Fatigue Fatigue   EGFR rash - - - - - - -   Pharmacist  "Intervention(EGFR) - - - - - - -   Intervention(s) - - - - - - -   Fatigue - - Grade 1 Grade 2 Grade 2 Grade 1 Grade 1   Pharmacist Intervention(fatigue) - - Yes No No No No   Intervention(s) - - Patient education - - - -   Home BPs not needed - - Not applicable - - -   Any new drug interactions? No No No No No No No   Is the dose as ordered appropriate for the patient? - - - Yes - - Yes   Is the patient currently in pain? - Assessed in last 30 days. Assessed in last 30 days. Assessed in last 30 days. Assessed in last 30 days. No No   Has the patient been assessed within the past 6 months for depression? - Yes Yes Yes Yes Yes Yes   Has the patient missed any days of school, work, or other routine activity? - - No - - No No       Vitals:  BP:   BP Readings from Last 1 Encounters:   02/07/19 110/64     Wt Readings from Last 1 Encounters:   02/07/19 78 kg (172 lb)     Estimated body surface area is 1.95 meters squared as calculated from the following:    Height as of an earlier encounter on 2/7/19: 1.753 m (5' 9\").    Weight as of an earlier encounter on 2/7/19: 78 kg (172 lb).    Labs:  Result Component Current Result Ref Range   Sodium 143 (2/7/2019) 133 - 144 mmol/L     Result Component Current Result Ref Range   Potassium 4.1 (2/7/2019) 3.4 - 5.3 mmol/L     Result Component Current Result Ref Range   Calcium 8.6 (2/7/2019) 8.5 - 10.1 mg/dL     Result Component Current Result Ref Range   Albumin 3.6 (2/7/2019) 3.4 - 5.0 g/dL     Result Component Current Result Ref Range   Urea Nitrogen 31 (H) (2/7/2019) 7 - 30 mg/dL     Result Component Current Result Ref Range   Creatinine 1.90 (H) (2/7/2019) 0.66 - 1.25 mg/dL     Result Component Current Result Ref Range   AST 25 (2/7/2019) 0 - 45 U/L     Result Component Current Result Ref Range   ALT 24 (2/7/2019) 0 - 70 U/L     Result Component Current Result Ref Range   Bilirubin Total 0.6 (2/7/2019) 0.2 - 1.3 mg/dL     Result Component Current Result Ref Range   WBC 5.9 " (2/7/2019) 4.0 - 11.0 10e9/L     Result Component Current Result Ref Range   Hemoglobin 14.8 (2/7/2019) 13.3 - 17.7 g/dL     Result Component Current Result Ref Range   Platelet Count 100 (L) (2/7/2019) 150 - 450 10e9/L     Result Component Current Result Ref Range   Absolute Neutrophil 4.0 (2/7/2019) 1.6 - 8.3 10e9/L     Assessment:  Pt doing well on therapy with great results.  He continues to struggle with fatigue, but has slightly improved.      Plan:  Increase water intake, which he agreed to do.  Talked with Bonnie.  Script transferred to [facility] on 2/4 with mauri information.  Gave [facility] number to Gilson.  He is to call for delivery.  If he has any issues, he has Bonnie's number and card and she can assist.     Follow-Up:  05/16/19 9AM appt with Zita with lab and CT prior.     Refill Due:  2019/03/01 release osimertinib to [facility]    Rebecca J. Fahrenbruch, PharmD, BCOP

## 2019-02-07 NOTE — NURSING NOTE
"Oncology Rooming Note    February 7, 2019 10:03 AM   Gilson Curtis is a 79 year old male who presents for:    Chief Complaint   Patient presents with     Oncology Clinic Visit     3 month follow up      Initial Vitals: /64   Pulse 79   Temp 97.5  F (36.4  C)   Resp 20   Ht 1.753 m (5' 9\")   Wt 78 kg (172 lb)   SpO2 96%   BMI 25.40 kg/m   Estimated body mass index is 25.4 kg/m  as calculated from the following:    Height as of this encounter: 1.753 m (5' 9\").    Weight as of this encounter: 78 kg (172 lb). Body surface area is 1.95 meters squared.  No Pain (0) Comment: Data Unavailable   No LMP for male patient.  Allergies reviewed: Yes  Medications reviewed: Yes    Medications: Medication refills not needed today.  Pharmacy name entered into [employer]: [facility] [address] [place] [address]         5 minutes for nursing intake (face to face time)     Pretty Collins LPN              "

## 2019-02-23 ENCOUNTER — TRANSFERRED RECORDS (OUTPATIENT)
Dept: HEALTH INFORMATION MANAGEMENT | Facility: CLINIC | Age: 79
End: 2019-02-23

## 2019-03-01 ENCOUNTER — ONCOLOGY VISIT (OUTPATIENT)
Dept: RADIATION ONCOLOGY | Facility: CLINIC | Age: 79
End: 2019-03-01

## 2019-03-01 DIAGNOSIS — Z79.899 ENCOUNTER FOR MONITORING CARDIOTOXIC DRUG THERAPY: ICD-10-CM

## 2019-03-01 DIAGNOSIS — Z51.81 ENCOUNTER FOR MONITORING CARDIOTOXIC DRUG THERAPY: ICD-10-CM

## 2019-03-01 DIAGNOSIS — C34.91 NON-SMALL CELL CANCER OF RIGHT LUNG (H): ICD-10-CM

## 2019-03-01 DIAGNOSIS — Z79.899 ENCOUNTER FOR MONITORING CARDIOTOXIC DRUG THERAPY: Primary | ICD-10-CM

## 2019-03-01 DIAGNOSIS — Z51.81 ENCOUNTER FOR MONITORING CARDIOTOXIC DRUG THERAPY: Primary | ICD-10-CM

## 2019-03-01 RX ORDER — OSIMERTINIB 80 1/1
TABLET, FILM COATED ORAL
Qty: 30 TABLET | Refills: 0 | Status: SHIPPED | OUTPATIENT
Start: 2019-03-01 | End: 2019-04-18

## 2019-03-02 NOTE — PROCEDURES
Radiotherapy Treatment Summary          Date of Report: 2019     PATIENT: FREEDOM WILLAMS  MEDICAL RECORD NO: [id number]  : 1940     DIAGNOSIS: C79.51 Secondary malignant neoplasm of bone  INTENT OF RADIOTHERAPY: Palliative  PATHOLOGY/STAGE: Mr. Kline is an 77yo gentleman with metastatic EGFR+ positive lung adenocarcinoma who was referred for palliation of a painful right rib mass. This is the treatment summary for his palliative course of radiotherapy to this area.     Details of the treatments summarized below are found in records kept in the Department of Radiation Oncology at   [facility].     Treatment Summary:  Radiation Oncology - Course: 1 Protocol:   Treatment SiteCurrent Dose Modality From  To Elapsed Days Fx.  1Rt Ribs  3,000 cGy 6X/10X  6/06/2018  2018  13 10          Dose per Fraction:        Total Dose:        300cGy    3000cGy to the right rib mass        COMMENTS:                      (Acute Toxicity Profile by CTC v4.0)  No side effect reported while on treatment.  Pain symptoms improved while on treatment.     PAIN MANAGEMENT:                           Pain symptoms improved while on treatment.     FOLLOW UP PLAN:                         Follow up with Rad Onc prn     Staff Physician: Sean Pérez M.D.  Physicist: Elvira Palafox MS     CC: Gerry Ahumada MD              Radiation Oncology [address] [place] [id number] Phone: [phone]

## 2019-03-29 DIAGNOSIS — Z79.899 ENCOUNTER FOR MONITORING CARDIOTOXIC DRUG THERAPY: Primary | ICD-10-CM

## 2019-03-29 DIAGNOSIS — Z51.81 ENCOUNTER FOR MONITORING CARDIOTOXIC DRUG THERAPY: Primary | ICD-10-CM

## 2019-03-29 DIAGNOSIS — C34.91 NON-SMALL CELL CANCER OF RIGHT LUNG (H): ICD-10-CM

## 2019-04-03 ENCOUNTER — PATIENT OUTREACH (OUTPATIENT)
Dept: ONCOLOGY | Facility: CLINIC | Age: 79
End: 2019-04-03

## 2019-04-03 NOTE — PROGRESS NOTES
Patient came into clinic and spoke to , Bonnie, complaining of new pain in the lower side of his chest.  He has follow-up with Dr. Ahumada in May, but is asking if he could be seen sooner.  He currently rates his pain about a 2.  Plan is we moved his appointment to 4/18; writer will discuss with Dr. Ahumada.

## 2019-04-16 ENCOUNTER — ANCILLARY PROCEDURE (OUTPATIENT)
Dept: CT IMAGING | Facility: CLINIC | Age: 79
End: 2019-04-16
Attending: INTERNAL MEDICINE
Payer: MEDICARE

## 2019-04-16 DIAGNOSIS — C34.91 NON-SMALL CELL CANCER OF RIGHT LUNG (H): ICD-10-CM

## 2019-04-16 DIAGNOSIS — Z51.81 ENCOUNTER FOR MONITORING CARDIOTOXIC DRUG THERAPY: ICD-10-CM

## 2019-04-16 DIAGNOSIS — Z79.899 ENCOUNTER FOR MONITORING CARDIOTOXIC DRUG THERAPY: ICD-10-CM

## 2019-04-16 LAB
ALBUMIN SERPL-MCNC: 3.7 G/DL (ref 3.4–5)
ALP SERPL-CCNC: 83 U/L (ref 40–150)
ALT SERPL W P-5'-P-CCNC: 21 U/L (ref 0–70)
ANION GAP SERPL CALCULATED.3IONS-SCNC: 6 MMOL/L (ref 3–14)
AST SERPL W P-5'-P-CCNC: 20 U/L (ref 0–45)
BASOPHILS # BLD AUTO: 0 10E9/L (ref 0–0.2)
BASOPHILS NFR BLD AUTO: 0.5 %
BILIRUB SERPL-MCNC: 0.5 MG/DL (ref 0.2–1.3)
BUN SERPL-MCNC: 21 MG/DL (ref 7–30)
CALCIUM SERPL-MCNC: 8.7 MG/DL (ref 8.5–10.1)
CHLORIDE SERPL-SCNC: 108 MMOL/L (ref 94–109)
CO2 SERPL-SCNC: 26 MMOL/L (ref 20–32)
CREAT SERPL-MCNC: 1.7 MG/DL (ref 0.66–1.25)
DIFFERENTIAL METHOD BLD: ABNORMAL
EOSINOPHIL # BLD AUTO: 0.1 10E9/L (ref 0–0.7)
EOSINOPHIL NFR BLD AUTO: 1.3 %
ERYTHROCYTE [DISTWIDTH] IN BLOOD BY AUTOMATED COUNT: 13.4 % (ref 10–15)
GFR SERPL CREATININE-BSD FRML MDRD: 37 ML/MIN/{1.73_M2}
GLUCOSE SERPL-MCNC: 83 MG/DL (ref 70–99)
HCT VFR BLD AUTO: 46 % (ref 40–53)
HGB BLD-MCNC: 14.4 G/DL (ref 13.3–17.7)
IMM GRANULOCYTES # BLD: 0 10E9/L (ref 0–0.4)
IMM GRANULOCYTES NFR BLD: 0.4 %
LYMPHOCYTES # BLD AUTO: 1 10E9/L (ref 0.8–5.3)
LYMPHOCYTES NFR BLD AUTO: 18.2 %
MCH RBC QN AUTO: 31.1 PG (ref 26.5–33)
MCHC RBC AUTO-ENTMCNC: 31.3 G/DL (ref 31.5–36.5)
MCV RBC AUTO: 99 FL (ref 78–100)
MONOCYTES # BLD AUTO: 0.6 10E9/L (ref 0–1.3)
MONOCYTES NFR BLD AUTO: 10.3 %
NEUTROPHILS # BLD AUTO: 3.9 10E9/L (ref 1.6–8.3)
NEUTROPHILS NFR BLD AUTO: 69.3 %
PLATELET # BLD AUTO: 91 10E9/L (ref 150–450)
POTASSIUM SERPL-SCNC: 4.5 MMOL/L (ref 3.4–5.3)
PROT SERPL-MCNC: 6.7 G/DL (ref 6.8–8.8)
RBC # BLD AUTO: 4.63 10E12/L (ref 4.4–5.9)
SODIUM SERPL-SCNC: 140 MMOL/L (ref 133–144)
WBC # BLD AUTO: 5.6 10E9/L (ref 4–11)

## 2019-04-16 PROCEDURE — 85025 COMPLETE CBC W/AUTO DIFF WBC: CPT | Performed by: INTERNAL MEDICINE

## 2019-04-16 PROCEDURE — 80053 COMPREHEN METABOLIC PANEL: CPT | Performed by: INTERNAL MEDICINE

## 2019-04-16 PROCEDURE — 36415 COLL VENOUS BLD VENIPUNCTURE: CPT | Performed by: INTERNAL MEDICINE

## 2019-04-16 PROCEDURE — 71250 CT THORAX DX C-: CPT | Mod: 51 | Performed by: RADIOLOGY

## 2019-04-16 PROCEDURE — 74176 CT ABD & PELVIS W/O CONTRAST: CPT | Performed by: RADIOLOGY

## 2019-04-18 ENCOUNTER — TELEPHONE (OUTPATIENT)
Dept: PHARMACY | Facility: CLINIC | Age: 79
End: 2019-04-18

## 2019-04-18 ENCOUNTER — ONCOLOGY VISIT (OUTPATIENT)
Dept: ONCOLOGY | Facility: CLINIC | Age: 79
End: 2019-04-18
Payer: MEDICARE

## 2019-04-18 VITALS
HEART RATE: 68 BPM | RESPIRATION RATE: 16 BRPM | SYSTOLIC BLOOD PRESSURE: 115 MMHG | HEIGHT: 69 IN | DIASTOLIC BLOOD PRESSURE: 74 MMHG | WEIGHT: 170.38 LBS | OXYGEN SATURATION: 99 % | TEMPERATURE: 97.9 F | BODY MASS INDEX: 25.23 KG/M2

## 2019-04-18 DIAGNOSIS — C34.91 NON-SMALL CELL CANCER OF RIGHT LUNG (H): Primary | ICD-10-CM

## 2019-04-18 DIAGNOSIS — N18.30 CKD (CHRONIC KIDNEY DISEASE) STAGE 3, GFR 30-59 ML/MIN (H): ICD-10-CM

## 2019-04-18 DIAGNOSIS — D69.6 THROMBOCYTOPENIA (H): ICD-10-CM

## 2019-04-18 PROCEDURE — 99214 OFFICE O/P EST MOD 30 MIN: CPT | Performed by: INTERNAL MEDICINE

## 2019-04-18 ASSESSMENT — MIFFLIN-ST. JEOR: SCORE: 1468.45

## 2019-04-18 ASSESSMENT — PAIN SCALES - GENERAL: PAINLEVEL: NO PAIN (1)

## 2019-04-18 NOTE — PROGRESS NOTES
"[facility]  HEMATOLOGY AND ONCOLOGY    FOLLOW-UP VISIT NOTE    PATIENT NAME: Gilson Curtis MRN # [id number]  DATE OF VISIT: Apr 18, 2019 YOB: 1940    REFERRING PROVIDER: No referring provider defined for this encounter.    CANCER TYPE: Non small cell lung cancer - adenocarcinoma  STAGE: IV    TREATMENT SUMMARY:  Gilson presented with with hemoptysis starting in November 2011. He underwent CT of chest on Jan 18, 2012 at his primary care clinic which showed a RUL mass 2 x 1.9 cm with no suspicious lymphadenopathy or adrenal masses. 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 He was staged with a PET-CT and 2 lesions were noted. He was staged as T3N0 disease. He had surgical resection of the nodules in RUL and RML. EGFR exon 21 deletion.  He has been on Tarceva since June 2016 and is tolerating it quite well. Dose was decreased from 150 mg daily to 100 mg daily in July 2017 due to rash.     CURRENT INTERVENTIONS:  osimertinib (Tagrisso) since 8/15/18    SUBJECTIVE   Gilson Curtis is being followed for metastatic lung adenocarcinoma    Gilson has been on osimertinib for the last 6 months. He has diminished energy/fatigue. His fatigue is getting better.  He also has a dry cough. He had some sinus drainage earlier last month and was treated with antibiotics. Otherwise he is doing well.       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     CAD (coronary artery disease)      CKD (chronic kidney disease) stage 3, GFR 30-59 ml/min (H)      Fatigue     \"spells\"     Hypertension      Lung cancer (H) 2016     Non-small cell lung cancer (H)      Prostate cancer (H) 2009     Skin cancer, basal cell      Stented coronary artery 2009    x2     TIA (transient ischemic attack) 2002         CURRENT OUTPATIENT MEDICATIONS     Current " "Outpatient Medications   Medication Sig     aspirin 81 MG tablet Take 1 tablet by mouth At Bedtime      calcium carbonate (TUMS) 500 MG chewable tablet Take 1 chew tab by mouth as needed for heartburn     GABAPENTIN PO Take 150 mg by mouth At Bedtime      lisinopril (PRINIVIL,ZESTRIL) 2.5 MG tablet Take 2.5 mg by mouth daily.     Multiple Vitamins-Minerals (EYE VITAMINS PO) Take 1 capsule by mouth daily OTC     Multiple Vitamins-Minerals (MATURE ADULT CENTURY PO) Take 1 tablet by mouth daily OTC     osimertinib (TAGRISSO) 80 MG tablet Take 1 tablet (80 mg) by mouth daily     simvastatin (ZOCOR) 80 MG tablet      hydrocortisone 1 % ointment Apply topically daily      prochlorperazine (COMPAZINE) 10 MG tablet Take 0.5 tablets (5 mg) by mouth every 6 hours as needed (Nausea/Vomiting) (Patient not taking: Reported on 4/18/2019)     ranitidine (ZANTAC) 150 MG capsule Take 150 mg by mouth 2 times daily      No current facility-administered medications for this visit.      Facility-Administered Medications Ordered in Other Visits   Medication     iopamidol (ISOVUE-370) solution 103 mL        ALLERGIES      Allergies   Allergen Reactions     No Known Drug Allergy         REVIEW OF SYSTEMS   As above in the HPI, o/w complete 12-point ROS was negative.     PHYSICAL EXAM   /74 (BP Location: Left arm)   Pulse 68   Temp 97.9  F (36.6  C) (Oral)   Resp 16   Ht 1.753 m (5' 9.02\")   Wt 77.3 kg (170 lb 6 oz)   SpO2 99%   BMI 25.15 kg/m    GEN: NAD  HEENT: PERRL, EOMI, no icterus, injection or pallor. Oropharynx is clear.  LYMPHATICs: no cervical or supraclavicular lymphadenopathy; no other abn lymphadenopathy  PULMONARY: clear with good air entry bilaterally  CARDIOVASCULAR: regular, no murmurs, rubs, or gallops  GASTROINTESTINAL: soft, non-tender, non-distended, normal bowel sounds, no hepatosplenomegaly by percussion or palpation  MUSCULOSKELTAL: warm, well perfused, no edema  NEURO: awake, alert and oriented to time " place and person, cranial nerves intact - II - XII, no focal neurologic deficits  SKIN: no rashes     LABORATORY AND IMAGING STUDIES     Labs are stable; continues to have CKD stage III and thrombocytopenia    Recent Labs   Lab Test 04/16/19  1427 02/07/19  0854 12/18/18  0928 11/15/18  0836 10/15/18  1143    143 142 141 143   POTASSIUM 4.5 4.1 4.2 4.4 4.7   CHLORIDE 108 109 109 108 110*   CO2 26 27 27 28 29   ANIONGAP 6 7 6 5 4   BUN 21 31* 28 28 23   CR 1.70* 1.90* 1.77* 1.71* 1.62*   GLC 83 112* 148* 98 77   RICK 8.7 8.6 8.5 8.6 8.8     Recent Labs   Lab Test 10/10/17  1033 07/11/17  1037 03/06/17  1000 01/11/17  1000 12/15/16  1055   MAG 2.0 1.9 2.1 2.3 2.0   PHOS 2.3* 2.9 2.7 2.0* 2.7     Recent Labs   Lab Test 04/16/19  1427 02/07/19  0854 12/18/18  0928 11/15/18  0836 10/15/18  1143   WBC 5.6 5.9 5.0 5.0 5.9   HGB 14.4 14.8 15.1 15.0 14.5   PLT 91* 100* 93* 92* 99*   MCV 99 99 100 99 101*   NEUTROPHIL 69.3 66.9 73.6 64.0 72.4     Recent Labs   Lab Test 04/16/19  1427 02/07/19  0854 12/18/18  0928  08/02/18  0948   BILITOTAL 0.5 0.6 0.6   < > 1.0   ALKPHOS 83 92 82   < > 117   ALT 21 24 25   < > 41   AST 20 25 25   < > 35   ALBUMIN 3.7 3.6 3.5   < > 3.5   LDH  --   --  154  --  152    < > = values in this interval not displayed.     Results for orders placed or performed in visit on 04/16/19   CT Chest Abdomen Pelvis w/o Contrast    Narrative    EXAMINATION: CT CHEST ABDOMEN PELVIS W/O CONTRAST, 4/16/2019 3:08 PM    TECHNIQUE:  Helical CT images from the thoracic inlet through the  symphysis pubis were obtained  without contrast. Contrast dose: None    COMPARISON: 2/7/2019, 11/1/2018    HISTORY: Non small cell lung cancer on therapy with TKI - omisertinib;  Non-small cell cancer of right lung (H)    FINDINGS:    Chest: Postoperative changes from right upper lobectomy and right  middle lobe wedge resections with similar degree of scarring when  compared to CT 2/17/2019. Central bronchial wall thickening.  Mild  centrilobular emphysematous changes. No new or enlarging pulmonary  nodules, such as a 3 mm nodule left upper lobe (series 8 image 38).    Heart size is normal. Coronary stents. No pericardial effusion. No  thoracic adenopathy. Normal caliber thoracic aorta. Unchanged  prominent, nodular left thyroid lobe that was not FDG avid on PET.  Trace right pleural fluid, similar to prior.    Abdomen and pelvis: Normal noncontrast appearance of the liver.  Calcified splenic granulomas. Severe fatty atrophy of the pancreas.  Unchanged presumed diverticula along the right renal pelvis.  Hyperdense 12 mm right renal cystic lesion is not substantially  changed from 2018. Atrophic right renal cortex. Unchanged left  parapelvic cysts. Postoperative changes from prostatectomy. Calcific  lesions of the abdominal aorta without aneurysmal dilatation.    Bones and soft tissues: Osteoporosis. Chronic bilateral pars defects  at L5-S1 with grade 1 anterolisthesis of L5 on S1. No suspicious soft  tissue abnormality. Unchanged mildly hyperdense fluid collection in  the right inguinal canal that was not FDG avid.      Impression    IMPRESSION: In this patient with a history of both non-small cell lung  cancer and prostate cancer:  1. Scattered tiny and unchanged pulmonary nodules. No new or enlarging  pulmonary nodules.  2. No clear evidence for recurrent or metastatic disease in the chest,  abdomen or pelvis.  3. Osteoporosis  4. Unchanged trace right pleural effusion.    I have personally reviewed the examination and initial interpretation  and I agree with the findings.    MART GERMAN MD          ASSESSMENT AND PLAN   Non small cell lung cancer - adenocarcinoma with EGFR mutation (exxon 21 deletion)   On erlotinib since June 2016  ECOG PS 1  HTN, CKD, CAD    Zane is tolerating osimertinib well at the 80 mg daily dose. He has cough and fatigue. I am not sure about the cause of his fatigue. He had fatigue on erlotinib and this  could be related to the drug. He has normal Hgb ~ 14.4 g/dl. His LVEF was 61% at the last check - 2 weeks ago.     He otherwise has no new complains on this medication.    I have reviewed actual images from his restaging scans and he has stable disease. I reviewed this with him and he is happy with the response. His wife had several questions for me again which I answered.      His wife has been newly diagnosed with multiple myeloma and follows with Dr. Misti Mckeon at [facility].     I will see him again in 3 months with labs and scans prior to visit. He will be followed by pharmacy team for his monitoring on therapy    Over 25 min of direct face to face time spent with patient with more than 50% time spent in counseling and coordinating care.

## 2019-04-18 NOTE — LETTER
"    4/18/2019         RE: Gilson Curtis  [address] [address]        Dear Colleague,    Thank you for referring your patient, Gilson Curtis, to the [facility]. Please see a copy of my visit note below.    [facility]  HEMATOLOGY AND ONCOLOGY    FOLLOW-UP VISIT NOTE    PATIENT NAME: Gilson Curtis MRN # [id number]  DATE OF VISIT: Apr 18, 2019 YOB: 1940    REFERRING PROVIDER: No referring provider defined for this encounter.    CANCER TYPE: Non small cell lung cancer - adenocarcinoma  STAGE: IV    TREATMENT SUMMARY:  Gilson presented with with hemoptysis starting in November 2011. He underwent CT of chest on Jan 18, 2012 at his primary care clinic which showed a RUL mass 2 x 1.9 cm with no suspicious lymphadenopathy or adrenal masses. 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 He was staged with a PET-CT and 2 lesions were noted. He was staged as T3N0 disease. He had surgical resection of the nodules in RUL and RML. EGFR exon 21 deletion.  He has been on Tarceva since June 2016 and is tolerating it quite well. Dose was decreased from 150 mg daily to 100 mg daily in July 2017 due to rash.     CURRENT INTERVENTIONS:  osimertinib (Tagrisso) since 8/15/18    SUBJECTIVE   Gilson Curtis is being followed for metastatic lung adenocarcinoma    Gilson has been on osimertinib for the last 6 months. He has diminished energy/fatigue. His fatigue is getting better.  He also has a dry cough. He had some sinus drainage earlier last month and was treated with antibiotics. Otherwise he is doing well.       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     CAD (coronary artery disease)      CKD (chronic kidney disease) stage 3, GFR 30-59 ml/min (H)      Fatigue     \"spells\"     Hypertension      " "Lung cancer (H) 2016     Non-small cell lung cancer (H)      Prostate cancer (H) 2009     Skin cancer, basal cell      Stented coronary artery 2009    x2     TIA (transient ischemic attack) 2002         CURRENT OUTPATIENT MEDICATIONS     Current Outpatient Medications   Medication Sig     aspirin 81 MG tablet Take 1 tablet by mouth At Bedtime      calcium carbonate (TUMS) 500 MG chewable tablet Take 1 chew tab by mouth as needed for heartburn     GABAPENTIN PO Take 150 mg by mouth At Bedtime      lisinopril (PRINIVIL,ZESTRIL) 2.5 MG tablet Take 2.5 mg by mouth daily.     Multiple Vitamins-Minerals (EYE VITAMINS PO) Take 1 capsule by mouth daily OTC     Multiple Vitamins-Minerals (MATURE ADULT CENTURY PO) Take 1 tablet by mouth daily OTC     osimertinib (TAGRISSO) 80 MG tablet Take 1 tablet (80 mg) by mouth daily     simvastatin (ZOCOR) 80 MG tablet      hydrocortisone 1 % ointment Apply topically daily      prochlorperazine (COMPAZINE) 10 MG tablet Take 0.5 tablets (5 mg) by mouth every 6 hours as needed (Nausea/Vomiting) (Patient not taking: Reported on 4/18/2019)     ranitidine (ZANTAC) 150 MG capsule Take 150 mg by mouth 2 times daily      No current facility-administered medications for this visit.      Facility-Administered Medications Ordered in Other Visits   Medication     iopamidol (ISOVUE-370) solution 103 mL        ALLERGIES      Allergies   Allergen Reactions     No Known Drug Allergy         REVIEW OF SYSTEMS   As above in the HPI, o/w complete 12-point ROS was negative.     PHYSICAL EXAM   /74 (BP Location: Left arm)   Pulse 68   Temp 97.9  F (36.6  C) (Oral)   Resp 16   Ht 1.753 m (5' 9.02\")   Wt 77.3 kg (170 lb 6 oz)   SpO2 99%   BMI 25.15 kg/m     GEN: NAD  HEENT: PERRL, EOMI, no icterus, injection or pallor. Oropharynx is clear.  LYMPHATICs: no cervical or supraclavicular lymphadenopathy; no other abn lymphadenopathy  PULMONARY: clear with good air entry bilaterally  CARDIOVASCULAR: " regular, no murmurs, rubs, or gallops  GASTROINTESTINAL: soft, non-tender, non-distended, normal bowel sounds, no hepatosplenomegaly by percussion or palpation  MUSCULOSKELTAL: warm, well perfused, no edema  NEURO: awake, alert and oriented to time place and person, cranial nerves intact - II - XII, no focal neurologic deficits  SKIN: no rashes     LABORATORY AND IMAGING STUDIES     Labs are stable; continues to have CKD stage III and thrombocytopenia    Recent Labs   Lab Test 04/16/19  1427 02/07/19  0854 12/18/18  0928 11/15/18  0836 10/15/18  1143    143 142 141 143   POTASSIUM 4.5 4.1 4.2 4.4 4.7   CHLORIDE 108 109 109 108 110*   CO2 26 27 27 28 29   ANIONGAP 6 7 6 5 4   BUN 21 31* 28 28 23   CR 1.70* 1.90* 1.77* 1.71* 1.62*   GLC 83 112* 148* 98 77   RICK 8.7 8.6 8.5 8.6 8.8     Recent Labs   Lab Test 10/10/17  1033 07/11/17  1037 03/06/17  1000 01/11/17  1000 12/15/16  1055   MAG 2.0 1.9 2.1 2.3 2.0   PHOS 2.3* 2.9 2.7 2.0* 2.7     Recent Labs   Lab Test 04/16/19  1427 02/07/19  0854 12/18/18  0928 11/15/18  0836 10/15/18  1143   WBC 5.6 5.9 5.0 5.0 5.9   HGB 14.4 14.8 15.1 15.0 14.5   PLT 91* 100* 93* 92* 99*   MCV 99 99 100 99 101*   NEUTROPHIL 69.3 66.9 73.6 64.0 72.4     Recent Labs   Lab Test 04/16/19  1427 02/07/19  0854 12/18/18  0928  08/02/18  0948   BILITOTAL 0.5 0.6 0.6   < > 1.0   ALKPHOS 83 92 82   < > 117   ALT 21 24 25   < > 41   AST 20 25 25   < > 35   ALBUMIN 3.7 3.6 3.5   < > 3.5   LDH  --   --  154  --  152    < > = values in this interval not displayed.     Results for orders placed or performed in visit on 04/16/19   CT Chest Abdomen Pelvis w/o Contrast    Narrative    EXAMINATION: CT CHEST ABDOMEN PELVIS W/O CONTRAST, 4/16/2019 3:08 PM    TECHNIQUE:  Helical CT images from the thoracic inlet through the  symphysis pubis were obtained  without contrast. Contrast dose: None    COMPARISON: 2/7/2019, 11/1/2018    HISTORY: Non small cell lung cancer on therapy with TKI -  omisertinib;  Non-small cell cancer of right lung (H)    FINDINGS:    Chest: Postoperative changes from right upper lobectomy and right  middle lobe wedge resections with similar degree of scarring when  compared to CT 2/17/2019. Central bronchial wall thickening. Mild  centrilobular emphysematous changes. No new or enlarging pulmonary  nodules, such as a 3 mm nodule left upper lobe (series 8 image 38).    Heart size is normal. Coronary stents. No pericardial effusion. No  thoracic adenopathy. Normal caliber thoracic aorta. Unchanged  prominent, nodular left thyroid lobe that was not FDG avid on PET.  Trace right pleural fluid, similar to prior.    Abdomen and pelvis: Normal noncontrast appearance of the liver.  Calcified splenic granulomas. Severe fatty atrophy of the pancreas.  Unchanged presumed diverticula along the right renal pelvis.  Hyperdense 12 mm right renal cystic lesion is not substantially  changed from 2018. Atrophic right renal cortex. Unchanged left  parapelvic cysts. Postoperative changes from prostatectomy. Calcific  lesions of the abdominal aorta without aneurysmal dilatation.    Bones and soft tissues: Osteoporosis. Chronic bilateral pars defects  at L5-S1 with grade 1 anterolisthesis of L5 on S1. No suspicious soft  tissue abnormality. Unchanged mildly hyperdense fluid collection in  the right inguinal canal that was not FDG avid.      Impression    IMPRESSION: In this patient with a history of both non-small cell lung  cancer and prostate cancer:  1. Scattered tiny and unchanged pulmonary nodules. No new or enlarging  pulmonary nodules.  2. No clear evidence for recurrent or metastatic disease in the chest,  abdomen or pelvis.  3. Osteoporosis  4. Unchanged trace right pleural effusion.    I have personally reviewed the examination and initial interpretation  and I agree with the findings.    MART GERMAN MD          ASSESSMENT AND PLAN   Non small cell lung cancer - adenocarcinoma with EGFR  mutation (exxon 21 deletion)   On erlotinib since June 2016  ECOG PS 1  HTN, CKD, CAD    Zane is tolerating osimertinib well at the 80 mg daily dose. He has cough and fatigue. I am not sure about the cause of his fatigue. He had fatigue on erlotinib and this could be related to the drug. He has normal Hgb ~ 14.4 g/dl. His LVEF was 61% at the last check - 2 weeks ago.     He otherwise has no new complains on this medication.    I have reviewed actual images from his restaging scans and he has stable disease. I reviewed this with him and he is happy with the response. His wife had several questions for me again which I answered.      His wife has been newly diagnosed with multiple myeloma and follows with Dr. Misti Mckeon at [facility].     I will see him again in 3 months with labs and scans prior to visit. He will be followed by pharmacy team for his monitoring on therapy    Over 25 min of direct face to face time spent with patient with more than 50% time spent in counseling and coordinating care.      Again, thank you for allowing me to participate in the care of your patient.        Sincerely,        Gerry Ahumada MD

## 2019-04-18 NOTE — TELEPHONE ENCOUNTER
Oral Chemotherapy Monitoring Program     Primary Oncologist: Dr. Gerry Ahumada  Primary Oncology Clinic: [facility]  Cancer Diagnosis: NSCLC     Drug: Osimertinib (Tagrisso) 80 mg daily continuously  Start Date: 8/15/18     Expected Duration of Therapy: Until disease progression or unacceptable toxicity     Drug Interaction Assessment: No significant drug interactions identified upon review.     Lab Monitoring Plan  CBC, CMP monthly, LVEF every 2-3 months     Subjective/Objective:  Gilson Curtis is a 79 year old male seen in clinic for a follow-up visit for oral chemotherapy.  Gilson states he is doing well. He has not missed any doses.  He is looking forward to the warmer weather.     ORAL CHEMOTHERAPY 8/14/2018 9/11/2018 11/15/2018 11/26/2018 12/19/2018 2/7/2019 4/18/2019   Drug Name Tagrisso (Osimertinib) Tagrisso (Osimertinib) Tagrisso (Osimertinib) Tagrisso (Osimertinib) Tagrisso (Osimertinib) Tagrisso (Osimertinib) Tagrisso (Osimertinib)   Current Dosage 80mg 80mg 80mg 80mg 80mg 80mg 80mg   Current Schedule Daily Daily Daily Daily Daily Daily Daily   Cycle Details Continuous Continuous Continuous Continuous Continuous Continuous Continuous   Start Date of Last Cycle 8/5/2018 - - - - - -   Planned next cycle start date - - - - - - -   Doses missed in last 2 weeks 0 0 - - 0 0 0   Adherence Assessment Adherent Adherent Adherent Adherent Adherent Adherent Adherent   Adverse Effects No AE identified during assessment Fatigue Fatigue Fatigue Fatigue Fatigue No AE identified during assessment   EGFR rash - - - - - - -   Pharmacist Intervention(EGFR) - - - - - - -   Intervention(s) - - - - - - -   Fatigue - Grade 1 Grade 2 Grade 2 Grade 1 Grade 1 -   Pharmacist Intervention(fatigue) - Yes No No No No -   Intervention(s) - Patient education - - - - -   Home BPs - - Not applicable - - - -   Any new drug interactions? No No No No No No No   Is the dose as ordered appropriate for the  "patient? - - Yes - - Yes Yes   Is the patient currently in pain? Assessed in last 30 days. Assessed in last 30 days. Assessed in last 30 days. Assessed in last 30 days. No No Assessed in last 30 days.   Has the patient been assessed within the past 6 months for depression? Yes Yes Yes Yes Yes Yes Yes   Has the patient missed any days of school, work, or other routine activity? - No - - No No -       Vitals:  BP:   BP Readings from Last 1 Encounters:   04/18/19 115/74     Wt Readings from Last 1 Encounters:   04/18/19 77.3 kg (170 lb 6 oz)     Estimated body surface area is 1.94 meters squared as calculated from the following:    Height as of an earlier encounter on 4/18/19: 1.753 m (5' 9.02\").    Weight as of an earlier encounter on 4/18/19: 77.3 kg (170 lb 6 oz).    Labs:  _  Result Component Current Result Ref Range   Sodium 140 (4/16/2019) 133 - 144 mmol/L     _  Result Component Current Result Ref Range   Potassium 4.5 (4/16/2019) 3.4 - 5.3 mmol/L     _  Result Component Current Result Ref Range   Calcium 8.7 (4/16/2019) 8.5 - 10.1 mg/dL     No results found for Mag within last 30 days.     No results found for Phos within last 30 days.     _  Result Component Current Result Ref Range   Albumin 3.7 (4/16/2019) 3.4 - 5.0 g/dL     _  Result Component Current Result Ref Range   Urea Nitrogen 21 (4/16/2019) 7 - 30 mg/dL     _  Result Component Current Result Ref Range   Creatinine 1.70 (H) (4/16/2019) 0.66 - 1.25 mg/dL       _  Result Component Current Result Ref Range   AST 20 (4/16/2019) 0 - 45 U/L     _  Result Component Current Result Ref Range   ALT 21 (4/16/2019) 0 - 70 U/L     _  Result Component Current Result Ref Range   Bilirubin Total 0.5 (4/16/2019) 0.2 - 1.3 mg/dL       _  Result Component Current Result Ref Range   WBC 5.6 (4/16/2019) 4.0 - 11.0 10e9/L     _  Result Component Current Result Ref Range   Hemoglobin 14.4 (4/16/2019) 13.3 - 17.7 g/dL     _  Result Component Current Result Ref Range "   Platelet Count 91 (L) (4/16/2019) 150 - 450 10e9/L     _  Result Component Current Result Ref Range   Absolute Neutrophil 3.9 (4/16/2019) 1.6 - 8.3 10e9/L     Assessment/Plan:  Continue same dose as ordered    Follow-Up:  5/16/19    Refill Due:  [facility]    Logan Barrientos, PharmD, BCOP  April 18, 2019

## 2019-04-18 NOTE — NURSING NOTE
"Oncology Rooming Note    April 18, 2019 1:40 PM   Gilson Curtis is a 79 year old male who presents for:    Chief Complaint   Patient presents with     Oncology Clinic Visit     Follow up     Initial Vitals: /74 (BP Location: Left arm)   Pulse 68   Temp 97.9  F (36.6  C) (Oral)   Resp 16   Ht 1.753 m (5' 9.02\")   Wt 77.3 kg (170 lb 6 oz)   SpO2 99%   BMI 25.15 kg/m   Estimated body mass index is 25.15 kg/m  as calculated from the following:    Height as of this encounter: 1.753 m (5' 9.02\").    Weight as of this encounter: 77.3 kg (170 lb 6 oz). Body surface area is 1.94 meters squared.  No Pain (1) Comment: Data Unavailable   No LMP for male patient.  Allergies reviewed: Yes  Medications reviewed: Yes    Medications: Medication refills not needed today.  Pharmacy name entered into [email or web address]:    [facility] [address] [place] [address] [address] [facility] SPECIAL[facility] ARMIDA ACEVEDO - [facility] [place], [place] - [address] RAISA Chaney LPN            " Seizure: Care Instructions  Your Care Instructions    Seizures are caused by abnormal patterns of electrical signals in the brain. They are different for each person. Seizures can affect movement, speech, vision, or awareness. Some people have only slight shaking of a hand and do not pass out. Other people may pass out and have violent shaking of the whole body. Some people appear to stare into space. They are awake, but they can't respond normally. Later, they may not remember what happened. You may need tests to identify the type and cause of the seizures. A seizure may occur only once, or you may have them more than one time. Taking medicines as directed and following up with your doctor may help keep you from having more seizures. The doctor has checked you carefully, but problems can develop later. If you notice any problems or new symptoms, get medical treatment right away. Follow-up care is a key part of your treatment and safety. Be sure to make and go to all appointments, and call your doctor if you are having problems. It's also a good idea to know your test results and keep a list of the medicines you take. How can you care for yourself at home? · Be safe with medicines. Take your medicines exactly as prescribed. Call your doctor if you think you are having a problem with your medicine. · Do not do any activity that could be dangerous to you or others until your doctor says it is safe to do so. For example, do not drive a car, operate machinery, swim, or climb ladders. · Be sure that anyone treating you for any health problem knows that you have had a seizure and what medicines you are taking for it. · Identify and avoid things that may make you more likely to have a seizure. These may include lack of sleep, alcohol or drug use, stress, or not eating. · Make sure you go to your follow-up appointment. When should you call for help? Call 911 anytime you think you may need emergency care. For example, call if:  · You have another seizure. · You have more than one seizure in 24 hours. · You have new symptoms, such as trouble walking, speaking, or thinking clearly. Call your doctor now or seek immediate medical care if:  · You are not acting normally. Watch closely for changes in your health, and be sure to contact your doctor if you have any problems. Where can you learn more? Go to http://alexei-rolando.info/. Enter Y684 in the search box to learn more about \"Seizure: Care Instructions. \"  Current as of: October 14, 2016  Content Version: 11.3  © 7566-4175 [employer]. Care instructions adapted under license by [employer] (which disclaims liability or warranty for this information). If you have questions about a medical condition or this instruction, always ask your healthcare professional. [address] any warranty or liability for your use of this information.

## 2019-04-26 DIAGNOSIS — Z51.81 ENCOUNTER FOR MONITORING CARDIOTOXIC DRUG THERAPY: Primary | ICD-10-CM

## 2019-04-26 DIAGNOSIS — Z79.899 ENCOUNTER FOR MONITORING CARDIOTOXIC DRUG THERAPY: Primary | ICD-10-CM

## 2019-04-26 DIAGNOSIS — C34.91 NON-SMALL CELL CANCER OF RIGHT LUNG (H): ICD-10-CM

## 2019-05-17 DIAGNOSIS — Z51.81 ENCOUNTER FOR MONITORING CARDIOTOXIC DRUG THERAPY: ICD-10-CM

## 2019-05-17 DIAGNOSIS — C34.91 NON-SMALL CELL CANCER OF RIGHT LUNG (H): ICD-10-CM

## 2019-05-17 DIAGNOSIS — Z79.899 ENCOUNTER FOR MONITORING CARDIOTOXIC DRUG THERAPY: ICD-10-CM

## 2019-05-20 DIAGNOSIS — C34.91 NON-SMALL CELL CANCER OF RIGHT LUNG (H): ICD-10-CM

## 2019-05-20 DIAGNOSIS — Z51.81 ENCOUNTER FOR MONITORING CARDIOTOXIC DRUG THERAPY: Primary | ICD-10-CM

## 2019-05-20 DIAGNOSIS — Z79.899 ENCOUNTER FOR MONITORING CARDIOTOXIC DRUG THERAPY: Primary | ICD-10-CM

## 2019-06-14 ENCOUNTER — TELEPHONE (OUTPATIENT)
Dept: ONCOLOGY | Facility: CLINIC | Age: 79
End: 2019-06-14

## 2019-06-14 NOTE — ORAL ONC MGMT
Keith/ Assistance Approved    Medication Tagrisso  Amount/ $ 5300  [facility]  Phone # [phone]  Fax # -  Effective Dates 03/15/19-06/12/20  Additional Information -  Patient notified? -

## 2019-06-20 DIAGNOSIS — Z51.81 ENCOUNTER FOR MONITORING CARDIOTOXIC DRUG THERAPY: Primary | ICD-10-CM

## 2019-06-20 DIAGNOSIS — Z79.899 ENCOUNTER FOR MONITORING CARDIOTOXIC DRUG THERAPY: Primary | ICD-10-CM

## 2019-06-20 DIAGNOSIS — C34.91 NON-SMALL CELL CANCER OF RIGHT LUNG (H): ICD-10-CM

## 2019-07-19 ENCOUNTER — TELEPHONE (OUTPATIENT)
Dept: PHARMACY | Facility: CLINIC | Age: 79
End: 2019-07-19

## 2019-07-19 DIAGNOSIS — Z79.899 ENCOUNTER FOR MONITORING CARDIOTOXIC DRUG THERAPY: Primary | ICD-10-CM

## 2019-07-19 DIAGNOSIS — C34.91 NON-SMALL CELL CANCER OF RIGHT LUNG (H): ICD-10-CM

## 2019-07-19 DIAGNOSIS — Z51.81 ENCOUNTER FOR MONITORING CARDIOTOXIC DRUG THERAPY: Primary | ICD-10-CM

## 2019-08-01 NOTE — TELEPHONE ENCOUNTER
Pt stated he did not want me to schedule an appt with Derm. He said he will set it up through [facility].     Claire

## 2019-08-01 NOTE — PROGRESS NOTES
"[facility]  HEMATOLOGY AND ONCOLOGY    FOLLOW-UP VISIT NOTE    PATIENT NAME: Gilson Curtis MRN # [id number]  DATE OF VISIT: Aug 1, 2019 YOB: 1940    REFERRING PROVIDER: No referring provider defined for this encounter.    CANCER TYPE: Non small cell lung cancer - adenocarcinoma  STAGE: IV    TREATMENT SUMMARY:  Gilson presented with with hemoptysis starting in November 2011. He underwent CT of chest on Jan 18, 2012 at his primary care clinic which showed a RUL mass 2 x 1.9 cm with no suspicious lymphadenopathy or adrenal masses. 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 He was staged with a PET-CT and 2 lesions were noted. He was staged as T3N0 disease. He had surgical resection of the nodules in RUL and RML. EGFR exon 21 deletion.  He has been on Tarceva since June 2016 and is tolerating it quite well. Dose was decreased from 150 mg daily to 100 mg daily in July 2017 due to rash.     CURRENT INTERVENTIONS:  osimertinib (Tagrisso) since 8/15/18    SUBJECTIVE   Gilson Curtis is being followed for metastatic lung adenocarcinoma    Gilson has been on osimertinib for the last year. He has diminished energy/fatigue. His fatigue is getting better.  He also has a dry cough. He had some sinus drainage earlier last month and was treated with antibiotics. Otherwise he is doing well.       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     CAD (coronary artery disease)      CKD (chronic kidney disease) stage 3, GFR 30-59 ml/min (H)      Fatigue     \"spells\"     Hypertension      Lung cancer (H) 2016     Non-small cell lung cancer (H)      Prostate cancer (H) 2009     Skin cancer, basal cell      Stented coronary artery 2009    x2     TIA (transient ischemic attack) 2002         CURRENT OUTPATIENT MEDICATIONS     Current " "Outpatient Medications   Medication Sig     aspirin 81 MG tablet Take 1 tablet by mouth At Bedtime      calcium carbonate (TUMS) 500 MG chewable tablet Take 1 chew tab by mouth as needed for heartburn     GABAPENTIN PO Take 150 mg by mouth At Bedtime      lisinopril (PRINIVIL,ZESTRIL) 2.5 MG tablet Take 2.5 mg by mouth daily.     Multiple Vitamins-Minerals (EYE VITAMINS PO) Take 1 capsule by mouth daily OTC     Multiple Vitamins-Minerals (MATURE ADULT CENTURY PO) Take 1 tablet by mouth daily OTC     ranitidine (ZANTAC) 150 MG capsule Take 150 mg by mouth 2 times daily      simvastatin (ZOCOR) 80 MG tablet      hydrocortisone 1 % ointment Apply topically daily      osimertinib (TAGRISSO) 80 MG tablet Take 1 tablet (80 mg) by mouth daily     prochlorperazine (COMPAZINE) 10 MG tablet Take 0.5 tablets (5 mg) by mouth every 6 hours as needed (Nausea/Vomiting) (Patient not taking: Reported on 4/18/2019)     No current facility-administered medications for this visit.      Facility-Administered Medications Ordered in Other Visits   Medication     iopamidol (ISOVUE-370) solution 103 mL        ALLERGIES      Allergies   Allergen Reactions     No Known Drug Allergy         REVIEW OF SYSTEMS   As above in the HPI, o/w complete 12-point ROS was negative.     PHYSICAL EXAM   /67 (BP Location: Right arm)   Pulse 69   Temp 97.9  F (36.6  C) (Oral)   Resp 18   Ht 1.753 m (5' 9.02\")   Wt 75.5 kg (166 lb 8 oz)   SpO2 97%   BMI 24.58 kg/m    GEN: NAD  HEENT: PERRL, EOMI, no icterus, injection or pallor. Oropharynx is clear.  LYMPHATICs: no cervical or supraclavicular lymphadenopathy; no other abn lymphadenopathy  PULMONARY: clear with good air entry bilaterally  CARDIOVASCULAR: regular, no murmurs, rubs, or gallops  GASTROINTESTINAL: soft, non-tender, non-distended, normal bowel sounds, no hepatosplenomegaly by percussion or palpation  MUSCULOSKELTAL: warm, well perfused, no edema  NEURO: awake, alert and oriented to time " place and person, cranial nerves intact - II - XII, no focal neurologic deficits  SKIN: no rashes     LABORATORY AND IMAGING STUDIES     Labs are stable; continues to have CKD stage III and thrombocytopenia    Recent Labs   Lab Test 07/29/19  0935 04/16/19  1427 02/07/19  0854 12/18/18  0928 11/15/18  0836    140 143 142 141   POTASSIUM 4.2 4.5 4.1 4.2 4.4   CHLORIDE 108 108 109 109 108   CO2 28 26 27 27 28   ANIONGAP 5 6 7 6 5   BUN 27 21 31* 28 28   CR 1.66* 1.70* 1.90* 1.77* 1.71*   * 83 112* 148* 98   RICK 8.5 8.7 8.6 8.5 8.6     Recent Labs   Lab Test 10/10/17  1033 07/11/17  1037 03/06/17  1000 01/11/17  1000 12/15/16  1055   MAG 2.0 1.9 2.1 2.3 2.0   PHOS 2.3* 2.9 2.7 2.0* 2.7     Recent Labs   Lab Test 07/29/19  0935 04/16/19  1427 02/07/19  0854 12/18/18  0928 11/15/18  0836   WBC 5.2 5.6 5.9 5.0 5.0   HGB 13.8 14.4 14.8 15.1 15.0   PLT 95* 91* 100* 93* 92*   MCV 99 99 99 100 99   NEUTROPHIL 75.6 69.3 66.9 73.6 64.0     Recent Labs   Lab Test 07/29/19  0935 04/16/19  1427 02/07/19  0854 12/18/18  0928  08/02/18  0948   BILITOTAL 0.6 0.5 0.6 0.6   < > 1.0   ALKPHOS 89 83 92 82   < > 117   ALT 19 21 24 25   < > 41   AST 21 20 25 25   < > 35   ALBUMIN 3.6 3.7 3.6 3.5   < > 3.5   LDH  --   --   --  154  --  152    < > = values in this interval not displayed.     Results for orders placed or performed in visit on 07/29/19   CT Chest Abdomen Pelvis w/o Contrast    Narrative    EXAMINATION: CT of the Chest, Abdomen and Pelvis without contrast on 7/29/2019.    INDICATION: Small cell lung cancer - restating on Tagrisso    COMPARISON: CT CAP 4/16/2019, 2/7/2019     TECHNIQUE: Axial images of the chest, abdomen and pelvis were obtained  without contrast. Coronal reconstructions were provided. Images were  reviewed in bone, lung, and soft tissue windows.    Dose: 711 mGy*cm    FINDINGS:    Chest: Calcification in the left thyroid lobe. Enlarged left thyroid  lobe similar to prior exams. No axillary  lymphadenopathy. No  supraclavicular lymphadenopathy. Normal branching pattern of the great  vessels. Aorta and pulmonary artery are normal in caliber. Mild to  moderate aortic arch calcifications. Coronary artery stents. No  significant mediastinal lymphadenopathy.    Postsurgical changes of right upper lobectomy and right middle lobe  wedge resections. Unchanged scarring noted. Mild emphysematous  changes. Mild pleural thickening on the right, with small pleural  effusion. Unchanged central bronchial wall thickening. No pulmonary  nodule. Unchanged left upper lobe 3 mm pulmonary nodule (series 7,  image 37) and right upper lobe 3 mm nodule series 7, image 27.    Abdomen and Pelvis:   Normal noncontrast appearance of the liver. Normal contour of the  liver. Postsurgical changes of cholecystectomy. Calcifications in the  spleen consistent with chronic granulomatous disease. Marked fatty  infiltration of the pancreas. Bilateral adrenal glands are  unremarkable on this noncontrast exam.    Atrophic appearing right renal cortex. Unchanged appearance of  multiple left peripelvic/renal sinus cysts. Bilateral renal cysts,  several that are subcentimeter and too small to characterize with CT,  overall no significant change. No hydronephrosis or hydroureter.  Bladder is unremarkable. Postsurgical changes of prostatectomy.     Small large bowel are normal in caliber and appearance.    Moderate abdominal aortic calcifications without evidence of aneurysm  or stenosis.    Small fat-containing left inguinal hernia. Mild scoliosis of the  lumbar spine. Right thoracotomy changes/rib resection, chronic.  No  acute osseous abnormalities. No focal osseous lesions. Decreased bone  mineralization of the spine. DISH of the thoracic spine.  Chronic  bilateral pars defects at L5-S1 with grade 1 anterolisthesis of L5 on  S1.       Impression    IMPRESSION:   In this patient with history of nonsmall cell lung cancer and  prostate  cancer:  1. No new or enlarging pulmonary nodules. Small sub-4 mm pulmonary nodules.  2. No evidence of recurrent or metastatic disease in the abdomen or pelvis.   3. Unchanged right pleural thickening and trace right pleural effusion.  4. Additional chronic unchanged findings as above.    I have personally reviewed the examination and initial interpretation  and I agree with the findings.    RAMON STODDARD MD          ASSESSMENT AND PLAN   Non small cell lung cancer - adenocarcinoma with EGFR mutation (exxon 21 deletion)   On erlotinib since June 2016 which was switched to osimertinib  ECOG PS 1  HTN, CKD, CAD    Zane is tolerating osimertinib well at the 80 mg daily dose. He has cough and fatigue. I am not sure about the cause of his fatigue. He had fatigue on erlotinib and this could be related to the drug. He has normal Hgb ~ 13.8 g/dl. His LVEF was 61% at the last check.     He otherwise has no new complains on this medication.    I have reviewed actual images from his restaging scans and he has stable disease. I reviewed this with him and he is happy with the response. His wife had several questions for me again which I answered.      His wife has been newly diagnosed with multiple myeloma and follows with Dr. Misti Mckeon at [facility].     I will see him again in 3 months with labs and scans prior to visit. He will be followed by pharmacy team for his monitoring on therapy    Over 25 min of direct face to face time spent with patient with more than 50% time spent in counseling and coordinating care.

## 2019-08-01 NOTE — LETTER
"    8/1/2019         RE: Gilson Curtis  [address] [facility] [address]        Dear Colleague,    Thank you for referring your patient, Gilson Curtis, to the [facility]. Please see a copy of my visit note below.    [facility]  HEMATOLOGY AND ONCOLOGY    FOLLOW-UP VISIT NOTE    PATIENT NAME: Gilson Curtis MRN # [id number]  DATE OF VISIT: Aug 1, 2019 YOB: 1940    REFERRING PROVIDER: No referring provider defined for this encounter.    CANCER TYPE: Non small cell lung cancer - adenocarcinoma  STAGE: IV    TREATMENT SUMMARY:  Gilson presented with with hemoptysis starting in November 2011. He underwent CT of chest on Jan 18, 2012 at his primary care clinic which showed a RUL mass 2 x 1.9 cm with no suspicious lymphadenopathy or adrenal masses. 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 He was staged with a PET-CT and 2 lesions were noted. He was staged as T3N0 disease. He had surgical resection of the nodules in RUL and RML. EGFR exon 21 deletion.  He has been on Tarceva since June 2016 and is tolerating it quite well. Dose was decreased from 150 mg daily to 100 mg daily in July 2017 due to rash.     CURRENT INTERVENTIONS:  osimertinib (Tagrisso) since 8/15/18    SUBJECTIVE   Gilson Curtis is being followed for metastatic lung adenocarcinoma    Gilson has been on osimertinib for the last year. He has diminished energy/fatigue. His fatigue is getting better.  He also has a dry cough. He had some sinus drainage earlier last month and was treated with antibiotics. Otherwise he is doing well.       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     CAD (coronary artery disease)      CKD (chronic kidney disease) stage 3, GFR 30-59 ml/min (H)      Fatigue     \"spells\"     Hypertension      Lung " "cancer (H) 2016     Non-small cell lung cancer (H)      Prostate cancer (H) 2009     Skin cancer, basal cell      Stented coronary artery 2009    x2     TIA (transient ischemic attack) 2002         CURRENT OUTPATIENT MEDICATIONS     Current Outpatient Medications   Medication Sig     aspirin 81 MG tablet Take 1 tablet by mouth At Bedtime      calcium carbonate (TUMS) 500 MG chewable tablet Take 1 chew tab by mouth as needed for heartburn     GABAPENTIN PO Take 150 mg by mouth At Bedtime      lisinopril (PRINIVIL,ZESTRIL) 2.5 MG tablet Take 2.5 mg by mouth daily.     Multiple Vitamins-Minerals (EYE VITAMINS PO) Take 1 capsule by mouth daily OTC     Multiple Vitamins-Minerals (MATURE ADULT CENTURY PO) Take 1 tablet by mouth daily OTC     ranitidine (ZANTAC) 150 MG capsule Take 150 mg by mouth 2 times daily      simvastatin (ZOCOR) 80 MG tablet      hydrocortisone 1 % ointment Apply topically daily      osimertinib (TAGRISSO) 80 MG tablet Take 1 tablet (80 mg) by mouth daily     prochlorperazine (COMPAZINE) 10 MG tablet Take 0.5 tablets (5 mg) by mouth every 6 hours as needed (Nausea/Vomiting) (Patient not taking: Reported on 4/18/2019)     No current facility-administered medications for this visit.      Facility-Administered Medications Ordered in Other Visits   Medication     iopamidol (ISOVUE-370) solution 103 mL        ALLERGIES      Allergies   Allergen Reactions     No Known Drug Allergy         REVIEW OF SYSTEMS   As above in the HPI, o/w complete 12-point ROS was negative.     PHYSICAL EXAM   /67 (BP Location: Right arm)   Pulse 69   Temp 97.9  F (36.6  C) (Oral)   Resp 18   Ht 1.753 m (5' 9.02\")   Wt 75.5 kg (166 lb 8 oz)   SpO2 97%   BMI 24.58 kg/m     GEN: NAD  HEENT: PERRL, EOMI, no icterus, injection or pallor. Oropharynx is clear.  LYMPHATICs: no cervical or supraclavicular lymphadenopathy; no other abn lymphadenopathy  PULMONARY: clear with good air entry bilaterally  CARDIOVASCULAR: " regular, no murmurs, rubs, or gallops  GASTROINTESTINAL: soft, non-tender, non-distended, normal bowel sounds, no hepatosplenomegaly by percussion or palpation  MUSCULOSKELTAL: warm, well perfused, no edema  NEURO: awake, alert and oriented to time place and person, cranial nerves intact - II - XII, no focal neurologic deficits  SKIN: no rashes     LABORATORY AND IMAGING STUDIES     Labs are stable; continues to have CKD stage III and thrombocytopenia    Recent Labs   Lab Test 07/29/19  0935 04/16/19  1427 02/07/19  0854 12/18/18  0928 11/15/18  0836    140 143 142 141   POTASSIUM 4.2 4.5 4.1 4.2 4.4   CHLORIDE 108 108 109 109 108   CO2 28 26 27 27 28   ANIONGAP 5 6 7 6 5   BUN 27 21 31* 28 28   CR 1.66* 1.70* 1.90* 1.77* 1.71*   * 83 112* 148* 98   RICK 8.5 8.7 8.6 8.5 8.6     Recent Labs   Lab Test 10/10/17  1033 07/11/17  1037 03/06/17  1000 01/11/17  1000 12/15/16  1055   MAG 2.0 1.9 2.1 2.3 2.0   PHOS 2.3* 2.9 2.7 2.0* 2.7     Recent Labs   Lab Test 07/29/19  0935 04/16/19  1427 02/07/19  0854 12/18/18  0928 11/15/18  0836   WBC 5.2 5.6 5.9 5.0 5.0   HGB 13.8 14.4 14.8 15.1 15.0   PLT 95* 91* 100* 93* 92*   MCV 99 99 99 100 99   NEUTROPHIL 75.6 69.3 66.9 73.6 64.0     Recent Labs   Lab Test 07/29/19  0935 04/16/19  1427 02/07/19  0854 12/18/18  0928  08/02/18  0948   BILITOTAL 0.6 0.5 0.6 0.6   < > 1.0   ALKPHOS 89 83 92 82   < > 117   ALT 19 21 24 25   < > 41   AST 21 20 25 25   < > 35   ALBUMIN 3.6 3.7 3.6 3.5   < > 3.5   LDH  --   --   --  154  --  152    < > = values in this interval not displayed.     Results for orders placed or performed in visit on 07/29/19   CT Chest Abdomen Pelvis w/o Contrast    Narrative    EXAMINATION: CT of the Chest, Abdomen and Pelvis without contrast on 7/29/2019.    INDICATION: Small cell lung cancer - restating on Tagrisso    COMPARISON: CT CAP 4/16/2019, 2/7/2019     TECHNIQUE: Axial images of the chest, abdomen and pelvis were obtained  without contrast. Coronal  reconstructions were provided. Images were  reviewed in bone, lung, and soft tissue windows.    Dose: 711 mGy*cm    FINDINGS:    Chest: Calcification in the left thyroid lobe. Enlarged left thyroid  lobe similar to prior exams. No axillary lymphadenopathy. No  supraclavicular lymphadenopathy. Normal branching pattern of the great  vessels. Aorta and pulmonary artery are normal in caliber. Mild to  moderate aortic arch calcifications. Coronary artery stents. No  significant mediastinal lymphadenopathy.    Postsurgical changes of right upper lobectomy and right middle lobe  wedge resections. Unchanged scarring noted. Mild emphysematous  changes. Mild pleural thickening on the right, with small pleural  effusion. Unchanged central bronchial wall thickening. No pulmonary  nodule. Unchanged left upper lobe 3 mm pulmonary nodule (series 7,  image 37) and right upper lobe 3 mm nodule series 7, image 27.    Abdomen and Pelvis:   Normal noncontrast appearance of the liver. Normal contour of the  liver. Postsurgical changes of cholecystectomy. Calcifications in the  spleen consistent with chronic granulomatous disease. Marked fatty  infiltration of the pancreas. Bilateral adrenal glands are  unremarkable on this noncontrast exam.    Atrophic appearing right renal cortex. Unchanged appearance of  multiple left peripelvic/renal sinus cysts. Bilateral renal cysts,  several that are subcentimeter and too small to characterize with CT,  overall no significant change. No hydronephrosis or hydroureter.  Bladder is unremarkable. Postsurgical changes of prostatectomy.     Small large bowel are normal in caliber and appearance.    Moderate abdominal aortic calcifications without evidence of aneurysm  or stenosis.    Small fat-containing left inguinal hernia. Mild scoliosis of the  lumbar spine. Right thoracotomy changes/rib resection, chronic.  No  acute osseous abnormalities. No focal osseous lesions. Decreased bone  mineralization of  the spine. DISH of the thoracic spine.  Chronic  bilateral pars defects at L5-S1 with grade 1 anterolisthesis of L5 on  S1.       Impression    IMPRESSION:   In this patient with history of nonsmall cell lung cancer and prostate  cancer:  1. No new or enlarging pulmonary nodules. Small sub-4 mm pulmonary nodules.  2. No evidence of recurrent or metastatic disease in the abdomen or pelvis.   3. Unchanged right pleural thickening and trace right pleural effusion.  4. Additional chronic unchanged findings as above.    I have personally reviewed the examination and initial interpretation  and I agree with the findings.    RAMON STODDARD MD          ASSESSMENT AND PLAN   Non small cell lung cancer - adenocarcinoma with EGFR mutation (exxon 21 deletion)   On erlotinib since June 2016 which was switched to osimertinib  ECOG PS 1  HTN, CKD, CAD    Zane is tolerating osimertinib well at the 80 mg daily dose. He has cough and fatigue. I am not sure about the cause of his fatigue. He had fatigue on erlotinib and this could be related to the drug. He has normal Hgb ~ 13.8 g/dl. His LVEF was 61% at the last check.     He otherwise has no new complains on this medication.    I have reviewed actual images from his restaging scans and he has stable disease. I reviewed this with him and he is happy with the response. His wife had several questions for me again which I answered.      His wife has been newly diagnosed with multiple myeloma and follows with Dr. Misti Mckeon at [facility].     I will see him again in 3 months with labs and scans prior to visit. He will be followed by pharmacy team for his monitoring on therapy    Over 25 min of direct face to face time spent with patient with more than 50% time spent in counseling and coordinating care.      Again, thank you for allowing me to participate in the care of your patient.        Sincerely,        Gerry Ahumada MD

## 2019-08-01 NOTE — NURSING NOTE
"Oncology Rooming Note    August 1, 2019 10:07 AM   Gilson Curtis is a 79 year old male who presents for:    Chief Complaint   Patient presents with     Oncology Clinic Visit     3 month follow up     Initial Vitals: /67 (BP Location: Right arm)   Pulse 69   Temp 97.9  F (36.6  C) (Oral)   Resp 18   Ht 1.753 m (5' 9.02\")   Wt 75.5 kg (166 lb 8 oz)   SpO2 97%   BMI 24.58 kg/m   Estimated body mass index is 24.58 kg/m  as calculated from the following:    Height as of this encounter: 1.753 m (5' 9.02\").    Weight as of this encounter: 75.5 kg (166 lb 8 oz). Body surface area is 1.92 meters squared.  No Pain (0) Comment: Data Unavailable   No LMP for male patient.  Allergies reviewed: Yes  Medications reviewed: Yes    Medications: Medication refills not needed today.  Pharmacy name entered into [employer]:    [facility] [address] [place] [address] - [address] [address] [facility] [address] - [address]  [facility] - [place], [place] - [address]    Stefania Garcia LPN              "

## 2019-08-01 NOTE — PROGRESS NOTES
Oral Chemotherapy Monitoring Program     Primary Oncologist: Dr. Gerry Ahumada  Primary Oncology Clinic: [facility]  Cancer Diagnosis: NSCLC     Drug: Osimertinib (Tagrisso) 80 mg daily continuously  Start Date: 8/15/18     Expected Duration of Therapy: Until disease progression or unacceptable toxicity     Drug Interaction Assessment: No significant drug interactions identified upon review.     Lab Monitoring Plan  CBC, CMP monthly, LVEF every 2-3 months    Subjective/Objective:  Gilson Curtis is a 79 year old male seen in clinic for a follow-up visit for oral chemotherapy.  Gilson has not missed any doses and has not started any new medications. He did have complaints of fatigue- noticed more during physical activity. States he has felt fatigued since the start of therapy but feels that it maybe has gotten worse. Also notes not being able to eat as much. Only eating a third of each meal (3 meals per day). Patient did have complaints of having to use [facility] specialty pharmacy, but appreciates Bonnie's help when he does have issues.     ORAL CHEMOTHERAPY 9/11/2018 11/15/2018 11/26/2018 12/19/2018 2/7/2019 4/18/2019 8/1/2019   Drug Name Tagrisso (Osimertinib) Tagrisso (Osimertinib) Tagrisso (Osimertinib) Tagrisso (Osimertinib) Tagrisso (Osimertinib) Tagrisso (Osimertinib) Tagrisso (Osimertinib)   Current Dosage 80mg 80mg 80mg 80mg 80mg 80mg 80mg   Current Schedule Daily Daily Daily Daily Daily Daily Daily   Cycle Details Continuous Continuous Continuous Continuous Continuous Continuous Continuous   Start Date of Last Cycle - - - - - - -   Planned next cycle start date - - - - - - -   Doses missed in last 2 weeks 0 - - 0 0 0 0   Adherence Assessment Adherent Adherent Adherent Adherent Adherent Adherent Adherent   Adverse Effects Fatigue Fatigue Fatigue Fatigue Fatigue No AE identified during assessment Fatigue;Other (see note for details)   EGFR rash - - - - - - -   Pharmacist  "Intervention(EGFR) - - - - - - -   Intervention(s) - - - - - - -   Fatigue Grade 1 Grade 2 Grade 2 Grade 1 Grade 1 - Grade 2   Pharmacist Intervention(fatigue) Yes No No No No - No   Intervention(s) Patient education - - - - - -   Other (see note for details) - - - - - - Anorexia   Pharmacist intervention? - - - - - - (No Data)   Home BPs - Not applicable - - - - -   Any new drug interactions? No No No No No No No   Is the dose as ordered appropriate for the patient? - Yes - - Yes Yes Yes   Is the patient currently in pain? Assessed in last 30 days. Assessed in last 30 days. Assessed in last 30 days. No No Assessed in last 30 days. No   Has the patient been assessed within the past 6 months for depression? Yes Yes Yes Yes Yes Yes Yes   Has the patient missed any days of school, work, or other routine activity? No - - No No - No       Vitals:  BP:   BP Readings from Last 1 Encounters:   08/01/19 118/67     Wt Readings from Last 1 Encounters:   08/01/19 75.5 kg (166 lb 8 oz)     Estimated body surface area is 1.92 meters squared as calculated from the following:    Height as of an earlier encounter on 8/1/19: 1.753 m (5' 9.02\").    Weight as of an earlier encounter on 8/1/19: 75.5 kg (166 lb 8 oz).    Labs:  _  Result Component Current Result Ref Range   Sodium 141 (7/29/2019) 133 - 144 mmol/L     Result Component Current Result Ref Range   Potassium 4.2 (7/29/2019) 3.4 - 5.3 mmol/L     Result Component Current Result Ref Range   Calcium 8.5 (7/29/2019) 8.5 - 10.1 mg/dL     Result Component Current Result Ref Range   Albumin 3.6 (7/29/2019) 3.4 - 5.0 g/dL     Result Component Current Result Ref Range   Urea Nitrogen 27 (7/29/2019) 7 - 30 mg/dL     Result Component Current Result Ref Range   Creatinine 1.66 (H) (7/29/2019) 0.66 - 1.25 mg/dL     Result Component Current Result Ref Range   AST 21 (7/29/2019) 0 - 45 U/L     Result Component Current Result Ref Range   ALT 19 (7/29/2019) 0 - 70 U/L     Result Component " Current Result Ref Range   Bilirubin Total 0.6 (7/29/2019) 0.2 - 1.3 mg/dL     Result Component Current Result Ref Range   WBC 5.2 (7/29/2019) 4.0 - 11.0 10e9/L     Result Component Current Result Ref Range   Hemoglobin 13.8 (7/29/2019) 13.3 - 17.7 g/dL   _  Result Component Current Result Ref Range   Platelet Count 95 (L) (7/29/2019) 150 - 450 10e9/L     Result Component Current Result Ref Range   Absolute Neutrophil 4.0 (7/29/2019) 1.6 - 8.3 10e9/L       Assessment:  Patient continues to have fatigue with new complaints of anorexia. Patient has not had weight loss associated with this. Dr. Ahumada would like to continue patient on treatment for now and follow-up with him at next appointment.     Plan:  Continue current therapy.     Follow-Up:  11/05 for labs and 11/07 follow up appointment with Dr. Ahumada    Refill Due:  8/16 to [facility]     Kenia Burris, PharmD  August 1, 2019

## 2019-08-08 NOTE — PROGRESS NOTES
"Oral Chemotherapy Monitoring Program    Primary Oncologist: Zita  Primary Oncology Clinic: [facility] Diagnosis: Lung    Drug: Osimertinib 80 mg daily  Start Date: TBD  Dose is appropriate for patients:  Renal Function CrCl 42 and  Hepatic Function   Expected duration of therapy: Until disease progression or unacceptable toxicity    Drug Interaction Assessment:  Potential QTC prolongation with fluconazole and osimertinib.  Will monitor.     Lab Monitoring Plan  Monitoring plan:    C1D1+   LVEF  CBC  CMP  (EKG  if high risk) C2D1+ CBC  CMP  (EKG  if high risk) C3D1+ CBC  CMP  (EKG if high risk)   C4D1+ LVEF  CBC  CMP  (EKG if high risk)   C5D1+ CBC  CMP  (EKG if high risk)   C6D1+ CBC  CMP  (EKG if high risk)     C1D8+  C2D8+  C3D8+  C4D8+  C5D8+  C6D8+    C1D15+  C2D15+  C3D15+  C4D15+  C5D15+  C6D15+    C1D22+  C2D22+  C3D22+  C4D22+  C5D22+  C6D22+      Labs drawn outside of [place]: If labs need to be done, Pt would like at [facility] in [place].  Subjective/Objective:  Gilson Curtis is a 78 year old male seen in clinic for an initial visit for oral chemotherapy education with significant other.     Vitals:  BP:   BP Readings from Last 1 Encounters:   08/06/18 124/74     Wt Readings from Last 1 Encounters:   08/06/18 74.8 kg (165 lb)     Estimated body surface area is 1.91 meters squared as calculated from the following:    Height as of an earlier encounter on 8/6/18: 1.753 m (5' 9\").    Weight as of an earlier encounter on 8/6/18: 74.8 kg (165 lb).      Labs:  _  Result Component Current Result Ref Range   Sodium 141 (8/2/2018) 133 - 144 mmol/L     Result Component Current Result Ref Range   Potassium 4.5 (8/2/2018) 3.4 - 5.3 mmol/L     Result Component Current Result Ref Range   Calcium 8.8 (8/2/2018) 8.5 - 10.1 mg/dL     Result Component Current Result Ref Range   Albumin 3.5 (8/2/2018) 3.4 - 5.0 g/dL     Result Component Current Result Ref Range   Urea Nitrogen 19 (8/2/2018) 7 - 30 mg/dL " Chief Complaint   Patient presents with   • Sinus Problem     cough, sinus pressure, sore throat, green production, raspy voice, ear pressure x1 week       Subjective       History of Present Illness     Helga Cardenas is a 60 y.o. female. She presents with 1 week history of URI symptoms, and worsening GERD symptoms. Regarding URI, pt reports symptoms began over 1 week ago with sore throat, nasal congestion and sinus pressure. She has bilateral ear pressure and mild HA. She has since developed a productive cough x2 days with minimal green mucous production. She denies fever, chills, SOA, wheezing, abdominal pain. She has tried OTC nasal solution, and Afrin x4 days. She does take Allegra and Nasonex daily as routine.    Pt does report worsening GERD symptoms x4 months with associated nausea. Pt previously well-controlled on Protonix and she continues to take this daily, although much less effective than in the past. She is experiencing reflux several days per week, and intermittent nausea 2-3x/ week without vomiting. She denies abdominal pain. She denies any major changes to diet or eating habits. Her least meal of the day is 3+ hours before going to bed. She does have looser stools x1 year with 4 BMs on average per day with loose stool but no watery stool. Denies dark stool or blood in stool. She does have a recent cough and sore throat as noted above but only x1 week associated with URI symptoms. She has a colonoscopy scheduled in 2 weeks as routine screening, per pt.     The following portions of the patient's history were reviewed and updated as appropriate: allergies, current medications, past medical history, past social history and problem list.    No Known Allergies  Social History     Tobacco Use   • Smoking status: Never Smoker   • Smokeless tobacco: Never Used   Substance Use Topics   • Alcohol use: No         Current Outpatient Medications:   •  estradiol (ESTRACE) 1 MG tablet, Take 1 tablet by      Result Component Current Result Ref Range   Creatinine 1.47 (H) (8/2/2018) 0.66 - 1.25 mg/dL     Result Component Current Result Ref Range   AST 35 (8/2/2018) 0 - 45 U/L     _  Result Component Current Result Ref Range   ALT 41 (8/2/2018) 0 - 70 U/L     Result Component Current Result Ref Range   Bilirubin Total 1.0 (8/2/2018) 0.2 - 1.3 mg/dL     Result Component Current Result Ref Range   WBC 6.4 (8/2/2018) 4.0 - 11.0 10e9/L     Result Component Current Result Ref Range   Hemoglobin 16.3 (8/2/2018) 13.3 - 17.7 g/dL     Result Component Current Result Ref Range   Platelet Count 157 (8/2/2018) 150 - 450 10e9/L     Result Component Current Result Ref Range   Absolute Neutrophil 4.3 (8/2/2018) 1.6 - 8.3 10e9/L       Assessment:  Patient is appropriate to start therapy.    Plan:  Basic chemotherapy teaching was reviewed with the patient including indication, start date of therapy, dose, administration, adverse effects, missed doses, food and drug interactions, monitoring, side effect management, office contact information, and safe handling. Written materials were provided and all questions answered.  EKG done today on patient and also MUGA. Script sent to [facility].     Follow-Up:  Talk to Bonnie later in the week.  Review results of baseline EF and document. Pt needs to be scheduled with Lazara in about 5-6 weeks.   Rebecca J. Fahrenbruch, PharmD, BCOP  August 6, 2018           mouth Daily., Disp: 90 tablet, Rfl: 1  •  Fexofenadine HCl (ALLEGRA ALLERGY PO), Take  by mouth., Disp: , Rfl:   •  losartan-hydrochlorothiazide (HYZAAR) 100-25 MG per tablet, Take 1 tablet by mouth Daily., Disp: 90 tablet, Rfl: 1  •  medroxyPROGESTERone (PROVERA) 5 MG tablet, Take 1 tablet by mouth Daily., Disp: 90 tablet, Rfl: 1  •  pantoprazole (PROTONIX) 40 MG EC tablet, Take 1 tablet by mouth Daily., Disp: 90 tablet, Rfl: 1  •  Triamcinolone Acetonide (NASACORT) 55 MCG/ACT nasal inhaler, 2 sprays into each nostril Daily., Disp: , Rfl:   •  azithromycin (ZITHROMAX) 250 MG tablet, Take 2 tablets the first day, then 1 tablet daily for 4 days., Disp: 6 tablet, Rfl: 0    Review of Systems   Constitutional: Negative for chills, fatigue and fever.   HENT: Positive for congestion, postnasal drip, sinus pressure, sore throat and voice change. Negative for ear pain, sneezing and trouble swallowing.         +ear pressure EFRAÍN   Eyes: Negative for blurred vision.   Respiratory: Positive for cough. Negative for shortness of breath and wheezing.    Cardiovascular: Negative for chest pain and palpitations.   Gastrointestinal: Positive for nausea and GERD. Negative for abdominal pain, blood in stool, diarrhea and vomiting.   Genitourinary: Negative for dysuria and hematuria.   Musculoskeletal: Negative for back pain.   Skin: Negative for rash.   Allergic/Immunologic: Negative for immunocompromised state.   Neurological: Negative for dizziness and headache.   Hematological: Does not bruise/bleed easily.       Objective   Vitals:    08/08/19 0955   BP: 140/84   Pulse: 72   Resp: 16   Temp: 97.8 °F (36.6 °C)   SpO2: 99%     Physical Exam   Constitutional: She appears well-developed and well-nourished.   HENT:   Head: Normocephalic and atraumatic.   Right Ear: Tympanic membrane, external ear and ear canal normal.   Left Ear: Tympanic membrane, external ear and ear canal normal.   Nose: Congestion present. Right sinus exhibits no  maxillary sinus tenderness and no frontal sinus tenderness. Left sinus exhibits no maxillary sinus tenderness and no frontal sinus tenderness.   Mouth/Throat: Oropharynx is clear and moist and mucous membranes are normal.   +PND with yellow mucous at posterior oropharynx noted   Eyes: Conjunctivae are normal.   Cardiovascular: Normal rate, regular rhythm and intact distal pulses.   No murmur heard.  Pulmonary/Chest: Effort normal and breath sounds normal. She has no wheezes. She has no rales.   Abdominal: Soft. Bowel sounds are normal. There is no hepatosplenomegaly. There is no tenderness.   Lymphadenopathy:     She has no cervical adenopathy.   Skin: No rash noted.   Psychiatric: She has a normal mood and affect. Her behavior is normal.             Assessment/Plan   Helga was seen today for sinus problem.    Diagnoses and all orders for this visit:    Upper respiratory tract infection, unspecified type  -     azithromycin (ZITHROMAX) 250 MG tablet; Take 2 tablets the first day, then 1 tablet daily for 4 days.    Gastroesophageal reflux disease, esophagitis presence not specified  -     H. Pylori Antigen, Stool - Stool, Per Rectum; Future    Loose stools  -     H. Pylori Antigen, Stool - Stool, Per Rectum; Future      Discontinue Afrin after 3 days use to avoid rebound.   Continue allegra and nasonex.   Finish Abx through completion. Advised to take mucinex 1-2x/daily with large glass of water.     Will check for H. Pylori today to begin GERD workup as she was previously stable on Protonix and no significant change in diet or eating habits.   Will consider change to Nexium or Prevacid if H Pylori negative. Pt in agreement with plan.          Return for Next scheduled follow up.

## 2019-10-03 NOTE — TELEPHONE ENCOUNTER
Received call from patient. He is at Dr Green, orthopedic doctor's office. Dr Green would like patient to receive a Cortisone injection in his hip for arthritis. Writer checked with Dr Ahumada who gave approval to receive injection. Pt informed and will receive injection today.  Jeannie Roberts  RN, BSN, OCN

## 2019-11-07 NOTE — PROGRESS NOTES
"[facility]  HEMATOLOGY AND ONCOLOGY    FOLLOW-UP VISIT NOTE    PATIENT NAME: Gilson Curtis MRN # [id number]  DATE OF VISIT: Nov 7, 2019 YOB: 1940    REFERRING PROVIDER: No referring provider defined for this encounter.    CANCER TYPE: Non small cell lung cancer - adenocarcinoma  STAGE: IV    TREATMENT SUMMARY:  Gilson presented with with hemoptysis starting in November 2011. He underwent CT of chest on Jan 18, 2012 at his primary care clinic which showed a RUL mass 2 x 1.9 cm with no suspicious lymphadenopathy or adrenal masses. 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 He was staged with a PET-CT and 2 lesions were noted. He was staged as T3N0 disease. He had surgical resection of the nodules in RUL and RML. EGFR exon 21 deletion.  He has been on Tarceva since June 2016 and is tolerating it quite well. Dose was decreased from 150 mg daily to 100 mg daily in July 2017 due to rash.     CURRENT INTERVENTIONS:  osimertinib (Tagrisso) since 8/15/18    SUBJECTIVE   Gilson Curtis is being followed for metastatic lung adenocarcinoma    Gilson has been on osimertinib for the last year. He was recently diagnosed with melanoma from skin on his scalp behind his left ear. He had a surgery done at [facility] about 2 weeks ago. He is recovering well from this surgery. I do not have access to the pathology report or notes. He states that none of the lymph nodes were involved. He had a successful surgery and no additional therapy was needed. He has no new symptoms from his lung cancer and has been stable on osimertinib.       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     CAD (coronary artery disease)      CKD (chronic kidney disease) stage 3, GFR 30-59 ml/min (H)      Fatigue     \"spells\"     Hypertension      Lung " "cancer (H) 2016     Non-small cell lung cancer (H)      Prostate cancer (H) 2009     Skin cancer, basal cell      Stented coronary artery 2009    x2     TIA (transient ischemic attack) 2002         CURRENT OUTPATIENT MEDICATIONS     Current Outpatient Medications   Medication Sig     aspirin 81 MG tablet Take 1 tablet by mouth At Bedtime      calcium carbonate (TUMS) 500 MG chewable tablet Take 1 chew tab by mouth as needed for heartburn     GABAPENTIN PO Take 150 mg by mouth At Bedtime      lisinopril (PRINIVIL,ZESTRIL) 2.5 MG tablet Take 2.5 mg by mouth daily.     Multiple Vitamins-Minerals (EYE VITAMINS PO) Take 1 capsule by mouth daily OTC     Multiple Vitamins-Minerals (MATURE ADULT CENTURY PO) Take 1 tablet by mouth daily OTC     osimertinib (TAGRISSO) 80 MG tablet Take 1 tablet (80 mg) by mouth daily     simvastatin (ZOCOR) 80 MG tablet      hydrocortisone 1 % ointment Apply topically daily      prochlorperazine (COMPAZINE) 10 MG tablet Take 0.5 tablets (5 mg) by mouth every 6 hours as needed (Nausea/Vomiting) (Patient not taking: Reported on 4/18/2019)     ranitidine (ZANTAC) 150 MG capsule Take 150 mg by mouth 2 times daily      No current facility-administered medications for this visit.      Facility-Administered Medications Ordered in Other Visits   Medication     iopamidol (ISOVUE-370) solution 103 mL        ALLERGIES      Allergies   Allergen Reactions     No Known Drug Allergy         REVIEW OF SYSTEMS   As above in the HPI, o/w complete 12-point ROS was negative.     PHYSICAL EXAM   /75 (BP Location: Left arm)   Pulse 98   Temp 98.4  F (36.9  C) (Oral)   Resp 16   Ht 1.753 m (5' 9.02\")   Wt 72.7 kg (160 lb 4.8 oz)   SpO2 98%   BMI 23.66 kg/m    GEN: NAD  HEENT: PERRL, EOMI, no icterus, injection or pallor. Oropharynx is clear.  LYMPHATICs: no cervical or supraclavicular lymphadenopathy; no other abn lymphadenopathy  PULMONARY: clear with good air entry bilaterally  CARDIOVASCULAR: " regular, no murmurs, rubs, or gallops  GASTROINTESTINAL: soft, non-tender, non-distended, normal bowel sounds, no hepatosplenomegaly by percussion or palpation  MUSCULOSKELTAL: warm, well perfused, no edema  NEURO: awake, alert and oriented to time place and person, cranial nerves intact - II - XII, no focal neurologic deficits  SKIN: no rashes     LABORATORY AND IMAGING STUDIES   Labs are stable; continues to have CKD stage III and thrombocytopenia  Recent Labs   Lab Test 11/05/19  1029 07/29/19  0935 04/16/19  1427 02/07/19  0854 12/18/18  0928    141 140 143 142   POTASSIUM 4.5 4.2 4.5 4.1 4.2   CHLORIDE 106 108 108 109 109   CO2 27 28 26 27 27   ANIONGAP 6 5 6 7 6   BUN 24 27 21 31* 28   CR 1.58* 1.66* 1.70* 1.90* 1.77*   * 127* 83 112* 148*   RICK 9.3 8.5 8.7 8.6 8.5     Recent Labs   Lab Test 10/10/17  1033 07/11/17  1037 03/06/17  1000 01/11/17  1000 12/15/16  1055   MAG 2.0 1.9 2.1 2.3 2.0   PHOS 2.3* 2.9 2.7 2.0* 2.7     Recent Labs   Lab Test 11/05/19  1029 07/29/19  0935 04/16/19  1427 02/07/19  0854 12/18/18  0928   WBC 5.5 5.2 5.6 5.9 5.0   HGB 13.9 13.8 14.4 14.8 15.1   * 95* 91* 100* 93*   MCV 98 99 99 99 100   NEUTROPHIL 68.8 75.6 69.3 66.9 73.6     Recent Labs   Lab Test 11/05/19  1029 07/29/19  0935 04/16/19  1427  12/18/18  0928 08/02/18  0948   BILITOTAL 0.6 0.6 0.5   < > 0.6 1.0   ALKPHOS 123 89 83   < > 82 117   ALT 28 19 21   < > 25 41   AST 28 21 20   < > 25 35   ALBUMIN 3.3* 3.6 3.7   < > 3.5 3.5   LDH  --   --   --   --  154 152    < > = values in this interval not displayed.     Results for orders placed or performed in visit on 11/05/19   CT Chest Abdomen Pelvis w/o Contrast    Narrative    CT CHEST, ABDOMEN AND PELVIS WITHOUT CONTRAST  11/5/2019 11:02 AM    HISTORY:  Metastatic non-small cell cancer for follow-up on therapy.  Non-small cell cancer of right lung (H). Thrombocytopenia (H). CKD  (chronic kidney disease) stage 3, GFR 30-59 ml/min (H).    TECHNIQUE: CT scan  obtained of the chest, abdomen, and pelvis without  IV contrast. Radiation dose for this scan was reduced using automated  exposure control, adjustment of the mA and/or kV according to patient  size, or iterative reconstruction technique.    COMPARISON:  CT chest, abdomen, and pelvis 7/29/2019.    FINDINGS:  Chest: Stable small volume right pleural fluid. There is right  inferior thoracic pleural thickening again identified. A portion of  this appears thicker along the posteromedial right hemidiaphragm, now  measuring 1.2 cm, previously 0.8 cm at a similar level on the prior  study, series 3 image 139. Posteromedial pleural thickening in this  region is 0.5 cm and appears slightly increased as well. Stable  ill-defined left thyroid nodule that is 1.6 cm, series 3 image 10. No  acute mediastinal abnormality. Thoracic aortic and coronary artery  calcifications. No left effusions. Small hiatal hernia is unchanged. A  small lymph node near the hiatal hernia is stable with a short axis of  0.5 cm. No enlarging lymph nodes are otherwise identified in the  chest.    Postoperative change at the anterior right midlung and hilar region.  There is an irregular nodule with some peripheral spiculation adjacent  to the right anterior midlung postoperative change that is 0.9 cm,  stable in size, series 8 image 90. There is a new 0.2 cm nodule left  lower lobe, series 8 image 130. There are other stable nodules that  are very small in size. New mild patchy groundglass opacities at the  anterior left upper lobe midchest, series 8 image 120, and medial  right upper lobe, image 130.    Abdomen/pelvis: Indeterminate larger-appearing hypodense focal lesion  within the posterior right liver that is very ill-defined measuring  approximately 2.2 cm, previously 1.7 cm, series 3 image 140, junction  of segment VI and segment VII. It has increasing extension posterior  to the right liver, image 144, that is now partially  exophytic.  Cholecystectomy.    Adrenals, spleen, pancreas appear stable. Multiple enlarged parapelvic  left renal cysts are unchanged. Right renal atrophy and other renal  cysts are suggested appearing stable.    Vascular calcifications diffusely. No acute bowel abnormality. No  fluid collections. No enlarged lymph nodes are seen.    Bone windows of the chest, abdomen, and pelvis do not show any new  destructive bony lesions.      Impression    IMPRESSION:   1. Increasing size of a nodular mass at the posterior right liver with  increased extension posteriorly worrisome for progression of  malignancy. Recommend further characterization with liver MRI.  2. Increased nonspecific pleural thickening and along the  posteromedial right hemidiaphragm. Cannot exclude neoplastic cause.  Stable small right pleural fluid.  3. A few pulmonary nodules again identified. The majority are stable.  A new but very small nodule is indeterminate at the left lower lobe.  Recommend surveillance imaging with repeat CT chest in 6 months.  4. New patchy pulmonary groundglass opacities bilaterally may relate  to an infectious or inflammatory etiology.    KIMBERLY CHAND MD          ASSESSMENT AND PLAN   Non small cell lung cancer - adenocarcinoma with EGFR mutation (exxon 21 deletion)   On erlotinib since June 2016 which was switched to osimertinib  ECOG PS 1  HTN, CKD, CAD    Zane is tolerating osimertinib well at the 80 mg daily dose. He has cough and fatigue which has been stable. He otherwise has no new complains on this medication.    I have reviewed actual images from his restaging scans and he has new lesion in right lobe of his liver posteriorly. I showed him the scans and have pasted a representative image above. This seems to be the only site for disease progression. We could consider TACE procedure but the location would not be favorable given the probable extension outside. I think radiation therapy would be a good option. I  would refer him to Dr. Tobias in radiation oncology to consider radiating this solitary site of failure.     I will get Guardant 360 testing done to assess for mutations in the tumor that could confer resistance.     I will try adding bevacizumab as this would be well tolerated and offer a good disease control. Bevacizumab is overall very well tolerated, but is associated with hypertension, bleeding (this usually has a nuisance value and presents with either epistaxis or some blood in stools) or clotting (this is much more serious, and often presents with a pulmonary embolism). Bevacizumab has been studied for a range of different cancers, and is approved for a number of them including kidney, lung, colon and previously in breast. It has a modest single-agent activity, and is approved as a frontline agent for treatment of renal cell carcinoma, in addition to interferon.    I do not know the exact extent of his melanoma. If he had locally advanced disease, we could consider ipilimumab with nivolumab as this would have efficacy again melanoma too.      His wife has been newly diagnosed with multiple myeloma and follows with Dr. Misti Mckeon at [facility].     I will see him again in 3 months with labs and scans prior to visit. He will be followed by pharmacy team for his monitoring on therapy    Over 45 min of direct face to face time spent with patient with more than 50% time spent in counseling and coordinating care.

## 2019-11-07 NOTE — TELEPHONE ENCOUNTER
Met with patient today to give him information on Bevacizumab; patient is having progression in his liver but other areas are stable.  Dr. Ahumada is also referring him to radiation for consult regarding treatment to the liver.  Guardant 360 will be submitted but writer forgot to obtain signature from patient for release on the Guardant form and patient will be e-mailing it back to writer later today.

## 2019-11-07 NOTE — NURSING NOTE
"Oncology Rooming Note    November 7, 2019 10:04 AM   Gilson Curtis is a 79 year old male who presents for:    Chief Complaint   Patient presents with     Oncology Clinic Visit     3 month follow up     Initial Vitals: /75 (BP Location: Left arm)   Pulse 98   Temp 98.4  F (36.9  C) (Oral)   Resp 16   Ht 1.753 m (5' 9.02\")   Wt 72.7 kg (160 lb 4.8 oz)   SpO2 98%   BMI 23.66 kg/m   Estimated body mass index is 23.66 kg/m  as calculated from the following:    Height as of this encounter: 1.753 m (5' 9.02\").    Weight as of this encounter: 72.7 kg (160 lb 4.8 oz). Body surface area is 1.88 meters squared.  No Pain (0) Comment: Data Unavailable   No LMP for male patient.  Allergies reviewed: Yes  Medications reviewed: Yes    Medications: Medication refills not needed today.  Pharmacy name entered into Instant API:    [facility] [address] [place] [address] - [address] [place] [address] [address] [facility] [place] - [address] [facility] [place], [place] - [address]    Pippa Chaney LPN              "

## 2019-11-07 NOTE — LETTER
11/7/2019         RE: Gilson Curtis  [address] [facility] [address]        Dear Colleague,    Thank you for referring your patient, Gilson Curtis, to the [facility]. Please see a copy of my visit note below.    [facility]  HEMATOLOGY AND ONCOLOGY    FOLLOW-UP VISIT NOTE    PATIENT NAME: Gilson Curtis MRN # [id number]  DATE OF VISIT: Nov 7, 2019 YOB: 1940    REFERRING PROVIDER: No referring provider defined for this encounter.    CANCER TYPE: Non small cell lung cancer - adenocarcinoma  STAGE: IV    TREATMENT SUMMARY:  Gilson presented with with hemoptysis starting in November 2011. He underwent CT of chest on Jan 18, 2012 at his primary care clinic which showed a RUL mass 2 x 1.9 cm with no suspicious lymphadenopathy or adrenal masses. 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 He was staged with a PET-CT and 2 lesions were noted. He was staged as T3N0 disease. He had surgical resection of the nodules in RUL and RML. EGFR exon 21 deletion.  He has been on Tarceva since June 2016 and is tolerating it quite well. Dose was decreased from 150 mg daily to 100 mg daily in July 2017 due to rash.     CURRENT INTERVENTIONS:  osimertinib (Tagrisso) since 8/15/18    SUBJECTIVE   Gilson Curtis is being followed for metastatic lung adenocarcinoma    Gilson has been on osimertinib for the last year. He was recently diagnosed with melanoma from skin on his scalp behind his left ear. He had a surgery done at [facility] about 2 weeks ago. He is recovering well from this surgery. I do not have access to the pathology report or notes. He states that none of the lymph nodes were involved. He had a successful surgery and no additional therapy was needed. He has no new symptoms from his lung cancer and has been stable on osimertinib.       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/p ablation     CAD (coronary artery disease)      CKD (chronic kidney disease) stage 3, GFR 30-59 ml/min (H)      Fatigue     \"spells\"     Hypertension      Lung cancer (H) 2016     Non-small cell lung cancer (H)      Prostate cancer (H) 2009     Skin cancer, basal cell      Stented coronary artery 2009    x2     TIA (transient ischemic attack) 2002         CURRENT OUTPATIENT MEDICATIONS     Current Outpatient Medications   Medication Sig     aspirin 81 MG tablet Take 1 tablet by mouth At Bedtime      calcium carbonate (TUMS) 500 MG chewable tablet Take 1 chew tab by mouth as needed for heartburn     GABAPENTIN PO Take 150 mg by mouth At Bedtime      lisinopril (PRINIVIL,ZESTRIL) 2.5 MG tablet Take 2.5 mg by mouth daily.     Multiple Vitamins-Minerals (EYE VITAMINS PO) Take 1 capsule by mouth daily OTC     Multiple Vitamins-Minerals (MATURE ADULT CENTURY PO) Take 1 tablet by mouth daily OTC     osimertinib (TAGRISSO) 80 MG tablet Take 1 tablet (80 mg) by mouth daily     simvastatin (ZOCOR) 80 MG tablet      hydrocortisone 1 % ointment Apply topically daily      prochlorperazine (COMPAZINE) 10 MG tablet Take 0.5 tablets (5 mg) by mouth every 6 hours as needed (Nausea/Vomiting) (Patient not taking: Reported on 4/18/2019)     ranitidine (ZANTAC) 150 MG capsule Take 150 mg by mouth 2 times daily      No current facility-administered medications for this visit.      Facility-Administered Medications Ordered in Other Visits   Medication     iopamidol (ISOVUE-370) solution 103 mL        ALLERGIES      Allergies   Allergen Reactions     No Known Drug Allergy         REVIEW OF SYSTEMS   As above in the HPI, o/w complete 12-point ROS was negative.     PHYSICAL EXAM   /75 (BP Location: Left arm)   Pulse 98   Temp 98.4  F (36.9  C) (Oral)   Resp 16   Ht 1.753 m (5' 9.02\")   Wt 72.7 kg (160 lb 4.8 oz)   SpO2 98%   BMI 23.66 kg/m   "   GEN: NAD  HEENT: PERRL, EOMI, no icterus, injection or pallor. Oropharynx is clear.  LYMPHATICs: no cervical or supraclavicular lymphadenopathy; no other abn lymphadenopathy  PULMONARY: clear with good air entry bilaterally  CARDIOVASCULAR: regular, no murmurs, rubs, or gallops  GASTROINTESTINAL: soft, non-tender, non-distended, normal bowel sounds, no hepatosplenomegaly by percussion or palpation  MUSCULOSKELTAL: warm, well perfused, no edema  NEURO: awake, alert and oriented to time place and person, cranial nerves intact - II - XII, no focal neurologic deficits  SKIN: no rashes     LABORATORY AND IMAGING STUDIES   Labs are stable; continues to have CKD stage III and thrombocytopenia  Recent Labs   Lab Test 11/05/19  1029 07/29/19  0935 04/16/19  1427 02/07/19  0854 12/18/18  0928    141 140 143 142   POTASSIUM 4.5 4.2 4.5 4.1 4.2   CHLORIDE 106 108 108 109 109   CO2 27 28 26 27 27   ANIONGAP 6 5 6 7 6   BUN 24 27 21 31* 28   CR 1.58* 1.66* 1.70* 1.90* 1.77*   * 127* 83 112* 148*   RICK 9.3 8.5 8.7 8.6 8.5     Recent Labs   Lab Test 10/10/17  1033 07/11/17  1037 03/06/17  1000 01/11/17  1000 12/15/16  1055   MAG 2.0 1.9 2.1 2.3 2.0   PHOS 2.3* 2.9 2.7 2.0* 2.7     Recent Labs   Lab Test 11/05/19  1029 07/29/19  0935 04/16/19  1427 02/07/19  0854 12/18/18  0928   WBC 5.5 5.2 5.6 5.9 5.0   HGB 13.9 13.8 14.4 14.8 15.1   * 95* 91* 100* 93*   MCV 98 99 99 99 100   NEUTROPHIL 68.8 75.6 69.3 66.9 73.6     Recent Labs   Lab Test 11/05/19  1029 07/29/19  0935 04/16/19  1427  12/18/18  0928 08/02/18  0948   BILITOTAL 0.6 0.6 0.5   < > 0.6 1.0   ALKPHOS 123 89 83   < > 82 117   ALT 28 19 21   < > 25 41   AST 28 21 20   < > 25 35   ALBUMIN 3.3* 3.6 3.7   < > 3.5 3.5   LDH  --   --   --   --  154 152    < > = values in this interval not displayed.     Results for orders placed or performed in visit on 11/05/19   CT Chest Abdomen Pelvis w/o Contrast    Narrative    CT CHEST, ABDOMEN AND PELVIS WITHOUT  CONTRAST  11/5/2019 11:02 AM    HISTORY:  Metastatic non-small cell cancer for follow-up on therapy.  Non-small cell cancer of right lung (H). Thrombocytopenia (H). CKD  (chronic kidney disease) stage 3, GFR 30-59 ml/min (H).    TECHNIQUE: CT scan obtained of the chest, abdomen, and pelvis without  IV contrast. Radiation dose for this scan was reduced using automated  exposure control, adjustment of the mA and/or kV according to patient  size, or iterative reconstruction technique.    COMPARISON:  CT chest, abdomen, and pelvis 7/29/2019.    FINDINGS:  Chest: Stable small volume right pleural fluid. There is right  inferior thoracic pleural thickening again identified. A portion of  this appears thicker along the posteromedial right hemidiaphragm, now  measuring 1.2 cm, previously 0.8 cm at a similar level on the prior  study, series 3 image 139. Posteromedial pleural thickening in this  region is 0.5 cm and appears slightly increased as well. Stable  ill-defined left thyroid nodule that is 1.6 cm, series 3 image 10. No  acute mediastinal abnormality. Thoracic aortic and coronary artery  calcifications. No left effusions. Small hiatal hernia is unchanged. A  small lymph node near the hiatal hernia is stable with a short axis of  0.5 cm. No enlarging lymph nodes are otherwise identified in the  chest.    Postoperative change at the anterior right midlung and hilar region.  There is an irregular nodule with some peripheral spiculation adjacent  to the right anterior midlung postoperative change that is 0.9 cm,  stable in size, series 8 image 90. There is a new 0.2 cm nodule left  lower lobe, series 8 image 130. There are other stable nodules that  are very small in size. New mild patchy groundglass opacities at the  anterior left upper lobe midchest, series 8 image 120, and medial  right upper lobe, image 130.    Abdomen/pelvis: Indeterminate larger-appearing hypodense focal lesion  within the posterior right liver  that is very ill-defined measuring  approximately 2.2 cm, previously 1.7 cm, series 3 image 140, junction  of segment VI and segment VII. It has increasing extension posterior  to the right liver, image 144, that is now partially exophytic.  Cholecystectomy.    Adrenals, spleen, pancreas appear stable. Multiple enlarged parapelvic  left renal cysts are unchanged. Right renal atrophy and other renal  cysts are suggested appearing stable.    Vascular calcifications diffusely. No acute bowel abnormality. No  fluid collections. No enlarged lymph nodes are seen.    Bone windows of the chest, abdomen, and pelvis do not show any new  destructive bony lesions.      Impression    IMPRESSION:   1. Increasing size of a nodular mass at the posterior right liver with  increased extension posteriorly worrisome for progression of  malignancy. Recommend further characterization with liver MRI.  2. Increased nonspecific pleural thickening and along the  posteromedial right hemidiaphragm. Cannot exclude neoplastic cause.  Stable small right pleural fluid.  3. A few pulmonary nodules again identified. The majority are stable.  A new but very small nodule is indeterminate at the left lower lobe.  Recommend surveillance imaging with repeat CT chest in 6 months.  4. New patchy pulmonary groundglass opacities bilaterally may relate  to an infectious or inflammatory etiology.    KIMBERLY CHAND MD          ASSESSMENT AND PLAN   Non small cell lung cancer - adenocarcinoma with EGFR mutation (exxon 21 deletion)   On erlotinib since June 2016 which was switched to osimertinib  ECOG PS 1  HTN, CKD, CAD    Zane is tolerating osimertinib well at the 80 mg daily dose. He has cough and fatigue which has been stable. He otherwise has no new complains on this medication.    I have reviewed actual images from his restaging scans and he has new lesion in right lobe of his liver posteriorly. I showed him the scans and have pasted a representative image  above. This seems to be the only site for disease progression. We could consider TACE procedure but the location would not be favorable given the probable extension outside. I think radiation therapy would be a good option. I would refer him to Dr. Tobias in radiation oncology to consider radiating this solitary site of failure.     I will get Guardant 360 testing done to assess for mutations in the tumor that could confer resistance.     I will try adding bevacizumab as this would be well tolerated and offer a good disease control. Bevacizumab is overall very well tolerated, but is associated with hypertension, bleeding (this usually has a nuisance value and presents with either epistaxis or some blood in stools) or clotting (this is much more serious, and often presents with a pulmonary embolism). Bevacizumab has been studied for a range of different cancers, and is approved for a number of them including kidney, lung, colon and previously in breast. It has a modest single-agent activity, and is approved as a frontline agent for treatment of renal cell carcinoma, in addition to interferon.    I do not know the exact extent of his melanoma. If he had locally advanced disease, we could consider ipilimumab with nivolumab as this would have efficacy again melanoma too.      His wife has been newly diagnosed with multiple myeloma and follows with Dr. Misti Mckeon at [facility].     I will see him again in 3 months with labs and scans prior to visit. He will be followed by pharmacy team for his monitoring on therapy    Over 45 min of direct face to face time spent with patient with more than 50% time spent in counseling and coordinating care.      Again, thank you for allowing me to participate in the care of your patient.        Sincerely,        Gerry Ahumada MD

## 2019-11-08 NOTE — TELEPHONE ENCOUNTER
Radiation Oncology Intake: New Consult Screening        REFERRAL INFORMATION:   Scheduled by, Callback #: Call made to patient     Diagnosis: NSCLC right  Referring provider:  Zita  (BMT) Protocol #:     Have you had previous Radiation Therapy with us?: Yes  Have you had previous Radiation Therapy elsewhere?: No  Is there a specific Radiation Oncologist requested?: No     Are you okay with traveling to our location daily to receive treatment(BMT: YES): Yes  Are there records pertaining to this diagnosis from external facilities?: No   Where:     Previous Radiation Therapy(Y/N; location):  Yes, at [facility] with Dr Pérez.  No other treatments outside.

## 2019-11-11 NOTE — TELEPHONE ENCOUNTER
Patient signed consent form for Guardant 360 through e-mail.  Writer submitted forms for lab to send off this morning.

## 2019-11-21 NOTE — PROGRESS NOTES
Reviewed results of recent MRI of Abdomen results.  Advised that there is only one lesion in segment 7 of the liver showing metastatic disease.  He should continue with plans to have consult in Radiation Oncology as planned on 12/3.

## 2019-11-22 NOTE — TELEPHONE ENCOUNTER
Guardant 360 results obtained.  Dr. Ahumada aware; he will review with his colleagues.  Report sent to scanning as per Dr. Ahumada.

## 2019-12-02 NOTE — TELEPHONE ENCOUNTER
Discussed cough symptoms with KARLIE Haile; advised to get cough evaluated by Oncology either at the [facility] (prior to Radiation Oncology Appt) or at [facility]. Versus going to Urgent Care.  The other option would be to see Lazara this Wednesday.  Patient would prefer to wait until Wednesday and see Lazara - appointment has been scheduled.  Reviewed address for Radiation at the [facility].

## 2019-12-02 NOTE — PROGRESS NOTES
RADIATION ONCOLOGY CONSULTATION  [facility] PHYSICIANS    DATE:  December 3, 2019    PATIENT NAME: Gilson Curtis  MEDICAL RECORD NUMBER: [id number]    REFERRING PHYSICIAN:  Dr. Ahumada    REASON FOR CONSULTATION: Consideration of  palliative radiotherapy for management of lung cancer.    STAGING(AJCC 8th ed): Clinical M1c   STAGING GROUPING: IVb (M1c)    HISTOLOGY: Adenocarcinoma Adenocarcinoma with lepedic growth pattern  GENOMICS:  EGFR+ ( 21 deletion, L858R)          HISTORY OF PRESENT ILLNESS: The patient is a 79 year old  who was found to have lung cancer in 2012.    The patient  presented with hemoptysis  in November 2011. He underwent a   chest CT on Jan 18, 2012 which showed a RUL mass without suspicious lymphadenopathy. A CT guided biopsy on  1/31/2012([facility] , [facility])  showed adenocarcinoma  with bronchoalveolar features.     A PET-CT( 2/14/2012)  showed a 1.9 x 3.2 cm hypermetabolic right upper lobe mass consistent with nonsmall cell lung carcinoma with SUVmax=4.7. There was a cluster of small nodules(<1cm) in the middle lobe with small surrounding satellite nodules indicating infection or malignancy.  A focal hypermetabolic nodule in the left thyroid lobe. The left  thyroid nodule biopsy was benign ( [id number], 2/23/2012)    A brain MRI on 2/14/2012 showed a small 5mm metastasis in the left frontal operculum. A repeat MRI on 5/28/2018 showed no metastasis.    The patient underwent an RUL lobectomy and LN dissection on 3/6/2012 ([id number]). It showed RUL 2.1 cm adenocarcinoma, moderately differentiated, without pleural invasion. There was another focus of tumor near the hilar LNs.  There was 0/7 LN involvement from the right hilum. There was no LN metastasis from the stations 2R, 4R, 7,9, 11R, and 12R. Additional wedge resection from the middle lobe showed granuloma.  Thus the pathologic staging was [id number]. The pT3 was for multiple tumors in a lobe.  The tumor was negative for ALK  "or ROS1 by FISH. There were no mutations identified in the BRAF, HRAS, KRAS, NRAS, ERBB2, MET, JAK2 or PIK3CA genes([id number], 4/8/2016 of the specimen from 3/6/2012). 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     The patient has been on Tarceva since June 2016 with dose reduction from 150 mg to 100 mg daily in 7/2017 due to rash. This was switched to osimertinib(Tagrisso) on 8/15/2018.     The patient received palliative radiotherapy to the right chest/ribs at (30 Gy/10, Dr Pérez) in 6/2018.    Most recently the non-contrast CT scan of chest/abdomen/pelvis(11/5/2019) showed increasing size of a nodular mass at the posterior right liver with worrisome for progression of malignancy. A few pulmonary nodules again identified but the majority are stable. A new but very small nodule that was indeterminate at the left lower lobe was seen.    The MRI of abdomen with contrast(11/18/2019) confirmed the 3.2 X 3.4 cm mass in the segment 7. There was also a cyst in the segment 5.    The patient has been referred for SBRT to treat the liver metastasis.    Of note, he reports he underwent surgery for melanoma behind his left ear approximately 6 weeks ago at [facility]. He was told there was no lymph node involvement or need for additional therapy.    Additionally, Mr. Curtis reports having about 2-3 months of left hip pain.  He was seen by an outside provider who given him a cortisone injection which brought some relief for several weeks, however in the last several days he has noticed a return of increasing and significant pain.  He feels unsteady when walking and has had to loosen his belt due to pressure on his left pelvis.  He reports having x-rays performed a couple months ago that showed \"arthritis of some kind\".    Mr. Curtis presents for discussion of SBRT.  His primary complaint is a cough of about 1 week's duration.  He normally has a cough at baseline, but he feels like this has " "significantly worsened and keeps him up at night.  It is nonproductive and he denies fevers, shortness of breath, or chills.  He is going to be evaluated tomorrow in [place] for this.  Additionally, he complains of ongoing hip pain as above as well as longstanding fatigue.  This is worsened acutely by his cough but typically he rests or sleeps for most of the day.    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     CAD (coronary artery disease)      CKD (chronic kidney disease) stage 3, GFR 30-59 ml/min (H)      Fatigue     \"spells\"     Hypertension      Lung cancer (H) 2016     Non-small cell lung cancer (H)      Prostate cancer (H) 2009     Skin cancer, basal cell      Stented coronary artery 2009    x2     TIA (transient ischemic attack) 2002       PSH:  Past Surgical History:   Procedure Laterality Date     CATARACT IOL, RT/LT Left 05/2015     CATARACT IOL, RT/LT Right 06/2015     CATHETER, ABLATION  06/2006    [facility]     CHOLECYSTECTOMY  10/2012     ENDOBRONCHIAL ULTRASOUND FLEXIBLE  2/27/2012    Procedure:ENDOBRONCHIAL ULTRASOUND FLEXIBLE; Flexible Bronchoscopy, Endo Bronchial Ultrasound, Transbroncial biopies (Cytology specimens taken by Cytology); Surgeon:BRITTA MEDEL; Location:[facility] OR     EXCISION OF BASAL CELL CARCINOMA  10/2005    Left upper back     HERNIA REPAIR Bilateral 12/2010    Inguinal     PROSTATECTOMY RETROPUBIC RADICAL  04/2009     RESECT RIB Right 4/8/2016    Procedure: RESECT RIB;  Surgeon: Jamarcus Gannon MD;  Location: [facility] OR     STENT  08/2009    Angiogram/ 2 Taxus Stents - Mid LAD, Mid CMX     THORACOSCOPIC RESECTION LUNG  3/6/2012    Procedure:THORACOSCOPIC RESECTION LUNG; right video assisted thoracic surgery, wedge resection middle lobe x 2, right thoroctomy, right upper lobectomy, mediastinal lymphadenectomy, flexible " bronchoscopy; Surgeon:BRITTA MEDEL; Location: OR       MEDICATIONS:  Current Outpatient Medications   Medication Sig Dispense Refill     aspirin 81 MG tablet Take 1 tablet by mouth At Bedtime        calcium carbonate (TUMS) 500 MG chewable tablet Take 1 chew tab by mouth as needed for heartburn       GABAPENTIN PO Take 150 mg by mouth At Bedtime        hydrocortisone 1 % ointment Apply topically daily        lisinopril (PRINIVIL,ZESTRIL) 2.5 MG tablet Take 2.5 mg by mouth daily.       Multiple Vitamins-Minerals (EYE VITAMINS PO) Take 1 capsule by mouth daily OTC       Multiple Vitamins-Minerals (MATURE ADULT CENTURY PO) Take 1 tablet by mouth daily OTC       osimertinib (TAGRISSO) 80 MG tablet Take 1 tablet (80 mg) by mouth daily 30 tablet 0     osimertinib (TAGRISSO) 80 MG tablet Take 1 tablet (80 mg) by mouth daily 30 tablet 0     prochlorperazine (COMPAZINE) 10 MG tablet Take 0.5 tablets (5 mg) by mouth every 6 hours as needed (Nausea/Vomiting) (Patient not taking: Reported on 2019) 30 tablet 2     ranitidine (ZANTAC) 150 MG capsule Take 150 mg by mouth 2 times daily        simvastatin (ZOCOR) 80 MG tablet          ALLERGY:  Allergies   Allergen Reactions     No Known Drug Allergy        FAMILY HISTORY:  Family History   Problem Relation Age of Onset     Breast Cancer Sister 65     Breast Cancer Maternal Aunt        SOCIAL HISTORY  Social History     Tobacco Use     Smoking status: Former Smoker     Packs/day: 1.00     Years: 20.00     Pack years: 20.00     Types: Cigarettes     Last attempt to quit: 1978     Years since quittin.9     Smokeless tobacco: Never Used   Substance Use Topics     Alcohol use: No     Alcohol/week: 0.0 standard drinks       IMPLANTED CARDIAC DEVICE: none  HISTORY OF RADIOTHERAPY: Rt ribs, 3000 cGy in 10 fractions completed 2018 (completed at [facility] by Dr. Pérez)    ROS: 10 point ROS reviewed as reported on the nursing assessment note.      PHYSICAL  EXAMINATION:  /62 (BP Location: Right arm, Patient Position: Chair, Cuff Size: Adult Regular)   Pulse 92   Temp 98.4  F (36.9  C) (Oral)   Resp 18   Wt 72.6 kg (160 lb)   SpO2 97%   BMI 23.62 kg/m      The patient was alert and oriented X 3.    Lungs: Coughs multiple times during examination, nonlabored breathing  Abdomen: Soft, nondistended  Neuro/MSK: Pain with palpation of left lateral pelvis.  Antalgic gait assisted with cane    IMAGING:  MR Abdomen 11/18/19:  IMPRESSION:   1. Metastatic focus in the liver segment 7. Presumed benign cyst in  the liver segment 5.  2. Two presumable metastatic nodular lesions in the right lung base.  3. Right pleural effusion, along with thickening of the right lung  pleura. Metastatic disease is a concern. Please refer to recent CT  exam.  4. Hemorrhagic/proteinaceous cysts bilaterally.  5. Small right kidney with scattered cortical scarring.    CT CAP 11/5/19  IMPRESSION:   1. Increasing size of a nodular mass at the posterior right liver with  increased extension posteriorly worrisome for progression of  malignancy. Recommend further characterization with liver MRI.  2. Increased nonspecific pleural thickening and along the  posteromedial right hemidiaphragm. Cannot exclude neoplastic cause.  Stable small right pleural fluid.  3. A few pulmonary nodules again identified. The majority are stable.  A new but very small nodule is indeterminate at the left lower lobe.  Recommend surveillance imaging with repeat CT chest in 6 months.  4. New patchy pulmonary groundglass opacities bilaterally may relate  to an infectious or inflammatory etiology.    IMPRESSION: Stage IVB metastatic non-small cell lung cancer on osimertinib with a growing right hepatic lesion, presenting for consideration of liver SBRT.  He also has a symptomatic and growing bony lesion in his left ilium.  On review of imaging, this lesion has been progressive since 2/2019.    RECOMMENDATION:   I discussed  with the patient in detail the reasoning behind providing liver SBRT for local control of disease in a patient with otherwise stable systemic disease.  However, given his progressive symptoms and growing left pelvic lesion, we discussed that local control in his liver at this time is not our main priority.  The patient's questions were answered to his stated satisfaction.    We will refer the patient to Dr. Pérez in radiation oncology at [facility] per the patient's preference for potential palliative radiotherapy to the left pelvis.  Dr. Tobias has also discussed the case with Dr. Ahumada who may consider alteration of his systemic regimen if indicated.  The patient will also keep his appointment for evaluation of his cough tomorrow.    We are happy to see the patient in the future if necessary for reconsideration of liver SBRT.    The patient was seen and examined with Dr. Tobias, who agrees with the above assessment and plan.    Levar Dao MD PGY-3   Radiation Oncology, [facility]  [phone] clinic  Pager [phone]    I saw the patient with the resident.  I agree with the resident's note and plan of care.      STACY Tobias M.D.  Department of Radiation Oncology  [facility]

## 2019-12-02 NOTE — TELEPHONE ENCOUNTER
Returned call to patient regarding increasing, persistent cough which he states is getting worse over the past week.  Cough is unproductive and dry.  He has been having a difficult time sleeping - he feels like this could be drainage from his sinuses.  He has no fever.  He tried Robitussin and a Mix's cough drop which did not help.

## 2019-12-03 NOTE — LETTER
12/3/2019       RE: Gilson Curtis  [address] [address]     Dear Colleague,    Thank you for referring your patient, Gilson Curtis, to the RADIATION ONCOLOGY CLINIC. Please see a copy of my visit note below.      RADIATION ONCOLOGY CONSULTATION  [facility] PHYSICIANS    DATE:  December 3, 2019    PATIENT NAME: Gilson Curtis  MEDICAL RECORD NUMBER: [id number]    REFERRING PHYSICIAN:  Dr. Ahumada    REASON FOR CONSULTATION: Consideration of  palliative radiotherapy for management of lung cancer.    STAGING(AJCC 8th ed): Clinical M1c   STAGING GROUPING: IVb (M1c)    HISTOLOGY: Adenocarcinoma Adenocarcinoma with lepedic growth pattern  GENOMICS:  EGFR+ ( 21 deletion, L858R)          HISTORY OF PRESENT ILLNESS: The patient is a 79 year old  who was found to have lung cancer in 2012.    The patient  presented with hemoptysis  in November 2011. He underwent a   chest CT on Jan 18, 2012 which showed a RUL mass without suspicious lymphadenopathy. A CT guided biopsy on  1/31/2012([facility] , [facility])  showed adenocarcinoma  with bronchoalveolar features.     A PET-CT( 2/14/2012)  showed a 1.9 x 3.2 cm hypermetabolic right upper lobe mass consistent with nonsmall cell lung carcinoma with SUVmax=4.7. There was a cluster of small nodules(<1cm) in the middle lobe with small surrounding satellite nodules indicating infection or malignancy.  A focal hypermetabolic nodule in the left thyroid lobe. The left  thyroid nodule biopsy was benign ( [id number], 2/23/2012)    A brain MRI on 2/14/2012 showed a small 5mm metastasis in the left frontal operculum. A repeat MRI on 5/28/2018 showed no metastasis.    The patient underwent an RUL lobectomy and LN dissection on 3/6/2012 ([id number]). It showed RUL 2.1 cm adenocarcinoma, moderately differentiated, without pleural invasion. There was another focus of tumor near the hilar LNs.  There was 0/7 LN involvement from the right hilum. There was no LN  metastasis from the stations 2R, 4R, 7,9, 11R, and 12R. Additional wedge resection from the middle lobe showed granuloma.  Thus the pathologic staging was [id number]. The pT3 was for multiple tumors in a lobe.  The tumor was negative for ALK or ROS1 by FISH. There were no mutations identified in the BRAF, HRAS, KRAS, NRAS, ERBB2, MET, JAK2 or PIK3CA genes([id number], 4/8/2016 of the specimen from 3/6/2012). 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     The patient has been on Tarceva since June 2016 with dose reduction from 150 mg to 100 mg daily in 7/2017 due to rash. This was switched to osimertinib(Tagrisso) on 8/15/2018.     The patient received palliative radiotherapy to the right chest/ribs at (30 Gy/10, Dr Pérez) in 6/2018.    Most recently the non-contrast CT scan of chest/abdomen/pelvis(11/5/2019) showed increasing size of a nodular mass at the posterior right liver with worrisome for progression of malignancy. A few pulmonary nodules again identified but the majority are stable. A new but very small nodule that was indeterminate at the left lower lobe was seen.    The MRI of abdomen with contrast(11/18/2019) confirmed the 3.2 X 3.4 cm mass in the segment 7. There was also a cyst in the segment 5.    The patient has been referred for SBRT to treat the liver metastasis.    Of note, he reports he underwent surgery for melanoma behind his left ear approximately 6 weeks ago at [facility]. He was told there was no lymph node involvement or need for additional therapy.    Additionally, Mr. Curtis reports having about 2-3 months of left hip pain.  He was seen by an outside provider who given him a cortisone injection which brought some relief for several weeks, however in the last several days he has noticed a return of increasing and significant pain.  He feels unsteady when walking and has had to loosen his belt due to pressure on his left pelvis.  He reports having x-rays performed  "a couple months ago that showed \"arthritis of some kind\".    Mr. Curtis presents for discussion of SBRT.  His primary complaint is a cough of about 1 week's duration.  He normally has a cough at baseline, but he feels like this has significantly worsened and keeps him up at night.  It is nonproductive and he denies fevers, shortness of breath, or chills.  He is going to be evaluated tomorrow in [place] for this.  Additionally, he complains of ongoing hip pain as above as well as longstanding fatigue.  This is worsened acutely by his cough but typically he rests or sleeps for most of the day.    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     CAD (coronary artery disease)      CKD (chronic kidney disease) stage 3, GFR 30-59 ml/min (H)      Fatigue     \"spells\"     Hypertension      Lung cancer (H) 2016     Non-small cell lung cancer (H)      Prostate cancer (H) 2009     Skin cancer, basal cell      Stented coronary artery 2009    x2     TIA (transient ischemic attack) 2002       PSH:  Past Surgical History:   Procedure Laterality Date     CATARACT IOL, RT/LT Left 05/2015     CATARACT IOL, RT/LT Right 06/2015     CATHETER, ABLATION  06/2006    [facility]     CHOLECYSTECTOMY  10/2012     ENDOBRONCHIAL ULTRASOUND FLEXIBLE  2/27/2012    Procedure:ENDOBRONCHIAL ULTRASOUND FLEXIBLE; Flexible Bronchoscopy, Endo Bronchial Ultrasound, Transbroncial biopies (Cytology specimens taken by Cytology); Surgeon:BRITTA MEDEL; Location:[facility] OR     EXCISION OF BASAL CELL CARCINOMA  10/2005    Left upper back     HERNIA REPAIR Bilateral 12/2010    Inguinal     PROSTATECTOMY RETROPUBIC RADICAL  04/2009     RESECT RIB Right 4/8/2016    Procedure: RESECT RIB;  Surgeon: Jamarcus Gannon MD;  Location: [facility] OR     STENT  08/2009    Angiogram/ 2 Taxus Stents - Mid LAD, Mid CMX     " THORACOSCOPIC RESECTION LUNG  3/6/2012    Procedure:THORACOSCOPIC RESECTION LUNG; right video assisted thoracic surgery, wedge resection middle lobe x 2, right thoroctomy, right upper lobectomy, mediastinal lymphadenectomy, flexible bronchoscopy; Surgeon:BRITTA MEDEL; Location: OR       MEDICATIONS:  Current Outpatient Medications   Medication Sig Dispense Refill     aspirin 81 MG tablet Take 1 tablet by mouth At Bedtime        calcium carbonate (TUMS) 500 MG chewable tablet Take 1 chew tab by mouth as needed for heartburn       GABAPENTIN PO Take 150 mg by mouth At Bedtime        hydrocortisone 1 % ointment Apply topically daily        lisinopril (PRINIVIL,ZESTRIL) 2.5 MG tablet Take 2.5 mg by mouth daily.       Multiple Vitamins-Minerals (EYE VITAMINS PO) Take 1 capsule by mouth daily OTC       Multiple Vitamins-Minerals (MATURE ADULT CENTURY PO) Take 1 tablet by mouth daily OTC       osimertinib (TAGRISSO) 80 MG tablet Take 1 tablet (80 mg) by mouth daily 30 tablet 0     osimertinib (TAGRISSO) 80 MG tablet Take 1 tablet (80 mg) by mouth daily 30 tablet 0     prochlorperazine (COMPAZINE) 10 MG tablet Take 0.5 tablets (5 mg) by mouth every 6 hours as needed (Nausea/Vomiting) (Patient not taking: Reported on 2019) 30 tablet 2     ranitidine (ZANTAC) 150 MG capsule Take 150 mg by mouth 2 times daily        simvastatin (ZOCOR) 80 MG tablet          ALLERGY:  Allergies   Allergen Reactions     No Known Drug Allergy        FAMILY HISTORY:  Family History   Problem Relation Age of Onset     Breast Cancer Sister 65     Breast Cancer Maternal Aunt        SOCIAL HISTORY  Social History     Tobacco Use     Smoking status: Former Smoker     Packs/day: 1.00     Years: 20.00     Pack years: 20.00     Types: Cigarettes     Last attempt to quit: 1978     Years since quittin.9     Smokeless tobacco: Never Used   Substance Use Topics     Alcohol use: No     Alcohol/week: 0.0 standard drinks       IMPLANTED  CARDIAC DEVICE: none  HISTORY OF RADIOTHERAPY: Rt ribs, 3000 cGy in 10 fractions completed 6/19/2018 (completed at [facility] by Dr. Pérez)    ROS: 10 point ROS reviewed as reported on the nursing assessment note.      PHYSICAL EXAMINATION:  /62 (BP Location: Right arm, Patient Position: Chair, Cuff Size: Adult Regular)   Pulse 92   Temp 98.4  F (36.9  C) (Oral)   Resp 18   Wt 72.6 kg (160 lb)   SpO2 97%   BMI 23.62 kg/m       The patient was alert and oriented X 3.    Lungs: Coughs multiple times during examination, nonlabored breathing  Abdomen: Soft, nondistended  Neuro/MSK: Pain with palpation of left lateral pelvis.  Antalgic gait assisted with cane    IMAGING:  MR Abdomen 11/18/19:  IMPRESSION:   1. Metastatic focus in the liver segment 7. Presumed benign cyst in  the liver segment 5.  2. Two presumable metastatic nodular lesions in the right lung base.  3. Right pleural effusion, along with thickening of the right lung  pleura. Metastatic disease is a concern. Please refer to recent CT  exam.  4. Hemorrhagic/proteinaceous cysts bilaterally.  5. Small right kidney with scattered cortical scarring.    CT CAP 11/5/19  IMPRESSION:   1. Increasing size of a nodular mass at the posterior right liver with  increased extension posteriorly worrisome for progression of  malignancy. Recommend further characterization with liver MRI.  2. Increased nonspecific pleural thickening and along the  posteromedial right hemidiaphragm. Cannot exclude neoplastic cause.  Stable small right pleural fluid.  3. A few pulmonary nodules again identified. The majority are stable.  A new but very small nodule is indeterminate at the left lower lobe.  Recommend surveillance imaging with repeat CT chest in 6 months.  4. New patchy pulmonary groundglass opacities bilaterally may relate  to an infectious or inflammatory etiology.    IMPRESSION: Stage IVB metastatic non-small cell lung cancer on osimertinib with a growing  right hepatic lesion, presenting for consideration of liver SBRT.  He also has a symptomatic and growing bony lesion in his left ilium.  On review of imaging, this lesion has been progressive since 2/2019.    RECOMMENDATION:   I discussed with the patient in detail the reasoning behind providing liver SBRT for local control of disease in a patient with otherwise stable systemic disease.  However, given his progressive symptoms and growing left pelvic lesion, we discussed that local control in his liver at this time is not our main priority.  The patient's questions were answered to his stated satisfaction.    We will refer the patient to Dr. Pérez in radiation oncology at [place] per the patient's preference for potential palliative radiotherapy to the left pelvis.  Dr. Tobias has also discussed the case with Dr. Ahumada who may consider alteration of his systemic regimen if indicated.  The patient will also keep his appointment for evaluation of his cough tomorrow.    We are happy to see the patient in the future if necessary for reconsideration of liver SBRT.    The patient was seen and examined with Dr. Tobias, who agrees with the above assessment and plan.    Levar Dao MD PGY-3   Radiation Oncology, [facility]  [phone] clinic  Pager [phone]    I saw the patient with the resident.  I agree with the resident's note and plan of care.      STACY Tobias M.D.  Department of Radiation Oncology  [facility]

## 2019-12-03 NOTE — PATIENT INSTRUCTIONS
Please feel free to contact the [facility] Radiation Oncology Department at [phone] with questions or concerns following today s appointment.    Thank you!    Radiation Oncology Nursing

## 2019-12-03 NOTE — NURSING NOTE
"INITIAL PATIENT ASSESSMENT      Diagnosis: metastatic lung cancer    Prior radiation therapy:   Site Treated: right ribs  Facility: [facility]  Dates: completed treatment 6/19/2018  Dose: 3,000 cGy    Prior chemotherapy: Patient with history of Erlotinib from 5/4/2016 - 6/28/2017, patient currently taking Osimertinib 8/5/2018 to present as prescribed by Dr. Ahumada.    Prior hormonal therapy:No    Pain Eval:  Current history of pain associated with this visit:   Intensity: 1/10  Current: dull and aching  Location: left hip  Treatment: patient reports history of Cortisone injections, patient reports worsening pain of left hip \"it feels unstable when I stand on it\" over the past couple of days.    Psychosocial  Living arrangements: Lives at home in [place] with wifeKatherine  Fall Risk: ambulates with assistive device, ambulates with cane and wife support  MIPS Falls Risk Screening Completed: Yes Result: Negative   referral needs: Not needed    Advanced Directive: Yes - Location: on file in [facility] chart  Implantable Cardiac Device: No      Reproductive note: Not recorded for male patient    Review of Systems     Constitutional: Positive for malaise/fatigue. Negative for chills, diaphoresis, fever and weight loss.   HENT: Positive for hearing loss. Negative for congestion, ear discharge, ear pain, nosebleeds, sinus pain, sore throat and tinnitus.         Bilateral hearing aides.   Eyes: Negative.    Respiratory: Positive for cough and shortness of breath. Negative for hemoptysis, sputum production, wheezing and stridor.         Coughing over the past one week, patient reports coughing fits that last one to ten minutes.  Patient reports shortness of breath with minimal activity, relief with rest.   Cardiovascular: Negative.    Gastrointestinal: Positive for heartburn. Negative for abdominal pain, blood in stool, constipation, diarrhea, melena, nausea and vomiting.   Genitourinary: Positive for " dysuria. Negative for flank pain, frequency, hematuria and urgency.        Patient reports increasing urinary incontinence during the day, has started to notice urinary incontinence at night, wears pad.   Musculoskeletal: Positive for joint pain. Negative for back pain, falls, myalgias and neck pain.        See pain note.   Skin: Negative.    Neurological: Positive for tingling. Negative for dizziness, tremors, sensory change, speech change, focal weakness, seizures, loss of consciousness, weakness and headaches.        Patient reports neuropathy of bilateral feet.   Endo/Heme/Allergies: Negative.    Psychiatric/Behavioral: Negative.        Nurse face-to-face time: Level 4:  15 min face to face time

## 2019-12-04 NOTE — PATIENT INSTRUCTIONS
Please contact [facility] RN with questions or concerns following today's appointment: [phone].    Thank you!

## 2019-12-04 NOTE — PROGRESS NOTES
"Oncology Rooming Note    December 4, 2019 1:48 PM   Gilson Curtis is a 79 year old male who presents for:    Chief Complaint   Patient presents with     Oncology Clinic Visit     Return appt     Initial Vitals: /80   Pulse 94   Temp 98.1  F (36.7  C) (Oral)   Wt 70.5 kg (155 lb 6.4 oz)   SpO2 98%   BMI 22.94 kg/m   Estimated body mass index is 22.94 kg/m  as calculated from the following:    Height as of 11/7/19: 1.753 m (5' 9.02\").    Weight as of this encounter: 70.5 kg (155 lb 6.4 oz). Body surface area is 1.85 meters squared.  Severe Pain (6) Comment: Data Unavailable   No LMP for male patient.  Allergies reviewed: Yes  Medications reviewed: Yes    Medications: Medication refills not needed today.  Pharmacy name entered into wywy:    [facility] [address] [place] [address] - [address] [facility] [address] [facility] [place] [address] [facility] [place], [place] - [address]    Clinical concerns: cough x 10 days, increased SOB, weakness and unable to sleep, cancer mets to L hip and pelvic area.  Found out yesterday Lazara Leon NP was notified.      Roberto Hackett RN              "

## 2019-12-04 NOTE — PROGRESS NOTES
"Oncology Follow Up Visit: December 4, 2019     Oncologist: Dr Gerry Ahumada  PCP: Hemant Jiménez    Diagnosis: Stage IV Lung Cancer- here for update and cough  Gilson Curtis is an 78 yo who complained of hemoptysis in 11/2011 and was found to have a 2 x 1.9 cm RUL mass. 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 Curtis comes to clinic with wife for review of cough as he continues use of Tagrisso with plan to add Avastin. Reviewed with pt that he saw Dr Tobias at [facility] for possible radiation to liver metastasis but was complaining of new left hip pain and area of metastasis was located by radiation oncologist on last imaging. Priority changed from treatment of liver to hip due to pain and now pt is set up to have 10 days of radiation here at [facility] to hip. Pt however continues to complain of harsh cough- not sure if related to chest or drainage from sinus but does not have sinus congestion.  Some SOB with cough. No fevers or chills. Minor clear discharge noted only occasionally.   Past Medical History:   Diagnosis Date     AVNRT (AV alnce re-entry tachycardia) (H)     s/p ablation     CAD (coronary artery disease)      CKD (chronic kidney disease) stage 3, GFR 30-59 ml/min (H)      Fatigue     \"spells\"     Hypertension      Lung cancer (H) 2016     Melanoma (H)      Non-small cell lung cancer (H)      Prostate cancer (H) 2009     S/P radiation therapy     3,000 cGy to right ribs completed on 6/19/2018 - [facility] with Dr. Pérez     Skin cancer, basal cell      Stented coronary " artery 2009    x2     TIA (transient ischemic attack) 2002     Current Outpatient Medications   Medication     aspirin 81 MG tablet     calcium carbonate (TUMS) 500 MG chewable tablet     GABAPENTIN PO     lisinopril (PRINIVIL,ZESTRIL) 2.5 MG tablet     Multiple Vitamins-Minerals (EYE VITAMINS PO)     Multiple Vitamins-Minerals (MATURE ADULT CENTURY PO)     osimertinib (TAGRISSO) 80 MG tablet     osimertinib (TAGRISSO) 80 MG tablet     prochlorperazine (COMPAZINE) 10 MG tablet     simvastatin (ZOCOR) 80 MG tablet     No current facility-administered medications for this visit.      Facility-Administered Medications Ordered in Other Visits   Medication     iopamidol (ISOVUE-370) solution 103 mL     Allergies   Allergen Reactions     No Known Drug Allergy        Physical Exam:/80   Pulse 94   Temp 98.1  F (36.7  C) (Oral)   Wt 70.5 kg (155 lb 6.4 oz)   SpO2 98%   BMI 22.94 kg/m      ECOG PS- 0  Constitutional: Alert, cooperative, with cough noted   ENT: Eyes bright, No mouth sores  Neck: Supple, No adenopathy.  Cardiac: Heart rate and rhythm is regular and strong without murmur  Respiratory: Breathing easy. Lung sounds clear to auscultation  GI: Abdomen is soft, non-tender, BS normal. No masses or organomegaly  MS: Muscle tone fair, extremities normal with no edema.   Skin: alejandro complexion to the face but no rash. Arms and legs which had the dry flakey skin.  Neuro: Sensory grossly WNL, gait normal.   Lymph: Normal ant/post cervical, axillary, supraclavicular nodes  Psych: Mentation appears normal and affect normal/bright with good conversation.     Laboratory Results:   Results for orders placed or performed in visit on 12/04/19   Comprehensive metabolic panel     Status: Abnormal   Result Value Ref Range    Sodium 139 133 - 144 mmol/L    Potassium 4.7 3.4 - 5.3 mmol/L    Chloride 107 94 - 109 mmol/L    Carbon Dioxide 28 20 - 32 mmol/L    Anion Gap 4 3 - 14 mmol/L    Glucose 115 (H) 70 - 99 mg/dL    Urea  Nitrogen 34 (H) 7 - 30 mg/dL    Creatinine 1.69 (H) 0.66 - 1.25 mg/dL    GFR Estimate 37 (L) >60 mL/min/[1.73_m2]    GFR Estimate If Black 43 (L) >60 mL/min/[1.73_m2]    Calcium 9.4 8.5 - 10.1 mg/dL    Bilirubin Total 0.5 0.2 - 1.3 mg/dL    Albumin 3.1 (L) 3.4 - 5.0 g/dL    Protein Total 6.6 (L) 6.8 - 8.8 g/dL    Alkaline Phosphatase 100 40 - 150 U/L    ALT 38 0 - 70 U/L    AST 44 0 - 45 U/L   CBC with platelets differential     Status: Abnormal   Result Value Ref Range    WBC 11.3 (H) 4.0 - 11.0 10e9/L    RBC Count 4.03 (L) 4.4 - 5.9 10e12/L    Hemoglobin 12.1 (L) 13.3 - 17.7 g/dL    Hematocrit 39.4 (L) 40.0 - 53.0 %    MCV 98 78 - 100 fl    MCH 30.0 26.5 - 33.0 pg    MCHC 30.7 (L) 31.5 - 36.5 g/dL    RDW 14.0 10.0 - 15.0 %    Platelet Count 168 150 - 450 10e9/L    Diff Method Automated Method     % Neutrophils 79.9 %    % Lymphocytes 8.6 %    % Monocytes 10.5 %    % Eosinophils 0.2 %    % Basophils 0.4 %    % Immature Granulocytes 0.4 %    Absolute Neutrophil 9.0 (H) 1.6 - 8.3 10e9/L    Absolute Lymphocytes 1.0 0.8 - 5.3 10e9/L    Absolute Monocytes 1.2 0.0 - 1.3 10e9/L    Absolute Eosinophils 0.0 0.0 - 0.7 10e9/L    Absolute Basophils 0.0 0.0 - 0.2 10e9/L    Abs Immature Granulocytes 0.1 0 - 0.4 10e9/L     12/4/2019 chest xray:   INDICATION: Non-small cell cancer of right lung (H); Cough     COMPARISON:  CT 11/5/2019     FINDINGS: PA and lateral view of the chest. Postoperative changes from  right-sided wedge resection with clips projecting over the right  hilum. Small right pleural effusion, similar to recent CT. Streaky  bibasilar opacities, atelectasis versus scarring. Upper lobes are  largely clear. No pneumothorax. Cardiac silhouette is stable. Upper  abdomen is unremarkable. No acute osseous lesions.                                                                 IMPRESSION:   1. Right-sided pleural effusion similar to chest CT 11/5/2019 with  right basilar scarring and/or atelectasis. Malignant effusion is  a  possibility.  2. Postoperative changes from right-sided wedge resection    Assessment and Plan:   Cough- Pt reports increase in cough over last week. Though no fevers or increased productivity of the cough - the cough is causing a harsh cough with occasional clear to yellow discharge and normal oxygenation. No obvious signs of pneumonia noted with xray. Concern for sinus discharge is not supported. Pt does have small effusion noted with CT and again with xray. Pt will trial guafenesin and offered short supply of Robitussin AC for control of cough for sleep. He will watch for fevers, SOB or progression of discharge.   Stage IV Lung Cancer-Pt continues with use of Tagrisso 80 mg po daily now for 17 cycles with few side effects. 11/2019 CT showed metastasis to liver and saw Dr Tobias for possible radiation to liver but pt developed severe left hip pain over last week and Dr Tobias found new metastasis to area and put priority to hip pain control- pt is now set for 10 treatments of radiation therapy starting next week. Though pt was set to add Avastin to the Tagrisso this week- it has been held and now will continue with Tagrisso through radiation.   We will set up pt to see Dr Ahumada again after treatment and consider further treatment changes as necessary.     This was a 30 min visit with > 50% in counseling and coordinating care including education and management of concerns.    Lazara Leon,CNP

## 2019-12-04 NOTE — LETTER
"    12/4/2019         RE: Gilson Curtis  [address] [facility] [address]        Dear Colleague,    Thank you for referring your patient, Gilson Curtis, to the [facility]. Please see a copy of my visit note below.    Oncology Follow Up Visit: December 4, 2019     Oncologist: Dr Gerry Ahumada  PCP: Hemant Jiménez    Diagnosis: Stage IV Lung Cancer- here for update and cough  Gilson Curtis is an 78 yo who complained of hemoptysis in 11/2011 and was found to have a 2 x 1.9 cm RUL mass. 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 Curtis comes to clinic with wife for review of cough as he continues use of Tagrisso with plan to add Avastin. Reviewed with pt that he saw Dr Tobias at [facility] for possible radiation to liver metastasis but was complaining of new left hip pain and area of metastasis was located by radiation oncologist on last imaging. Priority changed from treatment of liver to hip due to pain and now pt is set up to have 10 days of radiation here at [facility] to hip. Pt however continues to complain of harsh cough- not sure if related to chest or drainage from sinus but does not have sinus congestion.  Some SOB with cough. No fevers or chills. Minor clear discharge noted only occasionally.   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     CAD (coronary artery disease)      CKD (chronic kidney disease) stage 3, GFR 30-59 ml/min (H)      Fatigue     \"spells\"     Hypertension      Lung cancer (H) 2016 "     Melanoma (H)      Non-small cell lung cancer (H)      Prostate cancer (H) 2009     S/P radiation therapy     3,000 cGy to right ribs completed on 6/19/2018 - [facility] with Dr. Pérez     Skin cancer, basal cell      Stented coronary artery 2009    x2     TIA (transient ischemic attack) 2002     Current Outpatient Medications   Medication     aspirin 81 MG tablet     calcium carbonate (TUMS) 500 MG chewable tablet     GABAPENTIN PO     lisinopril (PRINIVIL,ZESTRIL) 2.5 MG tablet     Multiple Vitamins-Minerals (EYE VITAMINS PO)     Multiple Vitamins-Minerals (MATURE ADULT CENTURY PO)     osimertinib (TAGRISSO) 80 MG tablet     osimertinib (TAGRISSO) 80 MG tablet     prochlorperazine (COMPAZINE) 10 MG tablet     simvastatin (ZOCOR) 80 MG tablet     No current facility-administered medications for this visit.      Facility-Administered Medications Ordered in Other Visits   Medication     iopamidol (ISOVUE-370) solution 103 mL     Allergies   Allergen Reactions     No Known Drug Allergy        Physical Exam:/80   Pulse 94   Temp 98.1  F (36.7  C) (Oral)   Wt 70.5 kg (155 lb 6.4 oz)   SpO2 98%   BMI 22.94 kg/m       ECOG PS- 0  Constitutional: Alert, cooperative, with cough noted   ENT: Eyes bright, No mouth sores  Neck: Supple, No adenopathy.  Cardiac: Heart rate and rhythm is regular and strong without murmur  Respiratory: Breathing easy. Lung sounds clear to auscultation  GI: Abdomen is soft, non-tender, BS normal. No masses or organomegaly  MS: Muscle tone fair, extremities normal with no edema.   Skin: alejandro complexion to the face but no rash. Arms and legs which had the dry flakey skin.  Neuro: Sensory grossly WNL, gait normal.   Lymph: Normal ant/post cervical, axillary, supraclavicular nodes  Psych: Mentation appears normal and affect normal/bright with good conversation.     Laboratory Results:   Results for orders placed or performed in visit on 12/04/19   Comprehensive metabolic panel      Status: Abnormal   Result Value Ref Range    Sodium 139 133 - 144 mmol/L    Potassium 4.7 3.4 - 5.3 mmol/L    Chloride 107 94 - 109 mmol/L    Carbon Dioxide 28 20 - 32 mmol/L    Anion Gap 4 3 - 14 mmol/L    Glucose 115 (H) 70 - 99 mg/dL    Urea Nitrogen 34 (H) 7 - 30 mg/dL    Creatinine 1.69 (H) 0.66 - 1.25 mg/dL    GFR Estimate 37 (L) >60 mL/min/[1.73_m2]    GFR Estimate If Black 43 (L) >60 mL/min/[1.73_m2]    Calcium 9.4 8.5 - 10.1 mg/dL    Bilirubin Total 0.5 0.2 - 1.3 mg/dL    Albumin 3.1 (L) 3.4 - 5.0 g/dL    Protein Total 6.6 (L) 6.8 - 8.8 g/dL    Alkaline Phosphatase 100 40 - 150 U/L    ALT 38 0 - 70 U/L    AST 44 0 - 45 U/L   CBC with platelets differential     Status: Abnormal   Result Value Ref Range    WBC 11.3 (H) 4.0 - 11.0 10e9/L    RBC Count 4.03 (L) 4.4 - 5.9 10e12/L    Hemoglobin 12.1 (L) 13.3 - 17.7 g/dL    Hematocrit 39.4 (L) 40.0 - 53.0 %    MCV 98 78 - 100 fl    MCH 30.0 26.5 - 33.0 pg    MCHC 30.7 (L) 31.5 - 36.5 g/dL    RDW 14.0 10.0 - 15.0 %    Platelet Count 168 150 - 450 10e9/L    Diff Method Automated Method     % Neutrophils 79.9 %    % Lymphocytes 8.6 %    % Monocytes 10.5 %    % Eosinophils 0.2 %    % Basophils 0.4 %    % Immature Granulocytes 0.4 %    Absolute Neutrophil 9.0 (H) 1.6 - 8.3 10e9/L    Absolute Lymphocytes 1.0 0.8 - 5.3 10e9/L    Absolute Monocytes 1.2 0.0 - 1.3 10e9/L    Absolute Eosinophils 0.0 0.0 - 0.7 10e9/L    Absolute Basophils 0.0 0.0 - 0.2 10e9/L    Abs Immature Granulocytes 0.1 0 - 0.4 10e9/L     12/4/2019 chest xray:   INDICATION: Non-small cell cancer of right lung (H); Cough     COMPARISON:  CT 11/5/2019     FINDINGS: PA and lateral view of the chest. Postoperative changes from  right-sided wedge resection with clips projecting over the right  hilum. Small right pleural effusion, similar to recent CT. Streaky  bibasilar opacities, atelectasis versus scarring. Upper lobes are  largely clear. No pneumothorax. Cardiac silhouette is stable. Upper  abdomen is  unremarkable. No acute osseous lesions.                                                                 IMPRESSION:   1. Right-sided pleural effusion similar to chest CT 11/5/2019 with  right basilar scarring and/or atelectasis. Malignant effusion is a  possibility.  2. Postoperative changes from right-sided wedge resection    Assessment and Plan:   Cough- Pt reports increase in cough over last week. Though no fevers or increased productivity of the cough - the cough is causing a harsh cough with occasional clear to yellow discharge and normal oxygenation. No obvious signs of pneumonia noted with xray. Concern for sinus discharge is not supported. Pt does have small effusion noted with CT and again with xray. Pt will trial guafenesin and offered short supply of Robitussin AC for control of cough for sleep. He will watch for fevers, SOB or progression of discharge.   Stage IV Lung Cancer-Pt continues with use of Tagrisso 80 mg po daily now for 17 cycles with few side effects. 11/2019 CT showed metastasis to liver and saw Dr Tobias for possible radiation to liver but pt developed severe left hip pain over last week and Dr Tobias found new metastasis to area and put priority to hip pain control- pt is now set for 10 treatments of radiation therapy starting next week. Though pt was set to add Avastin to the Tagrisso this week- it has been held and now will continue with Tagrisso through radiation.   We will set up pt to see Dr Ahumada again after treatment and consider further treatment changes as necessary.     This was a 30 min visit with > 50% in counseling and coordinating care including education and management of concerns.    Lazara Leon CNP      Oncology Rooming Note    December 4, 2019 1:48 PM   Gilson Curtis is a 79 year old male who presents for:    Chief Complaint   Patient presents with     Oncology Clinic Visit     Return appt     Initial Vitals: /80   Pulse 94   Temp 98.1  F (36.7  C) (Oral)   Wt  "70.5 kg (155 lb 6.4 oz)   SpO2 98%   BMI 22.94 kg/m    Estimated body mass index is 22.94 kg/m  as calculated from the following:    Height as of 11/7/19: 1.753 m (5' 9.02\").    Weight as of this encounter: 70.5 kg (155 lb 6.4 oz). Body surface area is 1.85 meters squared.  Severe Pain (6) Comment: Data Unavailable   No LMP for male patient.  Allergies reviewed: Yes  Medications reviewed: Yes    Medications: Medication refills not needed today.  Pharmacy name entered into [employer]:    [facility] [address] [place] [address] - [address] [facility] [address] [facility] [place], [place] - [address] [facility]    Clinical concerns: cough x 10 days, increased SOB, weakness and unable to sleep, cancer mets to L hip and pelvic area.  Found out yesterday Lazara Leon NP was notified.      Roberto Hackett RN                Oral Chemotherapy Monitoring Program     Primary Oncologist: Dr. Gerry Ahumada  Primary Oncology Clinic: [facility]  Cancer Diagnosis: NSCLC     Drug: Osimertinib (Tagrisso) 80 mg daily continuously  Start Date: 8/15/18     Expected Duration of Therapy: Until disease progression or unacceptable toxicity     Drug Interaction Assessment: No significant drug interactions identified upon review.     Lab Monitoring Plan  CBC, CMP monthly, LVEF every 2-3 months    Subjective/Objective:  Gilson Curtis is a 79 year old male seen in clinic for a follow-up visit for oral chemotherapy.  Gilson has not missed any doses and has not started any new medications. Gilson also reports some fatigue that he isnt sure if its associated to the osimertinib therapy.      ORAL CHEMOTHERAPY 11/15/2018 11/26/2018 12/19/2018 2/7/2019 4/18/2019 8/1/2019 12/4/2019   Drug Name Tagrisso (Osimertinib) Tagrisso (Osimertinib) Tagrisso (Osimertinib) Tagrisso (Osimertinib) Tagrisso (Osimertinib) Tagrisso (Osimertinib) Tagrisso (Osimertinib) " "  Current Dosage 80mg 80mg 80mg 80mg 80mg 80mg 80mg   Current Schedule Daily Daily Daily Daily Daily Daily Daily   Cycle Details Continuous Continuous Continuous Continuous Continuous Continuous Continuous   Start Date of Last Cycle - - - - - - -   Planned next cycle start date - - - - - - -   Doses missed in last 2 weeks - - 0 0 0 0 -   Adherence Assessment Adherent Adherent Adherent Adherent Adherent Adherent Adherent   Adverse Effects Fatigue Fatigue Fatigue Fatigue No AE identified during assessment Fatigue;Other (see note for details) Fatigue   EGFR rash - - - - - - -   Pharmacist Intervention(EGFR) - - - - - - -   Intervention(s) - - - - - - -   Fatigue Grade 2 Grade 2 Grade 1 Grade 1 - Grade 2 Grade 1   Pharmacist Intervention(fatigue) No No No No - No No   Intervention(s) - - - - - - -   Other (see note for details) - - - - - Anorexia -   Pharmacist intervention? - - - - - (No Data) -   Home BPs Not applicable - - - - - -   Any new drug interactions? No No No No No No No   Is the dose as ordered appropriate for the patient? Yes - - Yes Yes Yes Yes   Is the patient currently in pain? Assessed in last 30 days. Assessed in last 30 days. No No Assessed in last 30 days. No Assessed in last 30 days.   Has the patient been assessed within the past 6 months for depression? Yes Yes Yes Yes Yes Yes Yes   Has the patient missed any days of school, work, or other routine activity? - - No No - No -       Vitals:  BP:   BP Readings from Last 1 Encounters:   12/04/19 131/80     Wt Readings from Last 1 Encounters:   12/04/19 70.5 kg (155 lb 6.4 oz)     Estimated body surface area is 1.85 meters squared as calculated from the following:    Height as of 11/7/19: 1.753 m (5' 9.02\").    Weight as of this encounter: 70.5 kg (155 lb 6.4 oz).    Labs:  _  Result Component Current Result Ref Range   Sodium 139 (11/5/2019) 133 - 144 mmol/L     _  Result Component Current Result Ref Range   Potassium 4.5 (11/5/2019) 3.4 - 5.3 mmol/L "     _  Result Component Current Result Ref Range   Calcium 9.3 (11/5/2019) 8.5 - 10.1 mg/dL     No results found for Mag within last 30 days.     No results found for Phos within last 30 days.     _  Result Component Current Result Ref Range   Albumin 3.3 (L) (11/5/2019) 3.4 - 5.0 g/dL     _  Result Component Current Result Ref Range   Urea Nitrogen 24 (11/5/2019) 7 - 30 mg/dL     _  Result Component Current Result Ref Range   Creatinine 1.58 (H) (11/5/2019) 0.66 - 1.25 mg/dL       _  Result Component Current Result Ref Range   AST 28 (11/5/2019) 0 - 45 U/L     _  Result Component Current Result Ref Range   ALT 28 (11/5/2019) 0 - 70 U/L     _  Result Component Current Result Ref Range   Bilirubin Total 0.6 (11/5/2019) 0.2 - 1.3 mg/dL       _  Result Component Current Result Ref Range   WBC 11.3 (H) (12/4/2019) 4.0 - 11.0 10e9/L     _  Result Component Current Result Ref Range   Hemoglobin 12.1 (L) (12/4/2019) 13.3 - 17.7 g/dL     _  Result Component Current Result Ref Range   Platelet Count 168 (12/4/2019) 150 - 450 10e9/L     _  Result Component Current Result Ref Range   Absolute Neutrophil 9.0 (H) (12/4/2019) 1.6 - 8.3 10e9/L       Assessment:  Patient is tolerating the osimertinib therapy with manageable side effects. Plan to continue current therapy.         Follow-Up:  2019/12/09 L and Avastin infusion 1100; will continue Osimertinib      Refill Due:  2019/12/27 release osimertinib to [facility]      Saida Brown, PharmD  PGY-2 Oncology Pharmacy Resident  December 4, 2019      Again, thank you for allowing me to participate in the care of your patient.        Sincerely,        Lazara Leon, NP, APRN CNP

## 2019-12-04 NOTE — PROGRESS NOTES
Dear Colleagues,  Today Gilson Curtis was seen in consultation.    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      HISTORY OF PRESENT ILLNESS:  Gilson Curtis is well-known to our clinic.  For detailed review of his most recent history please refer to my colleague Dr. Jonn Tobias's consult note from the [facility] yesterday.  In brief he is an 79-year-old gentleman with metastatic EGFR positive lung adenocarcinoma originally diagnosed in 2012 status post right upper lobe lobectomy and lymph node dissection revealing pathologic stage [id number].  He was started on Tarceva in June 2016.  This was switched to Tagrisso in August 2, 2018. He was initially seen in consultation by us in in May 2018 with a painful right rib mass.  He completed a palliative course of radiotherapy to the right rib mass to a total dose of 30Gy from June 6, 2018 to June 19, 2018.  Most recent scan showed an increasing right liver mass and he was thus seen at the [facility] for possibility of SBRT to treat the liver metastasis.    Unfortunately during his consult yesterday he also reported a 3 month history of left hip pain.  On review of his CAP CT scan a left iliac lesion was noted that had been present since 2/2019.  As he has progressive disease in multiple areas a decision was made to not treat the liver lesion.  He is being seen here today to initiate palliative course of radiotherapy to the left pelvis.  Currently complains of a 5 out of 10 pain when he walks in his left pelvis that decreases to 1 out of 10 when he sits.  No GI/ symptoms.  No new neurologic symptoms either.    REVIEW OF SYSTEMS: As per HPI, a 14-point review of system is otherwise negative.  PAST RADIATION THERAPY:  Per HPI.  30Gy from June 6, 2018 to June 19, 2018 to right rib.  PAST CTD/PACEMAKER: Denies    Past Medical History:   Diagnosis Date     AVNRT (AV  "lance re-entry tachycardia) (H)     s/p ablation     CAD (coronary artery disease)      CKD (chronic kidney disease) stage 3, GFR 30-59 ml/min (H)      Fatigue     \"spells\"     Hypertension      Lung cancer (H) 2016     Melanoma (H)      Non-small cell lung cancer (H)      Prostate cancer (H) 2009     S/P radiation therapy     3,000 cGy to right ribs completed on 6/19/2018 - [facility] with Dr. Pérez     Skin cancer, basal cell      Stented coronary artery 2009    x2     TIA (transient ischemic attack) 2002       Past Surgical History:   Procedure Laterality Date     CATARACT IOL, RT/LT Left 05/2015     CATARACT IOL, RT/LT Right 06/2015     CATHETER, ABLATION  06/2006    [facility]     CHOLECYSTECTOMY  10/2012     ENDOBRONCHIAL ULTRASOUND FLEXIBLE  2/27/2012    Procedure:ENDOBRONCHIAL ULTRASOUND FLEXIBLE; Flexible Bronchoscopy, Endo Bronchial Ultrasound, Transbroncial biopies (Cytology specimens taken by Cytology); Surgeon:BRITTA MEDEL; Location:[facility] OR     EXCISION OF BASAL CELL CARCINOMA  10/2005    Left upper back     HERNIA REPAIR Bilateral 12/2010    Inguinal     PROSTATECTOMY RETROPUBIC RADICAL  04/2009     REMOVAL OF MELANOMA BEHIND LEFT EAR Left 10/18/2019    [facility]     RESECT RIB Right 4/8/2016    Procedure: RESECT RIB;  Surgeon: Jamarcus Gannon MD;  Location: [facility] OR     STENT  08/2009    Angiogram/ 2 Taxus Stents - Mid LAD, Mid CMX     THORACOSCOPIC RESECTION LUNG  3/6/2012    Procedure:THORACOSCOPIC RESECTION LUNG; right video assisted thoracic surgery, wedge resection middle lobe x 2, right thoroctomy, right upper lobectomy, mediastinal lymphadenectomy, flexible bronchoscopy; Surgeon:BRITTA MEDEL; Location:[facility] OR       Family History   Problem Relation Age of Onset     Breast Cancer Sister 65     Breast Cancer Maternal Aunt      Bladder Cancer Son        Social History     Tobacco Use     Smoking status: Former Smoker     Packs/day: 1.00     " Years: 20.00     Pack years: 20.00     Types: Cigarettes     Last attempt to quit: 1978     Years since quittin.9     Smokeless tobacco: Never Used   Substance Use Topics     Alcohol use: No     Alcohol/week: 0.0 standard drinks       Allergies   Allergen Reactions     No Known Drug Allergy          PHYSICAL EXAMINATION:  /59 (BP Location: Left arm, Patient Position: Sitting, Cuff Size: Adult Regular)   Pulse 87   Wt 72.6 kg (160 lb)   SpO2 97%   BMI 23.62 kg/m    GENERAL well-appearing gentleman in no acute distress.  HEENT Normocephalic, atraumatic.  Sclerae anicteric.  CVR  No JVD  LUNGS respiration on room air  ABDOMEN Soft.    EXT  No CC.    NEURO No focal deficits.  Impaired by left hip pain.    SKIN  Warm and well perfused.    MSK  Tenderness to palpation in left hip.  PSYCH Alert and oriented x 3    ECOG PERFORMANCE STATUS: 1        IMAGING: I personally reviewed the relevant imaging for this case.    Most recently CT chest abdomen pelvis from 2019 shows destructive lesion in the left ilium.  It has progressed considerably since the 2019 scan.  Within the 2019 scan a right liver mass is identified as well as increased nonspecific pleural thickening in the posterior medial right hemidiaphragm.  He has multiple pulmonary nodules as well.    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  He was originally referred to [facility] for liver SBRT.  However during consultation he reported 3-month history of left hip pain.  Upon review of his imaging a destructive mass can be seen in his left ilium. As he has progressive disease in multiple areas and the liver lesion is asymptomatic, treatment of the liver was aborted.  He thus is being seen today at the [facility] for a palliative course of radiotherapy to the left ilium.      The risks,  benefits, treatment rationale and regimen of radiation therapy to the left pelvis were discussed. Risks include but are not limited to fatigue, skin erythema/changes, nausea, vomiting, erratic stools,  symptoms such as urgency or dysuria, bony fracture, nerve damage and secondary malignancy. Patient consented to  radiation therapy and had a CT simulation completed. Treatment will start next week and will encompass 10 days of treatment (30Gy in 10fx).    Additional problem list to be addressed in the following manner:    1. Systemic Treatment:  Currently under the care of Dr. Ahumada receiving Tagrisso.    There was ample time for questions and all were answered to the patient's satisfaction. Thank you for allowing me to participate in the care of this pleasant patient. If you have any questions, please do not hesitate to contact my office.     Sincerely,  Sean Pérez MD    [phone] Pager   [phone] [facility]   [phone] Solange Segura  [phone] [facility]

## 2019-12-04 NOTE — PROGRESS NOTES
Oral Chemotherapy Monitoring Program     Primary Oncologist: Dr. Gerry Ahumada  Primary Oncology Clinic: [facility]  Cancer Diagnosis: NSCLC     Drug: Osimertinib (Tagrisso) 80 mg daily continuously  Start Date: 8/15/18     Expected Duration of Therapy: Until disease progression or unacceptable toxicity     Drug Interaction Assessment: No significant drug interactions identified upon review.     Lab Monitoring Plan  CBC, CMP monthly, LVEF every 2-3 months    Subjective/Objective:  Gilson Curtis is a 79 year old male seen in clinic for a follow-up visit for oral chemotherapy.  Gilson has not missed any doses and has not started any new medications. Gilson also reports some fatigue that he isnt sure if its associated to the osimertinib therapy.      ORAL CHEMOTHERAPY 11/15/2018 11/26/2018 12/19/2018 2/7/2019 4/18/2019 8/1/2019 12/4/2019   Drug Name Tagrisso (Osimertinib) Tagrisso (Osimertinib) Tagrisso (Osimertinib) Tagrisso (Osimertinib) Tagrisso (Osimertinib) Tagrisso (Osimertinib) Tagrisso (Osimertinib)   Current Dosage 80mg 80mg 80mg 80mg 80mg 80mg 80mg   Current Schedule Daily Daily Daily Daily Daily Daily Daily   Cycle Details Continuous Continuous Continuous Continuous Continuous Continuous Continuous   Start Date of Last Cycle - - - - - - -   Planned next cycle start date - - - - - - -   Doses missed in last 2 weeks - - 0 0 0 0 -   Adherence Assessment Adherent Adherent Adherent Adherent Adherent Adherent Adherent   Adverse Effects Fatigue Fatigue Fatigue Fatigue No AE identified during assessment Fatigue;Other (see note for details) Fatigue   EGFR rash - - - - - - -   Pharmacist Intervention(EGFR) - - - - - - -   Intervention(s) - - - - - - -   Fatigue Grade 2 Grade 2 Grade 1 Grade 1 - Grade 2 Grade 1   Pharmacist Intervention(fatigue) No No No No - No No   Intervention(s) - - - - - - -   Other (see note for details) - - - - - Anorexia -   Pharmacist intervention? - - - - -  "(No Data) -   Home BPs Not applicable - - - - - -   Any new drug interactions? No No No No No No No   Is the dose as ordered appropriate for the patient? Yes - - Yes Yes Yes Yes   Is the patient currently in pain? Assessed in last 30 days. Assessed in last 30 days. No No Assessed in last 30 days. No Assessed in last 30 days.   Has the patient been assessed within the past 6 months for depression? Yes Yes Yes Yes Yes Yes Yes   Has the patient missed any days of school, work, or other routine activity? - - No No - No -       Vitals:  BP:   BP Readings from Last 1 Encounters:   12/04/19 131/80     Wt Readings from Last 1 Encounters:   12/04/19 70.5 kg (155 lb 6.4 oz)     Estimated body surface area is 1.85 meters squared as calculated from the following:    Height as of 11/7/19: 1.753 m (5' 9.02\").    Weight as of this encounter: 70.5 kg (155 lb 6.4 oz).    Labs:  _  Result Component Current Result Ref Range   Sodium 139 (11/5/2019) 133 - 144 mmol/L     _  Result Component Current Result Ref Range   Potassium 4.5 (11/5/2019) 3.4 - 5.3 mmol/L     _  Result Component Current Result Ref Range   Calcium 9.3 (11/5/2019) 8.5 - 10.1 mg/dL     No results found for Mag within last 30 days.     No results found for Phos within last 30 days.     _  Result Component Current Result Ref Range   Albumin 3.3 (L) (11/5/2019) 3.4 - 5.0 g/dL     _  Result Component Current Result Ref Range   Urea Nitrogen 24 (11/5/2019) 7 - 30 mg/dL     _  Result Component Current Result Ref Range   Creatinine 1.58 (H) (11/5/2019) 0.66 - 1.25 mg/dL       _  Result Component Current Result Ref Range   AST 28 (11/5/2019) 0 - 45 U/L     _  Result Component Current Result Ref Range   ALT 28 (11/5/2019) 0 - 70 U/L     _  Result Component Current Result Ref Range   Bilirubin Total 0.6 (11/5/2019) 0.2 - 1.3 mg/dL       _  Result Component Current Result Ref Range   WBC 11.3 (H) (12/4/2019) 4.0 - 11.0 10e9/L     _  Result Component Current Result Ref Range "   Hemoglobin 12.1 (L) (12/4/2019) 13.3 - 17.7 g/dL     _  Result Component Current Result Ref Range   Platelet Count 168 (12/4/2019) 150 - 450 10e9/L     _  Result Component Current Result Ref Range   Absolute Neutrophil 9.0 (H) (12/4/2019) 1.6 - 8.3 10e9/L       Assessment:  Patient is tolerating the osimertinib therapy with manageable side effects. Plan to continue current therapy.         Follow-Up:  2019/12/09 L and Avastin infusion 1100; will continue Osimertinib      Refill Due:  2019/12/27 release osimertinib to [facility]      Saida Brown, PharmD  PGY-2 Oncology Pharmacy Resident  December 4, 2019

## 2019-12-04 NOTE — NURSING NOTE
FOLLOW-UP VISIT    Patient Name: Gilson Curtis      : 1940     Age: 79 year old        ______________________________________________________________________________     Chief Complaint   Patient presents with     Cancer     Return to sign consent with Dr. Pérez     /59 (BP Location: Left arm, Patient Position: Sitting, Cuff Size: Adult Regular)   Pulse 87   Wt 72.6 kg (160 lb)   SpO2 97%   BMI 23.62 kg/m       Date Radiation Completed: Return visit to sign consent with Dr. Pérez    Pain  Current history of pain associated with this visit:   Intensity: 5/10  Location: Hips      Labs  Other Labs: Yes: 2019    Imaging  MR: Abdomen 2019        Other Appointments:     MD Name: Lazara Leon Appointment Date: 2019   MD Name: Appointment Date:   MD Name: Appointment Date:               Nurse face-to-face time: Level 2:  5 min face to face time

## 2019-12-06 NOTE — TELEPHONE ENCOUNTER
Patient's wife left  for [facility] reporting that pt fell in the bathroom last night.  Writer called the home phone number and was able to speak directly with the patient.  He states that he had his weight on the left side and turned slightly and fell.  He was able to get himself up off the floor, did not hit his head.  He reports today that he has no increased pain, no numbness or tingling in the leg.  He is able to move the leg as well as he could prior to the fall.  No increased swelling, redness, pain.  He is interested in having a walker on hand to help him get around a little easier and safer.      Encounter forwarded to Lazara Leon NP for walker order.     Chelo Goetz, RN-BSN, PHN, OCN  [facility]

## 2019-12-09 NOTE — PROGRESS NOTES
[facility] PHYSICIANS  SPECIALIZING IN BREAKTHROUGHS  Radiation Oncology    On Treatment Visit Note      Gilson Curtis      Date: 2019   MRN: [id number]   : 1940         Reason for Visit:  On Radiation Treatment Visit     Treatment Summary to Date  Treatment Site: Left Hip Current Dose: 300/3000 cGy Fractions: 1/10           Subjective:     Fell last week and now rates pain as being worse than before 3/10 while sitting.  Walks with a walker.  No new neurologic deficits.    Nursing ROS:   Nutrition Alteration  Diet Type: Patient's Preference      Pain Assessment  0-10 Pain Scale: 3  Pain Note: Patient reports pain in left hip       Objective:   /57 (BP Location: Left arm, Patient Position: Sitting, Cuff Size: Adult Regular)   Pulse 95   Temp 97.6  F (36.4  C) (Oral)   SpO2 96%   Gen: Appears well, in no acute distress  Skin: No erythema    Labs:  CBC RESULTS:   Recent Labs   Lab Test 19  1400   WBC 11.3*   RBC 4.03*   HGB 12.1*   HCT 39.4*   MCV 98   MCH 30.0   MCHC 30.7*   RDW 14.0        ELECTROLYTES:  Recent Labs   Lab Test 19  1400      POTASSIUM 4.7   CHLORIDE 107   RICK 9.4   CO2 28   BUN 34*   CR 1.69*   *       Assessment:    Tolerating radiation therapy well.  All questions and concerns addressed.    Toxicities:  Pain: Grade 1: Mild pain    Plan:   1. Continue current therapy.    2. Oxycodone 5mg Q4  3. Hip xray today to assess for new fracture or displacement.      Mosaiq chart and setup information reviewed  Ports checked    Medication Review  Med list reviewed with patient?: Yes  Med list printed and given: Offered and declined             Sean Pérez MD    Please do not send letter to referring physician.

## 2019-12-09 NOTE — PATIENT INSTRUCTIONS
Please contact [facility] RN with questions or concerns following today's appointment: [phone].    Thank you!

## 2019-12-09 NOTE — PROGRESS NOTES
Received return response from Dr. Pérez:    Renate Pérez MD Schneider, Tonya L., RN             That's perfect.  He should take them.  Please let me know if he needs another prescription.

## 2019-12-09 NOTE — PROGRESS NOTES
Received telephone call from patient's wife reporting she is calling to update Dr. Pérez regarding pain medication.  Kaye reports that during recent visit today, they informed Dr. Pérez patient had Oxycodone at home and was recommended to take it for pain.  Kaye reports the medication they have at home is Oxycodone-Acetaminophen (5-325), take one to two tablets by mouth every four to six hours for pain as needed, patient's wife reports that patient received this prescription from a recent ear surgery.  Kaye reports that patient was informed by Dr. Pérez that he could take this pain medication for his current pain.  This RN reviewed use of Senna po while taking narcotic pain medication and recommendation for patient to take Senna po one tablet at same time that he takes a pain medication.  Kaye verbalized understanding of all information and had no questions at this time.    In-basket message sent to Dr. Pérez with above information for review.    Imelda Huizar RN BSN OCN

## 2019-12-09 NOTE — PROGRESS NOTES
"Patient and wife notified of x-ray results from today as requested by Dr. Pérez, per Dr. Pérez \"nothing new and will continue current plan\".  Patient's wife verbalized understanding of all information and had no questions at this time.    Imelda Huizar RN BSN OCN    "

## 2019-12-11 NOTE — PATIENT INSTRUCTIONS
Please contact [facility] RN with questions or concerns following today's appointment: [phone].    Thank you!

## 2019-12-11 NOTE — PROGRESS NOTES
"[facility] PHYSICIANS  SPECIALIZING IN BREAKTHROUGHS  Radiation Oncology    On Treatment Visit Note      Gilson Curtis      Date: 2019   MRN: [id number]   : 1940  Diagnosis: metastatic lung cancer      Reason for Visit:  On Radiation Treatment Visit     Treatment Summary to Date  Treatment Site: left hip Current Dose: 900/3000 cGy Fractions: 3/10      Chemotherapy  Chemo concurrent with radx?: Yes  Oncologist: Dr. Ahumada  Drug Name/Frequency 1: Tagrisso    Subjective:     Still having considerable pain in treatment area and would prefer to not take narcotic or otc pain medication.  No new neurologic, GI or  side effects to report.    Nursing ROS:   Nutrition Alteration  Diet Type: Patient's Preference  Skin  Skin Reaction: 0 - No changes        Cardiovascular  Respiratory effort: 2 - Mild dyspnea on exertion  Cardio/Resp Note: patient reports frequent coughing \"fits\"  Gastrointestinal  Nausea: 0 - None  Diarrhea: 0 - None     Psychosocial  Mood - Anxiety: 0 - Normal  Mood - Depression: 0 - Normal  Pyschosocial Note: fatigue at baseline  Pain Assessment  0-10 Pain Scale: 3  Pain Descriptors: Aching, Dull  Pain Note: patient reports took Percocet yesterday afternoon with minimal relief, pain worsens in left hip with coughing \"7/10\"      Objective:   /65 (BP Location: Left arm, Patient Position: Chair, Cuff Size: Adult Regular)   Pulse 94   Temp 99.8  F (37.7  C) (Oral)   Resp 18   SpO2 96%   Gen: Appears well, in no acute distress  Skin: No erythema, bruising noted in left hip c/c prior fall    Labs:  CBC RESULTS:   Recent Labs   Lab Test 19  1400   WBC 11.3*   RBC 4.03*   HGB 12.1*   HCT 39.4*   MCV 98   MCH 30.0   MCHC 30.7*   RDW 14.0        ELECTROLYTES:  Recent Labs   Lab Test 19  1400      POTASSIUM 4.7   CHLORIDE 107   RICK 9.4   CO2 28   BUN 34*   CR 1.69*   *       Assessment:    Tolerating radiation therapy well.  All questions and " concerns addressed.    Toxicities:  Diarrhea: Grade 0: No toxicity  Urinary frequency: Grade 0: No toxicity  Urinary tract pain: Grade 0: No toxicity  Urinary urgency: Grade 0: No toxicity    Plan:   1. Continue current therapy.    2. Recommend pt continue taking narcotic or otc pain medication until radiation therapy starts to be effective.  I anticipate patient will start to have some relief in about 1 to 2 weeks.      Mosaiq chart and setup information reviewed  MVCT/IGRT images checked    Medication Review  Med list reviewed with patient?: Yes  Med list printed and given: Offered and declined    Educational Topic Discussed  Additional Instructions: low grade fever noted during OTV, patient reports fever of 102 oral temperature last night at home, did not call into triage nurse, Sapphire Montiel RN notified by this RN today, will continue to monitor  Education Instructions: radiation therapy side effects: fatigue, skin changes and skin cares, nausea/vomiting, diarrhea        Sean Pérez MD    Please do not send letter to referring physician.

## 2019-12-12 NOTE — TELEPHONE ENCOUNTER
Application for Disability Parking completed and left at  for patient to  tomorrow.  Patient informed.  Reviewed Triage/after hours number with patient.  Copy of Disability parking sent to scanning.

## 2019-12-18 NOTE — PROGRESS NOTES
"[facility] PHYSICIANS  SPECIALIZING IN BREAKTHROUGHS  Radiation Oncology    On Treatment Visit Note      Gilson Curtis      Date: 2019   MRN: [id number]   : 1940  Diagnosis: metastatic lung cancer      Reason for Visit:  On Radiation Treatment Visit     Treatment Summary to Date  Treatment Site: left hip Current Dose: 2400/3000 cGy Fractions: 8/10      Chemotherapy  Chemo concurrent with radx?: Yes  Oncologist: Dr. Ahumada  Drug Name/Frequency 1: Tagrisso    Subjective:     Pain has improved to 2 out of 10.  No other complaints.  Denies any new GI or  symptoms.    Nursing ROS:   Nutrition Alteration  Diet Type: Patient's Preference  Skin  Skin Reaction: 0 - No changes        Cardiovascular  Respiratory effort: 2 - Mild dyspnea on exertion  Cardio/Resp Note: patient reports frequent coughing \"fits\"  Gastrointestinal  Nausea: 0 - None  Diarrhea: 0 - None     Psychosocial  Mood - Anxiety: 0 - Normal  Mood - Depression: 0 - Normal  Pyschosocial Note: fatigue at baseline  Pain Assessment  0-10 Pain Scale: 3  Pain Descriptors: Aching, Dull  Pain Treatment: denies need for pain medication      Objective:   BP 96/62 (BP Location: Left arm, Patient Position: Chair, Cuff Size: Adult Regular)   Pulse 89   Temp 98.9  F (37.2  C) (Oral)   Resp 18   SpO2 97%   Gen: Appears well, in no acute distress      Labs:  CBC RESULTS:   Recent Labs   Lab Test 19  1400   WBC 11.3*   RBC 4.03*   HGB 12.1*   HCT 39.4*   MCV 98   MCH 30.0   MCHC 30.7*   RDW 14.0        ELECTROLYTES:  Recent Labs   Lab Test 19  1400      POTASSIUM 4.7   CHLORIDE 107   RICK 9.4   CO2 28   BUN 34*   CR 1.69*   *       Assessment:    Tolerating radiation therapy well.  All questions and concerns addressed.    Plan:   1. Continue current therapy.    2. After completing XRT follow up with Rad Onc prn  3. Follow up with Med Onc as previously directed    Mosaiq chart and setup information reviewed  Ports " checked    Medication Review  Med list reviewed with patient?: Yes  Med list printed and given: Offered and declined    Educational Topic Discussed  Education Instructions: follow-up with Lazara Leon NP scheduled 1/3/2020        Sean Pérez MD    Please do not send letter to referring physician.

## 2019-12-18 NOTE — PATIENT INSTRUCTIONS
Please contact [facility] RN with questions or concerns following today's appointment: [phone].    Thank you!

## 2020-01-01 ENCOUNTER — ONCOLOGY VISIT (OUTPATIENT)
Dept: ONCOLOGY | Facility: CLINIC | Age: 80
End: 2020-01-01
Payer: MEDICARE

## 2020-01-01 ENCOUNTER — PATIENT OUTREACH (OUTPATIENT)
Dept: ONCOLOGY | Facility: CLINIC | Age: 80
End: 2020-01-01

## 2020-01-01 ENCOUNTER — ANCILLARY PROCEDURE (OUTPATIENT)
Dept: CT IMAGING | Facility: CLINIC | Age: 80
End: 2020-01-01
Attending: NURSE PRACTITIONER
Payer: MEDICARE

## 2020-01-01 ENCOUNTER — INFUSION THERAPY VISIT (OUTPATIENT)
Dept: INFUSION THERAPY | Facility: CLINIC | Age: 80
End: 2020-01-01
Attending: INTERNAL MEDICINE
Payer: MEDICARE

## 2020-01-01 ENCOUNTER — TELEPHONE (OUTPATIENT)
Dept: PHARMACY | Facility: CLINIC | Age: 80
End: 2020-01-01

## 2020-01-01 ENCOUNTER — INFUSION THERAPY VISIT (OUTPATIENT)
Dept: INFUSION THERAPY | Facility: CLINIC | Age: 80
End: 2020-01-01
Payer: MEDICARE

## 2020-01-01 ENCOUNTER — VIRTUAL VISIT (OUTPATIENT)
Dept: ONCOLOGY | Facility: CLINIC | Age: 80
End: 2020-01-01
Attending: NURSE PRACTITIONER
Payer: MEDICARE

## 2020-01-01 ENCOUNTER — ONCOLOGY VISIT (OUTPATIENT)
Dept: RADIATION ONCOLOGY | Facility: CLINIC | Age: 80
End: 2020-01-01

## 2020-01-01 ENCOUNTER — CARE COORDINATION (OUTPATIENT)
Dept: ONCOLOGY | Facility: CLINIC | Age: 80
End: 2020-01-01

## 2020-01-01 ENCOUNTER — TELEPHONE (OUTPATIENT)
Dept: ONCOLOGY | Facility: CLINIC | Age: 80
End: 2020-01-01

## 2020-01-01 ENCOUNTER — ANCILLARY PROCEDURE (OUTPATIENT)
Dept: CT IMAGING | Facility: CLINIC | Age: 80
End: 2020-01-01
Attending: INTERNAL MEDICINE
Payer: MEDICARE

## 2020-01-01 ENCOUNTER — OFFICE VISIT (OUTPATIENT)
Dept: ORTHOPEDICS | Facility: CLINIC | Age: 80
End: 2020-01-01
Payer: COMMERCIAL

## 2020-01-01 ENCOUNTER — HOSPITAL ENCOUNTER (EMERGENCY)
Facility: CLINIC | Age: 80
Discharge: HOME | End: 2020-02-28
Attending: EMERGENCY MEDICINE | Admitting: EMERGENCY MEDICINE
Payer: MEDICARE

## 2020-01-01 ENCOUNTER — HEALTH MAINTENANCE LETTER (OUTPATIENT)
Age: 80
End: 2020-01-01

## 2020-01-01 VITALS
BODY MASS INDEX: 21.99 KG/M2 | SYSTOLIC BLOOD PRESSURE: 122 MMHG | RESPIRATION RATE: 22 BRPM | TEMPERATURE: 98 F | DIASTOLIC BLOOD PRESSURE: 68 MMHG | HEART RATE: 100 BPM | OXYGEN SATURATION: 100 % | WEIGHT: 149 LBS

## 2020-01-01 VITALS
WEIGHT: 144.7 LBS | OXYGEN SATURATION: 97 % | SYSTOLIC BLOOD PRESSURE: 120 MMHG | TEMPERATURE: 97.8 F | RESPIRATION RATE: 20 BRPM | DIASTOLIC BLOOD PRESSURE: 71 MMHG | HEART RATE: 91 BPM | BODY MASS INDEX: 21.43 KG/M2 | HEIGHT: 69 IN

## 2020-01-01 VITALS
TEMPERATURE: 97.8 F | BODY MASS INDEX: 21.73 KG/M2 | HEIGHT: 69 IN | OXYGEN SATURATION: 97 % | SYSTOLIC BLOOD PRESSURE: 119 MMHG | HEART RATE: 89 BPM | RESPIRATION RATE: 16 BRPM | DIASTOLIC BLOOD PRESSURE: 71 MMHG | WEIGHT: 146.7 LBS

## 2020-01-01 VITALS
HEART RATE: 92 BPM | RESPIRATION RATE: 8 BRPM | TEMPERATURE: 97.7 F | WEIGHT: 144.3 LBS | BODY MASS INDEX: 21.3 KG/M2 | DIASTOLIC BLOOD PRESSURE: 72 MMHG | SYSTOLIC BLOOD PRESSURE: 113 MMHG | OXYGEN SATURATION: 99 %

## 2020-01-01 VITALS
TEMPERATURE: 98.5 F | RESPIRATION RATE: 16 BRPM | SYSTOLIC BLOOD PRESSURE: 126 MMHG | DIASTOLIC BLOOD PRESSURE: 79 MMHG | HEIGHT: 69 IN | WEIGHT: 140.7 LBS | HEART RATE: 92 BPM | OXYGEN SATURATION: 95 % | BODY MASS INDEX: 20.84 KG/M2

## 2020-01-01 VITALS
BODY MASS INDEX: 20.94 KG/M2 | RESPIRATION RATE: 18 BRPM | TEMPERATURE: 97.9 F | WEIGHT: 141.8 LBS | HEART RATE: 92 BPM | SYSTOLIC BLOOD PRESSURE: 134 MMHG | DIASTOLIC BLOOD PRESSURE: 68 MMHG | OXYGEN SATURATION: 96 %

## 2020-01-01 VITALS
TEMPERATURE: 98.7 F | HEART RATE: 76 BPM | RESPIRATION RATE: 16 BRPM | OXYGEN SATURATION: 99 % | DIASTOLIC BLOOD PRESSURE: 71 MMHG | BODY MASS INDEX: 20.93 KG/M2 | WEIGHT: 141.7 LBS | SYSTOLIC BLOOD PRESSURE: 129 MMHG

## 2020-01-01 VITALS
BODY MASS INDEX: 20.96 KG/M2 | WEIGHT: 141.5 LBS | DIASTOLIC BLOOD PRESSURE: 65 MMHG | TEMPERATURE: 97.9 F | SYSTOLIC BLOOD PRESSURE: 115 MMHG | HEART RATE: 86 BPM | RESPIRATION RATE: 18 BRPM | HEIGHT: 69 IN | OXYGEN SATURATION: 99 %

## 2020-01-01 VITALS
SYSTOLIC BLOOD PRESSURE: 132 MMHG | WEIGHT: 144.7 LBS | DIASTOLIC BLOOD PRESSURE: 69 MMHG | HEART RATE: 96 BPM | OXYGEN SATURATION: 99 % | BODY MASS INDEX: 21.36 KG/M2 | TEMPERATURE: 98 F | RESPIRATION RATE: 18 BRPM

## 2020-01-01 VITALS
OXYGEN SATURATION: 99 % | RESPIRATION RATE: 16 BRPM | HEART RATE: 80 BPM | SYSTOLIC BLOOD PRESSURE: 119 MMHG | TEMPERATURE: 98.2 F | BODY MASS INDEX: 21.21 KG/M2 | DIASTOLIC BLOOD PRESSURE: 72 MMHG | WEIGHT: 143.6 LBS

## 2020-01-01 VITALS
OXYGEN SATURATION: 100 % | RESPIRATION RATE: 18 BRPM | TEMPERATURE: 96.7 F | SYSTOLIC BLOOD PRESSURE: 118 MMHG | WEIGHT: 142.4 LBS | DIASTOLIC BLOOD PRESSURE: 60 MMHG | HEART RATE: 96 BPM | BODY MASS INDEX: 21.03 KG/M2

## 2020-01-01 VITALS — BODY MASS INDEX: 21.62 KG/M2 | HEIGHT: 69 IN | WEIGHT: 146 LBS

## 2020-01-01 VITALS — BODY MASS INDEX: 21.03 KG/M2 | WEIGHT: 142 LBS | HEIGHT: 69 IN

## 2020-01-01 DIAGNOSIS — T45.1X5A ANTINEOPLASTIC CHEMOTHERAPY INDUCED PANCYTOPENIA (H): ICD-10-CM

## 2020-01-01 DIAGNOSIS — K12.31 MUCOSITIS DUE TO CHEMOTHERAPY: ICD-10-CM

## 2020-01-01 DIAGNOSIS — D70.1 CHEMOTHERAPY-INDUCED NEUTROPENIA (H): ICD-10-CM

## 2020-01-01 DIAGNOSIS — R05.9 COUGH: ICD-10-CM

## 2020-01-01 DIAGNOSIS — Z79.899 ENCOUNTER FOR MONITORING CARDIOTOXIC DRUG THERAPY: Primary | ICD-10-CM

## 2020-01-01 DIAGNOSIS — R73.9 ELEVATED BLOOD SUGAR: ICD-10-CM

## 2020-01-01 DIAGNOSIS — C34.91 NON-SMALL CELL CANCER OF RIGHT LUNG (H): ICD-10-CM

## 2020-01-01 DIAGNOSIS — M89.9 LYTIC LESION OF BONE ON X-RAY: ICD-10-CM

## 2020-01-01 DIAGNOSIS — C34.91 NON-SMALL CELL CANCER OF RIGHT LUNG (H): Primary | ICD-10-CM

## 2020-01-01 DIAGNOSIS — D69.6 THROMBOCYTOPENIA (H): ICD-10-CM

## 2020-01-01 DIAGNOSIS — C79.51 BONE METASTASIS: ICD-10-CM

## 2020-01-01 DIAGNOSIS — N18.30 CKD (CHRONIC KIDNEY DISEASE) STAGE 3, GFR 30-59 ML/MIN (H): ICD-10-CM

## 2020-01-01 DIAGNOSIS — C79.51 BONE METASTASIS: Primary | ICD-10-CM

## 2020-01-01 DIAGNOSIS — T45.1X5A CHEMOTHERAPY-INDUCED NEUTROPENIA (H): ICD-10-CM

## 2020-01-01 DIAGNOSIS — C78.7 LIVER METASTASIS: ICD-10-CM

## 2020-01-01 DIAGNOSIS — R63.4 WEIGHT LOSS: ICD-10-CM

## 2020-01-01 DIAGNOSIS — R63.4 WEIGHT LOSS: Primary | ICD-10-CM

## 2020-01-01 DIAGNOSIS — Z51.81 ENCOUNTER FOR MONITORING CARDIOTOXIC DRUG THERAPY: Primary | ICD-10-CM

## 2020-01-01 DIAGNOSIS — D64.9 ANEMIA, UNSPECIFIED TYPE: ICD-10-CM

## 2020-01-01 DIAGNOSIS — G89.3 CANCER ASSOCIATED PAIN: Primary | ICD-10-CM

## 2020-01-01 DIAGNOSIS — M62.81 GENERALIZED MUSCLE WEAKNESS: ICD-10-CM

## 2020-01-01 DIAGNOSIS — D61.810 ANTINEOPLASTIC CHEMOTHERAPY INDUCED PANCYTOPENIA (H): ICD-10-CM

## 2020-01-01 DIAGNOSIS — K64.8 INTERNAL HEMORRHOID, BLEEDING: ICD-10-CM

## 2020-01-01 DIAGNOSIS — Z51.81 ENCOUNTER FOR MONITORING CARDIOTOXIC DRUG THERAPY: ICD-10-CM

## 2020-01-01 DIAGNOSIS — M84.550A PATHOLOGICAL FRACTURE OF PELVIS DUE TO NEOPLASTIC DISEASE, INITIAL ENCOUNTER: Primary | ICD-10-CM

## 2020-01-01 DIAGNOSIS — R63.0 LOSS OF APPETITE: ICD-10-CM

## 2020-01-01 DIAGNOSIS — K12.31 MUCOSITIS DUE TO CHEMOTHERAPY: Primary | ICD-10-CM

## 2020-01-01 DIAGNOSIS — Z79.899 ENCOUNTER FOR MONITORING CARDIOTOXIC DRUG THERAPY: ICD-10-CM

## 2020-01-01 DIAGNOSIS — R07.81 RIB PAIN ON RIGHT SIDE: ICD-10-CM

## 2020-01-01 DIAGNOSIS — C34.90 NON-SMALL CELL LUNG CANCER (H): ICD-10-CM

## 2020-01-01 LAB
ABO + RH BLD: NORMAL
ABO + RH BLD: NORMAL
ALBUMIN SERPL-MCNC: 2.7 G/DL (ref 3.4–5)
ALBUMIN SERPL-MCNC: 2.8 G/DL (ref 3.4–5)
ALBUMIN SERPL-MCNC: 2.9 G/DL (ref 3.4–5)
ALBUMIN SERPL-MCNC: 2.9 G/DL (ref 3.4–5)
ALBUMIN SERPL-MCNC: 3.1 G/DL (ref 3.4–5)
ALBUMIN SERPL-MCNC: 3.2 G/DL (ref 3.4–5)
ALP SERPL-CCNC: 105 U/L (ref 40–150)
ALP SERPL-CCNC: 108 U/L (ref 40–150)
ALP SERPL-CCNC: 112 U/L (ref 40–150)
ALP SERPL-CCNC: 117 U/L (ref 40–150)
ALP SERPL-CCNC: 117 U/L (ref 40–150)
ALP SERPL-CCNC: 123 U/L (ref 40–150)
ALP SERPL-CCNC: 124 U/L (ref 40–150)
ALP SERPL-CCNC: 125 U/L (ref 40–150)
ALT SERPL W P-5'-P-CCNC: 18 U/L (ref 0–70)
ALT SERPL W P-5'-P-CCNC: 23 U/L (ref 0–70)
ALT SERPL W P-5'-P-CCNC: 24 U/L (ref 0–70)
ALT SERPL W P-5'-P-CCNC: 24 U/L (ref 0–70)
ALT SERPL W P-5'-P-CCNC: 27 U/L (ref 0–70)
ALT SERPL W P-5'-P-CCNC: 30 U/L (ref 0–70)
ALT SERPL W P-5'-P-CCNC: 40 U/L (ref 0–70)
ALT SERPL W P-5'-P-CCNC: 40 U/L (ref 0–70)
ANION GAP SERPL CALCULATED.3IONS-SCNC: 10 MMOL/L (ref 3–14)
ANION GAP SERPL CALCULATED.3IONS-SCNC: 4 MMOL/L (ref 3–14)
ANION GAP SERPL CALCULATED.3IONS-SCNC: 4 MMOL/L (ref 3–14)
ANION GAP SERPL CALCULATED.3IONS-SCNC: 6 MMOL/L (ref 3–14)
ANION GAP SERPL CALCULATED.3IONS-SCNC: 7 MMOL/L (ref 3–14)
ANION GAP SERPL CALCULATED.3IONS-SCNC: 7 MMOL/L (ref 3–14)
ANION GAP SERPL CALCULATED.3IONS-SCNC: 8 MMOL/L (ref 3–14)
ANION GAP SERPL CALCULATED.3IONS-SCNC: 9 MMOL/L (ref 3–14)
APTT PPP: 32 SEC (ref 22–37)
AST SERPL W P-5'-P-CCNC: 18 U/L (ref 0–45)
AST SERPL W P-5'-P-CCNC: 26 U/L (ref 0–45)
AST SERPL W P-5'-P-CCNC: 28 U/L (ref 0–45)
AST SERPL W P-5'-P-CCNC: 30 U/L (ref 0–45)
AST SERPL W P-5'-P-CCNC: 31 U/L (ref 0–45)
AST SERPL W P-5'-P-CCNC: 32 U/L (ref 0–45)
AST SERPL W P-5'-P-CCNC: 33 U/L (ref 0–45)
AST SERPL W P-5'-P-CCNC: 34 U/L (ref 0–45)
BASOPHILS # BLD AUTO: 0 10E9/L (ref 0–0.2)
BASOPHILS NFR BLD AUTO: 0 %
BASOPHILS NFR BLD AUTO: 0 %
BASOPHILS NFR BLD AUTO: 0.2 %
BASOPHILS NFR BLD AUTO: 0.4 %
BASOPHILS NFR BLD AUTO: 0.4 %
BASOPHILS NFR BLD AUTO: 1 %
BILIRUB SERPL-MCNC: 0.3 MG/DL (ref 0.2–1.3)
BILIRUB SERPL-MCNC: 0.5 MG/DL (ref 0.2–1.3)
BILIRUB SERPL-MCNC: 0.6 MG/DL (ref 0.2–1.3)
BLD GP AB SCN SERPL QL: NORMAL
BLOOD BANK CMNT PATIENT-IMP: NORMAL
BUN SERPL-MCNC: 19 MG/DL (ref 7–30)
BUN SERPL-MCNC: 23 MG/DL (ref 7–30)
BUN SERPL-MCNC: 25 MG/DL (ref 7–30)
BUN SERPL-MCNC: 28 MG/DL (ref 7–30)
BUN SERPL-MCNC: 30 MG/DL (ref 7–30)
BUN SERPL-MCNC: 31 MG/DL (ref 7–30)
BUN SERPL-MCNC: 34 MG/DL (ref 7–30)
BUN SERPL-MCNC: 35 MG/DL (ref 7–30)
CALCIUM SERPL-MCNC: 8.6 MG/DL (ref 8.5–10.1)
CALCIUM SERPL-MCNC: 8.6 MG/DL (ref 8.5–10.1)
CALCIUM SERPL-MCNC: 8.8 MG/DL (ref 8.5–10.1)
CALCIUM SERPL-MCNC: 8.9 MG/DL (ref 8.5–10.1)
CALCIUM SERPL-MCNC: 9.3 MG/DL (ref 8.5–10.1)
CALCIUM SERPL-MCNC: 9.4 MG/DL (ref 8.5–10.1)
CHLORIDE SERPL-SCNC: 105 MMOL/L (ref 94–109)
CHLORIDE SERPL-SCNC: 106 MMOL/L (ref 94–109)
CHLORIDE SERPL-SCNC: 107 MMOL/L (ref 94–109)
CHLORIDE SERPL-SCNC: 108 MMOL/L (ref 94–109)
CO2 SERPL-SCNC: 23 MMOL/L (ref 20–32)
CO2 SERPL-SCNC: 23 MMOL/L (ref 20–32)
CO2 SERPL-SCNC: 25 MMOL/L (ref 20–32)
CO2 SERPL-SCNC: 26 MMOL/L (ref 20–32)
CO2 SERPL-SCNC: 27 MMOL/L (ref 20–32)
CO2 SERPL-SCNC: 29 MMOL/L (ref 20–32)
CREAT SERPL-MCNC: 1.12 MG/DL (ref 0.66–1.25)
CREAT SERPL-MCNC: 1.16 MG/DL (ref 0.66–1.25)
CREAT SERPL-MCNC: 1.17 MG/DL (ref 0.66–1.25)
CREAT SERPL-MCNC: 1.25 MG/DL (ref 0.66–1.25)
CREAT SERPL-MCNC: 1.33 MG/DL (ref 0.66–1.25)
CREAT SERPL-MCNC: 1.42 MG/DL (ref 0.66–1.25)
CREAT SERPL-MCNC: 1.47 MG/DL (ref 0.66–1.25)
CREAT SERPL-MCNC: 1.66 MG/DL (ref 0.66–1.25)
DIFFERENTIAL METHOD BLD: ABNORMAL
EOSINOPHIL # BLD AUTO: 0 10E9/L (ref 0–0.7)
EOSINOPHIL # BLD AUTO: 0.1 10E9/L (ref 0–0.7)
EOSINOPHIL NFR BLD AUTO: 0 %
EOSINOPHIL NFR BLD AUTO: 0 %
EOSINOPHIL NFR BLD AUTO: 0.4 %
EOSINOPHIL NFR BLD AUTO: 0.6 %
EOSINOPHIL NFR BLD AUTO: 1 %
EOSINOPHIL NFR BLD AUTO: 2 %
ERYTHROCYTE [DISTWIDTH] IN BLOOD BY AUTOMATED COUNT: 14.8 % (ref 10–15)
ERYTHROCYTE [DISTWIDTH] IN BLOOD BY AUTOMATED COUNT: 15.3 % (ref 10–15)
ERYTHROCYTE [DISTWIDTH] IN BLOOD BY AUTOMATED COUNT: 15.4 % (ref 10–15)
ERYTHROCYTE [DISTWIDTH] IN BLOOD BY AUTOMATED COUNT: 15.4 % (ref 10–15)
ERYTHROCYTE [DISTWIDTH] IN BLOOD BY AUTOMATED COUNT: 16.9 % (ref 10–15)
ERYTHROCYTE [DISTWIDTH] IN BLOOD BY AUTOMATED COUNT: 18.8 % (ref 10–15)
ERYTHROCYTE [DISTWIDTH] IN BLOOD BY AUTOMATED COUNT: 19.2 % (ref 10–15)
ERYTHROCYTE [DISTWIDTH] IN BLOOD BY AUTOMATED COUNT: 20.1 % (ref 10–15)
GFR SERPL CREATININE-BSD FRML MDRD: 38 ML/MIN/{1.73_M2}
GFR SERPL CREATININE-BSD FRML MDRD: 44 ML/MIN/{1.73_M2}
GFR SERPL CREATININE-BSD FRML MDRD: 46 ML/MIN/{1.73_M2}
GFR SERPL CREATININE-BSD FRML MDRD: 49 ML/MIN/{1.73_M2}
GFR SERPL CREATININE-BSD FRML MDRD: 53 ML/MIN/{1.73_M2}
GFR SERPL CREATININE-BSD FRML MDRD: 58 ML/MIN/{1.73_M2}
GFR SERPL CREATININE-BSD FRML MDRD: 58 ML/MIN/{1.73_M2}
GFR SERPL CREATININE-BSD FRML MDRD: 61 ML/MIN/{1.73_M2}
GLUCOSE SERPL-MCNC: 112 MG/DL (ref 70–99)
GLUCOSE SERPL-MCNC: 134 MG/DL (ref 70–99)
GLUCOSE SERPL-MCNC: 138 MG/DL (ref 70–99)
GLUCOSE SERPL-MCNC: 156 MG/DL (ref 70–99)
GLUCOSE SERPL-MCNC: 190 MG/DL (ref 70–99)
GLUCOSE SERPL-MCNC: 191 MG/DL (ref 70–99)
GLUCOSE SERPL-MCNC: 238 MG/DL (ref 70–99)
GLUCOSE SERPL-MCNC: 281 MG/DL (ref 70–99)
HCT VFR BLD AUTO: 29.8 % (ref 40–53)
HCT VFR BLD AUTO: 36.3 % (ref 40–53)
HCT VFR BLD AUTO: 37.2 % (ref 40–53)
HCT VFR BLD AUTO: 37.7 % (ref 40–53)
HCT VFR BLD AUTO: 40.1 % (ref 40–53)
HCT VFR BLD AUTO: 40.3 % (ref 40–53)
HCT VFR BLD AUTO: 40.4 % (ref 40–53)
HCT VFR BLD AUTO: 44.2 % (ref 40–53)
HGB BLD-MCNC: 11.3 G/DL (ref 13.3–17.7)
HGB BLD-MCNC: 11.4 G/DL (ref 13.3–17.7)
HGB BLD-MCNC: 11.5 G/DL (ref 13.3–17.7)
HGB BLD-MCNC: 12 G/DL (ref 13.3–17.7)
HGB BLD-MCNC: 12.4 G/DL (ref 13.3–17.7)
HGB BLD-MCNC: 12.4 G/DL (ref 13.3–17.7)
HGB BLD-MCNC: 13.7 G/DL (ref 13.3–17.7)
HGB BLD-MCNC: 9.4 G/DL (ref 13.3–17.7)
IMM GRANULOCYTES # BLD: 0 10E9/L (ref 0–0.4)
IMM GRANULOCYTES # BLD: 0.1 10E9/L (ref 0–0.4)
IMM GRANULOCYTES NFR BLD: 0.2 %
IMM GRANULOCYTES NFR BLD: 0.2 %
IMM GRANULOCYTES NFR BLD: 0.4 %
IMM GRANULOCYTES NFR BLD: 0.5 %
IMM GRANULOCYTES NFR BLD: 1.6 %
INR PPP: 1.09 (ref 0.86–1.14)
LACTATE BLD-SCNC: 2 MMOL/L (ref 0.7–2)
LYMPHOCYTES # BLD AUTO: 0.1 10E9/L (ref 0.8–5.3)
LYMPHOCYTES # BLD AUTO: 0.3 10E9/L (ref 0.8–5.3)
LYMPHOCYTES # BLD AUTO: 0.5 10E9/L (ref 0.8–5.3)
LYMPHOCYTES # BLD AUTO: 0.5 10E9/L (ref 0.8–5.3)
LYMPHOCYTES # BLD AUTO: 0.6 10E9/L (ref 0.8–5.3)
LYMPHOCYTES # BLD AUTO: 0.7 10E9/L (ref 0.8–5.3)
LYMPHOCYTES # BLD AUTO: 0.7 10E9/L (ref 0.8–5.3)
LYMPHOCYTES # BLD AUTO: 0.8 10E9/L (ref 0.8–5.3)
LYMPHOCYTES NFR BLD AUTO: 1 %
LYMPHOCYTES NFR BLD AUTO: 12.4 %
LYMPHOCYTES NFR BLD AUTO: 12.5 %
LYMPHOCYTES NFR BLD AUTO: 15.8 %
LYMPHOCYTES NFR BLD AUTO: 25.5 %
LYMPHOCYTES NFR BLD AUTO: 33 %
LYMPHOCYTES NFR BLD AUTO: 6 %
LYMPHOCYTES NFR BLD AUTO: 7.8 %
MCH RBC QN AUTO: 29.8 PG (ref 26.5–33)
MCH RBC QN AUTO: 29.8 PG (ref 26.5–33)
MCH RBC QN AUTO: 29.9 PG (ref 26.5–33)
MCH RBC QN AUTO: 30.1 PG (ref 26.5–33)
MCH RBC QN AUTO: 30.3 PG (ref 26.5–33)
MCH RBC QN AUTO: 31.1 PG (ref 26.5–33)
MCH RBC QN AUTO: 32.2 PG (ref 26.5–33)
MCH RBC QN AUTO: 33.1 PG (ref 26.5–33)
MCHC RBC AUTO-ENTMCNC: 29.7 G/DL (ref 31.5–36.5)
MCHC RBC AUTO-ENTMCNC: 30.2 G/DL (ref 31.5–36.5)
MCHC RBC AUTO-ENTMCNC: 30.8 G/DL (ref 31.5–36.5)
MCHC RBC AUTO-ENTMCNC: 30.9 G/DL (ref 31.5–36.5)
MCHC RBC AUTO-ENTMCNC: 30.9 G/DL (ref 31.5–36.5)
MCHC RBC AUTO-ENTMCNC: 31 G/DL (ref 31.5–36.5)
MCHC RBC AUTO-ENTMCNC: 31.1 G/DL (ref 31.5–36.5)
MCHC RBC AUTO-ENTMCNC: 31.5 G/DL (ref 31.5–36.5)
MCV RBC AUTO: 100 FL (ref 78–100)
MCV RBC AUTO: 100 FL (ref 78–100)
MCV RBC AUTO: 101 FL (ref 78–100)
MCV RBC AUTO: 103 FL (ref 78–100)
MCV RBC AUTO: 105 FL (ref 78–100)
MCV RBC AUTO: 96 FL (ref 78–100)
MCV RBC AUTO: 97 FL (ref 78–100)
MCV RBC AUTO: 97 FL (ref 78–100)
MONOCYTES # BLD AUTO: 0.3 10E9/L (ref 0–1.3)
MONOCYTES # BLD AUTO: 0.4 10E9/L (ref 0–1.3)
MONOCYTES # BLD AUTO: 0.4 10E9/L (ref 0–1.3)
MONOCYTES # BLD AUTO: 0.5 10E9/L (ref 0–1.3)
MONOCYTES # BLD AUTO: 0.6 10E9/L (ref 0–1.3)
MONOCYTES NFR BLD AUTO: 10.8 %
MONOCYTES NFR BLD AUTO: 11 %
MONOCYTES NFR BLD AUTO: 13.3 %
MONOCYTES NFR BLD AUTO: 16.6 %
MONOCYTES NFR BLD AUTO: 18 %
MONOCYTES NFR BLD AUTO: 3 %
MONOCYTES NFR BLD AUTO: 4 %
MONOCYTES NFR BLD AUTO: 9.3 %
NEUTROPHILS # BLD AUTO: 1.1 10E9/L (ref 1.6–8.3)
NEUTROPHILS # BLD AUTO: 1.6 10E9/L (ref 1.6–8.3)
NEUTROPHILS # BLD AUTO: 14 10E9/L (ref 1.6–8.3)
NEUTROPHILS # BLD AUTO: 3.1 10E9/L (ref 1.6–8.3)
NEUTROPHILS # BLD AUTO: 3.3 10E9/L (ref 1.6–8.3)
NEUTROPHILS # BLD AUTO: 3.4 10E9/L (ref 1.6–8.3)
NEUTROPHILS # BLD AUTO: 3.9 10E9/L (ref 1.6–8.3)
NEUTROPHILS # BLD AUTO: 7.4 10E9/L (ref 1.6–8.3)
NEUTROPHILS NFR BLD AUTO: 46 %
NEUTROPHILS NFR BLD AUTO: 57.1 %
NEUTROPHILS NFR BLD AUTO: 72 %
NEUTROPHILS NFR BLD AUTO: 72.7 %
NEUTROPHILS NFR BLD AUTO: 76.6 %
NEUTROPHILS NFR BLD AUTO: 80.7 %
NEUTROPHILS NFR BLD AUTO: 90 %
NEUTROPHILS NFR BLD AUTO: 96 %
NRBC # BLD AUTO: 0 10*3/UL
NRBC BLD AUTO-RTO: 0 /100
PLATELET # BLD AUTO: 133 10E9/L (ref 150–450)
PLATELET # BLD AUTO: 147 10E9/L (ref 150–450)
PLATELET # BLD AUTO: 152 10E9/L (ref 150–450)
PLATELET # BLD AUTO: 187 10E9/L (ref 150–450)
PLATELET # BLD AUTO: 198 10E9/L (ref 150–450)
PLATELET # BLD AUTO: 216 10E9/L (ref 150–450)
PLATELET # BLD AUTO: 297 10E9/L (ref 150–450)
PLATELET # BLD AUTO: 72 10E9/L (ref 150–450)
PLATELET # BLD EST: ABNORMAL 10*3/UL
POTASSIUM SERPL-SCNC: 4.4 MMOL/L (ref 3.4–5.3)
POTASSIUM SERPL-SCNC: 4.5 MMOL/L (ref 3.4–5.3)
POTASSIUM SERPL-SCNC: 4.5 MMOL/L (ref 3.4–5.3)
POTASSIUM SERPL-SCNC: 4.6 MMOL/L (ref 3.4–5.3)
POTASSIUM SERPL-SCNC: 4.8 MMOL/L (ref 3.4–5.3)
POTASSIUM SERPL-SCNC: 4.9 MMOL/L (ref 3.4–5.3)
PROT SERPL-MCNC: 6.1 G/DL (ref 6.8–8.8)
PROT SERPL-MCNC: 6.4 G/DL (ref 6.8–8.8)
PROT SERPL-MCNC: 6.5 G/DL (ref 6.8–8.8)
PROT SERPL-MCNC: 6.8 G/DL (ref 6.8–8.8)
PROT SERPL-MCNC: 6.9 G/DL (ref 6.8–8.8)
PROT SERPL-MCNC: 7 G/DL (ref 6.8–8.8)
RBC # BLD AUTO: 2.84 10E12/L (ref 4.4–5.9)
RBC # BLD AUTO: 3.51 10E12/L (ref 4.4–5.9)
RBC # BLD AUTO: 3.76 10E12/L (ref 4.4–5.9)
RBC # BLD AUTO: 3.82 10E12/L (ref 4.4–5.9)
RBC # BLD AUTO: 3.99 10E12/L (ref 4.4–5.9)
RBC # BLD AUTO: 4.03 10E12/L (ref 4.4–5.9)
RBC # BLD AUTO: 4.15 10E12/L (ref 4.4–5.9)
RBC # BLD AUTO: 4.59 10E12/L (ref 4.4–5.9)
RBC MORPH BLD: NORMAL
SODIUM SERPL-SCNC: 136 MMOL/L (ref 133–144)
SODIUM SERPL-SCNC: 137 MMOL/L (ref 133–144)
SODIUM SERPL-SCNC: 138 MMOL/L (ref 133–144)
SODIUM SERPL-SCNC: 139 MMOL/L (ref 133–144)
SODIUM SERPL-SCNC: 139 MMOL/L (ref 133–144)
SODIUM SERPL-SCNC: 140 MMOL/L (ref 133–144)
SODIUM SERPL-SCNC: 141 MMOL/L (ref 133–144)
SODIUM SERPL-SCNC: 142 MMOL/L (ref 133–144)
SPECIMEN EXP DATE BLD: NORMAL
T4 FREE SERPL-MCNC: 1.09 NG/DL (ref 0.76–1.46)
TSH SERPL DL<=0.005 MIU/L-ACNC: 0.35 MU/L (ref 0.4–4)
TSH SERPL DL<=0.005 MIU/L-ACNC: 0.4 MU/L (ref 0.4–4)
TSH SERPL DL<=0.005 MIU/L-ACNC: 0.51 MU/L (ref 0.4–4)
TSH SERPL DL<=0.005 MIU/L-ACNC: 0.57 MU/L (ref 0.4–4)
TSH SERPL DL<=0.005 MIU/L-ACNC: 1.06 MU/L (ref 0.4–4)
WBC # BLD AUTO: 14.5 10E9/L (ref 4–11)
WBC # BLD AUTO: 2.3 10E9/L (ref 4–11)
WBC # BLD AUTO: 2.7 10E9/L (ref 4–11)
WBC # BLD AUTO: 4.3 10E9/L (ref 4–11)
WBC # BLD AUTO: 4.3 10E9/L (ref 4–11)
WBC # BLD AUTO: 4.6 10E9/L (ref 4–11)
WBC # BLD AUTO: 5.1 10E9/L (ref 4–11)
WBC # BLD AUTO: 8.2 10E9/L (ref 4–11)

## 2020-01-01 PROCEDURE — 74177 CT ABD & PELVIS W/CONTRAST: CPT | Performed by: RADIOLOGY

## 2020-01-01 PROCEDURE — 85025 COMPLETE CBC W/AUTO DIFF WBC: CPT | Performed by: EMERGENCY MEDICINE

## 2020-01-01 PROCEDURE — 99207 ZZC NO CHARGE NURSE ONLY: CPT

## 2020-01-01 PROCEDURE — 36415 COLL VENOUS BLD VENIPUNCTURE: CPT | Performed by: NURSE PRACTITIONER

## 2020-01-01 PROCEDURE — 99215 OFFICE O/P EST HI 40 MIN: CPT | Performed by: INTERNAL MEDICINE

## 2020-01-01 PROCEDURE — 80050 GENERAL HEALTH PANEL: CPT | Performed by: NURSE PRACTITIONER

## 2020-01-01 PROCEDURE — 96417 CHEMO IV INFUS EACH ADDL SEQ: CPT | Performed by: NURSE PRACTITIONER

## 2020-01-01 PROCEDURE — 96367 TX/PROPH/DG ADDL SEQ IV INF: CPT | Performed by: NURSE PRACTITIONER

## 2020-01-01 PROCEDURE — 36415 COLL VENOUS BLD VENIPUNCTURE: CPT | Performed by: INTERNAL MEDICINE

## 2020-01-01 PROCEDURE — 99205 OFFICE O/P NEW HI 60 MIN: CPT | Performed by: HOSPITALIST

## 2020-01-01 PROCEDURE — 84443 ASSAY THYROID STIM HORMONE: CPT | Performed by: NURSE PRACTITIONER

## 2020-01-01 PROCEDURE — 96411 CHEMO IV PUSH ADDL DRUG: CPT | Performed by: NURSE PRACTITIONER

## 2020-01-01 PROCEDURE — 99207 ZZC NO CHARGE LOS: CPT

## 2020-01-01 PROCEDURE — 96372 THER/PROPH/DIAG INJ SC/IM: CPT

## 2020-01-01 PROCEDURE — 85025 COMPLETE CBC W/AUTO DIFF WBC: CPT | Performed by: INTERNAL MEDICINE

## 2020-01-01 PROCEDURE — 86901 BLOOD TYPING SEROLOGIC RH(D): CPT | Performed by: EMERGENCY MEDICINE

## 2020-01-01 PROCEDURE — 71260 CT THORAX DX C+: CPT | Performed by: RADIOLOGY

## 2020-01-01 PROCEDURE — 99214 OFFICE O/P EST MOD 30 MIN: CPT | Performed by: NURSE PRACTITIONER

## 2020-01-01 PROCEDURE — 99215 OFFICE O/P EST HI 40 MIN: CPT | Mod: 25 | Performed by: NURSE PRACTITIONER

## 2020-01-01 PROCEDURE — 84439 ASSAY OF FREE THYROXINE: CPT | Performed by: NURSE PRACTITIONER

## 2020-01-01 PROCEDURE — 85610 PROTHROMBIN TIME: CPT | Performed by: EMERGENCY MEDICINE

## 2020-01-01 PROCEDURE — 99284 EMERGENCY DEPT VISIT MOD MDM: CPT | Performed by: EMERGENCY MEDICINE

## 2020-01-01 PROCEDURE — 80053 COMPREHEN METABOLIC PANEL: CPT | Performed by: NURSE PRACTITIONER

## 2020-01-01 PROCEDURE — 85025 COMPLETE CBC W/AUTO DIFF WBC: CPT | Performed by: NURSE PRACTITIONER

## 2020-01-01 PROCEDURE — 99214 OFFICE O/P EST MOD 30 MIN: CPT | Mod: 25 | Performed by: NURSE PRACTITIONER

## 2020-01-01 PROCEDURE — 83605 ASSAY OF LACTIC ACID: CPT | Performed by: EMERGENCY MEDICINE

## 2020-01-01 PROCEDURE — 80053 COMPREHEN METABOLIC PANEL: CPT | Performed by: INTERNAL MEDICINE

## 2020-01-01 PROCEDURE — 96413 CHEMO IV INFUSION 1 HR: CPT | Performed by: NURSE PRACTITIONER

## 2020-01-01 PROCEDURE — 99443 ZZC PHYSICIAN TELEPHONE EVALUATION 21-30 MIN: CPT | Mod: 25 | Performed by: NURSE PRACTITIONER

## 2020-01-01 PROCEDURE — 80050 GENERAL HEALTH PANEL: CPT | Performed by: INTERNAL MEDICINE

## 2020-01-01 PROCEDURE — 36415 COLL VENOUS BLD VENIPUNCTURE: CPT | Performed by: EMERGENCY MEDICINE

## 2020-01-01 PROCEDURE — 80053 COMPREHEN METABOLIC PANEL: CPT | Performed by: EMERGENCY MEDICINE

## 2020-01-01 PROCEDURE — 86850 RBC ANTIBODY SCREEN: CPT | Performed by: EMERGENCY MEDICINE

## 2020-01-01 PROCEDURE — 86900 BLOOD TYPING SEROLOGIC ABO: CPT | Performed by: EMERGENCY MEDICINE

## 2020-01-01 PROCEDURE — 85730 THROMBOPLASTIN TIME PARTIAL: CPT | Performed by: EMERGENCY MEDICINE

## 2020-01-01 PROCEDURE — 99442 ZZC PHYSICIAN TELEPHONE EVALUATION 11-20 MIN: CPT | Mod: 25 | Performed by: NURSE PRACTITIONER

## 2020-01-01 PROCEDURE — 99284 EMERGENCY DEPT VISIT MOD MDM: CPT | Mod: Z6 | Performed by: EMERGENCY MEDICINE

## 2020-01-01 RX ORDER — ALBUTEROL SULFATE 0.83 MG/ML
2.5 SOLUTION RESPIRATORY (INHALATION)
Status: CANCELLED | OUTPATIENT
Start: 2020-01-01

## 2020-01-01 RX ORDER — MEPERIDINE HYDROCHLORIDE 25 MG/ML
25 INJECTION INTRAMUSCULAR; INTRAVENOUS; SUBCUTANEOUS EVERY 30 MIN PRN
Status: CANCELLED | OUTPATIENT
Start: 2020-01-01

## 2020-01-01 RX ORDER — EPINEPHRINE 0.3 MG/.3ML
0.3 INJECTION SUBCUTANEOUS EVERY 5 MIN PRN
Status: CANCELLED | OUTPATIENT
Start: 2020-01-01

## 2020-01-01 RX ORDER — EPINEPHRINE 1 MG/ML
0.3 INJECTION, SOLUTION INTRAMUSCULAR; SUBCUTANEOUS EVERY 5 MIN PRN
Status: CANCELLED | OUTPATIENT
Start: 2020-01-01

## 2020-01-01 RX ORDER — SODIUM CHLORIDE 9 MG/ML
1000 INJECTION, SOLUTION INTRAVENOUS CONTINUOUS PRN
Status: CANCELLED
Start: 2020-01-01

## 2020-01-01 RX ORDER — METHYLPREDNISOLONE SODIUM SUCCINATE 125 MG/2ML
125 INJECTION, POWDER, LYOPHILIZED, FOR SOLUTION INTRAMUSCULAR; INTRAVENOUS
Status: CANCELLED
Start: 2020-01-01

## 2020-01-01 RX ORDER — HEPARIN SODIUM (PORCINE) LOCK FLUSH IV SOLN 100 UNIT/ML 100 UNIT/ML
5 SOLUTION INTRAVENOUS
Status: CANCELLED | OUTPATIENT
Start: 2020-01-01

## 2020-01-01 RX ORDER — ZOLEDRONIC ACID 0.04 MG/ML
4 INJECTION, SOLUTION INTRAVENOUS ONCE
Status: CANCELLED | OUTPATIENT
Start: 2020-01-01

## 2020-01-01 RX ORDER — LORAZEPAM 0.5 MG/1
0.5 TABLET ORAL EVERY 4 HOURS PRN
Qty: 30 TABLET | Refills: 3 | Status: SHIPPED | OUTPATIENT
Start: 2020-01-01 | End: 2020-01-01

## 2020-01-01 RX ORDER — HEPARIN SODIUM (PORCINE) LOCK FLUSH IV SOLN 100 UNIT/ML 100 UNIT/ML
5 SOLUTION INTRAVENOUS
Status: CANCELLED | OUTPATIENT
Start: 2020-10-12

## 2020-01-01 RX ORDER — IOPAMIDOL 755 MG/ML
88 INJECTION, SOLUTION INTRAVASCULAR ONCE
Status: COMPLETED | OUTPATIENT
Start: 2020-01-01 | End: 2020-01-01

## 2020-01-01 RX ORDER — NALOXONE HYDROCHLORIDE 0.4 MG/ML
.1-.4 INJECTION, SOLUTION INTRAMUSCULAR; INTRAVENOUS; SUBCUTANEOUS
Status: CANCELLED | OUTPATIENT
Start: 2020-01-01

## 2020-01-01 RX ORDER — LIDOCAINE 50 MG/G
1 PATCH TOPICAL EVERY 24 HOURS
Qty: 30 PATCH | Refills: 3 | Status: SHIPPED | OUTPATIENT
Start: 2020-01-01

## 2020-01-01 RX ORDER — ONDANSETRON 8 MG/1
8 TABLET, FILM COATED ORAL EVERY 8 HOURS PRN
Qty: 10 TABLET | Refills: 3 | Status: SHIPPED | OUTPATIENT
Start: 2020-01-01

## 2020-01-01 RX ORDER — LORAZEPAM 2 MG/ML
0.5 INJECTION INTRAMUSCULAR EVERY 4 HOURS PRN
Status: CANCELLED
Start: 2020-01-01

## 2020-01-01 RX ORDER — ALBUTEROL SULFATE 90 UG/1
1-2 AEROSOL, METERED RESPIRATORY (INHALATION)
Status: CANCELLED
Start: 2020-01-01

## 2020-01-01 RX ORDER — DIPHENHYDRAMINE HYDROCHLORIDE AND LIDOCAINE HYDROCHLORIDE AND ALUMINUM HYDROXIDE AND MAGNESIUM HYDRO
5-10 KIT EVERY 6 HOURS PRN
Qty: 120 ML | Refills: 1 | Status: SHIPPED | OUTPATIENT
Start: 2020-01-01

## 2020-01-01 RX ORDER — DEXAMETHASONE 4 MG/1
4 TABLET ORAL 2 TIMES DAILY WITH MEALS
Qty: 6 TABLET | Refills: 3 | Status: SHIPPED | OUTPATIENT
Start: 2020-01-01 | End: 2020-01-01

## 2020-01-01 RX ORDER — PROCHLORPERAZINE MALEATE 10 MG
5 TABLET ORAL EVERY 6 HOURS PRN
Qty: 30 TABLET | Refills: 3 | Status: SHIPPED | OUTPATIENT
Start: 2020-01-01

## 2020-01-01 RX ORDER — ZOLEDRONIC ACID 0.04 MG/ML
4 INJECTION, SOLUTION INTRAVENOUS ONCE
Status: CANCELLED | OUTPATIENT
Start: 2020-10-12

## 2020-01-01 RX ORDER — HEPARIN SODIUM,PORCINE 10 UNIT/ML
5 VIAL (ML) INTRAVENOUS
Status: CANCELLED | OUTPATIENT
Start: 2020-01-01

## 2020-01-01 RX ORDER — DIPHENHYDRAMINE HYDROCHLORIDE 50 MG/ML
50 INJECTION INTRAMUSCULAR; INTRAVENOUS
Status: CANCELLED
Start: 2020-01-01

## 2020-01-01 RX ORDER — DEXAMETHASONE 4 MG/1
TABLET ORAL
Qty: 6 TABLET | Refills: 3 | Status: SHIPPED | OUTPATIENT
Start: 2020-01-01

## 2020-01-01 RX ORDER — HEPARIN SODIUM,PORCINE 10 UNIT/ML
5 VIAL (ML) INTRAVENOUS
Status: CANCELLED | OUTPATIENT
Start: 2020-10-12

## 2020-01-01 RX ORDER — DIPHENHYDRAMINE HYDROCHLORIDE AND LIDOCAINE HYDROCHLORIDE AND ALUMINUM HYDROXIDE AND MAGNESIUM HYDRO
5-10 KIT EVERY 6 HOURS PRN
Qty: 237 ML | Refills: 0 | Status: SHIPPED | OUTPATIENT
Start: 2020-01-01 | End: 2020-01-01

## 2020-01-01 RX ORDER — IOPAMIDOL 755 MG/ML
89 INJECTION, SOLUTION INTRAVASCULAR ONCE
Status: COMPLETED | OUTPATIENT
Start: 2020-01-01 | End: 2020-01-01

## 2020-01-01 RX ORDER — ONDANSETRON 8 MG/1
8 TABLET, FILM COATED ORAL EVERY 8 HOURS PRN
Qty: 10 TABLET | Refills: 3 | Status: SHIPPED | OUTPATIENT
Start: 2020-01-01 | End: 2020-01-01

## 2020-01-01 RX ORDER — FOLIC ACID 1 MG/1
1000 TABLET ORAL DAILY
Qty: 30 TABLET | Refills: 4 | Status: SHIPPED | OUTPATIENT
Start: 2020-01-01

## 2020-01-01 RX ORDER — LORAZEPAM 0.5 MG/1
0.5 TABLET ORAL EVERY 4 HOURS PRN
Qty: 30 TABLET | Refills: 3 | Status: SHIPPED | OUTPATIENT
Start: 2020-01-01

## 2020-01-01 RX ORDER — SENNA AND DOCUSATE SODIUM 50; 8.6 MG/1; MG/1
1 TABLET, FILM COATED ORAL AT BEDTIME
Qty: 20 TABLET | Refills: 0 | Status: SHIPPED | OUTPATIENT
Start: 2020-01-01

## 2020-01-01 RX ORDER — CYANOCOBALAMIN 1000 UG/ML
1000 INJECTION, SOLUTION INTRAMUSCULAR; SUBCUTANEOUS ONCE
Status: COMPLETED | OUTPATIENT
Start: 2020-01-01 | End: 2020-01-01

## 2020-01-01 RX ORDER — PROCHLORPERAZINE MALEATE 10 MG
5 TABLET ORAL EVERY 6 HOURS PRN
Qty: 30 TABLET | Refills: 3 | Status: SHIPPED | OUTPATIENT
Start: 2020-01-01 | End: 2020-01-01

## 2020-01-01 RX ORDER — LORAZEPAM 2 MG/ML
1 INJECTION INTRAMUSCULAR EVERY 6 HOURS PRN
Status: CANCELLED
Start: 2020-01-01

## 2020-01-01 RX ORDER — LORAZEPAM 0.5 MG/1
0.5 TABLET ORAL EVERY 4 HOURS PRN
Qty: 30 TABLET | Refills: 3 | Status: CANCELLED | OUTPATIENT
Start: 2020-01-01

## 2020-01-01 RX ORDER — GABAPENTIN 100 MG/1
200 CAPSULE ORAL AT BEDTIME
Qty: 60 CAPSULE | Refills: 3 | Status: SHIPPED | OUTPATIENT
Start: 2020-01-01

## 2020-01-01 RX ORDER — HEPARIN SODIUM,PORCINE 10 UNIT/ML
5 VIAL (ML) INTRAVENOUS
Status: CANCELLED | OUTPATIENT
Start: 2020-08-31

## 2020-01-01 RX ORDER — HEPARIN SODIUM (PORCINE) LOCK FLUSH IV SOLN 100 UNIT/ML 100 UNIT/ML
5 SOLUTION INTRAVENOUS
Status: CANCELLED | OUTPATIENT
Start: 2020-08-31

## 2020-01-01 RX ORDER — ZOLEDRONIC ACID 0.04 MG/ML
4 INJECTION, SOLUTION INTRAVENOUS ONCE
Status: CANCELLED | OUTPATIENT
Start: 2020-08-31

## 2020-01-01 RX ADMIN — Medication 250 ML: at 12:21

## 2020-01-01 RX ADMIN — Medication 250 ML: at 14:44

## 2020-01-01 RX ADMIN — Medication 250 ML: at 11:38

## 2020-01-01 RX ADMIN — IOPAMIDOL 88 ML: 755 INJECTION, SOLUTION INTRAVASCULAR at 10:55

## 2020-01-01 RX ADMIN — CYANOCOBALAMIN 1000 MCG: 1000 INJECTION, SOLUTION INTRAMUSCULAR; SUBCUTANEOUS at 11:01

## 2020-01-01 RX ADMIN — IOPAMIDOL 89 ML: 755 INJECTION, SOLUTION INTRAVASCULAR at 13:45

## 2020-01-01 RX ADMIN — Medication 250 ML: at 11:44

## 2020-01-01 RX ADMIN — Medication 250 ML: at 12:06

## 2020-01-01 ASSESSMENT — MIFFLIN-ST. JEOR
SCORE: 1351.98
SCORE: 1334.49
SCORE: 1361.06
SCORE: 1328.59
SCORE: 1352.88
SCORE: 1332.22

## 2020-01-01 ASSESSMENT — ENCOUNTER SYMPTOMS
RECTAL PAIN: 0
ABDOMINAL PAIN: 0
APPETITE CHANGE: 1
SHORTNESS OF BREATH: 1
BLOOD IN STOOL: 1
CONSTIPATION: 0
COUGH: 1
FEVER: 0
DYSURIA: 0
SORE THROAT: 0
DIARRHEA: 1

## 2020-01-01 ASSESSMENT — PAIN SCALES - GENERAL
PAINLEVEL: NO PAIN (0)
PAINLEVEL: MILD PAIN (2)
PAINLEVEL: NO PAIN (0)
PAINLEVEL: NO PAIN (0)
PAINLEVEL: MILD PAIN (2)
PAINLEVEL: MILD PAIN (2)

## 2020-01-01 ASSESSMENT — PATIENT HEALTH QUESTIONNAIRE - PHQ9
SUM OF ALL RESPONSES TO PHQ QUESTIONS 1-9: 17
10. IF YOU CHECKED OFF ANY PROBLEMS, HOW DIFFICULT HAVE THESE PROBLEMS MADE IT FOR YOU TO DO YOUR WORK, TAKE CARE OF THINGS AT HOME, OR GET ALONG WITH OTHER PEOPLE: SOMEWHAT DIFFICULT

## 2020-01-03 NOTE — PROGRESS NOTES
"Oral Chemotherapy Monitoring Program     Primary Oncologist: Dr. Gerry Ahumada  Primary Oncology Clinic: [facility]  Cancer Diagnosis: NSCLC     Drug: Osimertinib (Tagrisso) 80 mg daily continuously  Start Date: 8/15/18     Expected Duration of Therapy: Until disease progression or unacceptable toxicity     Drug Interaction Assessment: No significant drug interactions identified upon review.     Lab Monitoring Plan  CBC, CMP monthly, LVEF every 2-3 months    Subjective/Objective:  Gilson Curtis is a 79 year old male seen in clinic for a follow-up visit for oral chemotherapy.  He is accompanied by his wife. Patient reports he missed one dose this past week due to the holidays, but otherwise denies missed doses and has not started any new medications. He reports fatigue, lack of appetite, and taste changes. Patient states the taste changes seem to be \"newer\" and states he did not have the taste changes while previously on osimertinib.     ORAL CHEMOTHERAPY 11/26/2018 12/19/2018 2/7/2019 4/18/2019 8/1/2019 12/4/2019 1/3/2020   Drug Name Tagrisso (Osimertinib) Tagrisso (Osimertinib) Tagrisso (Osimertinib) Tagrisso (Osimertinib) Tagrisso (Osimertinib) Tagrisso (Osimertinib) Tagrisso (Osimertinib)   Current Dosage 80mg 80mg 80mg 80mg 80mg 80mg 80mg   Current Schedule Daily Daily Daily Daily Daily Daily Daily   Cycle Details Continuous Continuous Continuous Continuous Continuous Continuous Continuous   Start Date of Last Cycle - - - - - - -   Planned next cycle start date - - - - - - -   Doses missed in last 2 weeks - 0 0 0 0 - -   Adherence Assessment Adherent Adherent Adherent Adherent Adherent Adherent Adherent   Adverse Effects Fatigue Fatigue Fatigue No AE identified during assessment Fatigue;Other (see note for details) Fatigue Fatigue;Other (see note for details)   EGFR rash - - - - - - -   Pharmacist Intervention(EGFR) - - - - - - -   Intervention(s) - - - - - - -   Fatigue Grade 2 " "Grade 1 Grade 1 - Grade 2 Grade 1 Grade 1   Pharmacist Intervention(fatigue) No No No - No No -   Intervention(s) - - - - - - -   Other (see note for details) - - - - Anorexia - Lack of appetite; taste changes   Pharmacist intervention? - - - - (No Data) - -   Home BPs - - - - - - -   Any new drug interactions? No No No No No No No   Is the dose as ordered appropriate for the patient? - - Yes Yes Yes Yes Yes   Is the patient currently in pain? Assessed in last 30 days. No No Assessed in last 30 days. No Assessed in last 30 days. Assessed in last 30 days.   Has the patient been assessed within the past 6 months for depression? Yes Yes Yes Yes Yes Yes Yes   Has the patient missed any days of school, work, or other routine activity? - No No - No - -       Vitals:  BP:   BP Readings from Last 1 Encounters:   01/03/20 120/71     Wt Readings from Last 1 Encounters:   01/03/20 65.6 kg (144 lb 11.2 oz)     Estimated body surface area is 1.79 meters squared as calculated from the following:    Height as of an earlier encounter on 1/3/20: 1.753 m (5' 9.02\").    Weight as of an earlier encounter on 1/3/20: 65.6 kg (144 lb 11.2 oz).    Labs:  _  Result Component Current Result Ref Range   Sodium 140 (1/3/2020) 133 - 144 mmol/L     _  Result Component Current Result Ref Range   Potassium 4.6 (1/3/2020) 3.4 - 5.3 mmol/L     _  Result Component Current Result Ref Range   Calcium 9.3 (1/3/2020) 8.5 - 10.1 mg/dL     No results found for Mag within last 30 days.     No results found for Phos within last 30 days.     Result Component Current Result Ref Range   Albumin 3.1 (L) (1/3/2020) 3.4 - 5.0 g/dL     _  Result Component Current Result Ref Range   Urea Nitrogen 31 (H) (1/3/2020) 7 - 30 mg/dL     _  Result Component Current Result Ref Range   Creatinine 1.66 (H) (1/3/2020) 0.66 - 1.25 mg/dL     Result Component Current Result Ref Range   AST 30 (1/3/2020) 0 - 45 U/L     Result Component Current Result Ref Range   ALT 30 (1/3/2020) " 0 - 70 U/L     Result Component Current Result Ref Range   Bilirubin Total 0.5 (1/3/2020) 0.2 - 1.3 mg/dL     Result Component Current Result Ref Range   WBC 5.1 (1/3/2020) 4.0 - 11.0 10e9/L     _  Result Component Current Result Ref Range   Hemoglobin 12.4 (L) (1/3/2020) 13.3 - 17.7 g/dL     _  Result Component Current Result Ref Range   Platelet Count 133 (L) (1/3/2020) 150 - 450 10e9/L     _  Result Component Current Result Ref Range   Absolute Neutrophil 3.9 (1/3/2020) 1.6 - 8.3 10e9/L       Assessment/Plan:  Patient is tolerating the osimertinib therapy with management side effects. Plan to continue with current therapy.     Follow-Up:   3 weeks (not yet scheduled) Avastin infusion; will continue Osimertinib; 1/22 visit with Dr. Fernando     Refill Due:  1/29 release osimertinib to [facility]     Education Note    Met with patient and his wife in clinic for first infusion education on Avastin. Handouts were discussed and given to patient to take home.      No barriers to learning identified. Patient and spouse verbalized understanding of all written and verbal information. All questions answered patient s satisfaction.      Patient instructed to call with further questions or concerns.  Patient states understanding and is in agreement with this plan.      Jenna Ramirez, PharmD  PGY-1 Pharmacy Resident  January 3, 2020

## 2020-01-03 NOTE — PROGRESS NOTES
"Oncology Follow Up Visit: January 3, 2020     Oncologist: Dr Gerry Ahumada  PCP: Hemant Jiménez    Diagnosis: Stage IV Lung Cancer  Gilson Curtis is an 80 yo who complained of hemoptysis in 11/2011 and was found to have a 2 x 1.9 cm RUL mass. 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 Curtis comes to clinic with wife for review of Tagrisso use for his lung cancer. Noting fatigue, lack of appetite, taste changes with weight loss with recent treatment that he has not had previously while on Tagrisso. He did complete his radiation to the left hip and feels he is gotten much better pain control ranking 2/10 with no pain medications used and is now able to cross his legs.  He has had no nausea mouth sores fevers or illnesses and bowel and bladder are now normal he does admit to some weakness but is using his walker to get around and has had no falls he also admits to some anxiety and depression is not treating at this time and having some trouble with sleep related to recurrent urination.  Wanted to go through results of the guardant 360.  Cough from December is significantly improved though is not resolved and mentions some shortness of breath with exertion-no recent fevers.  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     CAD (coronary artery disease)      CKD (chronic kidney disease) stage 3, GFR 30-59 ml/min (H)      Fatigue     \"spells\"     " "Hypertension      Lung cancer (H) 2016     Melanoma (H)      Non-small cell lung cancer (H)      Prostate cancer (H) 2009     S/P radiation therapy     3,000 cGy to right ribs completed on 6/19/2018 - [facility] with Dr. Pérez     Skin cancer, basal cell      Stented coronary artery 2009    x2     TIA (transient ischemic attack) 2002     Current Outpatient Medications   Medication     aspirin 81 MG tablet     calcium carbonate (TUMS) 500 MG chewable tablet     GABAPENTIN PO     lisinopril (PRINIVIL,ZESTRIL) 2.5 MG tablet     Multiple Vitamins-Minerals (EYE VITAMINS PO)     Multiple Vitamins-Minerals (MATURE ADULT CENTURY PO)     osimertinib (TAGRISSO) 80 MG tablet     prochlorperazine (COMPAZINE) 10 MG tablet     simvastatin (ZOCOR) 80 MG tablet     guaiFENesin-codeine (ROBITUSSIN AC) 100-10 MG/5ML solution     order for DME     osimertinib (TAGRISSO) 80 MG tablet     No current facility-administered medications for this visit.      Facility-Administered Medications Ordered in Other Visits   Medication     iopamidol (ISOVUE-370) solution 103 mL     Allergies   Allergen Reactions     No Known Drug Allergy        Physical Exam:/71 (BP Location: Right arm)   Pulse 91   Temp 97.8  F (36.6  C) (Oral)   Resp 20   Ht 1.753 m (5' 9.02\")   Wt 65.6 kg (144 lb 11.2 oz)   SpO2 97%   BMI 21.36 kg/m      ECOG PS- 0  Constitutional: Alert, cooperative, and appears in no distress.  ENT: Eyes bright, No mouth sores  Neck: Supple, No adenopathy.  Cardiac: Heart rate and rhythm is regular and strong without murmur  Respiratory: Breathing easy. Lung sounds clear to auscultation-cough is noted to be loose though not productive.  GI: Abdomen is soft, non-tender, BS normal. No masses or organomegaly  MS: Muscle tone fair, extremities normal with no edema.   Skin: alejandro complexion to the face but no rash.   Neuro: Sensory grossly WNL, gait normal with walker-appears to get dizziness when going from a laying position " to a standing.  Lymph: Normal ant/post cervical, axillary, supraclavicular nodes  Psych: Mentation appears normal and affect normal/bright with good conversation.     Laboratory Results:   Results for orders placed or performed in visit on 01/03/20   Comprehensive metabolic panel     Status: Abnormal   Result Value Ref Range    Sodium 140 133 - 144 mmol/L    Potassium 4.6 3.4 - 5.3 mmol/L    Chloride 107 94 - 109 mmol/L    Carbon Dioxide 26 20 - 32 mmol/L    Anion Gap 7 3 - 14 mmol/L    Glucose 156 (H) 70 - 99 mg/dL    Urea Nitrogen 31 (H) 7 - 30 mg/dL    Creatinine 1.66 (H) 0.66 - 1.25 mg/dL    GFR Estimate 38 (L) >60 mL/min/[1.73_m2]    GFR Estimate If Black 44 (L) >60 mL/min/[1.73_m2]    Calcium 9.3 8.5 - 10.1 mg/dL    Bilirubin Total 0.5 0.2 - 1.3 mg/dL    Albumin 3.1 (L) 3.4 - 5.0 g/dL    Protein Total 6.9 6.8 - 8.8 g/dL    Alkaline Phosphatase 125 40 - 150 U/L    ALT 30 0 - 70 U/L    AST 30 0 - 45 U/L   CBC with platelets differential     Status: Abnormal   Result Value Ref Range    WBC 5.1 4.0 - 11.0 10e9/L    RBC Count 4.15 (L) 4.4 - 5.9 10e12/L    Hemoglobin 12.4 (L) 13.3 - 17.7 g/dL    Hematocrit 40.3 40.0 - 53.0 %    MCV 97 78 - 100 fl    MCH 29.9 26.5 - 33.0 pg    MCHC 30.8 (L) 31.5 - 36.5 g/dL    RDW 14.8 10.0 - 15.0 %    Platelet Count 133 (L) 150 - 450 10e9/L    Diff Method Automated Method     % Neutrophils 76.6 %    % Lymphocytes 12.5 %    % Monocytes 9.3 %    % Eosinophils 1.0 %    % Basophils 0.4 %    % Immature Granulocytes 0.2 %    Absolute Neutrophil 3.9 1.6 - 8.3 10e9/L    Absolute Lymphocytes 0.6 (L) 0.8 - 5.3 10e9/L    Absolute Monocytes 0.5 0.0 - 1.3 10e9/L    Absolute Eosinophils 0.1 0.0 - 0.7 10e9/L    Absolute Basophils 0.0 0.0 - 0.2 10e9/L    Abs Immature Granulocytes 0.0 0 - 0.4 10e9/L       Assessment and Plan:   Stage IV Lung Cancer-Pt continues with use of Tagrisso 80 mg po daily and feels he is tolerating well though has had some new symptoms with change in appetite and taste that  has led to some weight loss.  He otherwise is tolerating theTagrisso so well after a near 2-year period of time.  He is completed radiation to the left hip which has improved movement and pain. He is now set to start  With addition of Avastin to his Tagrisso to help with liver metastasis. Education re: medication administration and potential side effects were given and pt gave approval for start of medication today.   Pt will return in 3 weeks for continuation of Avastin and will have labs and provider visit prior to infusion for symptom evaluation.   Cough- improved from previous.- 12/6/2019 xray was negative for infection or fluid so expect this is related to disease. No signs of infection- will continue to monitor.   Weight loss/ appetite changes- related to treatment- reviewed diet needs with increased proteins and fluids. Given samples of supplements with coupons.    This was a 45 min visit with > 50% in counseling and coordinating care including education and management of concerns.    Lazara Leon,CNP

## 2020-01-03 NOTE — NURSING NOTE
"Oncology Rooming Note    January 3, 2020 1:37 PM   Gilson Curtis is a 79 year old male who presents for:    Chief Complaint   Patient presents with     Oncology Clinic Visit     Follow Up     Initial Vitals: /71 (BP Location: Right arm)   Pulse 91   Temp 97.8  F (36.6  C) (Oral)   Resp 20   Ht 1.753 m (5' 9.02\")   Wt 65.6 kg (144 lb 11.2 oz)   SpO2 97%   BMI 21.36 kg/m   Estimated body mass index is 21.36 kg/m  as calculated from the following:    Height as of this encounter: 1.753 m (5' 9.02\").    Weight as of this encounter: 65.6 kg (144 lb 11.2 oz). Body surface area is 1.79 meters squared.  Mild Pain (2) Comment: Data Unavailable   No LMP for male patient.  Allergies reviewed: Yes  Medications reviewed: Yes    Medications: Medication refills not needed today.  Pharmacy name entered into [employer]:    [facility] [address] [place] [address] - [address] [facility] [address] [facility] JOSEPH[facility]IDA GONSALEZ - [address] [facility] - [place], [place] - [address] [facility]  [id number] - [place], [place] - [address]              "

## 2020-01-03 NOTE — LETTER
1/3/2020         RE: Gilson Curtis  [address] [facility] [address]        Dear Colleague,    Thank you for referring your patient, Gilson Curtis, to the [facility]. Please see a copy of my visit note below.    Oncology Follow Up Visit: January 3, 2020     Oncologist: Dr Gerry Ahumada  PCP: Hemant Jiménez    Diagnosis: Stage IV Lung Cancer  Gilson Curtis is an 78 yo who complained of hemoptysis in 11/2011 and was found to have a 2 x 1.9 cm RUL mass. 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 Curtis comes to clinic with wife for review of Tagrisso use for his lung cancer. Noting fatigue, lack of appetite, taste changes with weight loss with recent treatment that he has not had previously while on Tagrisso. He did complete his radiation to the left hip and feels he is gotten much better pain control ranking 2/10 with no pain medications used and is now able to cross his legs.  He has had no nausea mouth sores fevers or illnesses and bowel and bladder are now normal he does admit to some weakness but is using his walker to get around and has had no falls he also admits to some anxiety and depression is not treating at this time and having some trouble with sleep related to recurrent urination.  Wanted to go through results of the guardant 360.  Cough from December is significantly improved though is not resolved and mentions some shortness of breath with exertion-no  "recent fevers.  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 Pérez     Skin cancer, basal cell      Stented coronary artery 2009    x2     TIA (transient ischemic attack) 2002     Current Outpatient Medications   Medication     aspirin 81 MG tablet     calcium carbonate (TUMS) 500 MG chewable tablet     GABAPENTIN PO     lisinopril (PRINIVIL,ZESTRIL) 2.5 MG tablet     Multiple Vitamins-Minerals (EYE VITAMINS PO)     Multiple Vitamins-Minerals (MATURE ADULT CENTURY PO)     osimertinib (TAGRISSO) 80 MG tablet     prochlorperazine (COMPAZINE) 10 MG tablet     simvastatin (ZOCOR) 80 MG tablet     guaiFENesin-codeine (ROBITUSSIN AC) 100-10 MG/5ML solution     order for DME     osimertinib (TAGRISSO) 80 MG tablet     No current facility-administered medications for this visit.      Facility-Administered Medications Ordered in Other Visits   Medication     iopamidol (ISOVUE-370) solution 103 mL     Allergies   Allergen Reactions     No Known Drug Allergy        Physical Exam:/71 (BP Location: Right arm)   Pulse 91   Temp 97.8  F (36.6  C) (Oral)   Resp 20   Ht 1.753 m (5' 9.02\")   Wt 65.6 kg (144 lb 11.2 oz)   SpO2 97%   BMI 21.36 kg/m       ECOG PS- 0  Constitutional: Alert, cooperative, and appears in no distress.  ENT: Eyes bright, No mouth sores  Neck: Supple, No adenopathy.  Cardiac: Heart rate and rhythm is regular and strong without murmur  Respiratory: Breathing easy. Lung sounds clear to auscultation-cough is noted to be loose though not productive.  GI: Abdomen is soft, non-tender, BS normal. " No masses or organomegaly  MS: Muscle tone fair, extremities normal with no edema.   Skin: alejandro complexion to the face but no rash.   Neuro: Sensory grossly WNL, gait normal with walker-appears to get dizziness when going from a laying position to a standing.  Lymph: Normal ant/post cervical, axillary, supraclavicular nodes  Psych: Mentation appears normal and affect normal/bright with good conversation.     Laboratory Results:   Results for orders placed or performed in visit on 01/03/20   Comprehensive metabolic panel     Status: Abnormal   Result Value Ref Range    Sodium 140 133 - 144 mmol/L    Potassium 4.6 3.4 - 5.3 mmol/L    Chloride 107 94 - 109 mmol/L    Carbon Dioxide 26 20 - 32 mmol/L    Anion Gap 7 3 - 14 mmol/L    Glucose 156 (H) 70 - 99 mg/dL    Urea Nitrogen 31 (H) 7 - 30 mg/dL    Creatinine 1.66 (H) 0.66 - 1.25 mg/dL    GFR Estimate 38 (L) >60 mL/min/[1.73_m2]    GFR Estimate If Black 44 (L) >60 mL/min/[1.73_m2]    Calcium 9.3 8.5 - 10.1 mg/dL    Bilirubin Total 0.5 0.2 - 1.3 mg/dL    Albumin 3.1 (L) 3.4 - 5.0 g/dL    Protein Total 6.9 6.8 - 8.8 g/dL    Alkaline Phosphatase 125 40 - 150 U/L    ALT 30 0 - 70 U/L    AST 30 0 - 45 U/L   CBC with platelets differential     Status: Abnormal   Result Value Ref Range    WBC 5.1 4.0 - 11.0 10e9/L    RBC Count 4.15 (L) 4.4 - 5.9 10e12/L    Hemoglobin 12.4 (L) 13.3 - 17.7 g/dL    Hematocrit 40.3 40.0 - 53.0 %    MCV 97 78 - 100 fl    MCH 29.9 26.5 - 33.0 pg    MCHC 30.8 (L) 31.5 - 36.5 g/dL    RDW 14.8 10.0 - 15.0 %    Platelet Count 133 (L) 150 - 450 10e9/L    Diff Method Automated Method     % Neutrophils 76.6 %    % Lymphocytes 12.5 %    % Monocytes 9.3 %    % Eosinophils 1.0 %    % Basophils 0.4 %    % Immature Granulocytes 0.2 %    Absolute Neutrophil 3.9 1.6 - 8.3 10e9/L    Absolute Lymphocytes 0.6 (L) 0.8 - 5.3 10e9/L    Absolute Monocytes 0.5 0.0 - 1.3 10e9/L    Absolute Eosinophils 0.1 0.0 - 0.7 10e9/L    Absolute Basophils 0.0 0.0 - 0.2 10e9/L     Abs Immature Granulocytes 0.0 0 - 0.4 10e9/L       Assessment and Plan:   Stage IV Lung Cancer-Pt continues with use of Tagrisso 80 mg po daily and feels he is tolerating well though has had some new symptoms with change in appetite and taste that has led to some weight loss.  He otherwise is tolerating theTagrisso so well after a near 2-year period of time.  He is completed radiation to the left hip which has improved movement and pain. He is now set to start  With addition of Avastin to his Tagrisso to help with liver metastasis. Education re: medication administration and potential side effects were given and pt gave approval for start of medication today.   Pt will return in 3 weeks for continuation of Avastin and will have labs and provider visit prior to infusion for symptom evaluation.   Cough- improved from previous.- 12/6/2019 xray was negative for infection or fluid so expect this is related to disease. No signs of infection- will continue to monitor.   Weight loss/ appetite changes- related to treatment- reviewed diet needs with increased proteins and fluids. Given samples of supplements with coupons.    This was a 45 min visit with > 50% in counseling and coordinating care including education and management of concerns.    Lazara Leon CNP      Again, thank you for allowing me to participate in the care of your patient.        Sincerely,        Lazara Leon NP, APRN CNP

## 2020-01-03 NOTE — PROGRESS NOTES
Infusion Nursing Note:  Gilson Curtis presents today for  New Avastin .    Patient seen by provider today: Yes: Lazara Leon NP   present during visit today: Not Applicable.    Note: Pharmacist provided Avastin teaching today.  All questions answered and both patient and wife verbalized understanding.     Avastin orders added to oral chemo plan by Dr Ahumada. Today was his first dose of avastin. Tolerated the infusion without problems. Future appts made for every 3 weeks.     Intravenous Access:  Peripheral IV placed.    Treatment Conditions:  Lab Results   Component Value Date    HGB 12.4 01/03/2020     Lab Results   Component Value Date    WBC 5.1 01/03/2020      Lab Results   Component Value Date    ANEU 3.9 01/03/2020     Lab Results   Component Value Date     01/03/2020      Lab Results   Component Value Date     01/03/2020                   Lab Results   Component Value Date    POTASSIUM 4.6 01/03/2020           Lab Results   Component Value Date    MAG 2.0 10/10/2017            Lab Results   Component Value Date    CR 1.66 01/03/2020                   Lab Results   Component Value Date    RICK 9.3 01/03/2020                Lab Results   Component Value Date    BILITOTAL 0.5 01/03/2020           Lab Results   Component Value Date    ALBUMIN 3.1 01/03/2020                    Lab Results   Component Value Date    ALT 30 01/03/2020           Lab Results   Component Value Date    AST 30 01/03/2020       Results reviewed, labs MET treatment parameters, ok to proceed with treatment.      Post Infusion Assessment:  Patient tolerated infusion without incident.  Site patent and intact, free from redness, edema or discomfort.  No evidence of extravasations.  Access discontinued per protocol.       Discharge Plan:   Discharge instructions reviewed with: Patient and Family.  Patient and/or family verbalized understanding of discharge instructions and all questions answered.  Patient discharged  in stable condition accompanied by: wife.  Departure Mode: Ambulatory.    Gini Berry RN

## 2020-01-21 NOTE — PROGRESS NOTES
"Palliative Care Outpatient Clinic Consultation Note    Patient:  Gilson Curtis    This note was written using voice recognition. I did proof read, but might have missed some things. Please contact me for major errors.    Chief Complaint:   Gilson Curtis 79 year old male who is presenting to the palliative medicine clinic today at the request of Dr Ahumada for a palliative care consultation secondary to NSCLC.       The patient's primary care provider is:  Hemant Jiménez.     The patient is here with his wife Kaye    History of Present Illness:  Medical History:  - NSCLC diagnosed 2011   - sp systemic therapies, now on Tagrisso/Avastin   - metastatic to bone, known at least since 2018    - radiation to L hip Dec 2019   - Melanoma on L neck s/p removal Oct 2019   - describes fatigue preceding the surgery and ongoing functional decline since    Introduced the scope of our practice to Gilson and Kaye. Discussed our potential roles for symptom management, support/coping, and decisional support (aka goals of care).    Gilson had severe pain in his left hip due to a bone metastasis. This improved markedly after radiation. He doesn't require any pain medications anymore, last tylenol was over a week ago. He is still weaker and has a sense of instability in his left hip area. Hasn't seen PT yet.     His appetite is limited. He lost 15 pounds a few months ago and has maintained a stable weight since. His food preferences have changed markedly. We discuss strategies to address that. He already adds snacks between meals and has adjusted his meals to include more soft, bland foods.  He received some edible cannabis product from a family member which was labeled as containing \"10\". He took two without noting any effect.    Sleep has been limited. He goes to bed around 9-10pm, often takes up to an hour to go to sleep. Most nights he wakes up around 3-4 for a short time. He usually goes back to sleep in a recliner after that. Then " "sleeps until around 9am and usually wakes up from coughing or a dry mouth. He also naps several times during the day. He never feels entirely well rested.   There seems to be some issue around the achiness in his hip that prevents sleep, he does endorse occasional worries as well. Overall it seems that he has fatigue and never feels he gets \"enough\" sleep rather than not being able to sleep.       Patient's Disease Understanding: He is unable to state what the expectation from the chemotherapy is. When I explain the treatment as being palliative, Kaye does remember that Dr Ahumada told them that.   Gilson is quite withdrawn during this part of our conversation and doesn't engage nearly as well as at other times.     Social History  Living Situation: with Kaye    Children: 3 adult children    Occupation: , retired    Social History     Tobacco Use     Smoking status: Former Smoker     Packs/day: 1.00     Years: 20.00     Pack years: 20.00     Types: Cigarettes     Last attempt to quit: 1978     Years since quittin.0     Smokeless tobacco: Never Used   Substance Use Topics     Alcohol use: No     Alcohol/week: 0.0 standard drinks     Drug use: No       Advance Care Planning:  He is hoping to \"see some progress\". I explore what progress would look like to him and it seems that more energy and feeling better would be important to him. We didn't discuss goals and treatment preferences in more detail today. They agree to talk more at his next visit.     Advance Directive:    Dated , confirmed 2016 per our documentation    Health Care Agent Contact Information: spouse as agent with their 2 sons and daughter as alternate. Confirmed today as well.     Recommendations & Counseliny/o man with metastatic NSCLC    Appetite, po intake: discussed expected changes and possible adjustments at length today.   - recommended schedule of frequent small meals  - discussed typically preferred foods and " "importance to focus on calorie dense foods  - offered referral to nutrition. They will call should he feel that would be helpful    Pain: some residual pain, but mainly functional limitations in left hip to which he had radiation. He also has left shoulder pain, which seems to be due to degenerative disease. Lastly, he has tenderness in his right rib cage since a surgery a few years ago  - home PT for hip and shoulder  - lidocaine patch to rib cage  - increase gabapentin to 200mg HS for convenience. Will not increase dose at this time considering his decreased renal function    Sleep/Fatigue: Gilson focuses this conversation on the aspect of \"not sleeping well\". It is difficult to get more specific information. He agrees to reassess once his pain is better controlled and he is working with PT. At this time I don't feel he would benefit from pharmacological interventions.    Return to clinic in 2 months.    Data and Chart Review:    REVIEW OF SYSTEMS:   ROS: 10 point ROS neg other than the symptoms noted above in the HPI and here.    Physical Exam:  /68 (BP Location: Right leg)   Pulse 100   Temp 98  F (36.7  C) (Oral)   Resp 22   Wt 67.6 kg (149 lb)   SpO2 100%   BMI 21.99 kg/m       CONSTIT: awake, appears comfortable  EENT: MMM, EOMI, no icterus  RESP: reg, nl effort, frequent cough  CARDIOVASC: RRR, no m/r/g  GI: soft, decreased bowel sounds  MSK:moves x4, uses walker  SKIN:  warm, no rash, no obvious lesions  NEURO: alert, oriented x3  PSYCH: appropriate affect, memory and thought process intact    Medications, current, pertinent:  Gabapentin 150mg HS    : ok    Allergies   Allergen Reactions     No Known Drug Allergy      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 Pérez     Skin cancer, basal cell      Stented coronary artery 2009    x2     TIA (transient ischemic attack) 2002     Past Surgical History:   Procedure Laterality Date     CATARACT IOL, RT/LT Left 05/2015     CATARACT IOL, RT/LT Right 06/2015     CATHETER, ABLATION  06/2006    [facility]     CHOLECYSTECTOMY  10/2012     ENDOBRONCHIAL ULTRASOUND FLEXIBLE  2/27/2012    Procedure:ENDOBRONCHIAL ULTRASOUND FLEXIBLE; Flexible Bronchoscopy, Endo Bronchial Ultrasound, Transbroncial biopies (Cytology specimens taken by Cytology); Surgeon:BRITTA MEDEL; Location:[facility]     EXCISION OF BASAL CELL CARCINOMA  10/2005    Left upper back     HERNIA REPAIR Bilateral 12/2010    Inguinal     PROSTATECTOMY RETROPUBIC RADICAL  04/2009     REMOVAL OF MELANOMA BEHIND LEFT EAR Left 10/18/2019    [facility]     RESECT RIB Right 4/8/2016    Procedure: RESECT RIB;  Surgeon: Jamarcus Gannon MD;  Location: [facility] OR     STENT  08/2009    Angiogram/ 2 Taxus Stents - Mid LAD, Mid CMX     THORACOSCOPIC RESECTION LUNG  3/6/2012    Procedure:THORACOSCOPIC RESECTION LUNG; right video assisted thoracic surgery, wedge resection middle lobe x 2, right thoroctomy, right upper lobectomy, mediastinal lymphadenectomy, flexible bronchoscopy; Surgeon:BRITTA MEDEL; Location:[facility] OR       Family History  Family History   Problem Relation Age of Onset     Breast Cancer Sister 65     Breast Cancer Maternal Aunt      Bladder Cancer Son        Lab and imaging data Reviewed:  Comments:   GFR 38  Albumin 3.1      Ashlie Fernando MD  Palliative Medicine  Pager [phone]

## 2020-01-22 NOTE — PATIENT INSTRUCTIONS
Patient Instructions      Expand All Collapse All    Thank you for coming into the Palliative Care Clinic today.     1. Pain:  - I referred you to physical therapy to address the pain in your left hip and in your right shoulder  - Please increase gabapentin to 200mg at bedtime, I prescribed 100mg capsules so you can take 2 capsules each night.    - apply lidocaine patch to the painful area on your ribs        - if it is not covered by insurance, please buy a 4% patch over the counter        - you can try using the patch on your shoulder as well    2. Appetite  - please call if you would like a referral to nutrition      Return to clinic in 2 months for a follow up.     You can reach the Palliative Care Team during business hours at the following number:    - [phone]    To reach the Palliative Care Provider on-call After-hours or on holidays and weekends, call: [phone].  Please note that we are not able to provide pain medication refills on evenings or weekends.

## 2020-01-22 NOTE — PROGRESS NOTES
"Oncology Rooming Note    January 22, 2020 9:24 AM   Gilson Curtis is a 79 year old male who presents for:    Chief Complaint   Patient presents with     Oncology Clinic Visit     Initial Vitals: /68 (BP Location: Right leg)   Pulse 100   Temp 98  F (36.7  C) (Oral)   Resp 22   Wt 67.6 kg (149 lb)   SpO2 100%   BMI 21.99 kg/m   Estimated body mass index is 21.99 kg/m  as calculated from the following:    Height as of 1/3/20: 1.753 m (5' 9.02\").    Weight as of this encounter: 67.6 kg (149 lb). Body surface area is 1.81 meters squared.  Mild Pain (2) Comment: Data Unavailable   No LMP for male patient.  Allergies reviewed: Yes  Medications reviewed: No    Medications: Medication refills not needed today.  Pharmacy name entered into [employer]:    [facility] [address] [place] [address] - [address] [facility] [place] - [place] - [address] [facility]  [facility] - [place], [place] - [address]  [address] [id number] [place], [place] - [address] RN              "

## 2020-01-22 NOTE — LETTER
"    1/22/2020         RE: Gilson Curtis  [address] [address]        Dear Colleague,    Thank you for referring your patient, Gilson Curtis, to the [facility]. Please see a copy of my visit note below.    Palliative Care Outpatient Clinic Consultation Note    Patient:  Gilson Curtis    This note was written using voice recognition. I did proof read, but might have missed some things. Please contact me for major errors.    Chief Complaint:   Gilson Curtis 79 year old male who is presenting to the palliative medicine clinic today at the request of Dr Ahumada for a palliative care consultation secondary to NSCLC.       The patient's primary care provider is:  Hemant Jiménez.     The patient is here with his wife Kaye    History of Present Illness:  Medical History:  - NSCLC diagnosed 2011   - sp systemic therapies, now on Tagrisso/Avastin   - metastatic to bone, known at least since 2018    - radiation to L hip Dec 2019   - Melanoma on L neck s/p removal Oct 2019   - describes fatigue preceding the surgery and ongoing functional decline since    Introduced the scope of our practice to Gilson and Kaye. Discussed our potential roles for symptom management, support/coping, and decisional support (aka goals of care).    Gilson had severe pain in his left hip due to a bone metastasis. This improved markedly after radiation. He doesn't require any pain medications anymore, last tylenol was over a week ago. He is still weaker and has a sense of instability in his left hip area. Hasn't seen PT yet.     His appetite is limited. He lost 15 pounds a few months ago and has maintained a stable weight since. His food preferences have changed markedly. We discuss strategies to address that. He already adds snacks between meals and has adjusted his meals to include more soft, bland foods.  He received some edible cannabis product from a family member which was labeled as containing \"10\". " "He took two without noting any effect.    Sleep has been limited. He goes to bed around 9-10pm, often takes up to an hour to go to sleep. Most nights he wakes up around 3-4 for a short time. He usually goes back to sleep in a recliner after that. Then sleeps until around 9am and usually wakes up from coughing or a dry mouth. He also naps several times during the day. He never feels entirely well rested.   There seems to be some issue around the achiness in his hip that prevents sleep, he does endorse occasional worries as well. Overall it seems that he has fatigue and never feels he gets \"enough\" sleep rather than not being able to sleep.       Patient's Disease Understanding: He is unable to state what the expectation from the chemotherapy is. When I explain the treatment as being palliative, Kaye does remember that Dr Ahumada told them that.   Gilson is quite withdrawn during this part of our conversation and doesn't engage nearly as well as at other times.     Social History  Living Situation: with Kaye    Children: 3 adult children    Occupation: , retired    Social History     Tobacco Use     Smoking status: Former Smoker     Packs/day: 1.00     Years: 20.00     Pack years: 20.00     Types: Cigarettes     Last attempt to quit: 1978     Years since quittin.0     Smokeless tobacco: Never Used   Substance Use Topics     Alcohol use: No     Alcohol/week: 0.0 standard drinks     Drug use: No       Advance Care Planning:  He is hoping to \"see some progress\". I explore what progress would look like to him and it seems that more energy and feeling better would be important to him. We didn't discuss goals and treatment preferences in more detail today. They agree to talk more at his next visit.     Advance Directive:    Dated , confirmed 2016 per our documentation    Health Care Agent Contact Information: spouse as agent with their 2 sons and daughter as alternate. Confirmed today as well. " "    Recommendations & Counseliny/o man with metastatic NSCLC    Appetite, po intake: discussed expected changes and possible adjustments at length today.   - recommended schedule of frequent small meals  - discussed typically preferred foods and importance to focus on calorie dense foods  - offered referral to nutrition. They will call should he feel that would be helpful    Pain: some residual pain, but mainly functional limitations in left hip to which he had radiation. He also has left shoulder pain, which seems to be due to degenerative disease. Lastly, he has tenderness in his right rib cage since a surgery a few years ago  - home PT for hip and shoulder  - lidocaine patch to rib cage  - increase gabapentin to 200mg HS for convenience. Will not increase dose at this time considering his decreased renal function    Sleep/Fatigue: Gilson focuses this conversation on the aspect of \"not sleeping well\". It is difficult to get more specific information. He agrees to reassess once his pain is better controlled and he is working with PT. At this time I don't feel he would benefit from pharmacological interventions.    Return to clinic in 2 months.    Data and Chart Review:    REVIEW OF SYSTEMS:   ROS: 10 point ROS neg other than the symptoms noted above in the HPI and here.    Physical Exam:  /68 (BP Location: Right leg)   Pulse 100   Temp 98  F (36.7  C) (Oral)   Resp 22   Wt 67.6 kg (149 lb)   SpO2 100%   BMI 21.99 kg/m        CONSTIT: awake, appears comfortable  EENT: MMM, EOMI, no icterus  RESP: reg, nl effort, frequent cough  CARDIOVASC: RRR, no m/r/g  GI: soft, decreased bowel sounds  MSK:moves x4, uses walker  SKIN:  warm, no rash, no obvious lesions  NEURO: alert, oriented x3  PSYCH: appropriate affect, memory and thought process intact    Medications, current, pertinent:  Gabapentin 150mg HS    : ok    Allergies   Allergen Reactions     No Known Drug Allergy      Past Medical History: " "  Diagnosis Date     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 Pérez     Skin cancer, basal cell      Stented coronary artery 2009    x2     TIA (transient ischemic attack) 2002     Past Surgical History:   Procedure Laterality Date     CATARACT IOL, RT/LT Left 05/2015     CATARACT IOL, RT/LT Right 06/2015     CATHETER, ABLATION  06/2006    [facility]     CHOLECYSTECTOMY  10/2012     ENDOBRONCHIAL ULTRASOUND FLEXIBLE  2/27/2012    Procedure:ENDOBRONCHIAL ULTRASOUND FLEXIBLE; Flexible Bronchoscopy, Endo Bronchial Ultrasound, Transbroncial biopies (Cytology specimens taken by Cytology); Surgeon:BRITTA MEDEL; Location:[facility]     EXCISION OF BASAL CELL CARCINOMA  10/2005    Left upper back     HERNIA REPAIR Bilateral 12/2010    Inguinal     PROSTATECTOMY RETROPUBIC RADICAL  04/2009     REMOVAL OF MELANOMA BEHIND LEFT EAR Left 10/18/2019    [facility]     RESECT RIB Right 4/8/2016    Procedure: RESECT RIB;  Surgeon: Jamarcus Gannon MD;  Location: [facility] OR     STENT  08/2009    Angiogram/ 2 Taxus Stents - Mid LAD, Mid CMX     THORACOSCOPIC RESECTION LUNG  3/6/2012    Procedure:THORACOSCOPIC RESECTION LUNG; right video assisted thoracic surgery, wedge resection middle lobe x 2, right thoroctomy, right upper lobectomy, mediastinal lymphadenectomy, flexible bronchoscopy; Surgeon:BRITTA MEDEL; Location:[facility] OR       Family History  Family History   Problem Relation Age of Onset     Breast Cancer Sister 65     Breast Cancer Maternal Aunt      Bladder Cancer Son        Lab and imaging data Reviewed:  Comments: " "  GFR 38  Albumin 3.1      Ashlie Fernando MD  Palliative Medicine  Pager [phone]      Oncology Rooming Note    January 22, 2020 9:24 AM   Gilson Curtis is a 79 year old male who presents for:    Chief Complaint   Patient presents with     Oncology Clinic Visit     Initial Vitals: /68 (BP Location: Right leg)   Pulse 100   Temp 98  F (36.7  C) (Oral)   Resp 22   Wt 67.6 kg (149 lb)   SpO2 100%   BMI 21.99 kg/m    Estimated body mass index is 21.99 kg/m  as calculated from the following:    Height as of 1/3/20: 1.753 m (5' 9.02\").    Weight as of this encounter: 67.6 kg (149 lb). Body surface area is 1.81 meters squared.  Mild Pain (2) Comment: Data Unavailable   No LMP for male patient.  Allergies reviewed: Yes  Medications reviewed: No    Medications: Medication refills not needed today.  Pharmacy name entered into [employer]:    [facility] [address] [place] [address] - [address] [address] [facility] SPECIALTY [place], [place] - [address] [facility] - [place], [place] - [address] [facility]  [address] [id number] [place], [place] - [address]       Priyanka Hudson RN                Again, thank you for allowing me to participate in the care of your patient.        Sincerely,        Ashlie Fernando MD    "

## 2020-01-23 NOTE — PROGRESS NOTES
Dr Fernando ordered Home care for PT   [facility] unable to cover [facility] where he leaves  With Teresa approval referral sent to     [facility]  [address] [place] [id number]

## 2020-01-24 NOTE — LETTER
1/24/2020         RE: Gilson Curtis  [address] [facility] [address]        Dear Colleague,    Thank you for referring your patient, Gilson Curtis, to the [facility]. Please see a copy of my visit note below.    Oncology Follow Up Visit: January 24, 2020     Oncologist: Dr Gerry Ahumada  PCP: Hemant Jiménez    Diagnosis: Stage IV Lung Cancer  Gilson Curtis is an 78 yo who complained of hemoptysis in 11/2011 and was found to have a 2 x 1.9 cm RUL mass. 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 Curtis comes to clinic with wife for review of Tagrisso use for his lung cancer and infusion of Avastin.  Patient states he continues with right hip and shoulder pain ranked 2/10 that he is not using any medication for but has tried heat since he feels it is musculoskeletal.  He also complains of some shortness of breath with exertion as well as some occasional chills but otherwise feels he is getting around with a walker without any recent falls no notes some weakness and neuropathy.  Wife states he is not eating well yet at all due to his appetite changes though he has not lost significant weight with this cycle.  He has not had any chest pain shortness of breath or bleeding issues or bruising concerns.  Rest of comprehensive review of systems is negative.   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 Pérez     Skin cancer, basal cell      Stented coronary artery 2009    x2     TIA (transient ischemic attack) 2002     Current Outpatient Medications   Medication     aspirin 81 MG tablet     calcium carbonate (TUMS) 500 MG chewable tablet     gabapentin (NEURONTIN) 100 MG capsule     guaiFENesin-codeine (ROBITUSSIN AC) 100-10 MG/5ML solution     lidocaine (LIDODERM) 5 % patch     lisinopril (PRINIVIL,ZESTRIL) 2.5 MG tablet     Multiple Vitamins-Minerals (EYE VITAMINS PO)     Multiple Vitamins-Minerals (MATURE ADULT CENTURY PO)     order for DME     osimertinib (TAGRISSO) 80 MG tablet     osimertinib (TAGRISSO) 80 MG tablet     prochlorperazine (COMPAZINE) 10 MG tablet     simvastatin (ZOCOR) 80 MG tablet     No current facility-administered medications for this visit.      Facility-Administered Medications Ordered in Other Visits   Medication     iopamidol (ISOVUE-370) solution 103 mL     Allergies   Allergen Reactions     No Known Drug Allergy        Physical Exam: /71 (BP Location: Right arm)   Pulse 89   Temp 97.8  F (36.6  C) (Oral)   Resp 16   Ht 1.753 m (5' 9.02\")   Wt 66.5 kg (146 lb 11.2 oz)   SpO2 97%   BMI 21.65 kg/m      ECOG PS- 1  Constitutional: Alert, cooperative, and appears in no distress.  ENT: Eyes bright, No mouth sores  Neck: Supple, No adenopathy.  Cardiac: Heart rate and rhythm is regular and strong without murmur  Respiratory: Breathing easy. Lung sounds clear to auscultation-cough is occasional.  GI: Abdomen is soft, non-tender, BS normal. No masses or organomegaly  MS: Muscle tone fair-1 assist to exam table and using walker, extremities " normal with no edema.   Skin: No rash or suspicious lesions  Neuro: Sensory grossly WNL, gait normal with walker.  Lymph: Normal ant/post cervical, axillary, supraclavicular nodes  Psych: Mentation appears normal and affect normal/bright with good conversation.     Laboratory Results:   Results for orders placed or performed in visit on 01/24/20   Comprehensive metabolic panel     Status: Abnormal   Result Value Ref Range    Sodium 139 133 - 144 mmol/L    Potassium 4.4 3.4 - 5.3 mmol/L    Chloride 106 94 - 109 mmol/L    Carbon Dioxide 29 20 - 32 mmol/L    Anion Gap 4 3 - 14 mmol/L    Glucose 134 (H) 70 - 99 mg/dL    Urea Nitrogen 30 7 - 30 mg/dL    Creatinine 1.42 (H) 0.66 - 1.25 mg/dL    GFR Estimate 46 (L) >60 mL/min/[1.73_m2]    GFR Estimate If Black 53 (L) >60 mL/min/[1.73_m2]    Calcium 8.9 8.5 - 10.1 mg/dL    Bilirubin Total 0.5 0.2 - 1.3 mg/dL    Albumin 2.8 (L) 3.4 - 5.0 g/dL    Protein Total 6.5 (L) 6.8 - 8.8 g/dL    Alkaline Phosphatase 108 40 - 150 U/L    ALT 40 0 - 70 U/L    AST 31 0 - 45 U/L   *CBC with platelets differential     Status: Abnormal   Result Value Ref Range    WBC 4.6 4.0 - 11.0 10e9/L    RBC Count 4.03 (L) 4.4 - 5.9 10e12/L    Hemoglobin 12.0 (L) 13.3 - 17.7 g/dL    Hematocrit 40.4 40.0 - 53.0 %     78 - 100 fl    MCH 29.8 26.5 - 33.0 pg    MCHC 29.7 (L) 31.5 - 36.5 g/dL    RDW 15.4 (H) 10.0 - 15.0 %    Platelet Count 152 150 - 450 10e9/L    Diff Method Automated Method     % Neutrophils 72.0 %    % Lymphocytes 15.8 %    % Monocytes 11.0 %    % Eosinophils 0.6 %    % Basophils 0.4 %    % Immature Granulocytes 0.2 %    Absolute Neutrophil 3.3 1.6 - 8.3 10e9/L    Absolute Lymphocytes 0.7 (L) 0.8 - 5.3 10e9/L    Absolute Monocytes 0.5 0.0 - 1.3 10e9/L    Absolute Eosinophils 0.0 0.0 - 0.7 10e9/L    Absolute Basophils 0.0 0.0 - 0.2 10e9/L    Abs Immature Granulocytes 0.0 0 - 0.4 10e9/L       Assessment and Plan:   Stage IV Lung Cancer-Pt continues with use of Tagrisso 80 mg po daily  and Avastin IV.  States he continues to tolerate the Tagrisso well and is not missing doses.  The new Avastin addition has not caused any blood pressure changes or new concerns however he shares that his appetite is not better and he continues with some weakness neuropathies and shortness of breath.  He does meet goals to continue with Avastin and Tagrisso at this time.   Pt will return in 3 weeks for continuation of plan with provider visit prior to infusion.  CKD stage III-he is noted to have a slight elevation of his creatinine today and admits he has not been drinking his usual amount of fluids.  He says he will get better and does not need additional fluids during treatment today.  We will continue to monitor.  Weight loss/ appetite changes/ decreasing albumin-appetite changes related to treatment- reviewed need to increase proteins and just general increasing calories to support changes made during treatment.  Wife will be getting more supplements available for him.  Weakness and continued musculoskeletal pain-patient is using walker and has felt he adjusted to the hip issue well however he is ranking his shoulder and hip pain in 2/10 and is not using any oral pain medications at this time.  He has tried heat and felt it was not helpful but suggest if it is musculoskeletal to the shoulders etc. may be helpful over the long run at short intervals.  Though he says Tylenol is not effective, does not want to have any other medication available at this time.  Further therapy may be helpful in the future.  This was a 25 min visit with > 50% in counseling and coordinating care including education and management of concerns.    Lzaara Leon CNP      Again, thank you for allowing me to participate in the care of your patient.        Sincerely,        Lazara Leon, KARLIE, APRN CNP

## 2020-01-24 NOTE — PROGRESS NOTES
Infusion Nursing Note:  Gilson Curtis presents today for Avastin.    Patient seen by provider today: Yes: KARLIE Haile   present during visit today: Not Applicable.    Note: N/A.    Intravenous Access:  Peripheral IV placed.    Treatment Conditions:  Results reviewed, labs MET treatment parameters, ok to proceed with treatment.      Post Infusion Assessment:  Patient tolerated infusion without incident.  Site patent and intact, free from redness, edema or discomfort.  No evidence of extravasations.  Access discontinued per protocol.       Discharge Plan:   Patient will return 2/14 for next appointment.   Patient discharged in stable condition accompanied by: self and wife.  Departure Mode: Ambulatory.    Jaycee Mix RN

## 2020-01-24 NOTE — PROGRESS NOTES
"Oncology Follow Up Visit: January 24, 2020     Oncologist: Dr Gerry Ahumada  PCP: Hemant Jiménez    Diagnosis: Stage IV Lung Cancer  Gilson Curtis is an 78 yo who complained of hemoptysis in 11/2011 and was found to have a 2 x 1.9 cm RUL mass. 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 Curtis comes to clinic with wife for review of Tagrisso use for his lung cancer and infusion of Avastin.  Patient states he continues with right hip and shoulder pain ranked 2/10 that he is not using any medication for but has tried heat since he feels it is musculoskeletal.  He also complains of some shortness of breath with exertion as well as some occasional chills but otherwise feels he is getting around with a walker without any recent falls no notes some weakness and neuropathy.  Wife states he is not eating well yet at all due to his appetite changes though he has not lost significant weight with this cycle.  He has not had any chest pain shortness of breath or bleeding issues or bruising concerns.  Rest of comprehensive review of systems is negative.   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 Pérez     Skin cancer, basal cell      Stented coronary artery 2009    x2     TIA (transient ischemic attack) 2002     Current Outpatient Medications   Medication     aspirin 81 MG tablet     calcium carbonate (TUMS) 500 MG chewable tablet     gabapentin (NEURONTIN) 100 MG capsule     guaiFENesin-codeine (ROBITUSSIN AC) 100-10 MG/5ML solution     lidocaine (LIDODERM) 5 % patch     lisinopril (PRINIVIL,ZESTRIL) 2.5 MG tablet     Multiple Vitamins-Minerals (EYE VITAMINS PO)     Multiple Vitamins-Minerals (MATURE ADULT CENTURY PO)     order for DME     osimertinib (TAGRISSO) 80 MG tablet     osimertinib (TAGRISSO) 80 MG tablet     prochlorperazine (COMPAZINE) 10 MG tablet     simvastatin (ZOCOR) 80 MG tablet     No current facility-administered medications for this visit.      Facility-Administered Medications Ordered in Other Visits   Medication     iopamidol (ISOVUE-370) solution 103 mL     Allergies   Allergen Reactions     No Known Drug Allergy        Physical Exam: /71 (BP Location: Right arm)   Pulse 89   Temp 97.8  F (36.6  C) (Oral)   Resp 16   Ht 1.753 m (5' 9.02\")   Wt 66.5 kg (146 lb 11.2 oz)   SpO2 97%   BMI 21.65 kg/m     ECOG PS- 1  Constitutional: Alert, cooperative, and appears in no distress.  ENT: Eyes bright, No mouth sores  Neck: Supple, No adenopathy.  Cardiac: Heart rate and rhythm is regular and strong without murmur  Respiratory: Breathing easy. Lung sounds clear to auscultation-cough is occasional.  GI: Abdomen is soft, non-tender, BS normal. No masses or organomegaly  MS: Muscle tone fair-1 assist to exam table and using walker, extremities normal with no edema.   Skin: No rash or suspicious lesions  Neuro: Sensory grossly WNL, gait normal with walker.  Lymph: Normal ant/post cervical, axillary, supraclavicular nodes  Psych: Mentation appears normal and affect normal/bright with good " conversation.     Laboratory Results:   Results for orders placed or performed in visit on 01/24/20   Comprehensive metabolic panel     Status: Abnormal   Result Value Ref Range    Sodium 139 133 - 144 mmol/L    Potassium 4.4 3.4 - 5.3 mmol/L    Chloride 106 94 - 109 mmol/L    Carbon Dioxide 29 20 - 32 mmol/L    Anion Gap 4 3 - 14 mmol/L    Glucose 134 (H) 70 - 99 mg/dL    Urea Nitrogen 30 7 - 30 mg/dL    Creatinine 1.42 (H) 0.66 - 1.25 mg/dL    GFR Estimate 46 (L) >60 mL/min/[1.73_m2]    GFR Estimate If Black 53 (L) >60 mL/min/[1.73_m2]    Calcium 8.9 8.5 - 10.1 mg/dL    Bilirubin Total 0.5 0.2 - 1.3 mg/dL    Albumin 2.8 (L) 3.4 - 5.0 g/dL    Protein Total 6.5 (L) 6.8 - 8.8 g/dL    Alkaline Phosphatase 108 40 - 150 U/L    ALT 40 0 - 70 U/L    AST 31 0 - 45 U/L   *CBC with platelets differential     Status: Abnormal   Result Value Ref Range    WBC 4.6 4.0 - 11.0 10e9/L    RBC Count 4.03 (L) 4.4 - 5.9 10e12/L    Hemoglobin 12.0 (L) 13.3 - 17.7 g/dL    Hematocrit 40.4 40.0 - 53.0 %     78 - 100 fl    MCH 29.8 26.5 - 33.0 pg    MCHC 29.7 (L) 31.5 - 36.5 g/dL    RDW 15.4 (H) 10.0 - 15.0 %    Platelet Count 152 150 - 450 10e9/L    Diff Method Automated Method     % Neutrophils 72.0 %    % Lymphocytes 15.8 %    % Monocytes 11.0 %    % Eosinophils 0.6 %    % Basophils 0.4 %    % Immature Granulocytes 0.2 %    Absolute Neutrophil 3.3 1.6 - 8.3 10e9/L    Absolute Lymphocytes 0.7 (L) 0.8 - 5.3 10e9/L    Absolute Monocytes 0.5 0.0 - 1.3 10e9/L    Absolute Eosinophils 0.0 0.0 - 0.7 10e9/L    Absolute Basophils 0.0 0.0 - 0.2 10e9/L    Abs Immature Granulocytes 0.0 0 - 0.4 10e9/L       Assessment and Plan:   Stage IV Lung Cancer-Pt continues with use of Tagrisso 80 mg po daily and Avastin IV.  States he continues to tolerate the Tagrisso well and is not missing doses.  The new Avastin addition has not caused any blood pressure changes or new concerns however he shares that his appetite is not better and he continues with  some weakness neuropathies and shortness of breath.  He does meet goals to continue with Avastin and Tagrisso at this time.   Pt will return in 3 weeks for continuation of plan with provider visit prior to infusion.  CKD stage III-he is noted to have a slight elevation of his creatinine today and admits he has not been drinking his usual amount of fluids.  He says he will get better and does not need additional fluids during treatment today.  We will continue to monitor.  Weight loss/ appetite changes/ decreasing albumin-appetite changes related to treatment- reviewed need to increase proteins and just general increasing calories to support changes made during treatment.  Wife will be getting more supplements available for him.  Weakness and continued musculoskeletal pain-patient is using walker and has felt he adjusted to the hip issue well however he is ranking his shoulder and hip pain in 2/10 and is not using any oral pain medications at this time.  He has tried heat and felt it was not helpful but suggest if it is musculoskeletal to the shoulders etc. may be helpful over the long run at short intervals.  Though he says Tylenol is not effective, does not want to have any other medication available at this time.  Further therapy may be helpful in the future.  This was a 25 min visit with > 50% in counseling and coordinating care including education and management of concerns.    Lazara Leon,CNP

## 2020-01-24 NOTE — NURSING NOTE
"Oncology Rooming Note    January 24, 2020 10:55 AM   Gilson Curtis is a 79 year old male who presents for:    Chief Complaint   Patient presents with     Oncology Clinic Visit     3 week follow up     Initial Vitals: /71 (BP Location: Right arm)   Pulse 89   Temp 97.8  F (36.6  C) (Oral)   Resp 16   Ht 1.753 m (5' 9.02\")   Wt 66.5 kg (146 lb 11.2 oz)   SpO2 97%   BMI 21.65 kg/m   Estimated body mass index is 21.65 kg/m  as calculated from the following:    Height as of this encounter: 1.753 m (5' 9.02\").    Weight as of this encounter: 66.5 kg (146 lb 11.2 oz). Body surface area is 1.8 meters squared.  Mild Pain (2) Comment: Data Unavailable   No LMP for male patient.  Allergies reviewed: Yes  Medications reviewed: Yes    Medications: Medication refills not needed today.  Pharmacy name entered into Triductor:    [facility] [address] [place] [address] - [address] [facility] [address] [facility] [address] [facility] - [place], [place] - [address] [facility] [facility] [address]  [id number] - [place] - [address]    Pippa Chaney LPN              "

## 2020-01-30 NOTE — TELEPHONE ENCOUNTER
Prior Authorization Retail Medication Request    Medication/Dose: Lidocaine 5% patches  ICD code (if different than what is on RX):  See chart.   Previously Tried and Failed:  See chart  Rationale:  See chart    Insurance Name:  [employer]  Insurance ID:  [id number]      Pharmacy Information (if different than what is on RX)  Name:  .  Phone:  .    Pippa Chaney LPN on 1/30/2020 at 2:48 PM

## 2020-02-04 NOTE — TELEPHONE ENCOUNTER
Discussed results with KARLIE Haiel of CT finding of recent showing mildly displaced fracture of left iliac bone - and request to see orthopedics for consultation.  Writer reached out to BAR Camp at the [facility]; Dr. Wise would be able to see patient tomorrow at the [facility] @ 2:00.  Writer contacted patient and reviewed results; advised would be in his benefit to be seen since bone is fragile and causing him instability and pain.  He agrees with plan.

## 2020-02-05 NOTE — LETTER
2/5/2020       RE: Gilson Curtis  [address] [facility] [address]     Dear Colleague,    Thank you for referring your patient, Gilson Curtis, to the [facility] at [facility]. Please see a copy of my visit note below.    This patient with a history of non-small cell lung cancer suffered a fracture of his iliac wing probably about 2 months ago.  He has had a lytic lesion there for about a year.  He used to had of 8 out of 10 pain and noticeable motion of that left iliac crest but his pain now is 1-2 out of 10 and he is able to wear closed comfortably over that area and does not notice movement of the bone.  I reviewed his patient survey information in the EMR.    On examination he is alert oriented has a normal mood and affect and is no acute distress.  He has a productive cough.  Respirations are regular and unlabored eyes are nonicteric.  He uses a walker and when he stands he does so quite slowly but he is able to do that independently.  Over his left iliac crest he is really nontender and I do not detect gross motion there.  He has no overlying skin lesions in the left pelvis or flank area.  I do not notice any soft tissue masses here either.    I reviewed the patient's CT scans going back to July 2019.  He has a progressive lytic lesion.  This seems to have stopped and started to feel in a little bit after his radiation but he went on to fracture.  He has a displaced fracture of the left iliac crest that does not involve any joints.  There is some signs of bony healing especially anteriorly.    This patient has pathologic fracture of his left iliac crest but his symptoms are relatively mild.  One could do plating or some percutaneous screws to further stabilize this however at this point it is fairly stable.  I am not sure if he will ever have the ability to heal this so it is difficult for me with surgery to promise that the patient will have  pain less than his 1-2 out of 10 that he has right now.  This does not seem to be impeding his ability to walk or do activities of daily living so it does not seem to be a primary functional concern either.  He will return to see me if he has increased pain or difficulty doing activities because of his left pelvis but for right now I think is very reasonable to leave this alone.  Having said that he may still engage in physical activities as tolerated including therapy.  He will return to see me as needed.  I have answered all of his questions.    Again, thank you for allowing me to participate in the care of your patient.      Sincerely,    Yusuf Wise MD

## 2020-02-05 NOTE — NURSING NOTE
"Reason For Visit:   Chief Complaint   Patient presents with     Consult     displaced fracture of left iliac       Ht 1.753 m (5' 9.02\")   Wt 66.2 kg (146 lb)   BMI 21.55 kg/m      Pain Assessment  Patient Currently in Pain: Yes  0-10 Pain Scale: 2  Primary Pain Location: (left iliac)  Pain Descriptors: Aching          Maldonado Joann ATC    "

## 2020-02-05 NOTE — PROGRESS NOTES
This patient with a history of non-small cell lung cancer suffered a fracture of his iliac wing probably about 2 months ago.  He has had a lytic lesion there for about a year.  He used to had of 8 out of 10 pain and noticeable motion of that left iliac crest but his pain now is 1-2 out of 10 and he is able to wear closed comfortably over that area and does not notice movement of the bone.  I reviewed his patient survey information in the EMR.    On examination he is alert oriented has a normal mood and affect and is no acute distress.  He has a productive cough.  Respirations are regular and unlabored eyes are nonicteric.  He uses a walker and when he stands he does so quite slowly but he is able to do that independently.  Over his left iliac crest he is really nontender and I do not detect gross motion there.  He has no overlying skin lesions in the left pelvis or flank area.  I do not notice any soft tissue masses here either.    I reviewed the patient's CT scans going back to July 2019.  He has a progressive lytic lesion.  This seems to have stopped and started to feel in a little bit after his radiation but he went on to fracture.  He has a displaced fracture of the left iliac crest that does not involve any joints.  There is some signs of bony healing especially anteriorly.    This patient has pathologic fracture of his left iliac crest but his symptoms are relatively mild.  One could do plating or some percutaneous screws to further stabilize this however at this point it is fairly stable.  I am not sure if he will ever have the ability to heal this so it is difficult for me with surgery to promise that the patient will have pain less than his 1-2 out of 10 that he has right now.  This does not seem to be impeding his ability to walk or do activities of daily living so it does not seem to be a primary functional concern either.  He will return to see me if he has increased pain or difficulty doing activities  because of his left pelvis but for right now I think is very reasonable to leave this alone.  Having said that he may still engage in physical activities as tolerated including therapy.  He will return to see me as needed.  I have answered all of his questions.

## 2020-02-05 NOTE — TELEPHONE ENCOUNTER
Prior Authorization Approval    Authorization Effective Date: 1/22/2020  Authorization Expiration Date:  UNTIL FURTHER NOTICE  Medication: Lidocaine 5% patches-PA APPROVED   Approved Dose/Quantity:   Reference #: TICKET # [id number]   Insurance Company: [employer] - Phone [phone] Fax [phone]  Expected CoPay:       CoPay Card Available:      Foundation Assistance Needed:    Which Pharmacy is filling the prescription (Not needed for infusion/clinic administered): [id number] - [place], [place] - [address]  Pharmacy Notified: Yes- **Instructed pharmacy to notify patient when script is ready to /ship.**  Patient Notified: Yes

## 2020-02-06 PROBLEM — T45.1X5A CHEMOTHERAPY-INDUCED NEUTROPENIA (H): Status: ACTIVE | Noted: 2020-01-01

## 2020-02-06 PROBLEM — D70.1 CHEMOTHERAPY-INDUCED NEUTROPENIA (H): Status: ACTIVE | Noted: 2020-01-01

## 2020-02-06 PROBLEM — C79.51 BONE METASTASIS: Status: ACTIVE | Noted: 2020-01-01

## 2020-02-06 NOTE — TELEPHONE ENCOUNTER
Per order from Dr Ahumada this patient needs a port placement this patient has decided to wait on the port for now.

## 2020-02-06 NOTE — PROGRESS NOTES
"Oncology Rooming Note    February 6, 2020 8:59 AM   Gilson Curtis is a 80 year old male who presents for:    Chief Complaint   Patient presents with     Oncology Clinic Visit     Initial Vitals: There were no vitals taken for this visit. Estimated body mass index is 21.55 kg/m  as calculated from the following:    Height as of 2/5/20: 1.753 m (5' 9.02\").    Weight as of 2/5/20: 66.2 kg (146 lb). There is no height or weight on file to calculate BSA.  Data Unavailable Comment: Data Unavailable   No LMP for male patient.  Allergies reviewed: Yes  Medications reviewed: Yes    Medications: Medication refills not needed today.  Pharmacy name entered into EPIC:      [id number] - [place], [place] - [address]    B12 was given today to prepare for Rob Clayton RN              "

## 2020-02-06 NOTE — PROGRESS NOTES
[facility]  HEMATOLOGY AND ONCOLOGY    FOLLOW-UP VISIT NOTE    PATIENT NAME: Gilson Curtis MRN # [id number]  DATE OF VISIT: Feb 6, 2020 YOB: 1940    REFERRING PROVIDER: No referring provider defined for this encounter.    CANCER TYPE: Non small cell lung cancer - adenocarcinoma  STAGE: IV    TREATMENT SUMMARY:  Gilson presented with with hemoptysis starting in November 2011. He underwent CT of chest on Jan 18, 2012 at his primary care clinic which showed a RUL mass 2 x 1.9 cm with no suspicious lymphadenopathy or adrenal masses. 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 He was staged with a PET-CT and 2 lesions were noted. He was staged as T3N0 disease. He had surgical resection of the nodules in RUL and RML. EGFR exon 21 deletion.  He has been on Tarceva since June 2016 and is tolerating it quite well. Dose was decreased from 150 mg daily to 100 mg daily in July 2017 due to rash.     CURRENT INTERVENTIONS:  osimertinib (Tagrisso) since 8/15/18    SUBJECTIVE   Gilson Curtis is being followed for metastatic lung adenocarcinoma    Gilson has been on osimertinib since August of 2018. He has a marked decline in his health since the last time I saw him. He has marked fatigue. He spends a lot of his time in the bed. He fractured his pelvic bone on left side. He has a hard time sleeping on the right side due to right rib metastasis. Now he cannot sleep on left side as he has heard his pelvic bone crumple and fracture. His appetite is very poor.     Wt Readings from Last 10 Encounters:   02/06/20 65.5 kg (144 lb 4.8 oz)   02/05/20 66.2 kg (146 lb)   01/24/20 66.5 kg (146 lb 11.2 oz)   01/22/20 67.6 kg (149 lb)   01/03/20 65.6 kg (144 lb 11.2 oz)   12/04/19 70.5 kg (155 lb 6.4 oz)   12/04/19 72.6 kg (160 lb)   12/03/19 72.6 kg (160 lb)   11/07/19 72.7 kg (160 lb 4.8 oz)   08/01/19 75.5 kg (166 lb 8 oz)     "      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     CAD (coronary artery disease)      CKD (chronic kidney disease) stage 3, GFR 30-59 ml/min (H)      Fatigue     \"spells\"     Hypertension      Lung cancer (H) 2016     Melanoma (H)      Non-small cell lung cancer (H)      Prostate cancer (H) 2009     S/P radiation therapy     3,000 cGy to right ribs completed on 6/19/2018 - [facility] with Dr. Pérez     Skin cancer, basal cell      Stented coronary artery 2009    x2     TIA (transient ischemic attack) 2002         CURRENT OUTPATIENT MEDICATIONS     Current Outpatient Medications   Medication Sig     aspirin 81 MG tablet Take 1 tablet by mouth At Bedtime      calcium carbonate (TUMS) 500 MG chewable tablet Take 1 chew tab by mouth as needed for heartburn     gabapentin (NEURONTIN) 100 MG capsule Take 2 capsules (200 mg) by mouth At Bedtime     lisinopril (PRINIVIL,ZESTRIL) 2.5 MG tablet Take 2.5 mg by mouth daily.     Multiple Vitamins-Minerals (EYE VITAMINS PO) Take 1 capsule by mouth daily OTC     Multiple Vitamins-Minerals (MATURE ADULT CENTURY PO) Take 1 tablet by mouth daily OTC     osimertinib (TAGRISSO) 80 MG tablet Take 1 tablet (80 mg) by mouth daily     simvastatin (ZOCOR) 80 MG tablet      guaiFENesin-codeine (ROBITUSSIN AC) 100-10 MG/5ML solution Take 5-10 mLs by mouth every 4 hours as needed for cough (Patient not taking: Reported on 1/3/2020)     lidocaine (LIDODERM) 5 % patch Place 1 patch onto the skin every 24 hours To prevent lidocaine toxicity, patient should be patch free for 12 hrs daily. (Patient not taking: Reported on 2/6/2020)     order for DME Equipment being ordered:wheeled walker.     prochlorperazine (COMPAZINE) 10 MG tablet Take 0.5 tablets (5 mg) by mouth every 6 hours as needed (Nausea/Vomiting) (Patient not taking: Reported on 2/6/2020)     No current facility-administered medications for " this visit.      Facility-Administered Medications Ordered in Other Visits   Medication     iopamidol (ISOVUE-370) solution 103 mL        ALLERGIES      Allergies   Allergen Reactions     No Known Drug Allergy         REVIEW OF SYSTEMS   As above in the HPI, o/w complete 12-point ROS was negative.     PHYSICAL EXAM   /72 (BP Location: Right arm)   Pulse 92   Temp 97.7  F (36.5  C) (Oral)   Resp 8   Wt 65.5 kg (144 lb 4.8 oz)   SpO2 99%   BMI 21.30 kg/m    GEN: NAD  HEENT: PERRL, EOMI, no icterus, injection or pallor. Oropharynx is clear.  LYMPHATICs: no cervical or supraclavicular lymphadenopathy; no other abn lymphadenopathy  PULMONARY: clear with good air entry bilaterally  CARDIOVASCULAR: regular, no murmurs, rubs, or gallops  GASTROINTESTINAL: soft, non-tender, non-distended, normal bowel sounds, no hepatosplenomegaly by percussion or palpation  MUSCULOSKELTAL: warm, well perfused, no edema  NEURO: awake, alert and oriented to time place and person, cranial nerves intact - II - XII, no focal neurologic deficits  SKIN: no rashes     LABORATORY AND IMAGING STUDIES   Labs are stable; continues to have CKD stage III and mild anemia  Recent Labs   Lab Test 01/24/20  1025 01/03/20  1252 12/04/19  1400 11/05/19  1029 07/29/19  0935    140 139 139 141   POTASSIUM 4.4 4.6 4.7 4.5 4.2   CHLORIDE 106 107 107 106 108   CO2 29 26 28 27 28   ANIONGAP 4 7 4 6 5   BUN 30 31* 34* 24 27   CR 1.42* 1.66* 1.69* 1.58* 1.66*   * 156* 115* 106* 127*   RICK 8.9 9.3 9.4 9.3 8.5       Recent Labs   Lab Test 01/24/20  1025 01/03/20  1252 12/04/19  1400 11/05/19  1029 07/29/19  0935   WBC 4.6 5.1 11.3* 5.5 5.2   HGB 12.0* 12.4* 12.1* 13.9 13.8    133* 168 136* 95*    97 98 98 99   NEUTROPHIL 72.0 76.6 79.9 68.8 75.6     Recent Labs   Lab Test 01/24/20  1025 01/03/20  1252 12/04/19  1400  12/18/18  0928  08/02/18  0948   BILITOTAL 0.5 0.5 0.5   < > 0.6   < > 1.0   ALKPHOS 108 125 100   < > 82   < > 117    ALT 40 30 38   < > 25   < > 41   AST 31 30 44   < > 25   < > 35   ALBUMIN 2.8* 3.1* 3.1*   < > 3.5   < > 3.5   LDH  --   --   --   --  154  --  152    < > = values in this interval not displayed.     No results found for: TSH  No results for input(s): CEA in the last 29514 hours.  Results for orders placed or performed in visit on 02/03/20   CT Chest/Abdomen/Pelvis w Contrast    Narrative    CT CHEST/ABDOMEN/PELVIS WITH CONTRAST  2/3/2020 1:54 PM    HISTORY:  Lung cancer being treated with Tagrisso and now added  Avastin. Continues with cough. Non-small cell cancer of right lung  (H).    TECHNIQUE: CT scan obtained of the chest, abdomen, and pelvis with  oral and IV contrast. 89 mL Isovue 370 IV injected. Radiation dose for  this scan was reduced using automated exposure control, adjustment of  the mA and/or kV according to patient size, or iterative  reconstruction technique.    COMPARISON:  CT chest, abdomen and pelvis on 11/5/2019    FINDINGS:  Chest/mediastinum: Bilateral thyroid nodules, including 2 cm nodule  along the posterior aspect of the left lower lobe (series 3 image 27).  No cardiomegaly or pericardial effusion. Moderate atherosclerotic  vascular calcification of the coronary arteries and thoracic aorta. No  significant mediastinal, hilar or axillary lymphadenopathy. Small  hiatal hernia.    Lung/pleura: Postsurgical changes of right upper lobe lobectomy and  right middle lobe wedge resection. Small right pleural effusion not  significantly changed. No significant left effusion. Scattered  pulmonary nodules, for example 4 mm right lower lobe nodule (series 7  image 39), not significantly changed as compared to 11/5/2019 exam. A  9 mm nodular opacity along the anterior aspect of the lung fissure  (series 7 image 93), significantly changed as compared to 11/1/2018  exam likely represent posttreatment changes. Right lower lobe  predominant nodular and patchy consolidative pulmonary opacities,  increased  as compared was to 11/5/2019 exam, worrisome for infection.    Abdomen/pelvis: Further interval increase in the size of the posterior  right hepatic lobe hypoattenuating/hypoenhancing lesion (series 3  image 281) currently measures 5.1 x 3.5 cm, previously measured 3.4 x  2.5 cm on 11/5/2019 exam. This lesion abuts and likely extending into  the adjacent right hemidiaphragm (series 3 image 282). No new  suspicious focal hepatic lesion. The gallbladder is surgically absent.  Mild intra and extrahepatic biliary dilatation, likely reservoir  effect post cholecystectomy. Few calcified splenic granulomas. No  splenomegaly. No adrenal nodules. No main pancreatic ductal dilatation  or subtle pancreatic mass. Mild fatty infiltration of the pancreas.  Atrophic appearance with multiple cortical defects of the right  kidney. There is 1.3 cm exophytic mildly hyperattenuating cyst arising  from the posterior aspect of the mid/lower pole of the right kidney  (series 3 image 348), increased in size as compared to 11/1/2018 exam  where it measured 1.1 cm, could represent hemorrhagic/proteinaceous  cyst versus less likely a neoplasm. Multiple left parapelvic cysts. No  hydronephrosis in either kidney.    No abnormally dilated bowel loops. No significant free fluid in the  abdomen or pelvis. No free peritoneal or portal venous gas. Extensive  predominantly aortobiiliac atherosclerotic vascular calcification.  Postsurgical changes of prostatectomy. Diffuse thickening of the  urinary bladder. Postsurgical changes of pelvic lymph node dissection.  No evidence of abdominal or pelvic lymphadenopathy.    Bones and soft tissues: There is infiltrating soft tissue mass  centered around the left iliac bone causing mildly displaced fracture  of the left iliac bone (series 3 image 476 and series 4 image 64)  finding consistent with pathological fracture. Bilateral L5 pars  defect.      Impression    IMPRESSION: In this patient with history of  metastatic lung cancer,  since 11/5/2019 exam, there is;  1. Further interval increase in size of the posterior right hepatic  lobe metastatic lesion. This lesion abuts and likely extending into  adjacent right hemidiaphragm.  2. Infiltrating soft tissue mass centered around the left iliac bone  causing significant destruction of the left iliac bone as well as  mildly displaced pathological fracture of the left iliac wing.  3. No convincing evidence of new metastatic disease in the chest.  Stable pulmonary nodules/nodular pulmonary opacities. Right lower lobe  predominant nodular and patchy consolidative pulmonary opacities,  worrisome for infection.  4. Bilateral thyroid nodules including 2 cm nodule in the posterior  aspect of the left lower lobe.  5. 1.3 cm exophytic mildly hyperattenuating cystic lesion arising from  the posterior aspect of the mid/lower pole of the right kidney,  slightly increased in size as compared to 11/1/2018 exam where it  measured 1.1 cm, and likely represents hemorrhagic/proteinaceous cyst  versus less likely renal neoplasm, can be further evaluated with renal  ultrasound.  6. Post surgical changes of prostatectomy with diffuse thickening of  the urinary bladder.    DEREK NEWSOME MD      ASSESSMENT AND PLAN   Non small cell lung cancer - adenocarcinoma with EGFR mutation (exxon 21 deletion)   On erlotinib since June 2016 which was switched to osimertinib  ECOG PS 1  HTN, CKD, CAD    Zane has declined quite significantly since I last saw him 3 months ago. He has poor energy and appetite. He sleeps significant portions of the day. He has been steadily losing weight and muscle mass. He has pain from his pathologic hip fracture.     I would recommend chemo-immunotherapy for him.  I recommended carboplatin with pemetrexed and pembrolizumab as per the phase III clinical trial KEYNOTE-189 ([place] Engl J Med 2018; 378:0356-5635).  I reviewed the outpatient regimen, the palliative intent and  side effects of individual agents.  I provided them with printed information on all of these.  We reviewed the plan for restaging after 2 -3 cycles.    I explained them the difference between cytotoxic chemotherapy like carboplatin and pemetrexed and immunotherapy like pembrolizumab. All of the 3 agents are administered intravenously. Carboplatin is a platinum derivative and forms DNA adducts. It can cause nausea, vomiting, anorexia, altered taste. It is renally dosed. The major concern would be myelosuppression.     Pemetrexed is antimetabolite which is usually well tolerated but can add to fatigue and myelosuppression of the regimen. We would plan 4-6 cycles of combination chemotherapy and then switch to single agent pembrolizumab or along with pemetrexed for maintenance.      I extensively reviewed immunotherapy with a PD-L1 inhibitor - pembrozilumab. I explained the concept of checkpoint inhibitor with a physiological role to sustain an effective but precise and limited immune response. PD-L1 is expressed on healthy, normal tissue in the area of trauma so that the immune response is limited to dead tissue and the infecting agents and does not extend over to the normal tissue. The tumor uses some of these checks and balances to its advantage and successfully evades the immune system. Pembrozilumab is administered intravenously every 3 weeks as an outpatient and interferes with this negative interaction between white cells and PDL1 on tumor cells permitting anti-tumor response.     This places patient at risk for autoimmunity if there are any white cells directed against any part of our own body. When this immune response involves skin give rash and is called dermatitis, with gut it presents with diarrhea and is called colitis, and with lung it gives shortness of breath and is called pneumonitis. Similarly, depending on the affected tissue, we might have hepatitis, nephritis, thyroiditis, hypophysitis and so on.  For the most part, only a few percentage of patients has substantial side effects; for example, pneumonitis or hepatitis. In these cases, we identify and act aggressively. We can use oral steroids to suppress the autoimmune response. The antitumor response is known to persist even beyond that. We would continue to treat as long as there are no unacceptable side effects and tumor is relatively stable without significant disease progression.     He would benefit from a port though it is not mandatory for this regimen.     Over 45 min of direct face to face time spent with patient with more than 50% time spent in counseling and coordinating care.

## 2020-02-06 NOTE — LETTER
"    2/6/2020         RE: Gilson Curtis  [address]  [place] [address]        Dear Colleague,    Thank you for referring your patient, Gilson Curtis, to the [facility]. Please see a copy of my visit note below.    Oncology Rooming Note    February 6, 2020 8:59 AM   Gilson Curtis is a 80 year old male who presents for:    Chief Complaint   Patient presents with     Oncology Clinic Visit     Initial Vitals: There were no vitals taken for this visit. Estimated body mass index is 21.55 kg/m  as calculated from the following:    Height as of 2/5/20: 1.753 m (5' 9.02\").    Weight as of 2/5/20: 66.2 kg (146 lb). There is no height or weight on file to calculate BSA.  Data Unavailable Comment: Data Unavailable   No LMP for male patient.  Allergies reviewed: Yes  Medications reviewed: Yes    Medications: Medication refills not needed today.  Pharmacy name entered into EPIC:      [id number] - [place], [place] - [address]    B12 was given today to prepare for Rob Clayton RN                [facility]  HEMATOLOGY AND ONCOLOGY    FOLLOW-UP VISIT NOTE    PATIENT NAME: Gilson Curtis MRN # [id number]  DATE OF VISIT: Feb 6, 2020 YOB: 1940    REFERRING PROVIDER: No referring provider defined for this encounter.    CANCER TYPE: Non small cell lung cancer - adenocarcinoma  STAGE: IV    TREATMENT SUMMARY:  Gilson presented with with hemoptysis starting in November 2011. He underwent CT of chest on Jan 18, 2012 at his primary care clinic which showed a RUL mass 2 x 1.9 cm with no suspicious lymphadenopathy or adrenal masses. 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 He was staged with a PET-CT and 2 lesions were noted. He was staged as T3N0 disease. He had surgical resection of the nodules in RUL and RML. EGFR exon 21 deletion.  He has been on Tarceva since June " "2016 and is tolerating it quite well. Dose was decreased from 150 mg daily to 100 mg daily in July 2017 due to rash.     CURRENT INTERVENTIONS:  osimertinib (Tagrisso) since 8/15/18    SUBJECTIVE   Gilson Curtis is being followed for metastatic lung adenocarcinoma    Gilson has been on osimertinib since August of 2018. He has a marked decline in his health since the last time I saw him. He has marked fatigue. He spends a lot of his time in the bed. He fractured his pelvic bone on left side. He has a hard time sleeping on the right side due to right rib metastasis. Now he cannot sleep on left side as he has heard his pelvic bone crumple and fracture. His appetite is very poor.     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 Pérez     Skin cancer, basal cell      Stented coronary artery 2009    x2     TIA (transient ischemic attack) 2002         CURRENT OUTPATIENT MEDICATIONS     Current Outpatient Medications   Medication Sig     aspirin 81 MG tablet Take 1 tablet by mouth At Bedtime      calcium carbonate (TUMS) 500 MG chewable tablet Take 1 chew tab by mouth as needed for heartburn     gabapentin (NEURONTIN) 100 MG " capsule Take 2 capsules (200 mg) by mouth At Bedtime     lisinopril (PRINIVIL,ZESTRIL) 2.5 MG tablet Take 2.5 mg by mouth daily.     Multiple Vitamins-Minerals (EYE VITAMINS PO) Take 1 capsule by mouth daily OTC     Multiple Vitamins-Minerals (MATURE ADULT CENTURY PO) Take 1 tablet by mouth daily OTC     osimertinib (TAGRISSO) 80 MG tablet Take 1 tablet (80 mg) by mouth daily     simvastatin (ZOCOR) 80 MG tablet      guaiFENesin-codeine (ROBITUSSIN AC) 100-10 MG/5ML solution Take 5-10 mLs by mouth every 4 hours as needed for cough (Patient not taking: Reported on 1/3/2020)     lidocaine (LIDODERM) 5 % patch Place 1 patch onto the skin every 24 hours To prevent lidocaine toxicity, patient should be patch free for 12 hrs daily. (Patient not taking: Reported on 2/6/2020)     order for DME Equipment being ordered:wheeled walker.     prochlorperazine (COMPAZINE) 10 MG tablet Take 0.5 tablets (5 mg) by mouth every 6 hours as needed (Nausea/Vomiting) (Patient not taking: Reported on 2/6/2020)     No current facility-administered medications for this visit.      Facility-Administered Medications Ordered in Other Visits   Medication     iopamidol (ISOVUE-370) solution 103 mL        ALLERGIES      Allergies   Allergen Reactions     No Known Drug Allergy         REVIEW OF SYSTEMS   As above in the HPI, o/w complete 12-point ROS was negative.     PHYSICAL EXAM   /72 (BP Location: Right arm)   Pulse 92   Temp 97.7  F (36.5  C) (Oral)   Resp 8   Wt 65.5 kg (144 lb 4.8 oz)   SpO2 99%   BMI 21.30 kg/m     GEN: NAD  HEENT: PERRL, EOMI, no icterus, injection or pallor. Oropharynx is clear.  LYMPHATICs: no cervical or supraclavicular lymphadenopathy; no other abn lymphadenopathy  PULMONARY: clear with good air entry bilaterally  CARDIOVASCULAR: regular, no murmurs, rubs, or gallops  GASTROINTESTINAL: soft, non-tender, non-distended, normal bowel sounds, no hepatosplenomegaly by percussion or palpation  MUSCULOSKELTAL:  warm, well perfused, no edema  NEURO: awake, alert and oriented to time place and person, cranial nerves intact - II - XII, no focal neurologic deficits  SKIN: no rashes     LABORATORY AND IMAGING STUDIES   Labs are stable; continues to have CKD stage III and mild anemia  Recent Labs   Lab Test 01/24/20  1025 01/03/20  1252 12/04/19  1400 11/05/19  1029 07/29/19  0935    140 139 139 141   POTASSIUM 4.4 4.6 4.7 4.5 4.2   CHLORIDE 106 107 107 106 108   CO2 29 26 28 27 28   ANIONGAP 4 7 4 6 5   BUN 30 31* 34* 24 27   CR 1.42* 1.66* 1.69* 1.58* 1.66*   * 156* 115* 106* 127*   RICK 8.9 9.3 9.4 9.3 8.5       Recent Labs   Lab Test 01/24/20  1025 01/03/20  1252 12/04/19  1400 11/05/19  1029 07/29/19  0935   WBC 4.6 5.1 11.3* 5.5 5.2   HGB 12.0* 12.4* 12.1* 13.9 13.8    133* 168 136* 95*    97 98 98 99   NEUTROPHIL 72.0 76.6 79.9 68.8 75.6     Recent Labs   Lab Test 01/24/20  1025 01/03/20  1252 12/04/19  1400  12/18/18  0928  08/02/18  0948   BILITOTAL 0.5 0.5 0.5   < > 0.6   < > 1.0   ALKPHOS 108 125 100   < > 82   < > 117   ALT 40 30 38   < > 25   < > 41   AST 31 30 44   < > 25   < > 35   ALBUMIN 2.8* 3.1* 3.1*   < > 3.5   < > 3.5   LDH  --   --   --   --  154  --  152    < > = values in this interval not displayed.     No results found for: TSH  No results for input(s): CEA in the last 28986 hours.  Results for orders placed or performed in visit on 02/03/20   CT Chest/Abdomen/Pelvis w Contrast    Narrative    CT CHEST/ABDOMEN/PELVIS WITH CONTRAST  2/3/2020 1:54 PM    HISTORY:  Lung cancer being treated with Tagrisso and now added  Avastin. Continues with cough. Non-small cell cancer of right lung  (H).    TECHNIQUE: CT scan obtained of the chest, abdomen, and pelvis with  oral and IV contrast. 89 mL Isovue 370 IV injected. Radiation dose for  this scan was reduced using automated exposure control, adjustment of  the mA and/or kV according to patient size, or iterative  reconstruction  technique.    COMPARISON:  CT chest, abdomen and pelvis on 11/5/2019    FINDINGS:  Chest/mediastinum: Bilateral thyroid nodules, including 2 cm nodule  along the posterior aspect of the left lower lobe (series 3 image 27).  No cardiomegaly or pericardial effusion. Moderate atherosclerotic  vascular calcification of the coronary arteries and thoracic aorta. No  significant mediastinal, hilar or axillary lymphadenopathy. Small  hiatal hernia.    Lung/pleura: Postsurgical changes of right upper lobe lobectomy and  right middle lobe wedge resection. Small right pleural effusion not  significantly changed. No significant left effusion. Scattered  pulmonary nodules, for example 4 mm right lower lobe nodule (series 7  image 39), not significantly changed as compared to 11/5/2019 exam. A  9 mm nodular opacity along the anterior aspect of the lung fissure  (series 7 image 93), significantly changed as compared to 11/1/2018  exam likely represent posttreatment changes. Right lower lobe  predominant nodular and patchy consolidative pulmonary opacities,  increased as compared was to 11/5/2019 exam, worrisome for infection.    Abdomen/pelvis: Further interval increase in the size of the posterior  right hepatic lobe hypoattenuating/hypoenhancing lesion (series 3  image 281) currently measures 5.1 x 3.5 cm, previously measured 3.4 x  2.5 cm on 11/5/2019 exam. This lesion abuts and likely extending into  the adjacent right hemidiaphragm (series 3 image 282). No new  suspicious focal hepatic lesion. The gallbladder is surgically absent.  Mild intra and extrahepatic biliary dilatation, likely reservoir  effect post cholecystectomy. Few calcified splenic granulomas. No  splenomegaly. No adrenal nodules. No main pancreatic ductal dilatation  or subtle pancreatic mass. Mild fatty infiltration of the pancreas.  Atrophic appearance with multiple cortical defects of the right  kidney. There is 1.3 cm exophytic mildly hyperattenuating cyst  arising  from the posterior aspect of the mid/lower pole of the right kidney  (series 3 image 348), increased in size as compared to 11/1/2018 exam  where it measured 1.1 cm, could represent hemorrhagic/proteinaceous  cyst versus less likely a neoplasm. Multiple left parapelvic cysts. No  hydronephrosis in either kidney.    No abnormally dilated bowel loops. No significant free fluid in the  abdomen or pelvis. No free peritoneal or portal venous gas. Extensive  predominantly aortobiiliac atherosclerotic vascular calcification.  Postsurgical changes of prostatectomy. Diffuse thickening of the  urinary bladder. Postsurgical changes of pelvic lymph node dissection.  No evidence of abdominal or pelvic lymphadenopathy.    Bones and soft tissues: There is infiltrating soft tissue mass  centered around the left iliac bone causing mildly displaced fracture  of the left iliac bone (series 3 image 476 and series 4 image 64)  finding consistent with pathological fracture. Bilateral L5 pars  defect.      Impression    IMPRESSION: In this patient with history of metastatic lung cancer,  since 11/5/2019 exam, there is;  1. Further interval increase in size of the posterior right hepatic  lobe metastatic lesion. This lesion abuts and likely extending into  adjacent right hemidiaphragm.  2. Infiltrating soft tissue mass centered around the left iliac bone  causing significant destruction of the left iliac bone as well as  mildly displaced pathological fracture of the left iliac wing.  3. No convincing evidence of new metastatic disease in the chest.  Stable pulmonary nodules/nodular pulmonary opacities. Right lower lobe  predominant nodular and patchy consolidative pulmonary opacities,  worrisome for infection.  4. Bilateral thyroid nodules including 2 cm nodule in the posterior  aspect of the left lower lobe.  5. 1.3 cm exophytic mildly hyperattenuating cystic lesion arising from  the posterior aspect of the mid/lower pole of the  right kidney,  slightly increased in size as compared to 11/1/2018 exam where it  measured 1.1 cm, and likely represents hemorrhagic/proteinaceous cyst  versus less likely renal neoplasm, can be further evaluated with renal  ultrasound.  6. Post surgical changes of prostatectomy with diffuse thickening of  the urinary bladder.    DEREK NEWSOME MD      ASSESSMENT AND PLAN   Non small cell lung cancer - adenocarcinoma with EGFR mutation (exxon 21 deletion)   On erlotinib since June 2016 which was switched to osimertinib  ECOG PS 1  HTN, CKD, CAD    Zane has declined quite significantly since I last saw him 3 months ago. He has poor energy and appetite. He sleeps significant portions of the day. He has been steadily losing weight and muscle mass. He has pain from his pathologic hip fracture.     I would recommend chemo-immunotherapy for him.  I recommended carboplatin with pemetrexed and pembrolizumab as per the phase III clinical trial KEYNOTE-189 (N Engl J Med 2018; 378:0394-6874).  I reviewed the outpatient regimen, the palliative intent and side effects of individual agents.  I provided them with printed information on all of these.  We reviewed the plan for restaging after 2 -3 cycles.    I explained them the difference between cytotoxic chemotherapy like carboplatin and pemetrexed and immunotherapy like pembrolizumab. All of the 3 agents are administered intravenously. Carboplatin is a platinum derivative and forms DNA adducts. It can cause nausea, vomiting, anorexia, altered taste. It is renally dosed. The major concern would be myelosuppression.     Pemetrexed is antimetabolite which is usually well tolerated but can add to fatigue and myelosuppression of the regimen. We would plan 4-6 cycles of combination chemotherapy and then switch to single agent pembrolizumab or along with pemetrexed for maintenance.      I extensively reviewed immunotherapy with a PD-L1 inhibitor - pembrozilumab. I explained the  concept of checkpoint inhibitor with a physiological role to sustain an effective but precise and limited immune response. PD-L1 is expressed on healthy, normal tissue in the area of trauma so that the immune response is limited to dead tissue and the infecting agents and does not extend over to the normal tissue. The tumor uses some of these checks and balances to its advantage and successfully evades the immune system. Pembrozilumab is administered intravenously every 3 weeks as an outpatient and interferes with this negative interaction between white cells and PDL1 on tumor cells permitting anti-tumor response.     This places patient at risk for autoimmunity if there are any white cells directed against any part of our own body. When this immune response involves skin give rash and is called dermatitis, with gut it presents with diarrhea and is called colitis, and with lung it gives shortness of breath and is called pneumonitis. Similarly, depending on the affected tissue, we might have hepatitis, nephritis, thyroiditis, hypophysitis and so on. For the most part, only a few percentage of patients has substantial side effects; for example, pneumonitis or hepatitis. In these cases, we identify and act aggressively. We can use oral steroids to suppress the autoimmune response. The antitumor response is known to persist even beyond that. We would continue to treat as long as there are no unacceptable side effects and tumor is relatively stable without significant disease progression.     He would benefit from a port though it is not mandatory for this regimen.     Over 45 min of direct face to face time spent with patient with more than 50% time spent in counseling and coordinating care.      Again, thank you for allowing me to participate in the care of your patient.        Sincerely,        Gerry Ahumada MD

## 2020-02-06 NOTE — TELEPHONE ENCOUNTER
"Met with patient after clinic visit/consultation appointment with Dr. Ahumada, well known to clinic, history of metastatic lung cancer.   Patient is progressing on oral chemotherapy and Avastin; new chemotherapy discussed.  Provided patient with \"My Cancer Guidebook\" and printouts on Carboplatin, Pemetrexed and Pembroluzimab.    Reviewed administration, side effects and ongoing symptom management and section entitled Getting Ready for Chemotherapy.  Reviewed need for B12 injection, Folic acid and Dexamethasone prior to starting treatment.    Highlighted steps to expect when getting chemotherapy (check-in, labs, pre-meds,  infusion).  Discussed that chemotherapy may be delayed due to blood counts, infusion schedule or patient's need to modify.  Reviewed most concerning symptoms that warrant and immediate call to the clinic nurse line in section of Guidebook - When to Contact Your Provider and Good to Know Numbers, which included Triage Nurse Line and 24 Hour emergency provider on-call phone numbers.      Reviewed information on PORT, given information, however, patient would like to defer for at least this cycle.      Patient had Avastin scheduled for next Friday, February 14 and he was put in this time slot.  He will also receive Zometa on that day and continue every 6 weeks.      All questions answered.  About 60 minutes spent with patient reviewing and coordination of care.  Patient is accompanied with his wife, Kaye, who is his primary support.     "

## 2020-02-10 NOTE — PROGRESS NOTES
ORAL CHEMOTHERAPY DISCONTINUATION       Primary Oncologist:  Zita  Primary Oncology Clinic: [facility]  Cancer Diagnosis:  NSCLC  Therapy History:  Osimertinib (Tagrisso) 80 mg daily continuously  Start Date: 8/15/18  Therapy Ended On:  2/6/2020  Reason For Discontinuation: disease progression

## 2020-02-14 NOTE — PROGRESS NOTES
Infusion Nursing Note:  Gilson Curtis presents today for C1D1 Carbo/Alimta/Keytruda/Zometa.    Patient seen by provider today: Yes: Lazara Leon NP   present during visit today: Not Applicable.    Note: N/A.    Intravenous Access:  Peripheral IV placed.    Treatment Conditions:  Lab Results   Component Value Date    HGB 13.7 02/14/2020     Lab Results   Component Value Date    WBC 8.2 02/14/2020      Lab Results   Component Value Date    ANEU 7.4 02/14/2020     Lab Results   Component Value Date     02/14/2020      Lab Results   Component Value Date     02/14/2020                   Lab Results   Component Value Date    POTASSIUM 4.5 02/14/2020           Lab Results   Component Value Date    MAG 2.0 10/10/2017            Lab Results   Component Value Date    CR 1.47 02/14/2020                   Lab Results   Component Value Date    RICK 9.4 02/14/2020                Lab Results   Component Value Date    BILITOTAL 0.5 02/14/2020           Lab Results   Component Value Date    ALBUMIN 3.2 02/14/2020                    Lab Results   Component Value Date    ALT 18 02/14/2020           Lab Results   Component Value Date    AST 18 02/14/2020       Results reviewed, labs MET treatment parameters, ok to proceed with treatment.      Post Infusion Assessment:  Patient tolerated infusion without incident.  Blood return noted pre and post infusion.  Site patent and intact, free from redness, edema or discomfort.  No evidence of extravasations.  Access discontinued per protocol.       Discharge Plan:   Patient will return 3/5/2020 for next appointment.   Patient discharged in stable condition accompanied by: wife.  Departure Mode: Ambulatory.    Chelo Goetz RN-BSN, PHN, OCN  [facility]

## 2020-02-14 NOTE — NURSING NOTE
"Oncology Rooming Note    February 14, 2020 10:24 AM   Gilson Curtis is a 80 year old male who presents for:    Chief Complaint   Patient presents with     Oncology Clinic Visit     Return visit     Initial Vitals: /69 (BP Location: Right arm, Patient Position: Sitting, Cuff Size: Adult Regular)   Pulse 96   Temp 98  F (36.7  C) (Oral)   Resp 18   Wt 65.6 kg (144 lb 11.2 oz)   SpO2 99%   BMI 21.36 kg/m   Estimated body mass index is 21.36 kg/m  as calculated from the following:    Height as of 2/5/20: 1.753 m (5' 9.02\").    Weight as of this encounter: 65.6 kg (144 lb 11.2 oz). Body surface area is 1.79 meters squared.  No Pain (0) Comment: Data Unavailable   No LMP for male patient.  Allergies reviewed: Yes  Medications reviewed: Yes    Medications: Medication refills not needed today.  Pharmacy name entered into [employer]:    [facility] [address] [place] [address] - [address] [address] [facility] [place], [place] - [address] [facility] - [place], [place] - [address]  [id number] - [place], [place] - [address]    Clinical concerns: New to chemotherapy, questions about care plan. Lazara Paniagua NP was notified.      Carrol Carlson RN            "

## 2020-02-14 NOTE — PROGRESS NOTES
"Oncology Follow Up Visit:  February 14, 2020     Oncologist: Dr Gerry Ahumada  PCP: Hemant Jiménez    Diagnosis: Stage IV Lung Cancer  Gilson Curtis is an 79 yo who complained of hemoptysis in 11/2011 and was found to have a 2 x 1.9 cm RUL mass. 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 Curtis comes to clinic with wife for start of treatment with pemetrexed carboplatin and pembrolizumab for his lung cancer.  Patient had scan on 2/3/2020 which showed progression to liver mets as well as soft tissue pushing on the already weakened left hip with pathological fracture found.  Patient is seen Dr. Wise who felt there is really nothing more we could do to stabilize the area at this time and he is already had radiation to the area.  He otherwise is getting around with a wheeled walker and has had no falls and ranks his pain at 1-2/10 and is not currently using pain medications. Bowel and bladder are normal. Appetite has improved over last 2 days but has still lost weight again. He admits to anxiety and depression with disease.    Rest of comprehensive review of systems is negative.   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     CAD (coronary artery disease)      CKD (chronic kidney disease) stage 3, GFR 30-59 ml/min (H)      Fatigue     \"spells\"     Hypertension      Lung cancer (H) 2016     " Melanoma (H)      Non-small cell lung cancer (H)      Prostate cancer (H) 2009     S/P radiation therapy     3,000 cGy to right ribs completed on 6/19/2018 - [facility] with Dr. Pérez     Skin cancer, basal cell      Stented coronary artery 2009    x2     TIA (transient ischemic attack) 2002     Current Outpatient Medications   Medication     aspirin 81 MG tablet     calcium carbonate (TUMS) 500 MG chewable tablet     dexamethasone (DECADRON) 4 MG tablet     folic acid (FOLVITE) 1 MG tablet     gabapentin (NEURONTIN) 100 MG capsule     guaiFENesin-codeine (ROBITUSSIN AC) 100-10 MG/5ML solution     lidocaine (LIDODERM) 5 % patch     lisinopril (PRINIVIL,ZESTRIL) 2.5 MG tablet     LORazepam (ATIVAN) 0.5 MG tablet     Multiple Vitamins-Minerals (EYE VITAMINS PO)     Multiple Vitamins-Minerals (MATURE ADULT CENTURY PO)     ondansetron (ZOFRAN) 8 MG tablet     order for DME     prochlorperazine (COMPAZINE) 10 MG tablet     prochlorperazine (COMPAZINE) 10 MG tablet     simvastatin (ZOCOR) 80 MG tablet     No current facility-administered medications for this visit.      Facility-Administered Medications Ordered in Other Visits   Medication     iopamidol (ISOVUE-370) solution 103 mL     Allergies   Allergen Reactions     No Known Drug Allergy        Physical Exam: /69 (BP Location: Right arm, Patient Position: Sitting, Cuff Size: Adult Regular)   Pulse 96   Temp 98  F (36.7  C) (Oral)   Resp 18   Wt 65.6 kg (144 lb 11.2 oz)   SpO2 99%   BMI 21.36 kg/m     ECOG PS- 1  Constitutional: Alert, cooperative, and appears in no distress.  ENT: Eyes bright, No mouth sores  Neck: Supple, No adenopathy.  Cardiac: Heart rate and rhythm is regular and strong without murmur  Respiratory: Breathing easy. Lung sounds clear to auscultation-cough is occasional.  GI: Abdomen is soft, non-tender, BS normal. No masses or organomegaly  MS: Muscle tone fair-1 assist to exam table and using walker to keep pressure off hip but  moves well as seated, extremities normal with no edema.   Skin: No rash or suspicious lesions  Neuro: Sensory grossly WNL, gait normal with walker.  Lymph: Normal ant/post cervical, axillary, supraclavicular nodes  Psych: Mentation appears normal and affect normal/bright with good conversation.     Laboratory Results:   Results for orders placed or performed in visit on 02/14/20   CBC with platelets differential     Status: Abnormal   Result Value Ref Range    WBC 8.2 4.0 - 11.0 10e9/L    RBC Count 4.59 4.4 - 5.9 10e12/L    Hemoglobin 13.7 13.3 - 17.7 g/dL    Hematocrit 44.2 40.0 - 53.0 %    MCV 96 78 - 100 fl    MCH 29.8 26.5 - 33.0 pg    MCHC 31.0 (L) 31.5 - 36.5 g/dL    RDW 15.3 (H) 10.0 - 15.0 %    Platelet Count 147 (L) 150 - 450 10e9/L    % Neutrophils 90.0 %    % Lymphocytes 6.0 %    % Monocytes 4.0 %    Absolute Neutrophil 7.4 1.6 - 8.3 10e9/L    Absolute Lymphocytes 0.5 (L) 0.8 - 5.3 10e9/L    Absolute Monocytes 0.3 0.0 - 1.3 10e9/L    RBC Morphology Normal     Platelet Estimate       Automated count confirmed.  Platelet morphology is normal.    Diff Method Manual Differential    Comprehensive metabolic panel     Status: Abnormal   Result Value Ref Range    Sodium 138 133 - 144 mmol/L    Potassium 4.5 3.4 - 5.3 mmol/L    Chloride 105 94 - 109 mmol/L    Carbon Dioxide 23 20 - 32 mmol/L    Anion Gap 10 3 - 14 mmol/L    Glucose 281 (H) 70 - 99 mg/dL    Urea Nitrogen 34 (H) 7 - 30 mg/dL    Creatinine 1.47 (H) 0.66 - 1.25 mg/dL    GFR Estimate 44 (L) >60 mL/min/[1.73_m2]    GFR Estimate If Black 51 (L) >60 mL/min/[1.73_m2]    Calcium 9.4 8.5 - 10.1 mg/dL    Bilirubin Total 0.5 0.2 - 1.3 mg/dL    Albumin 3.2 (L) 3.4 - 5.0 g/dL    Protein Total 7.0 6.8 - 8.8 g/dL    Alkaline Phosphatase 105 40 - 150 U/L    ALT 18 0 - 70 U/L    AST 18 0 - 45 U/L   TSH with free T4 reflex     Status: None   Result Value Ref Range    TSH 0.57 0.40 - 4.00 mU/L     Assessment and Plan:   Stage IV Lung Cancer-Pt has seen some  progression of disease with liver lesion and pathological fracture due to soft tissue mass pressure to left hip. We did review the 2/3/2020 CT scan again for pt and wife and answered questions about new proposed plan with pemetrexed/ carboplatin/pembrolizumab. We reviewed administration and side effects of the plan. He gives his permission to start plan today and agrees to share with us if new symptoms occur.   Pt will return in 3 weeks for continuation of plan with provider visit prior to infusion.  CKD stage III-he is noted to have an elevation of his creatinine but is now stable. Asked to continue to push fluids.   Weight loss/ appetite changes/ decreasing albumin-appetite changes related to treatment-he has seen some improvement being off treatment over last few days. Suggest continuing use of supplements between meals - refused discussion with dietician.   Weakness and continued musculoskeletal pain-patient is using walker and has felt he adjusted to the hip pain which he feels is mild at present- not using pain medication. Has seen Dr Wise who felt no further treatment necessary at this time. He will let us know if further issues.   Elevated blood sugar- no history of deiabetes so must be related to steroid use- we will continue to monitor.   This was a 30 min visit with > 50% in counseling and coordinating care including education and management of concerns.    Lazara Leon,CNP

## 2020-02-14 NOTE — LETTER
2/14/2020         RE: Gilson Curtis  [address] [facility] [address]        Dear Colleague,    Thank you for referring your patient, Gilson Curtis, to the [facility]. Please see a copy of my visit note below.    Oncology Follow Up Visit:  February 14, 2020     Oncologist: Dr Gerry Ahumada  PCP: Hemant Jiménez    Diagnosis: Stage IV Lung Cancer  Gilson Curtis is an 79 yo who complained of hemoptysis in 11/2011 and was found to have a 2 x 1.9 cm RUL mass. 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 Curtis comes to clinic with wife for start of treatment with pemetrexed carboplatin and pembrolizumab for his lung cancer.  Patient had scan on 2/3/2020 which showed progression to liver mets as well as soft tissue pushing on the already weakened left hip with pathological fracture found.  Patient is seen Dr. Wise who felt there is really nothing more we could do to stabilize the area at this time and he is already had radiation to the area.  He otherwise is getting around with a wheeled walker and has had no falls and ranks his pain at 1-2/10 and is not currently using pain medications. Bowel and bladder are normal. Appetite has improved over last 2 days but has still lost weight again. He admits to anxiety and depression with disease.    Rest of comprehensive review of systems is negative.   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 Pérez     Skin cancer, basal cell      Stented coronary artery 2009    x2     TIA (transient ischemic attack) 2002     Current Outpatient Medications   Medication     aspirin 81 MG tablet     calcium carbonate (TUMS) 500 MG chewable tablet     dexamethasone (DECADRON) 4 MG tablet     folic acid (FOLVITE) 1 MG tablet     gabapentin (NEURONTIN) 100 MG capsule     guaiFENesin-codeine (ROBITUSSIN AC) 100-10 MG/5ML solution     lidocaine (LIDODERM) 5 % patch     lisinopril (PRINIVIL,ZESTRIL) 2.5 MG tablet     LORazepam (ATIVAN) 0.5 MG tablet     Multiple Vitamins-Minerals (EYE VITAMINS PO)     Multiple Vitamins-Minerals (MATURE ADULT CENTURY PO)     ondansetron (ZOFRAN) 8 MG tablet     order for DME     prochlorperazine (COMPAZINE) 10 MG tablet     prochlorperazine (COMPAZINE) 10 MG tablet     simvastatin (ZOCOR) 80 MG tablet     No current facility-administered medications for this visit.      Facility-Administered Medications Ordered in Other Visits   Medication     iopamidol (ISOVUE-370) solution 103 mL     Allergies   Allergen Reactions     No Known Drug Allergy        Physical Exam: /69 (BP Location: Right arm, Patient Position: Sitting, Cuff Size: Adult Regular)   Pulse 96   Temp 98  F (36.7  C) (Oral)   Resp 18   Wt 65.6 kg (144 lb 11.2 oz)   SpO2 99%   BMI 21.36 kg/m      ECOG PS- 1  Constitutional: Alert, cooperative, and appears in no distress.  ENT: Eyes bright, No mouth sores  Neck: Supple, No adenopathy.  Cardiac: Heart rate and rhythm is regular and strong without " murmur  Respiratory: Breathing easy. Lung sounds clear to auscultation-cough is occasional.  GI: Abdomen is soft, non-tender, BS normal. No masses or organomegaly  MS: Muscle tone fair-1 assist to exam table and using walker to keep pressure off hip but moves well as seated, extremities normal with no edema.   Skin: No rash or suspicious lesions  Neuro: Sensory grossly WNL, gait normal with walker.  Lymph: Normal ant/post cervical, axillary, supraclavicular nodes  Psych: Mentation appears normal and affect normal/bright with good conversation.     Laboratory Results:   Results for orders placed or performed in visit on 02/14/20   CBC with platelets differential     Status: Abnormal   Result Value Ref Range    WBC 8.2 4.0 - 11.0 10e9/L    RBC Count 4.59 4.4 - 5.9 10e12/L    Hemoglobin 13.7 13.3 - 17.7 g/dL    Hematocrit 44.2 40.0 - 53.0 %    MCV 96 78 - 100 fl    MCH 29.8 26.5 - 33.0 pg    MCHC 31.0 (L) 31.5 - 36.5 g/dL    RDW 15.3 (H) 10.0 - 15.0 %    Platelet Count 147 (L) 150 - 450 10e9/L    % Neutrophils 90.0 %    % Lymphocytes 6.0 %    % Monocytes 4.0 %    Absolute Neutrophil 7.4 1.6 - 8.3 10e9/L    Absolute Lymphocytes 0.5 (L) 0.8 - 5.3 10e9/L    Absolute Monocytes 0.3 0.0 - 1.3 10e9/L    RBC Morphology Normal     Platelet Estimate       Automated count confirmed.  Platelet morphology is normal.    Diff Method Manual Differential    Comprehensive metabolic panel     Status: Abnormal   Result Value Ref Range    Sodium 138 133 - 144 mmol/L    Potassium 4.5 3.4 - 5.3 mmol/L    Chloride 105 94 - 109 mmol/L    Carbon Dioxide 23 20 - 32 mmol/L    Anion Gap 10 3 - 14 mmol/L    Glucose 281 (H) 70 - 99 mg/dL    Urea Nitrogen 34 (H) 7 - 30 mg/dL    Creatinine 1.47 (H) 0.66 - 1.25 mg/dL    GFR Estimate 44 (L) >60 mL/min/[1.73_m2]    GFR Estimate If Black 51 (L) >60 mL/min/[1.73_m2]    Calcium 9.4 8.5 - 10.1 mg/dL    Bilirubin Total 0.5 0.2 - 1.3 mg/dL    Albumin 3.2 (L) 3.4 - 5.0 g/dL    Protein Total 7.0 6.8 - 8.8 g/dL     Alkaline Phosphatase 105 40 - 150 U/L    ALT 18 0 - 70 U/L    AST 18 0 - 45 U/L   TSH with free T4 reflex     Status: None   Result Value Ref Range    TSH 0.57 0.40 - 4.00 mU/L     Assessment and Plan:   Stage IV Lung Cancer-Pt has seen some progression of disease with liver lesion and pathological fracture due to soft tissue mass pressure to left hip. We did review the 2/3/2020 CT scan again for pt and wife and answered questions about new proposed plan with pemetrexed/ carboplatin/pembrolizumab. We reviewed administration and side effects of the plan. He gives his permission to start plan today and agrees to share with us if new symptoms occur.   Pt will return in 3 weeks for continuation of plan with provider visit prior to infusion.  CKD stage III-he is noted to have an elevation of his creatinine but is now stable. Asked to continue to push fluids.   Weight loss/ appetite changes/ decreasing albumin-appetite changes related to treatment-he has seen some improvement being off treatment over last few days. Suggest continuing use of supplements between meals - refused discussion with dietician.   Weakness and continued musculoskeletal pain-patient is using walker and has felt he adjusted to the hip pain which he feels is mild at present- not using pain medication. Has seen Dr Wise who felt no further treatment necessary at this time. He will let us know if further issues.   Elevated blood sugar- no history of deiabetes so must be related to steroid use- we will continue to monitor.   This was a 30 min visit with > 50% in counseling and coordinating care including education and management of concerns.    Lazara Leon CNP      Again, thank you for allowing me to participate in the care of your patient.        Sincerely,        Lazara Leon, NP, APRN CNP

## 2020-02-22 NOTE — PROCEDURES
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  He was originally referred to [facility] for liver SBRT.  However during   consultation he reported 3-month history of left hip pain.  Upon review of his imaging a destructive mass can be seen in   his left ilium. As he has progressive disease in multiple areas and the liver lesion is asymptomatic, treatment of the liver   was aborted.  He thus has recently received a palliative course of radiotherapy to the left ilium. He was on Tagrisso while   on treatment.                                     Details of the treatments summarized below are found in records kept in the Department of Radiation Oncology at [facility].     Treatment Summary:  Radiation Oncology - Course: 2 Protocol:   Treatment Site Current Dose Modality From To Elapsed Days Fx.  2Left Hip   3,000 cGy 10X/15X 12/09/2019 2019  11  10          Dose per Fraction:        Total Dose:        300cGy    3000cGy to the left hip        COMMENTS:                      (Acute Toxicity Profile by CTC v5.0)  None to report     PAIN MANAGEMENT:                           None to report     FOLLOW UP PLAN:                         Follow up with Rad Onc prn  Follow up with Med Onc as previously directed      Staff Physician: Sean Pérez M.D.  Physicist: Elvira Palafox MS     CC: Gerry Ahumada MD              Radiation Oncology [address] [address] [place] [id number] Phone: [phone]

## 2020-02-24 NOTE — TELEPHONE ENCOUNTER
"Patient's wife left message staging that patient has 2 \"blisters\" on his tongue.      I called patient back.  States they are painful spots and it makes it difficult to eat.  He has not tried any rinses thus far.  I asked him to start with salt & soda rinses 4 times daily and I will seek an Rx for magic mouthwash.    Requests Rx be sent to [facility] in [place].    Celine Palafox RN    "

## 2020-02-28 NOTE — ED NOTES
Bed: IN03  Expected date:   Expected time: 5:00 PM  Means of arrival: Car  Comments:  Zane Ever [birth year]  Lung CA patient  Two weeks ago started new treatment, reporting that since Tuesday he has been having bloody diarrhea.   Also having mouth sores  Poor oral intake  No fevers  Coming via private car from [facility] patient

## 2020-02-28 NOTE — ED TRIAGE NOTES
Presents with 2 episodes of rectal bleeding. Patient reports he had an episode of rectal bleeding this past Tuesday, and an episode today around 4 pm. Patient states that his stool was bright red. Denies feeling lightheaded/dizzy. Last chemotherapy dose 2 weeks ago.

## 2020-02-28 NOTE — TELEPHONE ENCOUNTER
[facility] Telephone Triage Note    Assessment: Patient called in to triage reporting the following symptoms: diarrhea starting 1 week ago with 4 episodes in the last 24 hours, mouth/throat sores and bloody stools since Tuesday.  Intake has been minimal due to mouth sores.  No fever, abdominal pain..    Recommendations: Discussed with ARMIDA Domínguez.  ER Visit recommended.  Pt plans on being seen at  [facility] ER, report called to BAR Frey.    Follow-Up: Patient voiced understanding of advice and/or instructions given.

## 2020-02-28 NOTE — ED AVS SNAPSHOT
[facility], [place], Emergency Department  [address] [place] [id number]  Phone:  [phone]                                    Gilson Curtis   MRN: [id number]    Department:  [facility], Emergency Department   Date of Visit:  2/28/2020           After Visit Summary Signature Page    I have received my discharge instructions, and my questions have been answered. I have discussed any challenges I see with this plan with the nurse or doctor.    ..........................................................................................................................................  Patient/Patient Representative Signature      ..........................................................................................................................................  Patient Representative Print Name and Relationship to Patient    ..................................................               ................................................  Date                                   Time    ..........................................................................................................................................  Reviewed by Signature/Title    ...................................................              ..............................................  Date                                               Time          22EPIC Rev 08/18

## 2020-02-29 NOTE — ED PROVIDER NOTES
"      [place] [facility] DE[facility]MENT ([facility])  February 28, 2020    History     Chief Complaint   Patient presents with     Rectal Bleeding     The history is provided by the patient and medical records.     Gilson Curtis is a 80 year old male with a medical history significant for lung cancer (on chemo/radiation), prostate cancer, CAD (s/p stent 2009), CKD, and HTN who reports to the Emergency Department today with rectal bleeding. Patient is currently on chemotherapy (iopamidol). He reports that last Tuesday (2/18/2020) he noticed blood in his stool and noticed it again today around 4 pm. In between these episodes, he had normal nonbloody stools. Had been constipated prior to Tuesday so took some senna and since then had been having liquid stools. Today, noted some liquid stool \"leak out\" with some gas while urinating. Patient reports chronic fatigue and cough that are unchanged. Patient denies rectal pain, abdominal pain, leg pain, leg swelling, chest pain, use of anticoagulant, dysuria, sore throat, or fever. He reports that he has not eaten since 12 pm today because he has no appetite and new sores on the tongue from chemotherapy.       I have reviewed the Medications, Allergies, Past Medical and Surgical History, and Social History in the [facility] system.  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     CAD (coronary artery disease)      CKD (chronic kidney disease) stage 3, GFR 30-59 ml/min (H)      Fatigue     \"spells\"     Hypertension      Lung cancer (H) 2016     Melanoma (H)      Non-small cell lung cancer (H)      Prostate cancer (H) 2009     S/P radiation therapy     3,000 cGy to right ribs completed on 6/19/2018 - [facility] with Dr. Pérez     Skin cancer, basal cell      Stented coronary artery 2009    x2     TIA (transient ischemic attack) 2002       Past Surgical History:   Procedure Laterality Date     CATARACT IOL, RT/LT " Left 2015     CATARACT IOL, RT/LT Right 2015     CATHETER, ABLATION  2006    [facility]     CHOLECYSTECTOMY  10/2012     ENDOBRONCHIAL ULTRASOUND FLEXIBLE  2012    Procedure:ENDOBRONCHIAL ULTRASOUND FLEXIBLE; Flexible Bronchoscopy, Endo Bronchial Ultrasound, Transbroncial biopies (Cytology specimens taken by Cytology); Surgeon:BRITTA MEDEL; Location:[facility] OR     EXCISION OF BASAL CELL CARCINOMA  10/2005    Left upper back     HERNIA REPAIR Bilateral 2010    Inguinal     PROSTATECTOMY RETROPUBIC RADICAL  2009     REMOVAL OF MELANOMA BEHIND LEFT EAR Left 10/18/2019    [facility]     RESECT RIB Right 2016    Procedure: RESECT RIB;  Surgeon: Jamarcus Gannon MD;  Location: [facility] OR     STENT  2009    Angiogram/ 2 Taxus Stents - Mid LAD, Mid CMX     THORACOSCOPIC RESECTION LUNG  3/6/2012    Procedure:THORACOSCOPIC RESECTION LUNG; right video assisted thoracic surgery, wedge resection middle lobe x 2, right thoroctomy, right upper lobectomy, mediastinal lymphadenectomy, flexible bronchoscopy; Surgeon:BRITTA MEDEL; Location:[facility] OR       Family History   Problem Relation Age of Onset     Breast Cancer Sister 65     Breast Cancer Maternal Aunt      Bladder Cancer Son        Social History     Tobacco Use     Smoking status: Former Smoker     Packs/day: 1.00     Years: 20.00     Pack years: 20.00     Types: Cigarettes     Last attempt to quit: 1978     Years since quittin.1     Smokeless tobacco: Never Used   Substance Use Topics     Alcohol use: No     Alcohol/week: 0.0 standard drinks       No current facility-administered medications for this encounter.      Current Outpatient Medications   Medication     aspirin 81 MG tablet     dexamethasone (DECADRON) 4 MG tablet     gabapentin (NEURONTIN) 100 MG capsule     lisinopril (PRINIVIL,ZESTRIL) 2.5 MG tablet     SENNA-docusate sodium (SENNA S) 8.6-50 MG tablet     simvastatin (ZOCOR) 80 MG tablet  "    calcium carbonate (TUMS) 500 MG chewable tablet     folic acid (FOLVITE) 1 MG tablet     guaiFENesin-codeine (ROBITUSSIN AC) 100-10 MG/5ML solution     lidocaine (LIDODERM) 5 % patch     LORazepam (ATIVAN) 0.5 MG tablet     magic mouthwash suspension, diphenhydrAMINE, lidocaine, aluminum-magnesium & simethicone, (FIRST-MOUTHWASH BLM) compounding kit     Multiple Vitamins-Minerals (EYE VITAMINS PO)     Multiple Vitamins-Minerals (MATURE ADULT CENTURY PO)     ondansetron (ZOFRAN) 8 MG tablet     order for DME     prochlorperazine (COMPAZINE) 10 MG tablet     prochlorperazine (COMPAZINE) 10 MG tablet     Facility-Administered Medications Ordered in Other Encounters   Medication     iopamidol (ISOVUE-370) solution 103 mL        Allergies   Allergen Reactions     No Known Drug Allergy        Review of Systems   Constitutional: Positive for appetite change. Negative for fever. Fatigue: chronic.   HENT: Negative for sore throat.    Respiratory: Positive for cough (wet) and shortness of breath (chronic).    Cardiovascular: Negative for chest pain and leg swelling.   Gastrointestinal: Positive for blood in stool and diarrhea. Negative for abdominal pain, constipation and rectal pain.   Genitourinary: Negative for dysuria.   All other systems reviewed and are negative.      Physical Exam   BP: 132/68  Pulse: 98  Heart Rate: 58  Temp: 97.9  F (36.6  C)  Resp: 17  Height: 175.3 cm (5' 9\")  Weight: 64.2 kg (141 lb 8 oz)  SpO2: 97 %      Physical Exam  Vitals signs and nursing note reviewed.   Constitutional:       General: He is not in acute distress.     Appearance: He is well-developed and normal weight. He is ill-appearing. He is not toxic-appearing or diaphoretic.   HENT:      Head: Normocephalic and atraumatic.      Nose: Nose normal.      Mouth/Throat:      Mouth: Mucous membranes are moist.      Comments: aphthous ulcers on left lateral tongue  Eyes:      General: No scleral icterus.     Conjunctiva/sclera: " Conjunctivae normal.   Neck:      Musculoskeletal: Normal range of motion and neck supple. No neck rigidity.   Cardiovascular:      Rate and Rhythm: Normal rate and regular rhythm.      Heart sounds: Normal heart sounds. No murmur.   Pulmonary:      Effort: Pulmonary effort is normal. No respiratory distress.      Breath sounds: No stridor. Decreased breath sounds present. No wheezing, rhonchi or rales.   Abdominal:      General: There is no distension.      Palpations: Abdomen is soft.      Tenderness: There is abdominal tenderness in the right lower quadrant. There is no guarding or rebound.      Hernia: No hernia is present.      Comments: Mildly tender in right lower/lateral quadrant. (Per patient this is baseline from a known liver mass)   Genitourinary:     Rectum: Guaiac result positive. Internal hemorrhoid present. No mass, tenderness, anal fissure or external hemorrhoid. Normal anal tone.      Comments: No gross blood or melena. Guaiac positive. nontender internal hemorrhoid.  Musculoskeletal: Normal range of motion.         General: No deformity or signs of injury.   Skin:     General: Skin is warm and dry.      Coloration: Skin is pale. Skin is not jaundiced.      Findings: No rash.   Neurological:      General: No focal deficit present.      Mental Status: He is alert and oriented to person, place, and time.      Motor: No weakness.   Psychiatric:         Mood and Affect: Mood normal.         Behavior: Behavior normal.         Thought Content: Thought content normal.         ED Course   8:16 PM  The patient was seen and examined by Dr. Duncan in Room ED31.        Procedures                   The Lactic acid level is elevated due to hypovolemia, at this time there is no sign of severe sepsis or septic shock.       Labs Ordered and Resulted from Time of ED Arrival Up to the Time of Departure from the ED   CBC WITH PLATELETS DIFFERENTIAL - Abnormal; Notable for the following components:       Result Value     WBC 2.7 (*)     RBC Count 3.82 (*)     Hemoglobin 11.5 (*)     Hematocrit 37.2 (*)     MCHC 30.9 (*)     RDW 15.4 (*)     Platelet Count 72 (*)     Absolute Lymphocytes 0.7 (*)     All other components within normal limits   COMPREHENSIVE METABOLIC PANEL - Abnormal; Notable for the following components:    Glucose 112 (*)     GFR Estimate 58 (*)     Albumin 3.1 (*)     Protein Total 6.5 (*)     All other components within normal limits   INR   LACTIC ACID WHOLE BLOOD   PARTIAL THROMBOPLASTIN TIME   ABO/RH TYPE AND SCREEN            Assessments & Plan (with Medical Decision Making)   Gilson Curtis is a 80 year old male with a medical history significant for lung cancer, prostate cancer, CAD (s/p stent 2009), CKD, and HTN who reports to the Emergency Department today with rectal bleeding.    Ddx: bleeding hemorrhoid, anal fissure, coagulopathy, acute blood loss anemia, constipation, diverticular bleed    Patient generally at baseline in setting of recent initiation of IV chemotherapy regiment. Cough and SOB are baseline. Reports decreased PO intake due to painful tongue ulcers since chemo.     Rectal exam with internal hemorrhoid and guiac pos. No gross blood. Reported h/o intermittent small volume bleeding on TP after recent h/o constipation is c/w bleeding internal hemorrhoid. Labs wnl except for new pancytopenia. Likely 2/2 chemo given very small volume blood loss reported from stools. No active hemorrhage while in ED. HEMODYNAMICALLY STABLE. Discussed with onc fellow and reviewed labs and presenting symptoms. Fellow agreed with discharge and close OP follow up. Recommended f/u for repeat CBC on Monday. Standing order placed for CBC with diff to be routed to patient's oncologist for follow up. Discussed with patient and wife and they plan to go to [facility] lab on Monday for blood draw. Advised ongoing stool softener and return for worsening bleeding or abdominal pain. Call to schedule follow up  with Onc clinic. Return precautions provided.       I have reviewed the nursing notes.    I have reviewed the findings, diagnosis, plan and need for follow up with the patient.    Discharge Medication List as of 2/28/2020  9:26 PM      START taking these medications    Details   SENNA-docusate sodium (SENNA S) 8.6-50 MG tablet Take 1 tablet by mouth At Bedtime, Disp-20 tablet, R-0, Local Print             Final diagnoses:   Internal hemorrhoid, bleeding   Antineoplastic chemotherapy induced pancytopenia (H)     I, Elisha Banks, am serving as a trained medical scribe to document services personally performed by Claudia Duncan MD, based on the provider's statements to me.     IClaudia MD, was physically present and have reviewed and verified the accuracy of this note documented by Elisha Banks.    2/28/2020   [facility], [address], EMERGENCY DEPARTMENT     Claudia Duncan MD  02/29/20 0221

## 2020-02-29 NOTE — ED NOTES
"ED Triage Provider Note  [facility]  Encounter Date: Feb 28, 2020    History:  Chief Complaint   Patient presents with     Rectal Bleeding     Gilson Curtis is a 80 year old male with hx of metastatic lung cancer who is currently on active treatment who presents to the ER for 2 episodes of rectal bleeding.  Patient says he had the first episode earlier this week when is just red blood in the toilet.  Today he had a bloody stool that was mixed with stool and blood.  Patient says that he has been feeling weak and tired but this is typical for him.  Patient denies any new weakness or fatigue.  Patient last had chemo 2 weeks prior.  He also had radiation 2 months prior in his pelvic region.  Has not had problems with rectal bleeding in the past.  Denies any current abdominal pain.        Review of Systems:  No fevers,   No weakness      Exam:  /68   Temp 97.9  F (36.6  C) (Oral)   Resp 17   Ht 1.753 m (5' 9\")   Wt 64.2 kg (141 lb 8 oz)   SpO2 97%   BMI 20.90 kg/m    General: No acute distress. Appears stated age.   Cardio: Regular rate, extremities well perfused  Resp: Normal work of breathing, grossly normal respiratory rate  Neuro: Alert. CN II-XII grossly intact. Grossly intact strength.   abd-soft, nontender    Medical Decision Making:  Patient arriving to the ED with problem as above. A medical screening exam was performed. IV, labs,  orders initiated from Triage. The patient is appropriate to wait in triage.      Tammy Ch MD on 2/28/2020 at 6:18 PM       Tammy Ch MD  02/28/20 1820    "

## 2020-02-29 NOTE — DISCHARGE INSTRUCTIONS
Please make an appointment to follow up with Your Primary Care Provider and Hematology Oncology Clinic (phone: [phone]) in 2-3 days.    Go to a [place] lab on Monday to have a repeat CBC drawn.  The results should be routed to your oncology clinic.  Please call to set up a follow-up appointment with your oncologist to go over the results and determine if further treatment is necessary.    Take stool softener to prevent bleeding from internal hemorrhoids.  Return to the emergency department if you develop worsening bleeding, pain, inability to have bowel movements, fever, abdominal pain, worsening shortness of breath.

## 2020-03-05 NOTE — LETTER
3/5/2020         RE: Gilson Curtis  [address] [address]        Dear Colleague,    Thank you for referring your patient, Gilson Curtis, to the [facility]. Please see a copy of my visit note below.    Oncology Follow Up Visit: March 5, 2020     Oncologist: Dr Gerry Ahumada  PCP: Hemant Jiménez    Diagnosis: Stage IV Lung Cancer  Gilson Curtis is an 79 yo who complained of hemoptysis in 11/2011 and was found to have a 2 x 1.9 cm RUL mass. 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 Curtis comes to clinic with wife for start of treatment with pemetrexed carboplatin and pembrolizumab for his lung cancer.  2/28/2020 pt ws seen in ED for bleeding hemorrhoid, anal fissure, coagulopathy, acute blood loss anemia, constipation, diverticular bleed.Pt states he has had no signs of bleeding after this visit in bowel or bladder. He is feeling well today but admits that shortly after start of new program he had mouth sores that were preventing him from eating and he has not been taking food well up until the last few days and is lost 4 pounds since last visit.  He also reports some shortness of breath with activity and notes that his left hip continues to be painful intermittently but is not using any pain medication and currently is not painful.  He admits to some weakness and continues to use his walker so that he  "does not have any falls.  He has some neuropathies related to his toes and admits to some anxiety about disease trouble sleeping intermittently.   Rest of comprehensive review of systems is negative.   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 Pérez     Skin cancer, basal cell      Stented coronary artery 2009    x2     TIA (transient ischemic attack) 2002     Current Outpatient Medications   Medication     aspirin 81 MG tablet     calcium carbonate (TUMS) 500 MG chewable tablet     dexamethasone (DECADRON) 4 MG tablet     folic acid (FOLVITE) 1 MG tablet     gabapentin (NEURONTIN) 100 MG capsule     guaiFENesin-codeine (ROBITUSSIN AC) 100-10 MG/5ML solution     lidocaine (LIDODERM) 5 % patch     lisinopril (PRINIVIL,ZESTRIL) 2.5 MG tablet     LORazepam (ATIVAN) 0.5 MG tablet     magic mouthwash suspension, diphenhydrAMINE, lidocaine, aluminum-magnesium & simethicone, (FIRST-MOUTHWASH BLM) compounding kit     Multiple Vitamins-Minerals (EYE VITAMINS PO)     Multiple Vitamins-Minerals (MATURE ADULT CENTURY PO)     ondansetron (ZOFRAN) 8 MG tablet     order for DME     prochlorperazine (COMPAZINE) 10 MG tablet     prochlorperazine (COMPAZINE) 10 MG tablet     SENNA-docusate sodium (SENNA S) 8.6-50 MG tablet     simvastatin (ZOCOR) 80 MG tablet     No current facility-administered medications for this visit.      Facility-Administered Medications Ordered in Other Visits   Medication     iopamidol (ISOVUE-370) solution 103 mL     Allergies   Allergen Reactions     No Known Drug Allergy        Physical Exam: BP " "126/79 (BP Location: Right arm)   Pulse 92   Temp 98.5  F (36.9  C) (Oral)   Resp 16   Ht 1.753 m (5' 9\")   Wt 63.8 kg (140 lb 11.2 oz)   SpO2 95%   BMI 20.78 kg/m      ECOG PS- 1  Constitutional: Alert, cooperative, and appears in no distress.  ENT: Eyes bright, No mouth sores  Neck: Supple, No adenopathy.  Cardiac: Heart rate and rhythm is regular and strong without murmur  Respiratory: Breathing easy. Lung sounds clear to auscultation-cough is occasional.  Has a rough voice but this is not changed.  GI: Abdomen is soft, non-tender, BS normal. No masses or organomegaly  MS: Muscle tone fair-light assist to exam table and using walker to keep pressure off left hip, extremities normal with no edema.   Skin: No rash or suspicious lesions-is ready facial complexion  Neuro: Sensory grossly WNL, gait normal with walker.  Lymph: Normal ant/post cervical, axillary, supraclavicular nodes  Psych: Mentation appears normal and affect normal/bright with good conversation.     Laboratory Results:   Results for orders placed or performed in visit on 03/05/20   CBC with platelets differential     Status: Abnormal   Result Value Ref Range    WBC 2.3 (L) 4.0 - 11.0 10e9/L    RBC Count 3.76 (L) 4.4 - 5.9 10e12/L    Hemoglobin 11.4 (L) 13.3 - 17.7 g/dL    Hematocrit 37.7 (L) 40.0 - 53.0 %     78 - 100 fl    MCH 30.3 26.5 - 33.0 pg    MCHC 30.2 (L) 31.5 - 36.5 g/dL    RDW 16.9 (H) 10.0 - 15.0 %    Platelet Count 198 150 - 450 10e9/L    % Neutrophils 46.0 %    % Lymphocytes 33.0 %    % Monocytes 18.0 %    % Eosinophils 2.0 %    % Basophils 1.0 %    Absolute Neutrophil 1.1 (L) 1.6 - 8.3 10e9/L    Absolute Lymphocytes 0.8 0.8 - 5.3 10e9/L    Absolute Monocytes 0.4 0.0 - 1.3 10e9/L    Absolute Eosinophils 0.0 0.0 - 0.7 10e9/L    Absolute Basophils 0.0 0.0 - 0.2 10e9/L    RBC Morphology Normal     Platelet Estimate       Automated count confirmed.  Platelet morphology is normal.    Diff Method Manual Differential  "   Comprehensive metabolic panel     Status: Abnormal   Result Value Ref Range    Sodium 142 133 - 144 mmol/L    Potassium 4.5 3.4 - 5.3 mmol/L    Chloride 108 94 - 109 mmol/L    Carbon Dioxide 27 20 - 32 mmol/L    Anion Gap 7 3 - 14 mmol/L    Glucose 138 (H) 70 - 99 mg/dL    Urea Nitrogen 19 7 - 30 mg/dL    Creatinine 1.25 0.66 - 1.25 mg/dL    GFR Estimate 53 (L) >60 mL/min/[1.73_m2]    GFR Estimate If Black 62 >60 mL/min/[1.73_m2]    Calcium 8.6 8.5 - 10.1 mg/dL    Bilirubin Total 0.3 0.2 - 1.3 mg/dL    Albumin 2.7 (L) 3.4 - 5.0 g/dL    Protein Total 6.1 (L) 6.8 - 8.8 g/dL    Alkaline Phosphatase 112 40 - 150 U/L    ALT 27 0 - 70 U/L    AST 33 0 - 45 U/L   TSH with free T4 reflex     Status: None   Result Value Ref Range    TSH 1.06 0.40 - 4.00 mU/L     Assessment and Plan:   Stage IV Lung Cancer-patient began treatment with pemetrexed/ carboplatin/pembrolizumab 3 weeks previous patient did have several side effects including mouth sores which were most disruptive and kept him from taking adequate fluids and food and ended in 4 pound weight loss.  He also saw some bleeding upon urination and some blood on the toilet paper which was reviewed at emergency department visit on 2/28 and was found to be bleeding hemorrhoid, anal fissure, coagulopathy, acute blood loss anemia, constipation, diverticular bleed-he was discharged and had no further symptoms post EGD.  Today we find that he has weight loss and neutropenia, mild at 1.1, we have decided to hold treatment x1 week and have him return for review prior to continuation of plan.  Adequate discussion about mouth sores treatment with mouth rinses starting at the start of the cycle and may continue to use Magic mouthwash as needed if this reoccurs.  Also reviewed need to increase his proteins and fluid.  Bone metastasis-patient received Zometa every 6 weeks-next due after 3/19/2020  CKD stage III- stable. Asked to continue to push fluids.   Weakness and continued  musculoskeletal pain-patient is using walker and has felt he adjusted to the hip pain which he feels is mild at present- not using pain medication. Has seen Dr Wise who felt no further treatment necessary at this time.  This was a 30 min visit with > 50% in counseling and coordinating care including education and management of concerns.    Lazara Leon CNP      Again, thank you for allowing me to participate in the care of your patient.        Sincerely,        Lazara Leon, KARLIE, APRN CNP

## 2020-03-05 NOTE — NURSING NOTE
"Oncology Rooming Note    March 5, 2020 9:42 AM   Gilson Curtis is a 80 year old male who presents for:    Chief Complaint   Patient presents with     Oncology Clinic Visit     Follow up     Initial Vitals: /79 (BP Location: Right arm)   Pulse 92   Temp 98.5  F (36.9  C) (Oral)   Resp 16   Ht 1.753 m (5' 9\")   Wt 63.8 kg (140 lb 11.2 oz)   SpO2 95%   BMI 20.78 kg/m   Estimated body mass index is 20.78 kg/m  as calculated from the following:    Height as of this encounter: 1.753 m (5' 9\").    Weight as of this encounter: 63.8 kg (140 lb 11.2 oz). Body surface area is 1.76 meters squared.  No Pain (0) Comment: Data Unavailable   No LMP for male patient.  Allergies reviewed: Yes  Medications reviewed: Yes    Medications: Medication refills not needed today.  Pharmacy name entered into [employer]:    [facility] [address] [place] [address] - [address] [address] [facility] [place] - [address] [facility] [place] [place] [facility] [address]  [id number] - [place], [place] - [address]      Pippa Chaney LPN              "

## 2020-03-05 NOTE — PROGRESS NOTES
Oncology Follow Up Visit: March 5, 2020     Oncologist: Dr Gerry Ahumada  PCP: Hemant Jiménez    Diagnosis: Stage IV Lung Cancer  Gilson Curtis is an 81 yo who complained of hemoptysis in 11/2011 and was found to have a 2 x 1.9 cm RUL mass. 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 Curtis comes to clinic with wife for start of treatment with pemetrexed carboplatin and pembrolizumab for his lung cancer.  2/28/2020 pt ws seen in ED for bleeding hemorrhoid, anal fissure, coagulopathy, acute blood loss anemia, constipation, diverticular bleed.Pt states he has had no signs of bleeding after this visit in bowel or bladder. He is feeling well today but admits that shortly after start of new program he had mouth sores that were preventing him from eating and he has not been taking food well up until the last few days and is lost 4 pounds since last visit.  He also reports some shortness of breath with activity and notes that his left hip continues to be painful intermittently but is not using any pain medication and currently is not painful.  He admits to some weakness and continues to use his walker so that he does not have any falls.  He has some neuropathies related to his toes and admits to some anxiety about disease trouble sleeping intermittently.   Rest of comprehensive review of systems is negative.   Past Medical History:   Diagnosis Date     AVNRT  "(AV lance re-entry tachycardia) (H)     s/p ablation     CAD (coronary artery disease)      CKD (chronic kidney disease) stage 3, GFR 30-59 ml/min (H)      Fatigue     \"spells\"     Hypertension      Lung cancer (H) 2016     Melanoma (H)      Non-small cell lung cancer (H)      Prostate cancer (H) 2009     S/P radiation therapy     3,000 cGy to right ribs completed on 6/19/2018 - [facility] with Dr. Pérez     Skin cancer, basal cell      Stented coronary artery 2009    x2     TIA (transient ischemic attack) 2002     Current Outpatient Medications   Medication     aspirin 81 MG tablet     calcium carbonate (TUMS) 500 MG chewable tablet     dexamethasone (DECADRON) 4 MG tablet     folic acid (FOLVITE) 1 MG tablet     gabapentin (NEURONTIN) 100 MG capsule     guaiFENesin-codeine (ROBITUSSIN AC) 100-10 MG/5ML solution     lidocaine (LIDODERM) 5 % patch     lisinopril (PRINIVIL,ZESTRIL) 2.5 MG tablet     LORazepam (ATIVAN) 0.5 MG tablet     magic mouthwash suspension, diphenhydrAMINE, lidocaine, aluminum-magnesium & simethicone, (FIRST-MOUTHWASH BLM) compounding kit     Multiple Vitamins-Minerals (EYE VITAMINS PO)     Multiple Vitamins-Minerals (MATURE ADULT CENTURY PO)     ondansetron (ZOFRAN) 8 MG tablet     order for DME     prochlorperazine (COMPAZINE) 10 MG tablet     prochlorperazine (COMPAZINE) 10 MG tablet     SENNA-docusate sodium (SENNA S) 8.6-50 MG tablet     simvastatin (ZOCOR) 80 MG tablet     No current facility-administered medications for this visit.      Facility-Administered Medications Ordered in Other Visits   Medication     iopamidol (ISOVUE-370) solution 103 mL     Allergies   Allergen Reactions     No Known Drug Allergy        Physical Exam: /79 (BP Location: Right arm)   Pulse 92   Temp 98.5  F (36.9  C) (Oral)   Resp 16   Ht 1.753 m (5' 9\")   Wt 63.8 kg (140 lb 11.2 oz)   SpO2 95%   BMI 20.78 kg/m     ECOG PS- 1  Constitutional: Alert, cooperative, and appears in no " distress.  ENT: Eyes bright, No mouth sores  Neck: Supple, No adenopathy.  Cardiac: Heart rate and rhythm is regular and strong without murmur  Respiratory: Breathing easy. Lung sounds clear to auscultation-cough is occasional.  Has a rough voice but this is not changed.  GI: Abdomen is soft, non-tender, BS normal. No masses or organomegaly  MS: Muscle tone fair-light assist to exam table and using walker to keep pressure off left hip, extremities normal with no edema.   Skin: No rash or suspicious lesions-is ready facial complexion  Neuro: Sensory grossly WNL, gait normal with walker.  Lymph: Normal ant/post cervical, axillary, supraclavicular nodes  Psych: Mentation appears normal and affect normal/bright with good conversation.     Laboratory Results:   Results for orders placed or performed in visit on 03/05/20   CBC with platelets differential     Status: Abnormal   Result Value Ref Range    WBC 2.3 (L) 4.0 - 11.0 10e9/L    RBC Count 3.76 (L) 4.4 - 5.9 10e12/L    Hemoglobin 11.4 (L) 13.3 - 17.7 g/dL    Hematocrit 37.7 (L) 40.0 - 53.0 %     78 - 100 fl    MCH 30.3 26.5 - 33.0 pg    MCHC 30.2 (L) 31.5 - 36.5 g/dL    RDW 16.9 (H) 10.0 - 15.0 %    Platelet Count 198 150 - 450 10e9/L    % Neutrophils 46.0 %    % Lymphocytes 33.0 %    % Monocytes 18.0 %    % Eosinophils 2.0 %    % Basophils 1.0 %    Absolute Neutrophil 1.1 (L) 1.6 - 8.3 10e9/L    Absolute Lymphocytes 0.8 0.8 - 5.3 10e9/L    Absolute Monocytes 0.4 0.0 - 1.3 10e9/L    Absolute Eosinophils 0.0 0.0 - 0.7 10e9/L    Absolute Basophils 0.0 0.0 - 0.2 10e9/L    RBC Morphology Normal     Platelet Estimate       Automated count confirmed.  Platelet morphology is normal.    Diff Method Manual Differential    Comprehensive metabolic panel     Status: Abnormal   Result Value Ref Range    Sodium 142 133 - 144 mmol/L    Potassium 4.5 3.4 - 5.3 mmol/L    Chloride 108 94 - 109 mmol/L    Carbon Dioxide 27 20 - 32 mmol/L    Anion Gap 7 3 - 14 mmol/L    Glucose  138 (H) 70 - 99 mg/dL    Urea Nitrogen 19 7 - 30 mg/dL    Creatinine 1.25 0.66 - 1.25 mg/dL    GFR Estimate 53 (L) >60 mL/min/[1.73_m2]    GFR Estimate If Black 62 >60 mL/min/[1.73_m2]    Calcium 8.6 8.5 - 10.1 mg/dL    Bilirubin Total 0.3 0.2 - 1.3 mg/dL    Albumin 2.7 (L) 3.4 - 5.0 g/dL    Protein Total 6.1 (L) 6.8 - 8.8 g/dL    Alkaline Phosphatase 112 40 - 150 U/L    ALT 27 0 - 70 U/L    AST 33 0 - 45 U/L   TSH with free T4 reflex     Status: None   Result Value Ref Range    TSH 1.06 0.40 - 4.00 mU/L     Assessment and Plan:   Stage IV Lung Cancer-patient began treatment with pemetrexed/ carboplatin/pembrolizumab 3 weeks previous patient did have several side effects including mouth sores which were most disruptive and kept him from taking adequate fluids and food and ended in 4 pound weight loss.  He also saw some bleeding upon urination and some blood on the toilet paper which was reviewed at emergency department visit on 2/28 and was found to be bleeding hemorrhoid, anal fissure, coagulopathy, acute blood loss anemia, constipation, diverticular bleed-he was discharged and had no further symptoms post EGD.  Today we find that he has weight loss and neutropenia, mild at 1.1, we have decided to hold treatment x1 week and have him return for review prior to continuation of plan.  Adequate discussion about mouth sores treatment with mouth rinses starting at the start of the cycle and may continue to use Magic mouthwash as needed if this reoccurs.  Also reviewed need to increase his proteins and fluid.  Bone metastasis-patient received Zometa every 6 weeks-next due after 3/19/2020  CKD stage III- stable. Asked to continue to push fluids.   Weakness and continued musculoskeletal pain-patient is using walker and has felt he adjusted to the hip pain which he feels is mild at present- not using pain medication. Has seen Dr Wise who felt no further treatment necessary at this time.  This was a 30 min visit with > 50%  in counseling and coordinating care including education and management of concerns.    Lazara Leon,CNP

## 2020-03-16 NOTE — PROGRESS NOTES
"Oncology Follow Up Visit: March 16, 2020     Oncologist: Dr Gerry Ahumada  PCP: Hemant Jiménez    Diagnosis: Stage IV Lung Cancer  Gilson Curtis is an 81 yo who complained of hemoptysis in 11/2011 and was found to have a 2 x 1.9 cm RUL mass. 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 Curtis comes to clinic with wife for start of treatment with pemetrexed carboplatin and pembrolizumab for his lung cancer.  Pt was held last week due to neutropenia. Pt denies any signs of infection today with white blood cell count back up and above normal without use of Gcsf. He admits to being tired but no other concerns today. Admits to some sinus congestion that is ongoing with no new discharge, headaches, ear pressure or fevers. He denies change to cough, increased SOB, change in bowel or bladder but has been able to increase proteins and fluids. He is tired but has been getting up and around the house for more chores and movement.   Rest of comprehensive review of systems is negative.   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     CAD (coronary artery disease)      CKD (chronic kidney disease) stage 3, GFR 30-59 ml/min (H)      Fatigue     \"spells\"     Hypertension      Lung cancer (H) 2016     Melanoma (H)      Non-small cell lung cancer (H)      Prostate cancer (H) 2009     S/P radiation therapy     " 3,000 cGy to right ribs completed on 6/19/2018 - [facility] with Dr. Pérez     Skin cancer, basal cell      Stented coronary artery 2009    x2     TIA (transient ischemic attack) 2002     Current Outpatient Medications   Medication     aspirin 81 MG tablet     calcium carbonate (TUMS) 500 MG chewable tablet     dexamethasone (DECADRON) 4 MG tablet     folic acid (FOLVITE) 1 MG tablet     gabapentin (NEURONTIN) 100 MG capsule     guaiFENesin-codeine (ROBITUSSIN AC) 100-10 MG/5ML solution     lidocaine (LIDODERM) 5 % patch     lisinopril (PRINIVIL,ZESTRIL) 2.5 MG tablet     LORazepam (ATIVAN) 0.5 MG tablet     magic mouthwash suspension, diphenhydrAMINE, lidocaine, aluminum-magnesium & simethicone, (FIRST-MOUTHWASH BLM) compounding kit     Multiple Vitamins-Minerals (EYE VITAMINS PO)     Multiple Vitamins-Minerals (MATURE ADULT CENTURY PO)     ondansetron (ZOFRAN) 8 MG tablet     order for DME     prochlorperazine (COMPAZINE) 10 MG tablet     SENNA-docusate sodium (SENNA S) 8.6-50 MG tablet     simvastatin (ZOCOR) 80 MG tablet     No current facility-administered medications for this visit.      Facility-Administered Medications Ordered in Other Visits   Medication     iopamidol (ISOVUE-370) solution 103 mL     Allergies   Allergen Reactions     No Known Drug Allergy        Physical Exam: /68   Pulse 92   Temp 97.9  F (36.6  C) (Oral)   Resp 18   Wt 64.3 kg (141 lb 12.8 oz)   SpO2 96%   BMI 20.94 kg/m     ECOG PS- 1  Constitutional: Alert, cooperative, and appears in no distress.  ENT: Eyes bright, No mouth sores  Neck: Supple, No adenopathy.  Cardiac: Heart rate and rhythm is regular and strong without murmur  Respiratory: Breathing easy. Lung sounds clear to auscultation-cough is occasional and non productive at this time.  Has a rough voice but this is not changed.  GI: Abdomen is soft, non-tender, BS normal. No masses or organomegaly  MS: Muscle tone fair-light assist to exam table but appears  to have more energy than previous - using walker to keep pressure off left hip, extremities normal with no edema.   Skin: No rash or suspicious lesions-is ready facial complexion  Neuro: Sensory grossly WNL, gait normal with walker.  Lymph: Normal ant/post cervical, axillary, supraclavicular nodes  Psych: Mentation appears normal and affect normal/bright with good conversation.     Laboratory Results:   Results for orders placed or performed in visit on 03/16/20   CBC with platelets differential     Status: Abnormal   Result Value Ref Range    WBC 14.5 (H) 4.0 - 11.0 10e9/L    RBC Count 3.99 (L) 4.4 - 5.9 10e12/L    Hemoglobin 12.4 (L) 13.3 - 17.7 g/dL    Hematocrit 40.1 40.0 - 53.0 %     (H) 78 - 100 fl    MCH 31.1 26.5 - 33.0 pg    MCHC 30.9 (L) 31.5 - 36.5 g/dL    RDW 18.8 (H) 10.0 - 15.0 %    Platelet Count 297 150 - 450 10e9/L    % Neutrophils 96.0 %    % Lymphocytes 1.0 %    % Monocytes 3.0 %    Absolute Neutrophil 14.0 (H) 1.6 - 8.3 10e9/L    Absolute Lymphocytes 0.1 (L) 0.8 - 5.3 10e9/L    Absolute Monocytes 0.4 0.0 - 1.3 10e9/L    RBC Morphology Normal     Platelet Estimate       Automated count confirmed.  Platelet morphology is normal.    Diff Method Manual Differential    Comprehensive metabolic panel     Status: Abnormal   Result Value Ref Range    Sodium 141 133 - 144 mmol/L    Potassium 4.9 3.4 - 5.3 mmol/L    Chloride 107 94 - 109 mmol/L    Carbon Dioxide 25 20 - 32 mmol/L    Anion Gap 9 3 - 14 mmol/L    Glucose 190 (H) 70 - 99 mg/dL    Urea Nitrogen 35 (H) 7 - 30 mg/dL    Creatinine 1.16 0.66 - 1.25 mg/dL    GFR Estimate 58 (L) >60 mL/min/[1.73_m2]    GFR Estimate If Black 68 >60 mL/min/[1.73_m2]    Calcium 8.9 8.5 - 10.1 mg/dL    Bilirubin Total 0.3 0.2 - 1.3 mg/dL    Albumin 2.9 (L) 3.4 - 5.0 g/dL    Protein Total 6.8 6.8 - 8.8 g/dL    Alkaline Phosphatase 117 40 - 150 U/L    ALT 24 0 - 70 U/L    AST 28 0 - 45 U/L   TSH with free T4 reflex     Status: None   Result Value Ref Range    TSH  0.51 0.40 - 4.00 mU/L     Assessment and Plan:   Stage IV Lung Cancer-patient began treatment with pemetrexed/ carboplatin/pembrolizumab  And is due to start cycle 2 of plan after a 1 week delay for neutropenia without Gcsf use. His ANC and total white count has increased to above normal range in 1 week but no infection is noted after review and exam. Pt is feeling well and will continue with planned infusion today but warned to shre if signs of infection present in next days.  Will receive B12 injection today.   Pt will return in 3 weeks for cycle 3 with treatment labs and review prior to infusion.   Bone metastasis-patient received Zometa every 6 weeks-next due after 3/19/2020  CKD stage III- stable- will continue to push fluids.   Weakness and continued musculoskeletal pain-patient is using walker and has felt he adjusted to the hip pain which he feels is mild at present- not using pain medication. Has seen Dr Wise who felt no further treatment necessary at this time.  Has been able to increase albumin and weight with added proteins to diet- given praise for effort.   mucositis renewed magic mouthwash for use as needed- currently no mouth sores.   Covid 19 prevention- Reviewed precautions for prevention of transmission with washing hands often for 20 seconds with soap and water or hand , avoiding anyone appearing ill,  crowds or common public places, and wearing mask to prevent touching face and help with prevention of transmission since they are at high risk.   If developing symptoms of concern they should call to clinic before coming to clinic for directions for evaluation and treatment.   We will cancel palliative care team visit per pt request and as precaution for above.   This was a 25 min visit with > 50% in counseling and coordinating care including education and management of concerns.    Lazara Leon,CNP

## 2020-03-16 NOTE — PROGRESS NOTES
Infusion Nursing Note:  Gilson Curtis presents today for C2 Keytruda/Alimta/Carbo/Zometa.    Patient seen by provider today: Yes: NP   present during visit today: Not Applicable.    Note: patient symptoms improved, but still feels fatigued.    Intravenous Access:  Peripheral IV placed.    Treatment Conditions:  Results reviewed, labs MET treatment parameters, ok to proceed with treatment.      Post Infusion Assessment:  Patient tolerated infusion without incident.       Discharge Plan:   Reviewed upcoming appts with patient.  RTC in 3 wks for lab/provider/C3 chemo.    KATELYNN GILMORE RN

## 2020-03-16 NOTE — NURSING NOTE
"Oncology Rooming Note    March 16, 2020 1:48 PM   Gilson Curtis is a 80 year old male who presents for:    Chief Complaint   Patient presents with     Oncology Clinic Visit     follow up prior to treatment     Initial Vitals: /68   Pulse 92   Temp 97.9  F (36.6  C) (Oral)   Resp 18   Wt 64.3 kg (141 lb 12.8 oz)   SpO2 96%   BMI 20.94 kg/m   Estimated body mass index is 20.94 kg/m  as calculated from the following:    Height as of 3/5/20: 1.753 m (5' 9\").    Weight as of this encounter: 64.3 kg (141 lb 12.8 oz). Body surface area is 1.77 meters squared.  No Pain (0) Comment: Data Unavailable   No LMP for male patient.  Allergies reviewed: Yes  Medications reviewed: Yes    Medications: Medication refills not needed today.  Pharmacy name entered into [employer]:    [facility] [address] [place] [address] - [address] [facility] [place] - [place] - [address]  [facility] [place], [place] - [address]  [id number] - [place] - [address]              "

## 2020-03-16 NOTE — LETTER
3/16/2020         RE: Gilson Curtis  [address] [facility] [address]        Dear Colleague,    Thank you for referring your patient, Gilson Curtis, to the [facility]. Please see a copy of my visit note below.    Oncology Follow Up Visit: March 16, 2020     Oncologist: Dr Gerry Ahumada  PCP: Hemant Jiménez    Diagnosis: Stage IV Lung Cancer  Gilson Curtis is an 79 yo who complained of hemoptysis in 11/2011 and was found to have a 2 x 1.9 cm RUL mass. 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 Curtis comes to clinic with wife for start of treatment with pemetrexed carboplatin and pembrolizumab for his lung cancer.  Pt was held last week due to neutropenia. Pt denies any signs of infection today with white blood cell count back up and above normal without use of Gcsf. He admits to being tired but no other concerns today. Admits to some sinus congestion that is ongoing with no new discharge, headaches, ear pressure or fevers. He denies change to cough, increased SOB, change in bowel or bladder but has been able to increase proteins and fluids. He is tired but has been getting up and around the house for more chores and movement.   Rest of comprehensive review of systems is negative.   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     CAD (coronary artery  "disease)      CKD (chronic kidney disease) stage 3, GFR 30-59 ml/min (H)      Fatigue     \"spells\"     Hypertension      Lung cancer (H) 2016     Melanoma (H)      Non-small cell lung cancer (H)      Prostate cancer (H) 2009     S/P radiation therapy     3,000 cGy to right ribs completed on 6/19/2018 - [facility] with Dr. Pérez     Skin cancer, basal cell      Stented coronary artery 2009    x2     TIA (transient ischemic attack) 2002     Current Outpatient Medications   Medication     aspirin 81 MG tablet     calcium carbonate (TUMS) 500 MG chewable tablet     dexamethasone (DECADRON) 4 MG tablet     folic acid (FOLVITE) 1 MG tablet     gabapentin (NEURONTIN) 100 MG capsule     guaiFENesin-codeine (ROBITUSSIN AC) 100-10 MG/5ML solution     lidocaine (LIDODERM) 5 % patch     lisinopril (PRINIVIL,ZESTRIL) 2.5 MG tablet     LORazepam (ATIVAN) 0.5 MG tablet     magic mouthwash suspension, diphenhydrAMINE, lidocaine, aluminum-magnesium & simethicone, (FIRST-MOUTHWASH BLM) compounding kit     Multiple Vitamins-Minerals (EYE VITAMINS PO)     Multiple Vitamins-Minerals (MATURE ADULT CENTURY PO)     ondansetron (ZOFRAN) 8 MG tablet     order for DME     prochlorperazine (COMPAZINE) 10 MG tablet     SENNA-docusate sodium (SENNA S) 8.6-50 MG tablet     simvastatin (ZOCOR) 80 MG tablet     No current facility-administered medications for this visit.      Facility-Administered Medications Ordered in Other Visits   Medication     iopamidol (ISOVUE-370) solution 103 mL     Allergies   Allergen Reactions     No Known Drug Allergy        Physical Exam: /68   Pulse 92   Temp 97.9  F (36.6  C) (Oral)   Resp 18   Wt 64.3 kg (141 lb 12.8 oz)   SpO2 96%   BMI 20.94 kg/m     ECOG PS- 1  Constitutional: Alert, cooperative, and appears in no distress.  ENT: Eyes bright, No mouth sores  Neck: Supple, No adenopathy.  Cardiac: Heart rate and rhythm is regular and strong without murmur  Respiratory: Breathing easy. Lung " sounds clear to auscultation-cough is occasional and non productive at this time.  Has a rough voice but this is not changed.  GI: Abdomen is soft, non-tender, BS normal. No masses or organomegaly  MS: Muscle tone fair-light assist to exam table but appears to have more energy than previous - using walker to keep pressure off left hip, extremities normal with no edema.   Skin: No rash or suspicious lesions-is ready facial complexion  Neuro: Sensory grossly WNL, gait normal with walker.  Lymph: Normal ant/post cervical, axillary, supraclavicular nodes  Psych: Mentation appears normal and affect normal/bright with good conversation.     Laboratory Results:   Results for orders placed or performed in visit on 03/16/20   CBC with platelets differential     Status: Abnormal   Result Value Ref Range    WBC 14.5 (H) 4.0 - 11.0 10e9/L    RBC Count 3.99 (L) 4.4 - 5.9 10e12/L    Hemoglobin 12.4 (L) 13.3 - 17.7 g/dL    Hematocrit 40.1 40.0 - 53.0 %     (H) 78 - 100 fl    MCH 31.1 26.5 - 33.0 pg    MCHC 30.9 (L) 31.5 - 36.5 g/dL    RDW 18.8 (H) 10.0 - 15.0 %    Platelet Count 297 150 - 450 10e9/L    % Neutrophils 96.0 %    % Lymphocytes 1.0 %    % Monocytes 3.0 %    Absolute Neutrophil 14.0 (H) 1.6 - 8.3 10e9/L    Absolute Lymphocytes 0.1 (L) 0.8 - 5.3 10e9/L    Absolute Monocytes 0.4 0.0 - 1.3 10e9/L    RBC Morphology Normal     Platelet Estimate       Automated count confirmed.  Platelet morphology is normal.    Diff Method Manual Differential    Comprehensive metabolic panel     Status: Abnormal   Result Value Ref Range    Sodium 141 133 - 144 mmol/L    Potassium 4.9 3.4 - 5.3 mmol/L    Chloride 107 94 - 109 mmol/L    Carbon Dioxide 25 20 - 32 mmol/L    Anion Gap 9 3 - 14 mmol/L    Glucose 190 (H) 70 - 99 mg/dL    Urea Nitrogen 35 (H) 7 - 30 mg/dL    Creatinine 1.16 0.66 - 1.25 mg/dL    GFR Estimate 58 (L) >60 mL/min/[1.73_m2]    GFR Estimate If Black 68 >60 mL/min/[1.73_m2]    Calcium 8.9 8.5 - 10.1 mg/dL     Bilirubin Total 0.3 0.2 - 1.3 mg/dL    Albumin 2.9 (L) 3.4 - 5.0 g/dL    Protein Total 6.8 6.8 - 8.8 g/dL    Alkaline Phosphatase 117 40 - 150 U/L    ALT 24 0 - 70 U/L    AST 28 0 - 45 U/L   TSH with free T4 reflex     Status: None   Result Value Ref Range    TSH 0.51 0.40 - 4.00 mU/L     Assessment and Plan:   Stage IV Lung Cancer-patient began treatment with pemetrexed/ carboplatin/pembrolizumab  And is due to start cycle 2 of plan after a 1 week delay for neutropenia without Gcsf use. His ANC and total white count has increased to above normal range in 1 week but no infection is noted after review and exam. Pt is feeling well and will continue with planned infusion today but warned to shre if signs of infection present in next days.  Will receive B12 injection today.   Pt will return in 3 weeks for cycle 3 with treatment labs and review prior to infusion.   Bone metastasis-patient received Zometa every 6 weeks-next due after 3/19/2020  CKD stage III- stable- will continue to push fluids.   Weakness and continued musculoskeletal pain-patient is using walker and has felt he adjusted to the hip pain which he feels is mild at present- not using pain medication. Has seen Dr Wise who felt no further treatment necessary at this time.  Has been able to increase albumin and weight with added proteins to diet- given praise for effort.   mucositis renewed magic mouthwash for use as needed- currently no mouth sores.   Covid 19 prevention- Reviewed precautions for prevention of transmission with washing hands often for 20 seconds with soap and water or hand , avoiding anyone appearing ill,  crowds or common public places, and wearing mask to prevent touching face and help with prevention of transmission since they are at high risk.   If developing symptoms of concern they should call to clinic before coming to clinic for directions for evaluation and treatment.   We will cancel palliative care team visit per pt  request and as precaution for above.   This was a 25 min visit with > 50% in counseling and coordinating care including education and management of concerns.    Lazara Leon,CNP      Again, thank you for allowing me to participate in the care of your patient.        Sincerely,        Lazara Leon, NP, APRN CNP

## 2020-04-06 NOTE — PROGRESS NOTES
Infusion Nursing Note:  Gilson Curtis presents today for C3 Carbo/Alimta/Keytruda.    Patient seen by provider today: Yes: Lazara Leon NP   present during visit today: Not Applicable.    Note: N/A.    Intravenous Access:  Peripheral IV placed.    Treatment Conditions:  Lab Results   Component Value Date    HGB 11.3 04/06/2020     Lab Results   Component Value Date    WBC 4.3 04/06/2020      Lab Results   Component Value Date    ANEU 3.4 04/06/2020     Lab Results   Component Value Date     04/06/2020      Lab Results   Component Value Date     04/06/2020                   Lab Results   Component Value Date    POTASSIUM 4.8 04/06/2020           Lab Results   Component Value Date    MAG 2.0 10/10/2017            Lab Results   Component Value Date    CR 1.12 04/06/2020                   Lab Results   Component Value Date    RICK 8.9 04/06/2020                Lab Results   Component Value Date    BILITOTAL 0.3 04/06/2020           Lab Results   Component Value Date    ALBUMIN 3.1 04/06/2020                    Lab Results   Component Value Date    ALT 24 04/06/2020           Lab Results   Component Value Date    AST 26 04/06/2020       Results reviewed, labs MET treatment parameters, ok to proceed with treatment.      Post Infusion Assessment:  Patient tolerated infusion without incident.  Blood return noted pre and post infusion.  Site patent and intact, free from redness, edema or discomfort.  No evidence of extravasations.  Access discontinued per protocol.       Discharge Plan:   Patient will return 4/27/2020 for next appointment.   Patient discharged in stable condition accompanied by: self.  Departure Mode: Ambulatory.    Chelo Goetz RN-BSN, PHN, OCN  [facility]

## 2020-04-06 NOTE — PROGRESS NOTES
"Gilson Curtis is a 80 year old male who is being evaluated via a billable telephone visit.      The patient has been notified of following:     \"This telephone visit will be conducted via a call between you and your physician/provider. We have found that certain health care needs can be provided without the need for a physical exam.  This service lets us provide the care you need with a short phone conversation.  If a prescription is necessary we can send it directly to your pharmacy.  If lab work is needed we can place an order for that and you can then stop by our lab to have the test done at a later time.    If during the course of the call the physician/provider feels a telephone visit is not appropriate, you will not be charged for this service.\"     Patient has given verbal consent for Telephone visit?  Yes    Gilson Curtis complains of    Chief Complaint   Patient presents with     Oncology Clinic Visit     Prior to treatment       I have reviewed and updated the patient's Past Medical History, Social History, Family History and Medication List.    ALLERGIES  No known drug allergy     Stefania Garcia LPN on 4/6/2020 at 8:20 AM        "

## 2020-04-06 NOTE — PROGRESS NOTES
Oncology Telephone Follow Up Visit: April 6, 2020     Oncologist: Dr Gerry Ahumada  PCP: Hemant Jiménez    Diagnosis: Stage IV Lung Cancer  Gilson Curtis is an 81 yo who complained of hemoptysis in 11/2011 and was found to have a 2 x 1.9 cm RUL mass. 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 Curtis and wife were on phone for review of symptoms with continuation of pemetrexed/ carboplatin/pembrolizumab. Pt states he continued to use the baking soda and salt rinses and mouth sores were much better with this cycle and wife admits he was eating better and has been using Boost daily- feels he is maintaining his weight . He loose but occasional cough continues but denies SOB. He denies fevers, illness, pain or  Depression but admits his neuropathy to the feet and some pain to shoulder and neck continues - he was using Neurontin 200 mg nightly for the pain and feels it is just not covering that pain- though has been able to sleep on right side somewhat longer than previous but still using lidocaine pain patch. Denies anxiety or depression and has been good about quarantine and no virus symptoms.   Rest of comprehensive review of systems is negative.   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 Pérez     Skin cancer, basal cell      Stented coronary artery 2009    x2     TIA (transient ischemic attack) 2002     Current Outpatient Medications   Medication     aspirin 81 MG tablet     calcium carbonate (TUMS) 500 MG chewable tablet     dexamethasone (DECADRON) 4 MG tablet     folic acid (FOLVITE) 1 MG tablet     gabapentin (NEURONTIN) 100 MG capsule     guaiFENesin-codeine (ROBITUSSIN AC) 100-10 MG/5ML solution     lidocaine (LIDODERM) 5 % patch     lisinopril (PRINIVIL,ZESTRIL) 2.5 MG tablet     LORazepam (ATIVAN) 0.5 MG tablet     magic mouthwash suspension, diphenhydrAMINE, lidocaine, aluminum-magnesium & simethicone, (FIRST-MOUTHWASH BLM) compounding kit     Multiple Vitamins-Minerals (EYE VITAMINS PO)     Multiple Vitamins-Minerals (MATURE ADULT CENTURY PO)     ondansetron (ZOFRAN) 8 MG tablet     order for DME     prochlorperazine (COMPAZINE) 10 MG tablet     SENNA-docusate sodium (SENNA S) 8.6-50 MG tablet     simvastatin (ZOCOR) 80 MG tablet     No current facility-administered medications for this visit.      Facility-Administered Medications Ordered in Other Visits   Medication     iopamidol (ISOVUE-370) solution 103 mL     Allergies   Allergen Reactions     No Known Drug Allergy      Assessment and Plan:   Stage IV Lung Cancer-patient began treatment with pemetrexed/ carboplatin/pembrolizumab in 2/2020 and is now due for cycle 3 today with pt noting improvement in side effects- noting mouth sores, insomnia with steroid use and continued cough and neuropathy of the feet as concerns.  He does meet goals for treatment today  Pt will return in 3 weeks for cycle 4 with treatment labs and review prior to infusion. He is due to see Dr Ahumada with imaging in " early May.   Bone metastasis-patient receiving Zometa every 6 weeks-next due on 4/27/2020. Confirms use of calcium.   Mucositis- improved with last cycle with regular use of salt and soda rinses each day- now eating better and weight is stable.   CKD stage III- stable- will continue to push fluids.   Weakness and continued musculoskeletal pain-patient feels pain is improved and is walking on hip without walker short distances. But still needing coverage for neuropathy of the feet and shoulder to neck pain- using Neurontin 200 mg and feels this is not ideal and does have the 300 mg Neurontin tabs. He does have CKD but will trial 300 mg at bedtime for pain coverage and keep eye on kidney function.   Covid 19 prevention- No current symptoms of concern and pt is sheltering with little exposure. Reviewed precautions for prevention of transmission with washing hands often for 20 seconds with soap and water or hand , avoiding anyone appearing ill,  crowds or common public places, and wearing mask to prevent touching face and help with prevention of transmission since they are at high risk.   If developing symptoms of concern they should call to clinic before coming to clinic for directions for evaluation and treatment.   We will cancel palliative care team visit per pt request and as precaution for above.   This was a 22 min Telephone visit  Lazara Leon,DAVID

## 2020-04-27 NOTE — NURSING NOTE
"Gilson Curtis is a 80 year old male who is being evaluated via a billable telephone visit.      The patient has been notified of following:     \"This telephone visit will be conducted via a call between you and your physician/provider. We have found that certain health care needs can be provided without the need for a physical exam.  This service lets us provide the care you need with a short phone conversation.  If a prescription is necessary we can send it directly to your pharmacy.  If lab work is needed we can place an order for that and you can then stop by our lab to have the test done at a later time.    Telephone visits are billed at different rates depending on your insurance coverage. During this emergency period, for some insurers they may be billed the same as an in-person visit.  Please reach out to your insurance provider with any questions.    If during the course of the call the physician/provider feels a telephone visit is not appropriate, you will not be charged for this service.\"    Patient has given verbal consent for Telephone visit?  Yes    How would you like to obtain your AVS? Yan Mustafa, CMA        "

## 2020-04-27 NOTE — PROGRESS NOTES
Infusion Nursing Note:  Gilson Curtis presents today for Keytruda/Alimta/Carbo/Zometa.    Patient seen by provider today: No   present during visit today: Not Applicable.    Note: pt acknowledges taking folic acid    Intravenous Access:  Peripheral IV placed.    Treatment Conditions:  Lab Results   Component Value Date    HGB 9.4 04/27/2020     Lab Results   Component Value Date    WBC 4.3 04/27/2020      Lab Results   Component Value Date    ANEU 3.1 04/27/2020     Lab Results   Component Value Date     04/27/2020      Lab Results   Component Value Date     04/27/2020                   Lab Results   Component Value Date    POTASSIUM 4.8 04/27/2020           Lab Results   Component Value Date    MAG 2.0 10/10/2017            Lab Results   Component Value Date    CR 1.33 04/27/2020                   Lab Results   Component Value Date    RICK 8.6 04/27/2020                Lab Results   Component Value Date    BILITOTAL 0.3 04/27/2020           Lab Results   Component Value Date    ALBUMIN 2.9 04/27/2020                    Lab Results   Component Value Date    ALT 23 04/27/2020           Lab Results   Component Value Date    AST 32 04/27/2020       Results reviewed, labs MET treatment parameters, ok to proceed with treatment.      Post Infusion Assessment:  Patient tolerated infusion without incident.  Patient observed for 10 minutes post chemotherapy per protocol.  No evidence of extravasations.  Access discontinued per protocol.       Discharge Plan:   Discharge instructions reviewed with: Patient.  Patient discharged in stable condition accompanied by: wife.  Departure Mode: Ambulatory with walker.    Lia Spence RN

## 2020-04-27 NOTE — PROGRESS NOTES
"Oncology Telephone Follow Up Visit: April 27, 2020     Oncologist: Dr Gerry Ahumada  PCP: Hemant Jiménez    Diagnosis: Stage IV Lung Cancer  Gilson Curtis is an 81 yo who complained of hemoptysis in 11/2011 and was found to have a 2 x 1.9 cm RUL mass. 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   Mr. Curtis and wife were on phone for review of symptoms with continuation of pemetrexed/ carboplatin/pembrolizumab. Pt is reports  No mouth sores with last cycle but does have some loose stools. He reports a very mild temperature rise 98.9 2 days previous but no other cough or illness noted. His feet have neuropathy symptoms that continue- using gabapentin 200 mg nightly. He admits to some trouble sleeping but he is not using any medication for this. No new pain, nausea, weight changes or SOB.   Rest of comprehensive review of systems is negative.   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 Pérez     Skin cancer, basal cell      Stented coronary artery 2009    x2     TIA (transient ischemic attack) 2002     Current Outpatient Medications   Medication     aspirin 81 MG tablet     calcium carbonate (TUMS) 500 MG chewable tablet     dexamethasone (DECADRON) 4 MG tablet     folic acid (FOLVITE) 1 MG tablet     gabapentin (NEURONTIN) 100 MG capsule     guaiFENesin-codeine (ROBITUSSIN AC) 100-10 MG/5ML solution     lidocaine (LIDODERM) 5 % patch     lisinopril (PRINIVIL,ZESTRIL) 2.5 MG tablet     LORazepam (ATIVAN) 0.5 MG tablet     magic mouthwash suspension, diphenhydrAMINE, lidocaine, aluminum-magnesium & simethicone, (FIRST-MOUTHWASH BLM) compounding kit     Multiple Vitamins-Minerals (EYE VITAMINS PO)     Multiple Vitamins-Minerals (MATURE ADULT CENTURY PO)     ondansetron (ZOFRAN) 8 MG tablet     order for DME     prochlorperazine (COMPAZINE) 10 MG tablet     SENNA-docusate sodium (SENNA S) 8.6-50 MG tablet     simvastatin (ZOCOR) 80 MG tablet     No current facility-administered medications for this visit.      Facility-Administered Medications Ordered in Other Visits   Medication     iopamidol (ISOVUE-370) solution 103 mL     Allergies   Allergen Reactions     No Known Drug Allergy      Physical Exam:   Alert and mentation is normal with good conversation   Results for orders placed or performed in visit on 04/27/20   CBC with platelets differential     Status: Abnormal   Result Value Ref Range    WBC 4.3 4.0 - 11.0 10e9/L    RBC Count 2.84 (L) 4.4 - 5.9 10e12/L    Hemoglobin 9.4 (L) 13.3 - 17.7 g/dL    Hematocrit 29.8 (L) 40.0 - 53.0 %     (H) 78 - 100 fl    MCH 33.1 (H) 26.5 - 33.0 pg    MCHC 31.5 31.5 - 36.5 g/dL    RDW 20.1 (H) 10.0 - 15.0 %    Platelet Count 216 150 - 450 10e9/L    Diff Method Automated Method     % Neutrophils 72.7 %    % Lymphocytes 12.4 %    % Monocytes 13.3  %    % Eosinophils 0.0 %    % Basophils 0.0 %    % Immature Granulocytes 1.6 %    Absolute Neutrophil 3.1 1.6 - 8.3 10e9/L    Absolute Lymphocytes 0.5 (L) 0.8 - 5.3 10e9/L    Absolute Monocytes 0.6 0.0 - 1.3 10e9/L    Absolute Eosinophils 0.0 0.0 - 0.7 10e9/L    Absolute Basophils 0.0 0.0 - 0.2 10e9/L    Abs Immature Granulocytes 0.1 0 - 0.4 10e9/L   Comprehensive metabolic panel     Status: Abnormal   Result Value Ref Range    Sodium 137 133 - 144 mmol/L    Potassium 4.8 3.4 - 5.3 mmol/L    Chloride 106 94 - 109 mmol/L    Carbon Dioxide 23 20 - 32 mmol/L    Anion Gap 8 3 - 14 mmol/L    Glucose 238 (H) 70 - 99 mg/dL    Urea Nitrogen 28 7 - 30 mg/dL    Creatinine 1.33 (H) 0.66 - 1.25 mg/dL    GFR Estimate 49 (L) >60 mL/min/[1.73_m2]    GFR Estimate If Black 57 (L) >60 mL/min/[1.73_m2]    Calcium 8.6 8.5 - 10.1 mg/dL    Bilirubin Total 0.3 0.2 - 1.3 mg/dL    Albumin 2.9 (L) 3.4 - 5.0 g/dL    Protein Total 6.4 (L) 6.8 - 8.8 g/dL    Alkaline Phosphatase 124 40 - 150 U/L    ALT 23 0 - 70 U/L    AST 32 0 - 45 U/L   TSH with free T4 reflex     Status: None   Result Value Ref Range    TSH 0.40 0.40 - 4.00 mU/L     Assessment and Plan:   Stage IV Lung Cancer-patient began treatment with pemetrexed/ carboplatin/pembrolizumab in 2/2020 and today is noted to have continued anemia and kidney disease.  He will continue with cycle 4 today with zometa as well since meeting goals.   Pt will return to see Dr Ahumada with imaging in early May.   Anemia- Hgb=9.4. will continue to monitor   Bone metastasis-patient receiving Zometa - will be due today. Confirms use of calcium.   CKD stage III- mild increase today- will continue to push fluids.   Increased blood sugar- pt reporting has history of elevated blood sugar but feel this is just around the steroid use. Asked to review blood sugars as fasting with next lab. Also reviewed that exercise and diet can change blood sugars. Will continue to follow.   Neuropathic pain to feet-patient using  200 mg nightly. Which is found to be helpful.    Covid 19 prevention- No current symptoms of concern.  If developing symptoms of concern they should call to clinic before coming to clinic for directions for evaluation and treatment.   This was a 14 min Telephone visit  Lazara Leon CNP

## 2020-05-07 NOTE — LETTER
5/7/2020         RE: Gilson Curtis  [address] [address]        Dear Colleague,    Thank you for referring your patient, Gilosn Curtis, to the [facility]. Please see a copy of my visit note below.    [facility]  HEMATOLOGY AND ONCOLOGY    FOLLOW-UP VISIT NOTE    PATIENT NAME: Gilson Curtis MRN # [id number]  DATE OF VISIT: May 7, 2020 YOB: 1940    REFERRING PROVIDER: No referring provider defined for this encounter.    CANCER TYPE: Non small cell lung cancer - adenocarcinoma  STAGE: IV    TREATMENT SUMMARY:  Gilson presented with with hemoptysis starting in November 2011. He underwent CT of chest on Jan 18, 2012 at his primary care clinic which showed a RUL mass 2 x 1.9 cm with no suspicious lymphadenopathy or adrenal masses. 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 He was staged with a PET-CT and 2 lesions were noted. He was staged as T3N0 disease. He had surgical resection of the nodules in RUL and RML. EGFR exon 21 deletion.  He had been on Tarceva since June 2016 and dose was decreased from 150 mg daily to 100 mg daily in July 2017 due to rash. He had disease progression and was switched to osimertinib (Tagrisso) since 8/15/18. He eventually progressed again in jan 2020 and was switched to chemotherapy with carboplatin, pemetrexed and pembrolizumab.     Chemotherapy 2/14/2020 3/16/2020 4/6/2020 4/27/2020   CARBOplatin  mg 350 mg 355 mg 320 mg   pembrolizumab (KEYTRUDA)  200 mg 200 mg 200 mg 200 mg   PEMEtrexed (ALIMTA)  mg 900 mg 900 mg 900 mg       CURRENT INTERVENTIONS:  Carboplatin AUC 5, pemetrexed 500 mg/m  and pembrolizumab 200 mg IV every 3 weeks    SUBJECTIVE   Gilson Cutris is being followed for metastatic lung adenocarcinoma    Gilson has been on osimertinib since August of 2018. He had progressive disease and was switched  "to chemotherapy with carboplatin with pemetrexed and pembrolizumab. He has a marked decline in his health since the last time I saw him. He has marked fatigue. He spends a lot of his time in the bed. He fractured his pelvic bone on left side. He has a hard time sleeping on the right side due to right rib metastasis. Now he cannot sleep on left side as he has heard his pelvic bone crumple and fracture. His appetite is very poor.     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 Pérez     Skin cancer, basal cell      Stented coronary artery 2009    x2     TIA (transient ischemic attack) 2002         CURRENT OUTPATIENT MEDICATIONS     Current Outpatient Medications   Medication Sig     aspirin 81 MG tablet Take 1 tablet by mouth At Bedtime      calcium carbonate (TUMS) 500 MG chewable tablet Take 1 chew tab by mouth as needed for heartburn     dexamethasone (DECADRON) 4 MG tablet TAKE 1 TABLET BY MOUTH TWO TIMES A DAY WITH MEALS. START ONE DAY PRIOR TO ALIMTA, CONTINUE ON THE DAY OF CHEMO AND NEXT DAY     folic acid (FOLVITE) 1 MG tablet Take 1 tablet (1,000 mcg) by mouth daily Begin 7 days before Pemetrexed " (Alimta) starts and continue for 21 days after last Alimta     gabapentin (NEURONTIN) 100 MG capsule Take 2 capsules (200 mg) by mouth At Bedtime     guaiFENesin-codeine (ROBITUSSIN AC) 100-10 MG/5ML solution Take 5-10 mLs by mouth every 4 hours as needed for cough     lidocaine (LIDODERM) 5 % patch Place 1 patch onto the skin every 24 hours To prevent lidocaine toxicity, patient should be patch free for 12 hrs daily.     lisinopril (PRINIVIL,ZESTRIL) 2.5 MG tablet Take 2.5 mg by mouth daily.     LORazepam (ATIVAN) 0.5 MG tablet Take 1 tablet (0.5 mg) by mouth every 4 hours as needed (Anxiety, Nausea/Vomiting or Sleep)     magic mouthwash suspension, diphenhydrAMINE, lidocaine, aluminum-magnesium & simethicone, (FIRST-MOUTHWASH BLM) compounding kit Swish and swallow 5-10 mLs in mouth every 6 hours as needed for mouth sores     Multiple Vitamins-Minerals (EYE VITAMINS PO) Take 1 capsule by mouth daily OTC     Multiple Vitamins-Minerals (MATURE ADULT CENTURY PO) Take 1 tablet by mouth daily OTC     ondansetron (ZOFRAN) 8 MG tablet Take 1 tablet (8 mg) by mouth every 8 hours as needed for nausea (vomiting)     order for DME Equipment being ordered:wheeled walker.     prochlorperazine (COMPAZINE) 10 MG tablet Take 0.5 tablets (5 mg) by mouth every 6 hours as needed (Nausea/Vomiting)     SENNA-docusate sodium (SENNA S) 8.6-50 MG tablet Take 1 tablet by mouth At Bedtime     simvastatin (ZOCOR) 80 MG tablet      No current facility-administered medications for this visit.      Facility-Administered Medications Ordered in Other Visits   Medication     iopamidol (ISOVUE-370) solution 103 mL        ALLERGIES      Allergies   Allergen Reactions     No Known Drug Allergy         REVIEW OF SYSTEMS   As above in the HPI, o/w complete 12-point ROS was negative.     PHYSICAL EXAM   /60 (BP Location: Right arm)   Pulse 96   Temp 96.7  F (35.9  C) (Tympanic)   Resp 18   Wt 64.6 kg (142 lb 6.4 oz)   SpO2 100%   BMI 21.03  kg/m    GEN: NAD  HEENT: PERRL, EOMI, no icterus, injection or pallor. Oropharynx is clear.  LYMPHATICs: no cervical or supraclavicular lymphadenopathy; no other abn lymphadenopathy  PULMONARY: clear with good air entry bilaterally  CARDIOVASCULAR: regular, no murmurs, rubs, or gallops  GASTROINTESTINAL: soft, non-tender, non-distended, normal bowel sounds, no hepatosplenomegaly by percussion or palpation  MUSCULOSKELTAL: warm, well perfused, no edema  NEURO: awake, alert and oriented to time place and person, cranial nerves intact - II - XII, no focal neurologic deficits  SKIN: no rashes     LABORATORY AND IMAGING STUDIES   Labs are stable; continues to have CKD stage III and mild anemia    Recent Labs   Lab Test 04/27/20  1107 04/06/20  1104 03/16/20  1313 03/05/20  0922 02/28/20  1751    139 141 142 136   POTASSIUM 4.8 4.8 4.9 4.5 4.8   CHLORIDE 106 107 107 108 106   CO2 23 26 25 27 26   ANIONGAP 8 6 9 7 4   BUN 28 25 35* 19 23   CR 1.33* 1.12 1.16 1.25 1.17   * 191* 190* 138* 112*   RICK 8.6 8.9 8.9 8.6 8.8     Recent Labs   Lab Test 10/10/17  1033 07/11/17  1037 03/06/17  1000 01/11/17  1000 12/15/16  1055   MAG 2.0 1.9 2.1 2.3 2.0   PHOS 2.3* 2.9 2.7 2.0* 2.7     Recent Labs   Lab Test 04/27/20  1107 04/06/20  1104 03/16/20  1313 03/05/20  0922 02/28/20  1751   WBC 4.3 4.3 14.5* 2.3* 2.7*   HGB 9.4* 11.3* 12.4* 11.4* 11.5*    187 297 198 72*   * 103* 101* 100 97   NEUTROPHIL 72.7 80.7 96.0 46.0 57.1     Recent Labs   Lab Test 04/27/20  1107 04/06/20  1104 03/16/20  1313  12/18/18  0928  08/02/18  0948   BILITOTAL 0.3 0.3 0.3   < > 0.6   < > 1.0   ALKPHOS 124 123 117   < > 82   < > 117   ALT 23 24 24   < > 25   < > 41   AST 32 26 28   < > 25   < > 35   ALBUMIN 2.9* 3.1* 2.9*   < > 3.5   < > 3.5   LDH  --   --   --   --  154  --  152    < > = values in this interval not displayed.     TSH   Date Value Ref Range Status   04/27/2020 0.40 0.40 - 4.00 mU/L Final   04/06/2020 0.35 (L) 0.40 -  4.00 mU/L Final   03/16/2020 0.51 0.40 - 4.00 mU/L Final     No results for input(s): CEA in the last 00420 hours.  Results for orders placed or performed in visit on 05/05/20   CT Chest/Abdomen/Pelvis w Contrast    Narrative    EXAMINATION: CT CHEST/ABDOMEN/PELVIS W CONTRAST, 5/5/2020 11:16 AM    TECHNIQUE:  Helical CT images from the lung bases through the  symphysis pubis were obtained with IV contrast. Contrast dose: 88 mL  isovue 370    COMPARISON: CT to 3/20/2020, 11/5/2019, MR 11/18/2019    HISTORY: Non small cell lung cancer. New liver metastasis; use of  contrast depending on renal function; Non-small cell cancer of right  lung (H); CKD (chronic kidney disease) stage 3, GFR 30-59 ml/min (H);  Liver metastasis (H)    FINDINGS:  CHEST:  LUNGS: Postsurgical changes of right upper lobe lobectomy and right  middle lobe wedge resection. Increased size of the small right pleural  effusion. No left pleural effusion. No pneumothorax. Increased size of  the previously 2 mm groundglass opacity in the superior aspect of the  right lower lobe, now demonstrating solid composition and measuring 4  mm (series 8 image 34). Stable 7 mm nodule along the right middle lobe  resection cavity (series 8 image 85) and a 10 mm nodule along the  right upper lobectomy site (series 8 image 61). Unchanged  consolidative opacity with adjacent groundglass micronodular opacities  in the right lower lobe, adjacent to the pleural effusion.     MEDIASTINUM: Multiple hypodense nodules in the thyroid, the largest  measuring 1.6 cm and the left lobe of the thyroid (series 2 image 6),  unchanged. Ascending aorta and main pulmonary artery are normal in  caliber. No central pulmonary embolism. Heart is not enlarged.  Moderate coronary artery calcifications. No pericardial effusion. No  thoracic lymphadenopathy.     ABDOMEN/PELVIS:  LIVER: Slightly increased size of the posterior right hepatic lobe  hypoattenuating lesion measuring 5.6 x 3.5 cm,  previously 5.1 x 3.5 cm  (series 3 image 278). The lesion abuts the right hemidiaphragm which  demonstrates slight thickening and irregularity similar to prior.  There are numerous additional subcentimeter hepatic hypodensities  which are increased in conspicuity from comparison exam 2/3/2020 and  new from MR abdomen 11/18/2019, suspicious for hepatic malignancy, for  example 7 mm hypodensity in hepatic segment 5 (series 3 image 276).  Multiple additional suspicious tiny hypodensities surrounding the  dominant lesion in the posterior right hepatic lobe.  GALLBLADDER: Surgically absent. No intra or extrahepatic biliary  dilation.  PANCREAS: Fatty atrophy of the pancreas without ductal dilation or  suspicious mass.   SPLEEN: Multiple punctate splenic granulomas.  ADRENAL GLANDS: Within normal limits.  URINARY TRACT: Symmetric cortical enhancement bilaterally. Right renal  atrophy similar to prior. Large left parapelvic cysts are unchanged.  Exophytic 1.3 cm posterior right renal cyst, seen to be  proteinaceous/hemorrhagic cyst on comparison MR 11/18/2019. No  hydronephrosis or hydroureter. The urinary bladder is unremarkable.   REPRODUCTIVE ORGANS: Postsurgical changes of prostatectomy.  BOWEL: Normal caliber of the small and large bowel. No abnormal wall  thickening or inflammatory change. Normal appendix.  PERITONEUM/FLUID: No free fluid or air in the abdomen or pelvis.    VESSELS: No aneurysmal dilatation of the abdominal aorta. The portal,  splenic, and superior mesenteric veins are patent.The origins of the  celiac and superior mesenteric arteries are patent.    LYMPH NODES: No lymphadenopathy in the abdomen or pelvis.    BONES/SOFT TISSUES: Redemonstration of large infiltrating soft tissue  mass centered about the left iliac bone with unchanged minimally  displaced pathologic left iliac bone fracture and diffuse erosive  changes (series 3 image 505). Diffuse osteopenia. Unchanged sclerosis  of the right seventh  rib. Increased peripheral sclerosis of the  posterior right 10th rib (series 3 image 336). New sclerosis of the  left sixth rib (series 3 image 167). New sclerotic focus in the L5  vertebral body. Bilateral L5 pars defects.      Impression    IMPRESSION: In this patient with a history of metastatic lung cancer,  since 2/3/2020, there is evidence of disease progression.  1. Stable postsurgical changes of the right lung with increased size  of a now 4 mm pulmonary nodule in the superior most aspect of the  remaining right lower lobe, indeterminate but concerning for  metastasis. Recommend close attention on follow-up.  2. Increased size of the right pleural effusion with stable adjacent  atelectasis versus consolidation and likely infectious/inflammatory  micronodules.  3. Slightly increased size of the metastatic posterior right hepatic  mass with stable suspected infiltration of the adjacent right  hemidiaphragm. Multiple additional subcentimeter hepatic hypodensities  are increased in conspicuity from comparison examination and new since  MR abdomen 11/18/2019, favored to represent additional hepatic  metastasis.  4. New sclerosis of the left sixth rib, posterior right 10th rib, and  L5 vertebral body suspicious for new foci of osseous metastases.  5. Redemonstration of the large infiltrating soft tissue mass centered  about the left iliac bone with unchanged displaced pathologic fracture  and erosive changes.    I have personally reviewed the examination and initial interpretation  and I agree with the findings.    TA GARAY, DO          ASSESSMENT AND PLAN   Non small cell lung cancer - adenocarcinoma with EGFR mutation (exxon 21 deletion)   On erlotinib since June 2016 which was switched to osimertinib with progressive disease now progressing on chemohormonal therapy with carboplatin, pemetrexed and pembrolizumab.   ECOG PS 1  HTN, CKD, CAD    Zane was seen in person in presence of his wife and my clinic  coordinator Sapphire Montiel. He has marked decline in his health since his last visit. He did not tolerate chemotherapy well. He has no energy or appetite. He has marked fatigue on therapy. He has increased somnolence. He has pain from disease progression. It is quite surprising that he has progressed on a 3 drug regimen. He previously had stable disease on targeted therapies for several years now. He has progressed on platinum pemetrexed combination with immunotherapy. This would mean that we have lost chance for both a platinum doublet and immune checkpoint inhibitor at the same time.     I would recommend either chemotherapy with docetaxel or hospice for him. I reviewed chemotherapy with docetaxel. I extensively reviewed the side effects from this chemotherapy.  We reviewed the peripheral neuropathy, alopecia, neutropenic fevers, myelosuppression, mucositis, diarrhea, allergies, pedal edema and rash.  Of these, alopecia and nail changes have more cosmetic bearings.  Neutropenic fevers are something to be quite cognizant about. He should take every fever seriously because if his counts are low, then he would not have the defenses and he should give us a call immediately.  He should not even wait for the next morning.  He should be seen either in the clinic or in the ER the same day.  We would get basic blood tests including the CBC and blood cultures.  We would do a fever workup for him.  If the counts were adequate, we could discharge him home on antibiotics if he looked stable.  If his counts were low, even if he was feeling fine, we would have to admit him to ensure that he was safe and stable. We reviewed the palliative intent, side effects, benefits, rationale, alternatives and outpatient regimen with him.      He is not happy about the prospect of additional chemotherapy. He is not in best of health for chemotherapy. I would suggest that we give him a 2-4 week break. I would follow him over phone. If he improves  a little and is willing to try chemotherapy, I would get it started for him.     Addendum: I followed up with him 2 weeks later and he admitted that he is not doing any better but feels worse. This is likely due to the disease progression. He understands this and is not interested in further chemotherapy but is not ready for hospice as yet.       Over 45 min of direct face to face time spent with patient with more than 50% time spent in counseling and coordinating care.      Again, thank you for allowing me to participate in the care of your patient.        Sincerely,        Gerry Ahumada MD

## 2020-05-07 NOTE — PROGRESS NOTES
[facility]  HEMATOLOGY AND ONCOLOGY    FOLLOW-UP VISIT NOTE    PATIENT NAME: Gilson Curtis MRN # [id number]  DATE OF VISIT: May 7, 2020 YOB: 1940    REFERRING PROVIDER: No referring provider defined for this encounter.    CANCER TYPE: Non small cell lung cancer - adenocarcinoma  STAGE: IV    TREATMENT SUMMARY:  Gilson presented with with hemoptysis starting in November 2011. He underwent CT of chest on Jan 18, 2012 at his primary care clinic which showed a RUL mass 2 x 1.9 cm with no suspicious lymphadenopathy or adrenal masses. 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 He was staged with a PET-CT and 2 lesions were noted. He was staged as T3N0 disease. He had surgical resection of the nodules in RUL and RML. EGFR exon 21 deletion.  He had been on Tarceva since June 2016 and dose was decreased from 150 mg daily to 100 mg daily in July 2017 due to rash. He had disease progression and was switched to osimertinib (Tagrisso) since 8/15/18. He eventually progressed again in jan 2020 and was switched to chemotherapy with carboplatin, pemetrexed and pembrolizumab.     Chemotherapy 2/14/2020 3/16/2020 4/6/2020 4/27/2020   CARBOplatin  mg 350 mg 355 mg 320 mg   pembrolizumab (KEYTRUDA)  200 mg 200 mg 200 mg 200 mg   PEMEtrexed (ALIMTA)  mg 900 mg 900 mg 900 mg       CURRENT INTERVENTIONS:  Carboplatin AUC 5, pemetrexed 500 mg/m  and pembrolizumab 200 mg IV every 3 weeks    SUBJECTIVE   Gilson Curtis is being followed for metastatic lung adenocarcinoma    Gilson has been on osimertinib since August of 2018. He had progressive disease and was switched to chemotherapy with carboplatin with pemetrexed and pembrolizumab. He has a marked decline in his health since the last time I saw him. He has marked fatigue. He spends a lot of his time in the bed. He fractured his pelvic bone on left side. He  "has a hard time sleeping on the right side due to right rib metastasis. Now he cannot sleep on left side as he has heard his pelvic bone crumple and fracture. His appetite is very poor.     Wt Readings from Last 10 Encounters:   05/07/20 64.6 kg (142 lb 6.4 oz)   04/27/20 65.1 kg (143 lb 9.6 oz)   04/06/20 64.3 kg (141 lb 11.2 oz)   04/06/20 64.4 kg (142 lb)   03/16/20 64.3 kg (141 lb 12.8 oz)   03/05/20 63.8 kg (140 lb 11.2 oz)   02/28/20 64.2 kg (141 lb 8 oz)   02/14/20 65.6 kg (144 lb 11.2 oz)   02/06/20 65.5 kg (144 lb 4.8 oz)   02/05/20 66.2 kg (146 lb)          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     CAD (coronary artery disease)      CKD (chronic kidney disease) stage 3, GFR 30-59 ml/min (H)      Fatigue     \"spells\"     Hypertension      Lung cancer (H) 2016     Melanoma (H)      Non-small cell lung cancer (H)      Prostate cancer (H) 2009     S/P radiation therapy     3,000 cGy to right ribs completed on 6/19/2018 - [facility] with Dr. Pérez     Skin cancer, basal cell      Stented coronary artery 2009    x2     TIA (transient ischemic attack) 2002         CURRENT OUTPATIENT MEDICATIONS     Current Outpatient Medications   Medication Sig     aspirin 81 MG tablet Take 1 tablet by mouth At Bedtime      calcium carbonate (TUMS) 500 MG chewable tablet Take 1 chew tab by mouth as needed for heartburn     dexamethasone (DECADRON) 4 MG tablet TAKE 1 TABLET BY MOUTH TWO TIMES A DAY WITH MEALS. START ONE DAY PRIOR TO ALIMTA, CONTINUE ON THE DAY OF CHEMO AND NEXT DAY     folic acid (FOLVITE) 1 MG tablet Take 1 tablet (1,000 mcg) by mouth daily Begin 7 days before Pemetrexed (Alimta) starts and continue for 21 days after last Alimta     gabapentin (NEURONTIN) 100 MG capsule Take 2 capsules (200 mg) by mouth At Bedtime     guaiFENesin-codeine (ROBITUSSIN AC) 100-10 MG/5ML solution Take 5-10 mLs by mouth every 4 hours as " needed for cough     lidocaine (LIDODERM) 5 % patch Place 1 patch onto the skin every 24 hours To prevent lidocaine toxicity, patient should be patch free for 12 hrs daily.     lisinopril (PRINIVIL,ZESTRIL) 2.5 MG tablet Take 2.5 mg by mouth daily.     LORazepam (ATIVAN) 0.5 MG tablet Take 1 tablet (0.5 mg) by mouth every 4 hours as needed (Anxiety, Nausea/Vomiting or Sleep)     magic mouthwash suspension, diphenhydrAMINE, lidocaine, aluminum-magnesium & simethicone, (FIRST-MOUTHWASH BLM) compounding kit Swish and swallow 5-10 mLs in mouth every 6 hours as needed for mouth sores     Multiple Vitamins-Minerals (EYE VITAMINS PO) Take 1 capsule by mouth daily OTC     Multiple Vitamins-Minerals (MATURE ADULT CENTURY PO) Take 1 tablet by mouth daily OTC     ondansetron (ZOFRAN) 8 MG tablet Take 1 tablet (8 mg) by mouth every 8 hours as needed for nausea (vomiting)     order for DME Equipment being ordered:wheeled walker.     prochlorperazine (COMPAZINE) 10 MG tablet Take 0.5 tablets (5 mg) by mouth every 6 hours as needed (Nausea/Vomiting)     SENNA-docusate sodium (SENNA S) 8.6-50 MG tablet Take 1 tablet by mouth At Bedtime     simvastatin (ZOCOR) 80 MG tablet      No current facility-administered medications for this visit.      Facility-Administered Medications Ordered in Other Visits   Medication     iopamidol (ISOVUE-370) solution 103 mL        ALLERGIES      Allergies   Allergen Reactions     No Known Drug Allergy         REVIEW OF SYSTEMS   As above in the HPI, o/w complete 12-point ROS was negative.     PHYSICAL EXAM   /60 (BP Location: Right arm)   Pulse 96   Temp 96.7  F (35.9  C) (Tympanic)   Resp 18   Wt 64.6 kg (142 lb 6.4 oz)   SpO2 100%   BMI 21.03 kg/m    GEN: NAD  HEENT: PERRL, EOMI, no icterus, injection or pallor. Oropharynx is clear.  LYMPHATICs: no cervical or supraclavicular lymphadenopathy; no other abn lymphadenopathy  PULMONARY: clear with good air entry bilaterally  CARDIOVASCULAR:  regular, no murmurs, rubs, or gallops  GASTROINTESTINAL: soft, non-tender, non-distended, normal bowel sounds, no hepatosplenomegaly by percussion or palpation  MUSCULOSKELTAL: warm, well perfused, no edema  NEURO: awake, alert and oriented to time place and person, cranial nerves intact - II - XII, no focal neurologic deficits  SKIN: no rashes     LABORATORY AND IMAGING STUDIES   Labs are stable; continues to have CKD stage III and mild anemia    Recent Labs   Lab Test 04/27/20  1107 04/06/20  1104 03/16/20  1313 03/05/20  0922 02/28/20  1751    139 141 142 136   POTASSIUM 4.8 4.8 4.9 4.5 4.8   CHLORIDE 106 107 107 108 106   CO2 23 26 25 27 26   ANIONGAP 8 6 9 7 4   BUN 28 25 35* 19 23   CR 1.33* 1.12 1.16 1.25 1.17   * 191* 190* 138* 112*   RICK 8.6 8.9 8.9 8.6 8.8     Recent Labs   Lab Test 10/10/17  1033 07/11/17  1037 03/06/17  1000 01/11/17  1000 12/15/16  1055   MAG 2.0 1.9 2.1 2.3 2.0   PHOS 2.3* 2.9 2.7 2.0* 2.7     Recent Labs   Lab Test 04/27/20  1107 04/06/20  1104 03/16/20  1313 03/05/20  0922 02/28/20  1751   WBC 4.3 4.3 14.5* 2.3* 2.7*   HGB 9.4* 11.3* 12.4* 11.4* 11.5*    187 297 198 72*   * 103* 101* 100 97   NEUTROPHIL 72.7 80.7 96.0 46.0 57.1     Recent Labs   Lab Test 04/27/20  1107 04/06/20  1104 03/16/20  1313  12/18/18  0928  08/02/18  0948   BILITOTAL 0.3 0.3 0.3   < > 0.6   < > 1.0   ALKPHOS 124 123 117   < > 82   < > 117   ALT 23 24 24   < > 25   < > 41   AST 32 26 28   < > 25   < > 35   ALBUMIN 2.9* 3.1* 2.9*   < > 3.5   < > 3.5   LDH  --   --   --   --  154  --  152    < > = values in this interval not displayed.     TSH   Date Value Ref Range Status   04/27/2020 0.40 0.40 - 4.00 mU/L Final   04/06/2020 0.35 (L) 0.40 - 4.00 mU/L Final   03/16/2020 0.51 0.40 - 4.00 mU/L Final     No results for input(s): CEA in the last 20372 hours.  Results for orders placed or performed in visit on 05/05/20   CT Chest/Abdomen/Pelvis w Contrast    Narrative    EXAMINATION: CT  CHEST/ABDOMEN/PELVIS W CONTRAST, 5/5/2020 11:16 AM    TECHNIQUE:  Helical CT images from the lung bases through the  symphysis pubis were obtained with IV contrast. Contrast dose: 88 mL  isovue 370    COMPARISON: CT to 3/20/2020, 11/5/2019, MR 11/18/2019    HISTORY: Non small cell lung cancer. New liver metastasis; use of  contrast depending on renal function; Non-small cell cancer of right  lung (H); CKD (chronic kidney disease) stage 3, GFR 30-59 ml/min (H);  Liver metastasis (H)    FINDINGS:  CHEST:  LUNGS: Postsurgical changes of right upper lobe lobectomy and right  middle lobe wedge resection. Increased size of the small right pleural  effusion. No left pleural effusion. No pneumothorax. Increased size of  the previously 2 mm groundglass opacity in the superior aspect of the  right lower lobe, now demonstrating solid composition and measuring 4  mm (series 8 image 34). Stable 7 mm nodule along the right middle lobe  resection cavity (series 8 image 85) and a 10 mm nodule along the  right upper lobectomy site (series 8 image 61). Unchanged  consolidative opacity with adjacent groundglass micronodular opacities  in the right lower lobe, adjacent to the pleural effusion.     MEDIASTINUM: Multiple hypodense nodules in the thyroid, the largest  measuring 1.6 cm and the left lobe of the thyroid (series 2 image 6),  unchanged. Ascending aorta and main pulmonary artery are normal in  caliber. No central pulmonary embolism. Heart is not enlarged.  Moderate coronary artery calcifications. No pericardial effusion. No  thoracic lymphadenopathy.     ABDOMEN/PELVIS:  LIVER: Slightly increased size of the posterior right hepatic lobe  hypoattenuating lesion measuring 5.6 x 3.5 cm, previously 5.1 x 3.5 cm  (series 3 image 278). The lesion abuts the right hemidiaphragm which  demonstrates slight thickening and irregularity similar to prior.  There are numerous additional subcentimeter hepatic hypodensities  which are increased  in conspicuity from comparison exam 2/3/2020 and  new from MR abdomen 11/18/2019, suspicious for hepatic malignancy, for  example 7 mm hypodensity in hepatic segment 5 (series 3 image 276).  Multiple additional suspicious tiny hypodensities surrounding the  dominant lesion in the posterior right hepatic lobe.  GALLBLADDER: Surgically absent. No intra or extrahepatic biliary  dilation.  PANCREAS: Fatty atrophy of the pancreas without ductal dilation or  suspicious mass.   SPLEEN: Multiple punctate splenic granulomas.  ADRENAL GLANDS: Within normal limits.  URINARY TRACT: Symmetric cortical enhancement bilaterally. Right renal  atrophy similar to prior. Large left parapelvic cysts are unchanged.  Exophytic 1.3 cm posterior right renal cyst, seen to be  proteinaceous/hemorrhagic cyst on comparison MR 11/18/2019. No  hydronephrosis or hydroureter. The urinary bladder is unremarkable.   REPRODUCTIVE ORGANS: Postsurgical changes of prostatectomy.  BOWEL: Normal caliber of the small and large bowel. No abnormal wall  thickening or inflammatory change. Normal appendix.  PERITONEUM/FLUID: No free fluid or air in the abdomen or pelvis.    VESSELS: No aneurysmal dilatation of the abdominal aorta. The portal,  splenic, and superior mesenteric veins are patent.The origins of the  celiac and superior mesenteric arteries are patent.    LYMPH NODES: No lymphadenopathy in the abdomen or pelvis.    BONES/SOFT TISSUES: Redemonstration of large infiltrating soft tissue  mass centered about the left iliac bone with unchanged minimally  displaced pathologic left iliac bone fracture and diffuse erosive  changes (series 3 image 505). Diffuse osteopenia. Unchanged sclerosis  of the right seventh rib. Increased peripheral sclerosis of the  posterior right 10th rib (series 3 image 336). New sclerosis of the  left sixth rib (series 3 image 167). New sclerotic focus in the L5  vertebral body. Bilateral L5 pars defects.      Impression     IMPRESSION: In this patient with a history of metastatic lung cancer,  since 2/3/2020, there is evidence of disease progression.  1. Stable postsurgical changes of the right lung with increased size  of a now 4 mm pulmonary nodule in the superior most aspect of the  remaining right lower lobe, indeterminate but concerning for  metastasis. Recommend close attention on follow-up.  2. Increased size of the right pleural effusion with stable adjacent  atelectasis versus consolidation and likely infectious/inflammatory  micronodules.  3. Slightly increased size of the metastatic posterior right hepatic  mass with stable suspected infiltration of the adjacent right  hemidiaphragm. Multiple additional subcentimeter hepatic hypodensities  are increased in conspicuity from comparison examination and new since  MR abdomen 11/18/2019, favored to represent additional hepatic  metastasis.  4. New sclerosis of the left sixth rib, posterior right 10th rib, and  L5 vertebral body suspicious for new foci of osseous metastases.  5. Redemonstration of the large infiltrating soft tissue mass centered  about the left iliac bone with unchanged displaced pathologic fracture  and erosive changes.    I have personally reviewed the examination and initial interpretation  and I agree with the findings.    TA GARAY, DO          ASSESSMENT AND PLAN   Non small cell lung cancer - adenocarcinoma with EGFR mutation (exxon 21 deletion)   On erlotinib since June 2016 which was switched to osimertinib with progressive disease now progressing on chemohormonal therapy with carboplatin, pemetrexed and pembrolizumab.   ECOG PS 1  HTN, CKD, CAD    Zane was seen in person in presence of his wife and my clinic coordinator Sapphire Montiel. He has marked decline in his health since his last visit. He did not tolerate chemotherapy well. He has no energy or appetite. He has marked fatigue on therapy. He has increased somnolence. He has pain from disease  progression. It is quite surprising that he has progressed on a 3 drug regimen. He previously had stable disease on targeted therapies for several years now. He has progressed on platinum pemetrexed combination with immunotherapy. This would mean that we have lost chance for both a platinum doublet and immune checkpoint inhibitor at the same time.     I would recommend either chemotherapy with docetaxel or hospice for him. I reviewed chemotherapy with docetaxel. I extensively reviewed the side effects from this chemotherapy.  We reviewed the peripheral neuropathy, alopecia, neutropenic fevers, myelosuppression, mucositis, diarrhea, allergies, pedal edema and rash.  Of these, alopecia and nail changes have more cosmetic bearings.  Neutropenic fevers are something to be quite cognizant about. He should take every fever seriously because if his counts are low, then he would not have the defenses and he should give us a call immediately.  He should not even wait for the next morning.  He should be seen either in the clinic or in the ER the same day.  We would get basic blood tests including the CBC and blood cultures.  We would do a fever workup for him.  If the counts were adequate, we could discharge him home on antibiotics if he looked stable.  If his counts were low, even if he was feeling fine, we would have to admit him to ensure that he was safe and stable. We reviewed the palliative intent, side effects, benefits, rationale, alternatives and outpatient regimen with him.      He is not happy about the prospect of additional chemotherapy. He is not in best of health for chemotherapy. I would suggest that we give him a 2-4 week break. I would follow him over phone. If he improves a little and is willing to try chemotherapy, I would get it started for him.     Addendum: I followed up with him 2 weeks later and he admitted that he is not doing any better but feels worse. This is likely due to the disease progression.  He understands this and is not interested in further chemotherapy but is not ready for hospice as yet.       Over 45 min of direct face to face time spent with patient with more than 50% time spent in counseling and coordinating care.

## 2020-05-07 NOTE — NURSING NOTE
"Oncology Rooming Note    May 7, 2020 10:23 AM   Gilson Curtis is a 80 year old male who presents for:    Chief Complaint   Patient presents with     Oncology Clinic Visit     Follow up     Initial Vitals: /60 (BP Location: Right arm)   Pulse 96   Temp 96.7  F (35.9  C) (Tympanic)   Resp 18   Wt 64.6 kg (142 lb 6.4 oz)   SpO2 100%   BMI 21.03 kg/m   Estimated body mass index is 21.03 kg/m  as calculated from the following:    Height as of 4/6/20: 1.753 m (5' 9\").    Weight as of this encounter: 64.6 kg (142 lb 6.4 oz). Body surface area is 1.77 meters squared.  No Pain (0) Comment: Data Unavailable   No LMP for male patient.  Allergies reviewed: Yes  Medications reviewed: Yes    Medications: Medication refills not needed today.  Pharmacy name entered into Create:    [facility] [address] [place] [address] - [address] [facility] [place] - [place] - [address] [facility] [place], [place] - [address]  [id number] - [place] - [address]      Stefania Garcia LPN              "

## 2020-05-21 NOTE — TELEPHONE ENCOUNTER
Spoke with Dewayne today to get update on how he was feeling - last seen two weeks ago; Dr. Ahumada offered patient chemotherapy with Docetaxel.  Patient has not been feeling very well with increased shortness of breath, weakness, fatigue.  At that time, patient stated he would like to take some time off, see how he feels in about a month and decide on whether or not to proceed with treatment.  Today, he states not much has changed from two weeks ago but he is not any worse.  His wife was also recently diagnosed with recurrence of her multiple myeloma and is receiving chemotherapy through [facility].  Will follow up with him in about another two weeks.

## 2020-05-26 NOTE — TELEPHONE ENCOUNTER
Returned call to Katherine, spouse, who wanted to update writer on status of Dewayne, as he is not doing well, she felt Dewayne was not straight forward when writer spoke with him.  He is more weak and struggling more with shortness of breath.  Writer advised that it may be time to consider hospice and writer would connect with Dr. Auhmada to discuss and call her back in the next day or two. Hospice has not been discussed recently.

## 2020-05-28 NOTE — TELEPHONE ENCOUNTER
Writer discussed with Dr. Ahumada regarding hospice; if patient agrees this is a reasonable option.  Writer called and spoke with spouse, Kaye; she spoke with Dewayne briefly, he became teary but agreed.  Writer will submit referral. Dewayne had an episode of SOB this a.m., used his inhaler but didn't help at first but she stated became more stable.

## 2020-05-29 NOTE — PROGRESS NOTES
Writer received a call from [facility] that they didn't service the [place] area.  Writer contacted [facility], [place], [place] and they were accepting of the referral and would be able to contact patient today.  Orders faxed, signed by Dr. Aguila, who was covering clinic today.  FAX confirmed as received to [phone].

## 2020-05-29 NOTE — TELEPHONE ENCOUNTER
Call received from Kaye, she stated that she received a call from [facility]; she understood that Rhodelia could not go out to [place].  She had Dewayne on speaker phone also.  Family wanted to confirm that Dr. Ahumada reviewed Dewayne's medical record and that he agreed with referral to hospice.  Advised that writer did speak to Dr. Ahumada, and he agreed.  Advised that Dewayne's cancer showed progression and was metastatic.  Advised that hospice would make his quality of life better; his cancer was terminal and it is a very reasonable option.  Writer also advised that Dr. Ahumada could call them to confirm.  Patient agreed with plan and they will schedule a consult with Hospice.

## 2020-06-02 NOTE — TELEPHONE ENCOUNTER
I spoke to patient and his wife. He had a a particularly difficult night last night. He has been dealing with increasing pain in his chest, back, abdomen and also legs. The pain is not worst in intensity but he cannot get comfortable.     We had discussed additional chemotherapy vs hospice. He is not getting any better and pain is getting worse. He is not fit for additional chemotherapy and is open to the idea of hospice. I would recommend hospice for him.     He understands and agrees. I offered help and support. I can be reached if the hospice team needs me for any reason but usually they have their team physician with whom they work.     Gerry Ahumada    Hematologist and Medical Oncologist  [facility]

## 2020-08-11 DIAGNOSIS — Z79.899 ENCOUNTER FOR LONG-TERM (CURRENT) USE OF OTHER MEDICATIONS: ICD-10-CM

## 2022-11-07 NOTE — TELEPHONE ENCOUNTER
Oral Chemotherapy Monitoring Program       Primary Oncologist: Dr. Haywood  Primary Oncology Clinic: [facility]  Cancer Diagnosis: Lung Cancer     Therapy History: Confirmed patient taking Tarceva 150mg (1 x 150mg) once daily, Continuously   Start Date: 5/11/16                 Drug Interaction Assessment: No new drug interactions.  Lab Monitoring Plan  Monitoring plan:     C1D1+     CMP   C2D1+   Call, CMP, Mg, Phos   C3D1+   Call, CMP, Mg, Phos   C4D1+   Call, CMP, Mg, Phos   C5D1+   Call, CMP, Mg, Phos   C6D1+   Call, CMP, Mg, Phos     C1D8+      C2D8+      C3D8+      C4D8+      C5D8+      C6D8+        C1D15+   Call   C2D15+      C3D15+      C4D15+      C5D15+      C6D15+        C1D22+      C2D22+      C3D22+      C4D22+      C5D22+      C6D22+            Subjective/Objective:  Gilson Curtis is a 77 year old male contacted by phone for a follow-up visit for oral chemotherapy.  Gilson recently returned from a bus tour vacation. Reports the rash has not improved around his nose and discussed OTC options. He uses Aveno cream and does not use hydrocortisone cream anymore. He reports no medication changes, no missed doses, and no other side effects. He said the liaison will follow up next week to set up delivery. He has 12 day supply remaining.     ORAL CHEMOTHERAPY 6/9/2016 7/8/2016 7/26/2016 9/6/2016 11/9/2016 1/16/2017 3/1/2017   Drug Name Tarceva (Erlotinib) Tarceva (Erlotinib) Tarceva (Erlotinib) Tarceva (Erlotinib) Tarceva (Erlotinib) Tarceva (Erlotinib) Tarceva (Erlotinib)   Current Dosage 150mg 150mg 150mg 1500mg 150mg 150mg 150mg   Current Schedule Daily Daily Daily Daily Daily Daily Daily   Cycle Details Continuous Continuous Continuous Continuous Continuous Continuous Continuous   Start Date of Last Cycle 5/11/2016 6/11/2016 7/11/2016 8/12/2016 10/10/2016 - -   Planned next cycle start date - - - - 11/10/2016 - -   Doses missed in last 2 weeks 0 0 0 0 0 0 0   Adherence Assessment - - - - - -  "Adherent   Adverse Effects Rash Rash Rash Rash EGFR rash EGFR rash EGFR rash   EGFR rash - - - - Grade 1 Grade 1 Grade 1   Pharmacist Intervention(EGFR) - - - - No No Yes   Intervention(s) - - - - - - Patient education   Home BPs not needed not needed not needed all BPs<140/90 not needed Not applicable not needed   Any new drug interactions? - - - - No No No   Is the dose as ordered appropriate for the patient? - - - - - - Yes   Is the patient currently in pain? - - - - No No No   Has the patient missed any days of school, work, or other routine activity? - - - - - - No       Last PHQ-2 Score on record:   PHQ-2 ( 1999 [employer]) 1/11/2017 10/12/2016   Q1: Little interest or pleasure in doing things 0 0   Q2: Feeling down, depressed or hopeless 0 0   PHQ-2 Score 0 0       Patient does not report depression symptoms.      Vitals:  BP:   BP Readings from Last 1 Encounters:   01/11/17 143/74     Wt Readings from Last 1 Encounters:   01/11/17 75.6 kg (166 lb 9.6 oz)     Estimated body surface area is 1.92 meters squared as calculated from the following:    Height as of 1/11/17: 1.753 m (5' 9.02\").    Weight as of 1/11/17: 75.6 kg (166 lb 9.6 oz).    Labs:  Lab Results   Component Value Date     01/11/2017      Lab Results   Component Value Date    POTASSIUM 5.1 01/11/2017     Lab Results   Component Value Date    RICK 8.8 01/11/2017     Lab Results   Component Value Date    ALBUMIN 3.6 01/11/2017     Lab Results   Component Value Date    MAG 2.3 01/11/2017     Lab Results   Component Value Date    PHOS 2.0 01/11/2017     Lab Results   Component Value Date    BUN 20 01/11/2017     Lab Results   Component Value Date    CR 1.59 01/11/2017       Lab Results   Component Value Date    AST 24 01/11/2017     Lab Results   Component Value Date    ALT 25 01/11/2017     Lab Results   Component Value Date    BILITOTAL 1.1 01/11/2017       Lab Results   Component Value Date    WBC 6.2 01/11/2017     Lab Results   Component Value " Date    HGB 15.6 01/11/2017     Lab Results   Component Value Date     01/11/2017     12/15/2016     Lab Results   Component Value Date    ANEU 3.8 01/11/2017           Assessment:  Rash is stable and Gilson agrees with current treatment plan.     Plan:  Continue Tarceva 150mg po once daily.      Follow-Up:  Next appointment with Dr. Haywood 3/8/2017  Next labs and radiology 3/6/2017  Oral chemotherapy management in 4 weeks     Refill Due:  Has 12 day supply, deliver by 3/10/17  Next Tarceva rx release 2/27/2017  Fills at site 19        Jj Hassan, PharmD  Therapy Management Pharmacist  [facility]    Phone: [phone]     (1) More than 48 hours/None

## 2023-02-22 NOTE — TELEPHONE ENCOUNTER
Central Prior Authorization Team   [phone]    PA Initiation    Medication: Lidocaine 5% patches  Insurance Company: [employer] - Phone [phone] Fax [phone]  Pharmacy Filling the Rx: [id number] - [place] - [address]  Filling Pharmacy Phone: [phone]  Filling Pharmacy Fax: [phone]  Start Date: 2/4/2020                 Patient is on lab schedule and states he needs a PSA, please place orders